# Patient Record
Sex: MALE | Race: BLACK OR AFRICAN AMERICAN | Employment: OTHER | URBAN - METROPOLITAN AREA
[De-identification: names, ages, dates, MRNs, and addresses within clinical notes are randomized per-mention and may not be internally consistent; named-entity substitution may affect disease eponyms.]

---

## 2017-01-17 ENCOUNTER — OFFICE VISIT (OUTPATIENT)
Dept: GASTROENTEROLOGY | Facility: CLINIC | Age: 72
End: 2017-01-17
Attending: PHYSICIAN ASSISTANT
Payer: COMMERCIAL

## 2017-01-17 VITALS
HEIGHT: 68 IN | OXYGEN SATURATION: 100 % | DIASTOLIC BLOOD PRESSURE: 82 MMHG | SYSTOLIC BLOOD PRESSURE: 148 MMHG | WEIGHT: 173 LBS | BODY MASS INDEX: 26.22 KG/M2 | TEMPERATURE: 97.7 F | HEART RATE: 74 BPM

## 2017-01-17 DIAGNOSIS — K74.60 CIRRHOSIS OF LIVER WITHOUT ASCITES, UNSPECIFIED HEPATIC CIRRHOSIS TYPE (H): ICD-10-CM

## 2017-01-17 DIAGNOSIS — R74.8 ALKALINE PHOSPHATASE ELEVATION: ICD-10-CM

## 2017-01-17 DIAGNOSIS — M35.3 PMR (POLYMYALGIA RHEUMATICA) (H): ICD-10-CM

## 2017-01-17 DIAGNOSIS — Z86.19 HISTORY OF HEPATITIS C: ICD-10-CM

## 2017-01-17 DIAGNOSIS — K74.60 CIRRHOSIS OF LIVER WITHOUT ASCITES, UNSPECIFIED HEPATIC CIRRHOSIS TYPE (H): Primary | ICD-10-CM

## 2017-01-17 DIAGNOSIS — N18.30 CKD (CHRONIC KIDNEY DISEASE) STAGE 3, GFR 30-59 ML/MIN (H): ICD-10-CM

## 2017-01-17 LAB
ALBUMIN SERPL-MCNC: 2.9 G/DL (ref 3.4–5)
ALP SERPL-CCNC: 240 U/L (ref 40–150)
ALT SERPL W P-5'-P-CCNC: 27 U/L (ref 0–70)
ANION GAP SERPL CALCULATED.3IONS-SCNC: 7 MMOL/L (ref 3–14)
AST SERPL W P-5'-P-CCNC: 40 U/L (ref 0–45)
BILIRUB DIRECT SERPL-MCNC: 0.4 MG/DL (ref 0–0.2)
BILIRUB SERPL-MCNC: 0.8 MG/DL (ref 0.2–1.3)
BUN SERPL-MCNC: 9 MG/DL (ref 7–30)
CALCIUM SERPL-MCNC: 8.2 MG/DL (ref 8.5–10.1)
CHLORIDE SERPL-SCNC: 106 MMOL/L (ref 94–109)
CO2 SERPL-SCNC: 28 MMOL/L (ref 20–32)
CREAT SERPL-MCNC: 1.15 MG/DL (ref 0.66–1.25)
ERYTHROCYTE [DISTWIDTH] IN BLOOD BY AUTOMATED COUNT: 15.6 % (ref 10–15)
GFR SERPL CREATININE-BSD FRML MDRD: 63 ML/MIN/1.7M2
GLUCOSE SERPL-MCNC: 96 MG/DL (ref 70–99)
HCT VFR BLD AUTO: 37.7 % (ref 40–53)
HGB BLD-MCNC: 12.5 G/DL (ref 13.3–17.7)
INR PPP: 0.98 (ref 0.86–1.14)
MCH RBC QN AUTO: 28.2 PG (ref 26.5–33)
MCHC RBC AUTO-ENTMCNC: 33.2 G/DL (ref 31.5–36.5)
MCV RBC AUTO: 85 FL (ref 78–100)
PLATELET # BLD AUTO: 117 10E9/L (ref 150–450)
POTASSIUM SERPL-SCNC: 4.1 MMOL/L (ref 3.4–5.3)
PROT SERPL-MCNC: 7.9 G/DL (ref 6.8–8.8)
RBC # BLD AUTO: 4.43 10E12/L (ref 4.4–5.9)
SODIUM SERPL-SCNC: 141 MMOL/L (ref 133–144)
WBC # BLD AUTO: 3.9 10E9/L (ref 4–11)

## 2017-01-17 PROCEDURE — 80076 HEPATIC FUNCTION PANEL: CPT | Performed by: PHYSICIAN ASSISTANT

## 2017-01-17 PROCEDURE — 85610 PROTHROMBIN TIME: CPT | Performed by: PHYSICIAN ASSISTANT

## 2017-01-17 PROCEDURE — 80048 BASIC METABOLIC PNL TOTAL CA: CPT | Performed by: PHYSICIAN ASSISTANT

## 2017-01-17 PROCEDURE — 36415 COLL VENOUS BLD VENIPUNCTURE: CPT | Performed by: PHYSICIAN ASSISTANT

## 2017-01-17 PROCEDURE — 99212 OFFICE O/P EST SF 10 MIN: CPT | Mod: ZF

## 2017-01-17 PROCEDURE — 85027 COMPLETE CBC AUTOMATED: CPT | Performed by: PHYSICIAN ASSISTANT

## 2017-01-17 ASSESSMENT — PAIN SCALES - GENERAL: PAINLEVEL: NO PAIN (0)

## 2017-01-17 NOTE — Clinical Note
1/17/2017      RE: Danielle Cook  [address]   [facility] [address]       Division of Gastroenterology, Hepatology and Nutrition Department of Medicine       Assessment Plan  Cirrhosis, without ascites (MR Ramirez 9/10/15)  History of Hepatitis , SVR 4/2016  CKD stage 3  Polymyalgia Rheumatica    Danielle Cook has  Cirrhosis, weill compensated.  His MELD score based on labs today is 8.  He did achieve a sustained virological response with Harvoni 4/2016.    His US from today shows no suspicious lesions or ascites.  He still reports pruritis all over without any relief from  Woody 300 mg bid and sertraline 50 mg qd.  He stopped both medications.  I will look into another option.     He will continue to need HCC surveillance   He will schedule an US, lab and appointment with me in 6 months.  He is up to date on immunizations.  I recommended he take a multivitamin.    Health Maintenance:  Imaging: US 1/17/17.  No suspicious lesions, no ascites.    EGD: 11/4/15:  No varices  Dexa scan:  1/25/16:  Low bone density:  Advised to take calcium and Vit D  Vaccines ( Hep A/B, pneumovax, influenza):  Up to Date  Multivitamin:  Advised to start taking    Thank you for allowing me to participate in the care of this patient.  If you have any questions regarding this visit and plan of care, please do not hesitate to page me at [phone].    Malinda Morse, MS, PA-C  Division of Gastroenterology, Hepatology and Nutrition    HPI  Danielle Cook has  Cirrhosis, weill compensated.  He is here for a follow-up appointment.  He did achieve a sustained virological response with Harvoni for 12 weeks April 2016. He reports these complaints:  chronic HAs and pruritis.  He did try WOODY and sertraline and the patient stopped both on his own as he felt they were not helping.  I had asked him to call Mary Smith RN, so we could try adjusting the dose, but the patient just stopped instead.  He is interested  in trying another medication.   These are his other medical issues:  Patient Active Problem List   Diagnosis     CKD (chronic kidney disease) stage 3, GFR 30-59 ml/min     Hypertension     Persistent headaches     History of hepatitis C     Cirrhosis of liver without ascites, unspecified hepatic cirrhosis type (H)     PMR (polymyalgia rheumatica) (H)   He is negative for hematemesis, hematochezia or melena.      ROS:  10 point review of systems performed.  All pertinent positives noted in the HPI,  otherwise Negative.    Past Medical History   Diagnosis Date     Hypertension      Hepatitis C virus infection      CKD (chronic kidney disease) stage 3, GFR 30-59 ml/min        Current Outpatient Prescriptions   Medication Sig Dispense Refill     sertraline (ZOLOFT) 50 MG tablet Take 1 tablet (50 mg) by mouth daily 30 tablet 3     acetaminophen (TYLENOL) 325 MG tablet Take 1 tablet (325 mg) by mouth every 6 hours as needed for mild pain 100 tablet 3     omeprazole (PRILOSEC) 40 MG capsule Take 1 capsule (40 mg) by mouth daily (with lunch) 60 capsule 2     cholecalciferol (PA VITAMIN D-3) 2000 UNITS CAPS Take 2,000 Units by mouth       calcium carb 1250 mg, 500 mg [place],/vitamin D 200 units (OSCAL WITH D) 500-200 MG-UNIT per tablet Take 1 tablet by mouth 2 times daily (with meals) 90 tablet 3     amLODIPine (NORVASC) 5 MG tablet Take 2 tablets (10 mg) by mouth daily 30 tablet 12     predniSONE (DELTASONE) 10 MG tablet Take 3 tablets (30 mg) by mouth daily 30 tablet 3     senna-docusate (SENOKOT-S;PERICOLACE) 8.6-50 MG per tablet Take 2 tablets by mouth daily as needed for constipation 20 tablet 1     gabapentin (NEURONTIN) 300 MG tablet Take 800 mg by mouth 3 times daily          No Known Allergies    Family History   Problem Relation Age of Onset     Hypertension Father        Social History     Social History     Marital Status:      Spouse Name: N/A     Number of Children: N/A     Years of Education: N/A  "    Social History Main Topics     Smoking status: Never Smoker      Smokeless tobacco: Never Used     Alcohol Use: No     Drug Use: No     Sexual Activity: Not on file     Other Topics Concern     Not on file     Social History Narrative       OBJECTIVE  Vitals: Blood pressure 148/82, pulse 74, temperature 97.7  F (36.5  C), temperature source Oral, height 1.727 m (5' 8\"), weight 78.472 kg (173 lb), SpO2 100 %. There is no weight on file to calculate BMI.  Gen: No acute distress, well nourished  Head: Normocephalic atraumatic  Eyes: Sclera anicteric  Respiratory: Clear to auscultation bilaterally, no overt wheezing or rales  CV: RRR without overt murmur  Abdomen:  Soft, nontender, nondistended, normal bowel sounds  Without appreciable hepatosplenomegaly or masses   Extrem: No edema  Skin: No jaundice  Neuro: Alert and oriented.     MSK: Grossly Intact  Psych: Normal speech, normal affect    Recent Laboratory Test Results  Lab Results   Component Value Date    ALBUMIN 2.9* 01/17/2017    BILITOTAL 0.8 01/17/2017    ALKPHOS 240* 01/17/2017    AST 40 01/17/2017    ALT 27 01/17/2017    WBC 3.9* 01/17/2017    ANEU 7.1 08/02/2016    HGB 12.5* 01/17/2017    * 01/17/2017    INR 0.98 01/17/2017    TRIG 78 08/02/2016    CR 1.15 01/17/2017    TSH 4.36 09/24/2013       Malinda Morse PA-C      "

## 2017-01-17 NOTE — PROGRESS NOTES
Division of Gastroenterology, Hepatology and Nutrition Department of Medicine       Assessment Plan  Cirrhosis, without ascites (MR Ramirez 9/10/15)  History of Hepatitis , SVR 4/2016  CKD stage 3  Polymyalgia Rheumatica    Danielle Cook has  Cirrhosis, weill compensated.  His MELD score based on labs today is 8.  He did achieve a sustained virological response with Harvoni 4/2016.    His US from today shows no suspicious lesions or ascites.  He still reports pruritis all over without any relief from  Woody 300 mg bid and sertraline 50 mg qd.  He stopped both medications.  I will look into another option.     He will continue to need HCC surveillance   He will schedule an US, lab and appointment with me in 6 months.  He is up to date on immunizations.  I recommended he take a multivitamin.    Health Maintenance:  Imaging: US 1/17/17.  No suspicious lesions, no ascites.    EGD: 11/4/15:  No varices  Dexa scan:  1/25/16:  Low bone density:  Advised to take calcium and Vit D  Vaccines ( Hep A/B, pneumovax, influenza):  Up to Date  Multivitamin:  Advised to start taking    Thank you for allowing me to participate in the care of this patient.  If you have any questions regarding this visit and plan of care, please do not hesitate to page me at [phone].    Malinda Morse, MS, PA-C  Division of Gastroenterology, Hepatology and Nutrition    HPI  Danielle Cook has  Cirrhosis, weill compensated.  He is here for a follow-up appointment.  He did achieve a sustained virological response with Harvoni for 12 weeks April 2016. He reports these complaints:  chronic HAs and pruritis.  He did try WOODY and sertraline and the patient stopped both on his own as he felt they were not helping.  I had asked him to call Mary Smith RN, so we could try adjusting the dose, but the patient just stopped instead.  He is interested in trying another medication.   These are his other medical issues:  Patient Active Problem List    Diagnosis     CKD (chronic kidney disease) stage 3, GFR 30-59 ml/min     Hypertension     Persistent headaches     History of hepatitis C     Cirrhosis of liver without ascites, unspecified hepatic cirrhosis type (H)     PMR (polymyalgia rheumatica) (H)   He is negative for hematemesis, hematochezia or melena.      ROS:  10 point review of systems performed.  All pertinent positives noted in the HPI,  otherwise Negative.    Past Medical History   Diagnosis Date     Hypertension      Hepatitis C virus infection      CKD (chronic kidney disease) stage 3, GFR 30-59 ml/min        Current Outpatient Prescriptions   Medication Sig Dispense Refill     sertraline (ZOLOFT) 50 MG tablet Take 1 tablet (50 mg) by mouth daily 30 tablet 3     acetaminophen (TYLENOL) 325 MG tablet Take 1 tablet (325 mg) by mouth every 6 hours as needed for mild pain 100 tablet 3     omeprazole (PRILOSEC) 40 MG capsule Take 1 capsule (40 mg) by mouth daily (with lunch) 60 capsule 2     cholecalciferol (PA VITAMIN D-3) 2000 UNITS CAPS Take 2,000 Units by mouth       calcium carb 1250 mg, 500 mg [place],/vitamin D 200 units (OSCAL WITH D) 500-200 MG-UNIT per tablet Take 1 tablet by mouth 2 times daily (with meals) 90 tablet 3     amLODIPine (NORVASC) 5 MG tablet Take 2 tablets (10 mg) by mouth daily 30 tablet 12     predniSONE (DELTASONE) 10 MG tablet Take 3 tablets (30 mg) by mouth daily 30 tablet 3     senna-docusate (SENOKOT-S;PERICOLACE) 8.6-50 MG per tablet Take 2 tablets by mouth daily as needed for constipation 20 tablet 1     gabapentin (NEURONTIN) 300 MG tablet Take 800 mg by mouth 3 times daily          No Known Allergies    Family History   Problem Relation Age of Onset     Hypertension Father        Social History     Social History     Marital Status:      Spouse Name: N/A     Number of Children: N/A     Years of Education: N/A     Social History Main Topics     Smoking status: Never Smoker      Smokeless tobacco: Never Used      "Alcohol Use: No     Drug Use: No     Sexual Activity: Not on file     Other Topics Concern     Not on file     Social History Narrative       OBJECTIVE  Vitals: Blood pressure 148/82, pulse 74, temperature 97.7  F (36.5  C), temperature source Oral, height 1.727 m (5' 8\"), weight 78.472 kg (173 lb), SpO2 100 %. There is no weight on file to calculate BMI.  Gen: No acute distress, well nourished  Head: Normocephalic atraumatic  Eyes: Sclera anicteric  Respiratory: Clear to auscultation bilaterally, no overt wheezing or rales  CV: RRR without overt murmur  Abdomen:  Soft, nontender, nondistended, normal bowel sounds  Without appreciable hepatosplenomegaly or masses   Extrem: No edema  Skin: No jaundice  Neuro: Alert and oriented.     MSK: Grossly Intact  Psych: Normal speech, normal affect    Recent Laboratory Test Results  Lab Results   Component Value Date    ALBUMIN 2.9* 01/17/2017    BILITOTAL 0.8 01/17/2017    ALKPHOS 240* 01/17/2017    AST 40 01/17/2017    ALT 27 01/17/2017    WBC 3.9* 01/17/2017    ANEU 7.1 08/02/2016    HGB 12.5* 01/17/2017    * 01/17/2017    INR 0.98 01/17/2017    TRIG 78 08/02/2016    CR 1.15 01/17/2017    TSH 4.36 09/24/2013             "

## 2017-01-17 NOTE — NURSING NOTE
Chief Complaint   Patient presents with     RECHECK     Cirrhosis of Liver without ascites   Pt roomed, vitals, meds, and allergies reviewed with pt. Pt ready for provider.  Enrrique Perez, CMA

## 2017-01-17 NOTE — MR AVS SNAPSHOT
After Visit Summary   1/17/2017    Danielle Cook    MRN: [id number]           Patient Information     Date Of Birth          1945        Visit Information        Provider Department      1/17/2017 9:45 AM Malinda Morse PA-C; [employer] [facility] Hepatology        Today's Diagnoses     Cirrhosis of liver without ascites, unspecified hepatic cirrhosis type (H)    -  1     Alkaline phosphatase elevation            Follow-ups after your visit        Follow-up notes from your care team     Return in about 6 months (around 7/17/2017).      Your next 10 appointments already scheduled     Jul 18, 2017  8:45 AM   Lab with  LAB   [facility] Lab ([facility])    [address] [place] [address] [phone]            Jul 18, 2017  9:15 AM   US ABDOMEN COMPLETE with US[address] [place] [address] Imaging Center US ([facility])    [address] [place] [address] [phone]           Please bring a list of your medicines (including vitamins, minerals and over-the-counter drugs). Also, tell your doctor about any allergies you may have. Wear comfortable clothes and leave your valuables at home.  Adults: No eating or drinking for 8 hours before the exam. You may take medicine with a small sip of water.  Children: - Children 6+ years: No food or drink for 6 hours before exam. - Children 1-5 years: No food or drink for 4 hours before exam. - Infants, breast-fed: may have breast milk up to 2 hours before exam. - Infants, formula: may have bottle until 4 hours before exam.  Please call the Imaging Department at your exam site with any questions.            Jul 18, 2017 11:00 AM   (Arrive by 10:45 AM)   Return General Liver with Malinda Morse PA-C   [facility] Hepatology ([facility])    [address] [address] [phone]  "           Dec 15, 2017  8:00 AM   Lab with  LAB    Health Lab ([facility])    [address]  1st Floor  [facility] [address] [phone]            Dec 15, 2017  8:40 AM   (Arrive by 8:10 AM)   Return Visit with Beto Alvarado MD   [facility] Nephrology ([facility])    [address]  [address] [facility] [address] [phone]              Future tests that were ordered for you today     Open Future Orders        Priority Expected Expires Ordered    INR Routine 7/17/2017 9/17/2017 1/17/2017    CBC with platelets Routine 7/17/2017 9/17/2017 1/17/2017    Hepatic panel Routine 7/17/2017 9/17/2017 1/17/2017    Basic metabolic panel Routine 7/17/2017 9/17/2017 1/17/2017    Alkaline phosphatase isoenzymes Routine 7/17/2017 9/17/2017 1/17/2017    US Abdomen Complete Routine 7/17/2017 9/17/2017 1/17/2017            Who to contact     If you have questions or need follow up information about today's clinic visit or your schedule please contact [facility] HEPATOLOGY directly at [phone].  Normal or non-critical lab and imaging results will be communicated to you by [employer], letter or phone within 4 business days after the clinic has received the results. If you do not hear from us within 7 days, please contact the clinic through [employer] or phone. If you have a critical or abnormal lab result, we will notify you by phone as soon as possible.  Submit refill requests through [employer] or call your pharmacy and they will forward the refill request to us. Please allow 3 business days for your refill to be completed.          Additional Information About Your Visit        [employer] Information     [employer] lets you send messages to your doctor, view your test results, renew your prescriptions, schedule appointments and more. To sign up, go to [email or web address] . Click on \"Log in\" on the left side of the screen, which will take you to the Welcome " "page. Then click on \"Sign up Now\" on the right side of the page.     You will be asked to enter the access code listed below, as well as some personal information. Please follow the directions to create your username and password.     Your access code is: [email or web address]  Expires: 2017  6:30 AM     Your access code will  in 90 days. If you need help or a new code, please call your [place] clinic or [phone].        Care EveryWhere ID     This is your Care EveryWhere ID. This could be used by other organizations to access your [place] medical records  [id number]        Your Vitals Were     Pulse Temperature Height BMI (Body Mass Index) Pulse Oximetry       74 97.7  F (36.5  C) (Oral) 1.727 m (5' 8\") 26.31 kg/m2 100%        Blood Pressure from Last 3 Encounters:   17 148/82   16 158/93   10/14/16 135/71    Weight from Last 3 Encounters:   17 78.472 kg (173 lb)   16 77.973 kg (171 lb 14.4 oz)   10/14/16 76.613 kg (168 lb 14.4 oz)               Primary Care Provider Office Phone # Fax #    Eddie Huff [phone]       [facility] [address] [place] [address]        Thank you!     Thank you for choosing [facility] HEPATOLOGY  for your care. Our goal is always to provide you with excellent care. Hearing back from our patients is one way we can continue to improve our services. Please take a few minutes to complete the written survey that you may receive in the mail after your visit with us. Thank you!             Your Updated Medication List - Protect others around you: Learn how to safely use, store and throw away your medicines at www.disposemymeds.org.          This list is accurate as of: 17 10:38 AM.  Always use your most recent med list.                   Brand Name Dispense Instructions for use    acetaminophen 325 MG tablet    TYLENOL    100 tablet    Take 1 tablet (325 mg) by mouth every 6 hours as needed for mild pain       " amLODIPine 5 MG tablet    NORVASC    30 tablet    Take 2 tablets (10 mg) by mouth daily       calcium carb 1250 mg (500 mg [place])/vitamin D 200 units 500-200 MG-UNIT per tablet    OSCAL with D    90 tablet    Take 1 tablet by mouth 2 times daily (with meals)       gabapentin 300 MG tablet    NEURONTIN     Take 600 mg by mouth 3 times daily       omeprazole 40 MG capsule    priLOSEC    60 capsule    Take 1 capsule (40 mg) by mouth daily (with lunch)

## 2017-07-06 DIAGNOSIS — K74.60 CIRRHOSIS OF LIVER WITHOUT ASCITES, UNSPECIFIED HEPATIC CIRRHOSIS TYPE (H): Primary | ICD-10-CM

## 2017-07-18 ENCOUNTER — OFFICE VISIT (OUTPATIENT)
Dept: GASTROENTEROLOGY | Facility: CLINIC | Age: 72
End: 2017-07-18
Attending: INTERNAL MEDICINE
Payer: COMMERCIAL

## 2017-07-18 VITALS
RESPIRATION RATE: 20 BRPM | OXYGEN SATURATION: 100 % | WEIGHT: 166.2 LBS | TEMPERATURE: 97.5 F | DIASTOLIC BLOOD PRESSURE: 75 MMHG | BODY MASS INDEX: 25.19 KG/M2 | HEART RATE: 67 BPM | HEIGHT: 68 IN | SYSTOLIC BLOOD PRESSURE: 136 MMHG

## 2017-07-18 DIAGNOSIS — K74.69 COMPENSATED HCV CIRRHOSIS (H): ICD-10-CM

## 2017-07-18 DIAGNOSIS — R74.8 ALKALINE PHOSPHATASE ELEVATION: ICD-10-CM

## 2017-07-18 DIAGNOSIS — B19.20 COMPENSATED HCV CIRRHOSIS (H): ICD-10-CM

## 2017-07-18 DIAGNOSIS — K74.60 CIRRHOSIS OF LIVER WITHOUT ASCITES, UNSPECIFIED HEPATIC CIRRHOSIS TYPE (H): ICD-10-CM

## 2017-07-18 DIAGNOSIS — D50.8 OTHER IRON DEFICIENCY ANEMIA: Primary | ICD-10-CM

## 2017-07-18 LAB
ALBUMIN SERPL-MCNC: 2.8 G/DL (ref 3.4–5)
ALP SERPL-CCNC: 270 U/L (ref 40–150)
ALT SERPL W P-5'-P-CCNC: 25 U/L (ref 0–70)
ANION GAP SERPL CALCULATED.3IONS-SCNC: 8 MMOL/L (ref 3–14)
AST SERPL W P-5'-P-CCNC: 33 U/L (ref 0–45)
BILIRUB DIRECT SERPL-MCNC: 0.5 MG/DL (ref 0–0.2)
BILIRUB SERPL-MCNC: 0.9 MG/DL (ref 0.2–1.3)
BUN SERPL-MCNC: 14 MG/DL (ref 7–30)
CALCIUM SERPL-MCNC: 8.1 MG/DL (ref 8.5–10.1)
CHLORIDE SERPL-SCNC: 108 MMOL/L (ref 94–109)
CO2 SERPL-SCNC: 24 MMOL/L (ref 20–32)
CREAT SERPL-MCNC: 1.24 MG/DL (ref 0.66–1.25)
ERYTHROCYTE [DISTWIDTH] IN BLOOD BY AUTOMATED COUNT: 15.7 % (ref 10–15)
GFR SERPL CREATININE-BSD FRML MDRD: 57 ML/MIN/1.7M2
GLUCOSE SERPL-MCNC: 94 MG/DL (ref 70–99)
HCT VFR BLD AUTO: 34 % (ref 40–53)
HGB BLD-MCNC: 11.1 G/DL (ref 13.3–17.7)
INR PPP: 1.1 (ref 0.86–1.14)
MCH RBC QN AUTO: 27.3 PG (ref 26.5–33)
MCHC RBC AUTO-ENTMCNC: 32.6 G/DL (ref 31.5–36.5)
MCV RBC AUTO: 84 FL (ref 78–100)
PLATELET # BLD AUTO: 142 10E9/L (ref 150–450)
POTASSIUM SERPL-SCNC: 3.9 MMOL/L (ref 3.4–5.3)
PROT SERPL-MCNC: 7.7 G/DL (ref 6.8–8.8)
RBC # BLD AUTO: 4.07 10E12/L (ref 4.4–5.9)
SODIUM SERPL-SCNC: 140 MMOL/L (ref 133–144)
WBC # BLD AUTO: 4.7 10E9/L (ref 4–11)

## 2017-07-18 PROCEDURE — 99212 OFFICE O/P EST SF 10 MIN: CPT

## 2017-07-18 RX ORDER — FERROUS SULFATE 325(65) MG
1 TABLET ORAL
Qty: 100 TABLET | Refills: 1 | Status: SHIPPED | OUTPATIENT
Start: 2017-07-18 | End: 2018-04-05

## 2017-07-18 ASSESSMENT — PAIN SCALES - GENERAL: PAINLEVEL: NO PAIN (0)

## 2017-07-18 NOTE — PROGRESS NOTES
Hepatology Clinic note  Danielle Cook   Date of Birth 1945  Date of Service 7/18/2017         Impression/plan:   Danielle Cook is a 72 year old Citizen of [place] man with h/o CKD 3, HTN,  cured HCV, complicated by cirrhosis, previously followed by Malinda Morse. Presenting for follow up.     His liver disease is well compensated with a MELD-Na of 9.   He doesn't have ascites, HE, or EV.   encouraged ongoing adherence with high protein intake and low sodium diet.   - will be due for repeat imaging in 6 months for HCC screening  - due for repeat EGD later this year, last one in 11/2017 without EV.     DERIAN:   Suggested repeat EGD and getting a colonoscopy. He's interested in iron supplementation, and improved nutrition before reassessing in few months. If still with anemia, and/or no response or inappropriate response then will recommend EGD/colonoscopy     Labs alone in 3 months, RTC in 6 months or sooner as needed.     Nataly Darnell MD  [facility] Hepatology clinic    -----------------------------------------------------       HPI:   Danielle Cook is a 72 year old male with cured HCV, complicated by cirrhosis, previously followed by Malinda Morse. He presents with his wife for follow up.     Please refer to prior clinic note for details of initial presentation.     Overall, he feels well and denies acute problems. He notes stable energy, appetite and weight.   Otherwise, denies chest pain, shortness of breath, abdominal pain, nausea, vomiting fevers, chills, bleeding, jaundice, confusion, falls, rash, pruritis, leg swelling or abdominal distention. Has stable bowel habits.         Past Medical History:     Past Medical History:   Diagnosis Date     CKD (chronic kidney disease) stage 3, GFR 30-59 ml/min      Hepatitis C virus infection      Hypertension             Past Surgical History:     Past Surgical History:   Procedure Laterality Date     ESOPHAGOSCOPY, GASTROSCOPY,  "DUODENOSCOPY (EGD), COMBINED N/A 11/4/2015    Procedure: COMBINED ESOPHAGOSCOPY, GASTROSCOPY, DUODENOSCOPY (EGD), BIOPSY SINGLE OR MULTIPLE;  Surgeon: Inocente Do MD;  Location:  GI            Medications:     Current Outpatient Prescriptions   Medication     acetaminophen (TYLENOL) 325 MG tablet     omeprazole (PRILOSEC) 40 MG capsule     calcium carb 1250 mg, 500 mg [place],/vitamin D 200 units (OSCAL WITH D) 500-200 MG-UNIT per tablet     amLODIPine (NORVASC) 5 MG tablet     gabapentin (NEURONTIN) 300 MG tablet     No current facility-administered medications for this visit.             Allergies:   No Known Allergies         Social History:     Social History     Social History     Marital status:      Spouse name: N/A     Number of children: N/A     Years of education: N/A     Occupational History     Not on file.     Social History Main Topics     Smoking status: Never Smoker     Smokeless tobacco: Never Used     Alcohol use No     Drug use: No     Sexual activity: Not on file     Other Topics Concern     Not on file     Social History Narrative            Family History:     Family History   Problem Relation Age of Onset     Hypertension Father               Review of Systems:   10 points ROS was obtained and highlighted in the HPI, otherwise negative.          Physical Exam:   VS:  /75  Pulse 67  Temp 97.5  F (36.4  C) (Oral)  Resp 20  Ht 1.727 m (5' 8\")  Wt 75.4 kg (166 lb 3.2 oz)  SpO2 100%  BMI 25.27 kg/m2      Gen: A&Ox3, NAD  HEENT: non-icteric, oropharynx clear  CV: RRR  Lung: CTAB  Abd: soft, NT, ND, spleen is enlarged, liver is not palpable.   Ext: no edema, intact pulses.   Skin: 2stigmata of chronic liver disease.   Neuro: no focal deficits, grossly intact, no asterixis   Psych: appropriate mood and affects         Data:   Reviewed in person and significant for:  Lab Results   Component Value Date    WBC 4.7 07/18/2017     Lab Results   Component Value Date    RBC 4.07 " 07/18/2017     Lab Results   Component Value Date    HGB 11.1 07/18/2017     Lab Results   Component Value Date    HCT 34.0 07/18/2017     No components found for: MCT  Lab Results   Component Value Date    MCV 84 07/18/2017     Lab Results   Component Value Date    MCH 27.3 07/18/2017     Lab Results   Component Value Date    MCHC 32.6 07/18/2017     Lab Results   Component Value Date    RDW 15.7 07/18/2017     Lab Results   Component Value Date     07/18/2017     Last Basic Metabolic Panel:  Lab Results   Component Value Date     07/18/2017      Lab Results   Component Value Date    POTASSIUM 3.9 07/18/2017     Lab Results   Component Value Date    CHLORIDE 108 07/18/2017     Lab Results   Component Value Date    ABDIRASHID 8.1 07/18/2017     Lab Results   Component Value Date    CO2 24 07/18/2017     Lab Results   Component Value Date    BUN 14 07/18/2017     Lab Results   Component Value Date    CR 1.24 07/18/2017     Lab Results   Component Value Date    GLC 94 07/18/2017     Liver Function Studies -   Recent Labs   Lab Test  07/18/17   0811   PROTTOTAL  7.7   ALBUMIN  2.8*   BILITOTAL  0.9   ALKPHOS  270*   AST  33   ALT  25

## 2017-07-18 NOTE — LETTER
7/18/2017       RE: Danielle Cook  [address]   [facility] [address]     Dear Colleague,    Thank you for referring your patient, Dainelle Cook, to the [facility] HEPATOLOGY at [facility]. Please see a copy of my visit note below.    Hepatology Clinic note  Danielle Cook   Date of Birth 1945  Date of Service 7/18/2017         Impression/plan:   Danielle Cook is a 72 year old [place]ian man with h/o CKD 3, HTN,  cured HCV, complicated by cirrhosis, previously followed by Malinda Morse. Presenting for follow up.     His liver disease is well compensated with a MELD-Na of 9.   He doesn't have ascites, HE, or EV.   encouraged ongoing adherence with high protein intake and low sodium diet.   - will be due for repeat imaging in 6 months for HCC screening  - due for repeat EGD later this year, last one in 11/2017 without EV.     DERIAN:   Suggested repeat EGD and getting a colonoscopy. He's interested in iron supplementation, and improved nutrition before reassessing in few months. If still with anemia, and/or no response or inappropriate response then will recommend EGD/colonoscopy     Labs alone in 3 months, RTC in 6 months or sooner as needed.     Nataly Darnell MD  [facility]    -----------------------------------------------------       HPI:   Danielle Cook is a 72 year old male with cured HCV, complicated by cirrhosis, previously followed by Malinda Morse. He presents with his wife for follow up.     Please refer to prior clinic note for details of initial presentation.     Overall, he feels well and denies acute problems. He notes stable energy, appetite and weight.   Otherwise, denies chest pain, shortness of breath, abdominal pain, nausea, vomiting fevers, chills, bleeding, jaundice, confusion, falls, rash, pruritis, leg swelling or abdominal distention. Has stable bowel habits.          "Past Medical History:     Past Medical History:   Diagnosis Date     CKD (chronic kidney disease) stage 3, GFR 30-59 ml/min      Hepatitis C virus infection      Hypertension             Past Surgical History:     Past Surgical History:   Procedure Laterality Date     ESOPHAGOSCOPY, GASTROSCOPY, DUODENOSCOPY (EGD), COMBINED N/A 11/4/2015    Procedure: COMBINED ESOPHAGOSCOPY, GASTROSCOPY, DUODENOSCOPY (EGD), BIOPSY SINGLE OR MULTIPLE;  Surgeon: Inocente Do MD;  Location:  GI            Medications:     Current Outpatient Prescriptions   Medication     acetaminophen (TYLENOL) 325 MG tablet     omeprazole (PRILOSEC) 40 MG capsule     calcium carb 1250 mg, 500 mg [place],/vitamin D 200 units (OSCAL WITH D) 500-200 MG-UNIT per tablet     amLODIPine (NORVASC) 5 MG tablet     gabapentin (NEURONTIN) 300 MG tablet     No current facility-administered medications for this visit.             Allergies:   No Known Allergies         Social History:     Social History     Social History     Marital status:      Spouse name: N/A     Number of children: N/A     Years of education: N/A     Occupational History     Not on file.     Social History Main Topics     Smoking status: Never Smoker     Smokeless tobacco: Never Used     Alcohol use No     Drug use: No     Sexual activity: Not on file     Other Topics Concern     Not on file     Social History Narrative            Family History:     Family History   Problem Relation Age of Onset     Hypertension Father               Review of Systems:   10 points ROS was obtained and highlighted in the HPI, otherwise negative.          Physical Exam:   VS:  /75  Pulse 67  Temp 97.5  F (36.4  C) (Oral)  Resp 20  Ht 1.727 m (5' 8\")  Wt 75.4 kg (166 lb 3.2 oz)  SpO2 100%  BMI 25.27 kg/m2      Gen: A&Ox3, NAD  HEENT: non-icteric, oropharynx clear  CV: RRR  Lung: CTAB  Abd: soft, NT, ND, spleen is enlarged, liver is not palpable.   Ext: no edema, intact pulses.   Skin: " 2stigmata of chronic liver disease.   Neuro: no focal deficits, grossly intact, no asterixis   Psych: appropriate mood and affects         Data:   Reviewed in person and significant for:  Lab Results   Component Value Date    WBC 4.7 07/18/2017     Lab Results   Component Value Date    RBC 4.07 07/18/2017     Lab Results   Component Value Date    HGB 11.1 07/18/2017     Lab Results   Component Value Date    HCT 34.0 07/18/2017     No components found for: MCT  Lab Results   Component Value Date    MCV 84 07/18/2017     Lab Results   Component Value Date    MCH 27.3 07/18/2017     Lab Results   Component Value Date    MCHC 32.6 07/18/2017     Lab Results   Component Value Date    RDW 15.7 07/18/2017     Lab Results   Component Value Date     07/18/2017     Last Basic Metabolic Panel:  Lab Results   Component Value Date     07/18/2017      Lab Results   Component Value Date    POTASSIUM 3.9 07/18/2017     Lab Results   Component Value Date    CHLORIDE 108 07/18/2017     Lab Results   Component Value Date    ABDIRASHID 8.1 07/18/2017     Lab Results   Component Value Date    CO2 24 07/18/2017     Lab Results   Component Value Date    BUN 14 07/18/2017     Lab Results   Component Value Date    CR 1.24 07/18/2017     Lab Results   Component Value Date    GLC 94 07/18/2017     Liver Function Studies -   Recent Labs   Lab Test  07/18/17   0811   PROTTOTAL  7.7   ALBUMIN  2.8*   BILITOTAL  0.9   ALKPHOS  270*   AST  33   ALT  25       Again, thank you for allowing me to participate in the care of your patient.      Sincerely,    Nataly Darnell MD

## 2017-07-18 NOTE — NURSING NOTE
"Chief Complaint   Patient presents with     Allied Health Visit     hep c       Initial /75  Pulse 67  Temp 97.5  F (36.4  C) (Oral)  Resp 20  Ht 1.727 m (5' 8\")  Wt 75.4 kg (166 lb 3.2 oz)  SpO2 100%  BMI 25.27 kg/m2 Estimated body mass index is 25.27 kg/(m^2) as calculated from the following:    Height as of this encounter: 1.727 m (5' 8\").    Weight as of this encounter: 75.4 kg (166 lb 3.2 oz).  Medication Reconciliation: complete    "

## 2017-07-18 NOTE — MR AVS SNAPSHOT
After Visit Summary   7/18/2017    Danielle Cook    MRN: [id number]           Patient Information     Date Of Birth          1945        Visit Information        Provider Department      7/18/2017 10:45 AM Nataly Darnell MD; [employer] [facility]at[facility]        Today's Diagnoses     Other iron deficiency anemia    -  1    Compensated HCV cirrhosis (H)           Follow-ups after your visit        Your next 10 appointments already scheduled     Oct 18, 2017 12:00 PM CDT   LAB with  LAB   [facility] ([facility])    [address] [place] [id number]   [phone]           Patient must bring picture ID.  Patient should be prepared to give a urine specimen  Please do not eat 10-12 hours before your appointment if you are coming in fasting for labs on lipids, cholesterol, or glucose (sugar).  Pregnant women should follow their Care Team instructions. Water with medications is okay. Do not drink coffee or other fluids.   If you have concerns about taking  your medications, please ask at office or if scheduling via [employer], send a message by clicking on Secure Messaging, Message Your Care Team.            Dec 15, 2017  8:00 AM CST   Lab with  LAB   [facility] ([facility])    [address] [place] [phone]   [phone]            Dec 15, 2017  8:40 AM CST   (Arrive by 8:10 AM)   Return Visit with Beto Alvarado MD   [facility] ([facility])    [address] [place] [id number]   [phone]            Jan 22, 2018  9:00 AM CST   Lab with  LAB   [facility] ([facility])    [address] [place] [id number]   [phone]            Jan 22, 2018  9:15 AM CST   US ABDOMEN/PELVIS DUPLEX COMPLETE with [id number]   [facility] ([facility]  [place])    [address] [id number] [phone]           Please bring a list of your medicines (including vitamins, minerals and over-the-counter drugs). Also, tell your doctor about any allergies you may have. Wear comfortable clothes and leave your valuables at home.  Adults: No eating or drinking for 8 hours before the exam. You may take medicine with a small sip of water.  Children: - Children 6+ years: No food or drink for 6 hours before exam. - Children 1-5 years: No food or drink for 4 hours before exam. - Infants, breast-fed: may have breast milk up to 2 hours before exam. - Infants, formula: may have bottle until 4 hours before exam.  Please call the Imaging Department at your exam site with any questions.            Jan 22, 2018 10:30 AM CST   (Arrive by 10:15 AM)   Return General Liver with Nataly Darnell MD   [facility] ([facility])    [address] [place] [id number] [phone]              Future tests that were ordered for you today     Open Future Orders        Priority Expected Expires Ordered    Basic metabolic panel Routine 1/8/2018 6/18/2018 7/18/2017    Hepatic panel Routine 1/8/2018 6/18/2018 7/18/2017    INR Routine 1/8/2018 6/18/2018 7/18/2017    US Abdomen/Pelvis Duplex Complete Routine 1/8/2018 6/18/2018 7/18/2017    CBC with platelets Routine 1/8/2018 6/18/2018 7/18/2017    CBC with platelets Routine 10/18/2017 6/18/2018 7/18/2017    IRON Routine 10/18/2017 6/18/2018 7/18/2017    IRON AND IRON BINDING CAPACITY Routine 10/18/2017 6/18/2018 7/18/2017    FERRITIN Routine 10/18/2017 6/18/2018 7/18/2017    Hepatic panel Routine 10/18/2017 6/18/2018 7/18/2017            Who to contact     If you have questions or need follow up information about today's clinic visit or your schedule please contact [place] directly at [phone].  Normal or non-critical lab and imaging results will  "be communicated to you by [employer], letter or phone within 4 business days after the clinic has received the results. If you do not hear from us within 7 days, please contact the clinic through [employer] or phone. If you have a critical or abnormal lab result, we will notify you by phone as soon as possible.  Submit refill requests through [employer] or call your pharmacy and they will forward the refill request to us. Please allow 3 business days for your refill to be completed.          Additional Information About Your Visit        [employer] Information     [employer] lets you send messages to your doctor, view your test results, renew your prescriptions, schedule appointments and more. To sign up, go to [email or web address] . Click on \"Log in\" on the left side of the screen, which will take you to the Welcome page. Then click on \"Sign up Now\" on the right side of the page.     You will be asked to enter the access code listed below, as well as some personal information. Please follow the directions to create your username and password.     Your access code is: [email or web address]  Expires: 10/2/2017  6:30 AM     Your access code will  in 90 days. If you need help or a new code, please call your [facility] clinic or [phone].        Care EveryWhere ID     This is your Care EveryWhere ID. This could be used by other organizations to access your [facility] medical records  [id number]        Your Vitals Were     Pulse Temperature Respirations Height Pulse Oximetry BMI (Body Mass Index)    67 97.5  F (36.4  C) (Oral) 20 1.727 m (5' 8\") 100% 25.27 kg/m2       Blood Pressure from Last 3 Encounters:   17 136/75   17 148/82   16 (!) 158/93    Weight from Last 3 Encounters:   17 75.4 kg (166 lb 3.2 oz)   17 78.5 kg (173 lb)   16 78 kg (171 lb 14.4 oz)                 Today's Medication Changes          These changes are accurate as of: 17 11:48 AM.  If you have any questions, ask your " nurse or doctor.               Start taking these medicines.        Dose/Directions    ferrous sulfate 325 (65 FE) MG tablet   Commonly known as:  IRON   Used for:  Other iron deficiency anemia   Started by:  Nataly Darnell MD        Dose:  1 tablet   Take 1 tablet (325 mg) by mouth daily (with breakfast)   Quantity:  100 tablet   Refills:  1            Where to get your medicines      These medications were sent to [facility] - [place], [place] - [address] [facility]  [address], [facility] [address]     Phone:  [phone]     ferrous sulfate 325 (65 FE) MG tablet                Primary Care Provider Office Phone # Fax #    Eddie Huff [phone]       [facility] [address] [place] [address]        Equal Access to Services     ISA SORIANO : Hadii janette boogie hadasho Soomaali, waaxda luqadaha, qaybta kaalmada adeegyada, waxcurt acosta. So [facility] [phone].    ATENCIÓN: Si habla español, tiene a whitehead disposición servicios gratuitos de asistencia lingüística. Llame al [phone].    We comply with applicable federal civil rights laws and [place] laws. We do not discriminate on the basis of race, color, national origin, age, disability sex, sexual orientation or gender identity.            Thank you!     Thank you for choosing [facility]  for your care. Our goal is always to provide you with excellent care. Hearing back from our patients is one way we can continue to improve our services. Please take a few minutes to complete the written survey that you may receive in the mail after your visit with us. Thank you!             Your Updated Medication List - Protect others around you: Learn how to safely use, store and throw away your medicines at www.disposemymeds.org.          This list is accurate as of: 7/18/17 11:48 AM.  Always use your most recent med list.                   Brand Name Dispense Instructions for use  Diagnosis    acetaminophen 325 MG tablet    TYLENOL    100 tablet    Take 1 tablet (325 mg) by mouth every 6 hours as needed for mild pain    Other chronic pain       amLODIPine 5 MG tablet    NORVASC    30 tablet    Take 2 tablets (10 mg) by mouth daily    Essential hypertension with goal blood pressure less than 140/90       calcium carb 1250 mg (500 mg [place])/vitamin D 200 units 500-200 MG-UNIT per tablet    OSCAL with D    90 tablet    Take 1 tablet by mouth 2 times daily (with meals)    PMR (polymyalgia rheumatica) (H)       ferrous sulfate 325 (65 FE) MG tablet    IRON    100 tablet    Take 1 tablet (325 mg) by mouth daily (with breakfast)    Other iron deficiency anemia       gabapentin 300 MG tablet    NEURONTIN     Take 600 mg by mouth 3 times daily    Chronic hepatitis C (H), Renal insufficiency       omeprazole 40 MG capsule    priLOSEC    60 capsule    Take 1 capsule (40 mg) by mouth daily (with lunch)    Esophagitis, reflux

## 2017-07-19 LAB
ALP BONE CFR SERPL: 53 %
ALP LIVER SERPL-CCNC: 201 U/L
ALP OTHER CFR SERPL: 0 %
ALP SERPL-CCNC: 254 U/L

## 2017-10-18 DIAGNOSIS — D50.8 OTHER IRON DEFICIENCY ANEMIA: ICD-10-CM

## 2017-10-18 LAB
ALBUMIN SERPL-MCNC: 2.9 G/DL (ref 3.4–5)
ALP SERPL-CCNC: 317 U/L (ref 40–150)
ALT SERPL W P-5'-P-CCNC: 28 U/L (ref 0–70)
AST SERPL W P-5'-P-CCNC: 34 U/L (ref 0–45)
BILIRUB DIRECT SERPL-MCNC: 0.6 MG/DL (ref 0–0.2)
BILIRUB SERPL-MCNC: 1.2 MG/DL (ref 0.2–1.3)
ERYTHROCYTE [DISTWIDTH] IN BLOOD BY AUTOMATED COUNT: 16.3 % (ref 10–15)
FERRITIN SERPL-MCNC: 13 NG/ML (ref 26–388)
HCT VFR BLD AUTO: 35.7 % (ref 40–53)
HGB BLD-MCNC: 11.4 G/DL (ref 13.3–17.7)
IRON SATN MFR SERPL: 11 % (ref 15–46)
IRON SERPL-MCNC: 42 UG/DL (ref 35–180)
MCH RBC QN AUTO: 27.2 PG (ref 26.5–33)
MCHC RBC AUTO-ENTMCNC: 31.9 G/DL (ref 31.5–36.5)
MCV RBC AUTO: 85 FL (ref 78–100)
PLATELET # BLD AUTO: 134 10E9/L (ref 150–450)
PROT SERPL-MCNC: 8 G/DL (ref 6.8–8.8)
RBC # BLD AUTO: 4.19 10E12/L (ref 4.4–5.9)
TIBC SERPL-MCNC: 374 UG/DL (ref 240–430)
WBC # BLD AUTO: 5.4 10E9/L (ref 4–11)

## 2017-11-10 DIAGNOSIS — D50.9 ANEMIA, IRON DEFICIENCY: Primary | ICD-10-CM

## 2017-11-13 ENCOUNTER — TELEPHONE (OUTPATIENT)
Dept: GASTROENTEROLOGY | Facility: CLINIC | Age: 72
End: 2017-11-13

## 2017-11-13 NOTE — TELEPHONE ENCOUNTER
Contacted patient via [employer]  to schedule colonoscopy and EGD as recommended by Dr. Darnell. Explained to patient that procedures were ordered to see why he was anemic.   Patient wants to speak with his provider first and then will call us if he wishes to schedule.    Call back number provided.  Anabell Tariq RN

## 2017-11-15 ENCOUNTER — TELEPHONE (OUTPATIENT)
Dept: GASTROENTEROLOGY | Facility: CLINIC | Age: 72
End: 2017-11-15

## 2017-11-15 NOTE — TELEPHONE ENCOUNTER
Orders per Dr. Darnell: Iron panel and Hemoglobin are still low and not significantly different from before.   I therefore, suggest he gets EGD and colonoscopy for iron deficiency anemia, as we discussed during our last clinic visit.  Contacted interpretor Rhina #[phone] and discussed with pt Dr. Darnell's recommendations. Pt prefers to wait to schedule until seen in January to discuss tests. KATJAI.

## 2017-12-12 DIAGNOSIS — N18.30 CKD (CHRONIC KIDNEY DISEASE) STAGE 3, GFR 30-59 ML/MIN (H): Primary | ICD-10-CM

## 2017-12-15 ENCOUNTER — OFFICE VISIT (OUTPATIENT)
Dept: NEPHROLOGY | Facility: CLINIC | Age: 72
End: 2017-12-15
Attending: INTERNAL MEDICINE
Payer: COMMERCIAL

## 2017-12-15 VITALS
HEART RATE: 82 BPM | WEIGHT: 166 LBS | BODY MASS INDEX: 25.16 KG/M2 | SYSTOLIC BLOOD PRESSURE: 130 MMHG | HEIGHT: 68 IN | TEMPERATURE: 98.2 F | OXYGEN SATURATION: 98 % | DIASTOLIC BLOOD PRESSURE: 76 MMHG

## 2017-12-15 DIAGNOSIS — N18.30 CKD (CHRONIC KIDNEY DISEASE) STAGE 3, GFR 30-59 ML/MIN (H): Primary | ICD-10-CM

## 2017-12-15 DIAGNOSIS — I10 HYPERTENSION, UNSPECIFIED TYPE: ICD-10-CM

## 2017-12-15 DIAGNOSIS — N18.30 CKD (CHRONIC KIDNEY DISEASE) STAGE 3, GFR 30-59 ML/MIN (H): ICD-10-CM

## 2017-12-15 LAB
ALBUMIN SERPL-MCNC: 2.8 G/DL (ref 3.4–5)
ANION GAP SERPL CALCULATED.3IONS-SCNC: 7 MMOL/L (ref 3–14)
BUN SERPL-MCNC: 12 MG/DL (ref 7–30)
CALCIUM SERPL-MCNC: 8.1 MG/DL (ref 8.5–10.1)
CHLORIDE SERPL-SCNC: 106 MMOL/L (ref 94–109)
CO2 SERPL-SCNC: 26 MMOL/L (ref 20–32)
CREAT SERPL-MCNC: 1.35 MG/DL (ref 0.66–1.25)
CREAT UR-MCNC: 239 MG/DL
ERYTHROCYTE [DISTWIDTH] IN BLOOD BY AUTOMATED COUNT: 15.3 % (ref 10–15)
GFR SERPL CREATININE-BSD FRML MDRD: 52 ML/MIN/1.7M2
GLUCOSE SERPL-MCNC: 99 MG/DL (ref 70–99)
HCT VFR BLD AUTO: 35.3 % (ref 40–53)
HGB BLD-MCNC: 11.2 G/DL (ref 13.3–17.7)
MCH RBC QN AUTO: 26.9 PG (ref 26.5–33)
MCHC RBC AUTO-ENTMCNC: 31.7 G/DL (ref 31.5–36.5)
MCV RBC AUTO: 85 FL (ref 78–100)
PHOSPHATE SERPL-MCNC: 2.9 MG/DL (ref 2.5–4.5)
PLATELET # BLD AUTO: 157 10E9/L (ref 150–450)
POTASSIUM SERPL-SCNC: 3.7 MMOL/L (ref 3.4–5.3)
PROT UR-MCNC: 0.24 G/L
PROT/CREAT 24H UR: 0.1 G/G CR (ref 0–0.2)
RBC # BLD AUTO: 4.17 10E12/L (ref 4.4–5.9)
SODIUM SERPL-SCNC: 139 MMOL/L (ref 133–144)
WBC # BLD AUTO: 5.2 10E9/L (ref 4–11)

## 2017-12-15 PROCEDURE — 84156 ASSAY OF PROTEIN URINE: CPT | Performed by: INTERNAL MEDICINE

## 2017-12-15 PROCEDURE — 99213 OFFICE O/P EST LOW 20 MIN: CPT | Mod: ZF

## 2017-12-15 PROCEDURE — 36415 COLL VENOUS BLD VENIPUNCTURE: CPT | Performed by: INTERNAL MEDICINE

## 2017-12-15 PROCEDURE — 80069 RENAL FUNCTION PANEL: CPT | Performed by: INTERNAL MEDICINE

## 2017-12-15 PROCEDURE — 85027 COMPLETE CBC AUTOMATED: CPT | Performed by: INTERNAL MEDICINE

## 2017-12-15 ASSESSMENT — PAIN SCALES - GENERAL: PAINLEVEL: NO PAIN (0)

## 2017-12-15 NOTE — LETTER
12/15/2017      RE: Danielle Coko  [address]   [facility] [address]       Nephrology Clinic    ASSESSMENT AND RECOMMENDATIONS:     1.CKD: creatinine baseline 1.3-1.4 mg/dl since 2013 with eGFR 60s. Etiology is likely renovascular disease due to longstanding HTN.    Renal function remains stable when evaluated over last 4-5 years. Does have moderate amount of variation, however.    2. HTN: BP goal is 140/90 OR LESS.Now at goal.    3.Electrolytes: hypokalemia resolved K 3.7.     Follow up in 12 months       REASON FOR VISIT: CKD follow up     HISTORY OF PRESENT ILLNESS:    Danielle Cook is a 71 year old male who presents for CKD follow up. He has been doing well since his last visit. Does complain of mild worsening of chronic headaches that he has had for at least 6 years. Pattern is suggestive of muscle tension HA and is relieved partially by pain medications. Occurs once or twice a week or less. Has been referred to neurologyOtherwise feels well. No edema.      PAST MEDICAL HISTORY:  Past Medical History:   Diagnosis Date     CKD (chronic kidney disease) stage 3, GFR 30-59 ml/min      Hepatitis C virus infection      Hypertension      PAST SURGICAL HISTORY:  Past Surgical History:   Procedure Laterality Date     ESOPHAGOSCOPY, GASTROSCOPY, DUODENOSCOPY (EGD), COMBINED N/A 11/4/2015    Procedure: COMBINED ESOPHAGOSCOPY, GASTROSCOPY, DUODENOSCOPY (EGD), BIOPSY SINGLE OR MULTIPLE;  Surgeon: Inocente Do MD;  Location:  GI     MEDICATIONS:  Prescription Medications as of 12/15/2017             ferrous sulfate (IRON) 325 (65 FE) MG tablet Take 1 tablet (325 mg) by mouth daily (with breakfast)    acetaminophen (TYLENOL) 325 MG tablet Take 1 tablet (325 mg) by mouth every 6 hours as needed for mild pain    omeprazole (PRILOSEC) 40 MG capsule Take 1 capsule (40 mg) by mouth daily (with lunch)    calcium carb 1250 mg, 500 mg [place],/vitamin D 200 units (OSCAL WITH D) 500-200 MG-UNIT per  "tablet Take 1 tablet by mouth 2 times daily (with meals)    amLODIPine (NORVASC) 5 MG tablet Take 2 tablets (10 mg) by mouth daily    gabapentin (NEURONTIN) 300 MG tablet Take 600 mg by mouth 3 times daily          ALLERGIES:    No Known Allergies  REVIEW OF SYSTEMS:  A comprehensive review of systems was performed and found to be negative except as described here or above.   SOCIAL HISTORY:   Social History     Social History     Marital status:      Spouse name: N/A     Number of children: N/A     Years of education: N/A     Occupational History     Not on file.     Social History Main Topics     Smoking status: Never Smoker     Smokeless tobacco: Never Used     Alcohol use No     Drug use: No     Sexual activity: Not on file     Other Topics Concern     Not on file     Social History Narrative     FAMILY MEDICAL HISTORY:   Family History   Problem Relation Age of Onset     Hypertension Father      PHYSICAL EXAM:     /76  Pulse 82  Temp 98.2  F (36.8  C) (Oral)  Ht 1.727 m (5' 8\")  Wt 75.3 kg (166 lb)  SpO2 98%  BMI 25.24 kg/m2  GENERAL APPEARANCE: alert and no distress, thin, healthy appearing  Head: no tenderness over scalp or paraspinal mm.  EYES: nonicteric  NECK: supple, no adenopathy  RESP: lungs clear to auscultation   CV: regular rhythm, normal rate, no rub  ABDOMEN: soft, nontender, normal bowel sounds  Extremities : no edema  MS:  no muscle tenderness  SKIN: no rash  NEURO: mentation and speech normal   PSYCH: affect normal/bright     LABS:     Recent Labs   Lab Test  12/15/17   0738  10/18/17   1209  07/18/17   0811  01/17/17   0821  12/16/16   0842   07/19/16   1522   10/06/15   1027   12/12/14   0826   NA  139   --   140  141  142   < >  137   < >  140   < >  138   POTASSIUM  3.7   --   3.9  4.1  4.5   < >  4.2   < >  3.8   < >  3.1*   CHLORIDE  106   --   108  106  109   < >  105   < >  109   < >  106   CO2  26   --   24  28  26   < >  27   < >  26   < >  29   ANIONGAP  7   --   8  " 7  7   < >  5   < >  6   < >  3   GLC  99   --   94  96  99   < >  99   < >  80   < >  97   BUN  12   --   14  9  13   < >  13   < >  9   < >  12   CR  1.35*   --   1.24  1.15  1.39*   < >  1.30*   < >  1.27*   < >  1.28*   GFRESTIMATED  52*   --   57*  63  50*   < >  55*   < >  56*   < >  56*   GFRESTBLACK  63   --   69  76  61   < >  66   < >  68   < >  67   ABDIRASHID  8.1*   --   8.1*  8.2*  8.5   < >  9.0   < >  8.2*   < >  8.5   PHOS  2.9   --    --    --   3.2   --    --    --   2.8   --   3.8   PROTTOTAL   --   8.0  7.7  7.9   --    --   8.6   < >  7.2   --    --    ALBUMIN  2.8*  2.9*  2.8*  2.9*  2.8*   --   3.1*   < >  2.6*   --   2.7*   BILITOTAL   --   1.2  0.9  0.8   --    --   1.2   < >  1.3   --    --    ALKPHOS   --   317*  270*  240*   --    --   228*   < >  174*   --    --    AST   --   34  33  40   --    --   36   < >  77*   --    --    ALT   --   28  25  27   --    --   27   < >  50   --    --     < > = values in this interval not displayed.     CBC  Recent Labs   Lab Test  10/18/17   1209  07/18/17   0811  01/17/17   0821  12/16/16   0842  08/02/16   1713   HGB  11.4*  11.1*  12.5*  12.4*  13.1*   WBC  5.4  4.7  3.9*   --   10.2   RBC  4.19*  4.07*  4.43   --   4.32*   HCT  35.7*  34.0*  37.7*   --   39.5*   MCV  85  84  85   --   91   MCH  27.2  27.3  28.2   --   30.3   MCHC  31.9  32.6  33.2   --   33.2   RDW  16.3*  15.7*  15.6*   --   13.9   PLT  134*  142*  117*   --   143*     INR  Recent Labs   Lab Test  07/18/17   0811  01/17/17   0821  07/19/16   1522  01/19/16   0958   INR  1.10  0.98  1.07  1.07     ABGNo lab results found.   URINE STUDIES  Recent Labs   Lab Test  08/15/14   1047  09/24/13   1439   COLOR  Yellow  Yellow   APPEARANCE  Clear  Clear   URINEGLC  Negative  Negative   URINEBILI  Negative  Small  This is an unconfirmed screening test result. A positive result may be false.*   URINEKETONE  Negative  Negative   SG  1.007  1.017   UBLD  Negative  Negative   URINEPH  7.0  6.5    PROTEIN  Negative  30*   NITRITE  Negative  Negative   LEUKEST  Negative  Negative   RBCU  10*  0   WBCU  8*  <1     Recent Labs   Lab Test  12/16/16   0852  10/06/15   1030  12/12/14   0835  08/15/14   1047  12/31/13   1319  09/24/13   1439   UTPG  0.10  0.12  0.16  0.16  0.06  0.03     PTH  Recent Labs   Lab Test  12/16/16   0842  08/15/14   1028  09/24/13   1303   PTHI  88*  81*  65     IRON STUDIES  Recent Labs   Lab Test  10/18/17   1209  12/16/16   0842  08/15/14   1028  09/24/13   1303   IRON  42  47  27*  38   FEB  374  414  437*  447*   IRONSAT  11*  11*  6*  9*   MARYAM  13*  10*  14*  19*       Beto Alvarado MD

## 2017-12-15 NOTE — PROGRESS NOTES
Nephrology Clinic    ASSESSMENT AND RECOMMENDATIONS:     1.CKD: creatinine baseline 1.3-1.4 mg/dl since 2013 with eGFR 60s. Etiology is likely renovascular disease due to longstanding HTN.    Renal function remains stable when evaluated over last 4-5 years. Does have moderate amount of variation, however.    2. HTN: BP goal is 140/90 OR LESS.Now at goal.    3.Electrolytes: hypokalemia resolved K 3.7.     Follow up in 12 months       REASON FOR VISIT: CKD follow up     HISTORY OF PRESENT ILLNESS:    Danielle Cook is a 71 year old male who presents for CKD follow up. He has been doing well since his last visit. Does complain of mild worsening of chronic headaches that he has had for at least 6 years. Pattern is suggestive of muscle tension HA and is relieved partially by pain medications. Occurs once or twice a week or less. Has been referred to neurologyOtherwise feels well. No edema.      PAST MEDICAL HISTORY:  Past Medical History:   Diagnosis Date     CKD (chronic kidney disease) stage 3, GFR 30-59 ml/min      Hepatitis C virus infection      Hypertension      PAST SURGICAL HISTORY:  Past Surgical History:   Procedure Laterality Date     ESOPHAGOSCOPY, GASTROSCOPY, DUODENOSCOPY (EGD), COMBINED N/A 11/4/2015    Procedure: COMBINED ESOPHAGOSCOPY, GASTROSCOPY, DUODENOSCOPY (EGD), BIOPSY SINGLE OR MULTIPLE;  Surgeon: Inocente Do MD;  Location:  GI     MEDICATIONS:  Prescription Medications as of 12/15/2017             ferrous sulfate (IRON) 325 (65 FE) MG tablet Take 1 tablet (325 mg) by mouth daily (with breakfast)    acetaminophen (TYLENOL) 325 MG tablet Take 1 tablet (325 mg) by mouth every 6 hours as needed for mild pain    omeprazole (PRILOSEC) 40 MG capsule Take 1 capsule (40 mg) by mouth daily (with lunch)    calcium carb 1250 mg, 500 mg [place],/vitamin D 200 units (OSCAL WITH D) 500-200 MG-UNIT per tablet Take 1 tablet by mouth 2 times daily (with meals)    amLODIPine (NORVASC) 5 MG tablet Take 2  "tablets (10 mg) by mouth daily    gabapentin (NEURONTIN) 300 MG tablet Take 600 mg by mouth 3 times daily          ALLERGIES:    No Known Allergies  REVIEW OF SYSTEMS:  A comprehensive review of systems was performed and found to be negative except as described here or above.   SOCIAL HISTORY:   Social History     Social History     Marital status:      Spouse name: N/A     Number of children: N/A     Years of education: N/A     Occupational History     Not on file.     Social History Main Topics     Smoking status: Never Smoker     Smokeless tobacco: Never Used     Alcohol use No     Drug use: No     Sexual activity: Not on file     Other Topics Concern     Not on file     Social History Narrative     FAMILY MEDICAL HISTORY:   Family History   Problem Relation Age of Onset     Hypertension Father      PHYSICAL EXAM:     /76  Pulse 82  Temp 98.2  F (36.8  C) (Oral)  Ht 1.727 m (5' 8\")  Wt 75.3 kg (166 lb)  SpO2 98%  BMI 25.24 kg/m2  GENERAL APPEARANCE: alert and no distress, thin, healthy appearing  Head: no tenderness over scalp or paraspinal mm.  EYES: nonicteric  NECK: supple, no adenopathy  RESP: lungs clear to auscultation   CV: regular rhythm, normal rate, no rub  ABDOMEN: soft, nontender, normal bowel sounds  Extremities : no edema  MS:  no muscle tenderness  SKIN: no rash  NEURO: mentation and speech normal   PSYCH: affect normal/bright     LABS:     Recent Labs   Lab Test  12/15/17   0738  10/18/17   1209  07/18/17   0811  01/17/17   0821  12/16/16   0842   07/19/16   1522   10/06/15   1027   12/12/14   0826   NA  139   --   140  141  142   < >  137   < >  140   < >  138   POTASSIUM  3.7   --   3.9  4.1  4.5   < >  4.2   < >  3.8   < >  3.1*   CHLORIDE  106   --   108  106  109   < >  105   < >  109   < >  106   CO2  26   --   24  28  26   < >  27   < >  26   < >  29   ANIONGAP  7   --   8  7  7   < >  5   < >  6   < >  3   GLC  99   --   94  96  99   < >  99   < >  80   < >  97   BUN  12 "   --   14  9  13   < >  13   < >  9   < >  12   CR  1.35*   --   1.24  1.15  1.39*   < >  1.30*   < >  1.27*   < >  1.28*   GFRESTIMATED  52*   --   57*  63  50*   < >  55*   < >  56*   < >  56*   GFRESTBLACK  63   --   69  76  61   < >  66   < >  68   < >  67   ABDIRASHID  8.1*   --   8.1*  8.2*  8.5   < >  9.0   < >  8.2*   < >  8.5   PHOS  2.9   --    --    --   3.2   --    --    --   2.8   --   3.8   PROTTOTAL   --   8.0  7.7  7.9   --    --   8.6   < >  7.2   --    --    ALBUMIN  2.8*  2.9*  2.8*  2.9*  2.8*   --   3.1*   < >  2.6*   --   2.7*   BILITOTAL   --   1.2  0.9  0.8   --    --   1.2   < >  1.3   --    --    ALKPHOS   --   317*  270*  240*   --    --   228*   < >  174*   --    --    AST   --   34  33  40   --    --   36   < >  77*   --    --    ALT   --   28  25  27   --    --   27   < >  50   --    --     < > = values in this interval not displayed.     CBC  Recent Labs   Lab Test  10/18/17   1209  07/18/17   0811  01/17/17   0821  12/16/16   0842  08/02/16   1713   HGB  11.4*  11.1*  12.5*  12.4*  13.1*   WBC  5.4  4.7  3.9*   --   10.2   RBC  4.19*  4.07*  4.43   --   4.32*   HCT  35.7*  34.0*  37.7*   --   39.5*   MCV  85  84  85   --   91   MCH  27.2  27.3  28.2   --   30.3   MCHC  31.9  32.6  33.2   --   33.2   RDW  16.3*  15.7*  15.6*   --   13.9   PLT  134*  142*  117*   --   143*     INR  Recent Labs   Lab Test  07/18/17   0811  01/17/17   0821  07/19/16   1522  01/19/16   0958   INR  1.10  0.98  1.07  1.07     ABGNo lab results found.   URINE STUDIES  Recent Labs   Lab Test  08/15/14   1047  09/24/13   1439   COLOR  Yellow  Yellow   APPEARANCE  Clear  Clear   URINEGLC  Negative  Negative   URINEBILI  Negative  Small  This is an unconfirmed screening test result. A positive result may be false.*   URINEKETONE  Negative  Negative   SG  1.007  1.017   UBLD  Negative  Negative   URINEPH  7.0  6.5   PROTEIN  Negative  30*   NITRITE  Negative  Negative   LEUKEST  Negative  Negative   RBCU  10*  0   WBCU   8*  <1     Recent Labs   Lab Test  12/16/16   0852  10/06/15   1030  12/12/14   0835  08/15/14   1047  12/31/13   1319  09/24/13   1439   UTPG  0.10  0.12  0.16  0.16  0.06  0.03     PTH  Recent Labs   Lab Test  12/16/16   0842  08/15/14   1028  09/24/13   1303   PTHI  88*  81*  65     IRON STUDIES  Recent Labs   Lab Test  10/18/17   1209  12/16/16   0842  08/15/14   1028  09/24/13   1303   IRON  42  47  27*  38   FEB  374  414  437*  447*   IRONSAT  11*  11*  6*  9*   MARYAM  13*  10*  14*  19*       Beto Alvarado MD

## 2017-12-15 NOTE — MR AVS SNAPSHOT
After Visit Summary   12/15/2017    Danielle Cook    MRN: [id number]           Patient Information     Date Of Birth          1945        Visit Information        Provider Department      12/15/2017 8:10 AM Beto Alvarado MD; [employer] [facility] Nephrology        Today's Diagnoses     CKD (chronic kidney disease) stage 3, GFR 30-59 ml/min    -  1    Hypertension, unspecified type           Follow-ups after your visit        Follow-up notes from your care team     Return in about 1 year (around 12/15/2018).      Your next 10 appointments already scheduled     Jan 04, 2018  3:30 PM CST   (Arrive by 3:15 PM)   New Patient Visit with Av De León MD   [facility] Neurology ([facility])    [address] [place] [address]  3rd [facility] [id number] [phone]            Jan 22, 2018  9:00 AM CST   Lab with  LAB   [facility] Lab ([facility])    [address] [place] [id number] [phone]            Jan 22, 2018  9:15 AM CST   US ABDOMEN/PELVIS DUPLEX COMPLETE with UCUS1   [facility] Imaging Center US ([facility])    [address] [place] [id number] [phone]           Please bring a list of your medicines (including vitamins, minerals and over-the-counter drugs). Also, tell your doctor about any allergies you may have. Wear comfortable clothes and leave your valuables at home.  Adults: No eating or drinking for 8 hours before the exam. You may take medicine with a small sip of water.  Children: - Children 6+ years: No food or drink for 6 hours before exam. - Children 1-5 years: No food or drink for 4 hours before exam. - Infants, breast-fed: may have breast milk up to 2 hours before exam. - Infants, formula: may have bottle until 4 hours before exam.  Please call the Imaging Department at your exam site with any questions.            Jan 22, 2018 10:20  "AM CST   (Arrive by 10:05 AM)   Return General Liver with Nataly Darnell MD   [facility] Hepatology ([facility])    [address] [facility]  3rd [facility] [address] [phone]              Who to contact     If you have questions or need follow up information about today's clinic visit or your schedule please contact [facility] NEPHROLOGY directly at [phone].  Normal or non-critical lab and imaging results will be communicated to you by [employer] Reg[employer], letter or phone within 4 business days after the clinic has received the results. If you do not hear from us within 7 days, please contact the clinic through Mobileumt or phone. If you have a critical or abnormal lab result, we will notify you by phone as soon as possible.  Submit refill requests through [employer] or call your pharmacy and they will forward the refill request to us. Please allow 3 business days for your refill to be completed.          Additional Information About Your Visit        [employer] Information     [employer] lets you send messages to your doctor, view your test results, renew your prescriptions, schedule appointments and more. To sign up, go to [email or web address] . Click on \"Log in\" on the left side of the screen, which will take you to the Welcome page. Then click on \"Sign up Now\" on the right side of the page.     You will be asked to enter the access code listed below, as well as some personal information. Please follow the directions to create your username and password.     Your access code is: [email or web address]  Expires: 2018  5:30 AM     Your access code will  in 90 days. If you need help or a new code, please call your [place] clinic or [phone].        Care EveryWhere ID     This is your Care EveryWhere ID. This could be used by other organizations to access your [place] medical records  [id number]        Your Vitals Were     Pulse Temperature Height Pulse Oximetry BMI (Body Mass Index)       " "82 98.2  F (36.8  C) (Oral) 1.727 m (5' 8\") 98% 25.24 kg/m2        Blood Pressure from Last 3 Encounters:   12/15/17 130/76   07/18/17 136/75   01/17/17 148/82    Weight from Last 3 Encounters:   12/15/17 75.3 kg (166 lb)   07/18/17 75.4 kg (166 lb 3.2 oz)   01/17/17 78.5 kg (173 lb)              Today, you had the following     No orders found for display       Primary Care Provider Office Phone # Fax #    Eddie Huff [phone]       [facility] [address] [place] [address]        Equal Access to Services     ISA SORIANO : Bobbi shuklao Sorohith, waaxda luqadaha, qaybta kaalmada adeegyada, jeannie markham . So [facility] [phone].    ATENCIÓN: Si habla español, tiene a whitehead disposición servicios gratuitos de asistencia lingüística. [facility] [phone].    We comply with applicable federal civil rights laws and [place] laws. We do not discriminate on the basis of race, color, national origin, age, disability, sex, sexual orientation, or gender identity.            Thank you!     Thank you for choosing [facility]  for your care. Our goal is always to provide you with excellent care. Hearing back from our patients is one way we can continue to improve our services. Please take a few minutes to complete the written survey that you may receive in the mail after your visit with us. Thank you!             Your Updated Medication List - Protect others around you: Learn how to safely use, store and throw away your medicines at www.disposemymeds.org.          This list is accurate as of: 12/15/17  8:58 AM.  Always use your most recent med list.                   Brand Name Dispense Instructions for use Diagnosis    acetaminophen 325 MG tablet    TYLENOL    100 tablet    Take 1 tablet (325 mg) by mouth every 6 hours as needed for mild pain    Other chronic pain       amLODIPine 5 MG tablet    NORVASC    30 tablet    Take 2 tablets (10 mg) " by mouth daily    Essential hypertension with goal blood pressure less than 140/90       Calcium carb-Vitamin D 500 mg [place]-200 units 500-200 MG-UNIT per tablet    OSCAL with D;Oyster Shell Calcium    90 tablet    Take 1 tablet by mouth 2 times daily (with meals)    PMR (polymyalgia rheumatica) (H)       ferrous sulfate 325 (65 FE) MG tablet    IRON    100 tablet    Take 1 tablet (325 mg) by mouth daily (with breakfast)    Other iron deficiency anemia       gabapentin 300 MG tablet    NEURONTIN     Take 600 mg by mouth 3 times daily    Chronic hepatitis C (H), Renal insufficiency       omeprazole 40 MG capsule    priLOSEC    60 capsule    Take 1 capsule (40 mg) by mouth daily (with lunch)    Esophagitis, reflux       SYNTHROID PO

## 2017-12-15 NOTE — NURSING NOTE
"Chief Complaint   Patient presents with     RECHECK     Ckd follow up       Initial /76  Pulse 82  Temp 98.2  F (36.8  C) (Oral)  Ht 1.727 m (5' 8\")  Wt 75.3 kg (166 lb)  SpO2 98%  BMI 25.24 kg/m2 Estimated body mass index is 25.24 kg/(m^2) as calculated from the following:    Height as of this encounter: 1.727 m (5' 8\").    Weight as of this encounter: 75.3 kg (166 lb).  Medication Reconciliation: complete   ENA PENN CMA      "

## 2017-12-21 NOTE — TELEPHONE ENCOUNTER
APPT INFO    Date /Time: 1/4/18, 3:30pm   Reason for Appt: Headaches    [facility]   Are there internal records? Yes/No?  IF YES, list clinic names: no   Are there outside records? Yes/No? Yes    Patient Contact (Y/N) & Call Details: No, referred    Action: Faxed cover sheet     OUTSIDE RECORDS CHECKLIST     CLINIC NAME COMMENTS REC (x) IMG (x)   [facility]

## 2018-01-04 ENCOUNTER — OFFICE VISIT (OUTPATIENT)
Dept: NEUROLOGY | Facility: CLINIC | Age: 73
End: 2018-01-04
Payer: COMMERCIAL

## 2018-01-04 ENCOUNTER — PRE VISIT (OUTPATIENT)
Dept: NEUROLOGY | Facility: CLINIC | Age: 73
End: 2018-01-04

## 2018-01-04 VITALS — SYSTOLIC BLOOD PRESSURE: 124 MMHG | DIASTOLIC BLOOD PRESSURE: 69 MMHG | HEART RATE: 76 BPM | HEIGHT: 68 IN

## 2018-01-04 DIAGNOSIS — G44.59 OTHER COMPLICATED HEADACHE SYNDROME: Primary | ICD-10-CM

## 2018-01-04 ASSESSMENT — PAIN SCALES - GENERAL: PAINLEVEL: EXTREME PAIN (8)

## 2018-01-04 NOTE — MR AVS SNAPSHOT
After Visit Summary   1/4/2018    Danielle Cook    MRN: [id number]           Patient Information     Date Of Birth          1945        Visit Information        Provider Department      1/4/2018 3:15 PM Av De León MD; [employer] [facility] Neurology        Today's Diagnoses     Other complicated headache syndrome    -  1       Follow-ups after your visit        Follow-up notes from your care team     Return in about 3 months (around 4/4/2018).      Your next 10 appointments already scheduled     Jan 13, 2018  1:45 PM CST   (Arrive by 1:30 PM)   MR BRAIN W/O CONTRAST & MR ANGIOGRAM with UC[address] [facility] [place] [facility] ([facility])    [address] [address] [phone]           Take your medicines as usual, unless your doctor tells you not to. Bring a list of your current medicines to your exam (including vitamins, minerals and over-the-counter drugs). Also bring the results of similar scans you may have had.  Please remove any body piercings and hair extensions before you arrive.  Follow your doctor s orders. If you do not, we may have to postpone your exam.  You will not have contrast for this exam. You do not need to do anything special to prepare.  The MRI machine uses a strong magnet. Please wear clothes without metal (snaps, zippers). A sweatsuit works well, or we may give you a hospital gown.   **IMPORTANT** THE INSTRUCTIONS BELOW ARE ONLY FOR THOSE PATIENTS WHO HAVE BEEN TOLD THEY WILL RECEIVE SEDATION OR GENERAL ANESTHESIA DURING THEIR MRI PROCEDURE:  IF YOU WILL RECEIVE SEDATION (take medicine to help you relax during your exam):   You must get the medicine from your doctor before you arrive. Bring the medicine to the exam. Do not take it at home.   Arrive one hour early. Bring someone who can take you home after the test. Your medicine will make you sleepy. After the exam, you may not drive, take a  bus or take a taxi by yourself.   No eating 8 hours before your exam. You may have clear liquids up until 4 hours before your exam. (Clear liquids include water, clear tea, black coffee and fruit juice without pulp.)  IF YOU WILL RECEIVE ANESTHESIA (be asleep for your exam):   Arrive 1 1/2 hours early. Bring someone who can take you home after the test. You may not drive, take a bus or take a taxi by yourself.   No eating 8 hours before your exam. You may have clear liquids up until 4 hours before your exam. (Clear liquids include water, clear tea, black coffee and fruit juice without pulp.)   You will spend four to five hours in the recovery room.  Please call the Imaging Department at your exam site with any questions.            Jan 22, 2018  9:00 AM CST   Lab with  LAB   [facility] Lab ([facility])    [address] [place] [id number] [phone]            Jan 22, 2018  9:15 AM CST   US ABDOMEN/PELVIS DUPLEX COMPLETE with UCUS1   [facility] Imaging Center US ([facility])    [address] [place] [id number] [phone]           Please bring a list of your medicines (including vitamins, minerals and over-the-counter drugs). Also, tell your doctor about any allergies you may have. Wear comfortable clothes and leave your valuables at home.  Adults: No eating or drinking for 8 hours before the exam. You may take medicine with a small sip of water.  Children: - Children 6+ years: No food or drink for 6 hours before exam. - Children 1-5 years: No food or drink for 4 hours before exam. - Infants, breast-fed: may have breast milk up to 2 hours before exam. - Infants, formula: may have bottle until 4 hours before exam.  Please call the Imaging Department at your exam site with any questions.            Jan 22, 2018 10:20 AM CST   (Arrive by 10:05 AM)   Return General Liver with Nataly Darnell MD   [facility] Hepatology  ([facility])    [address] [facility]  [address]  [facility] [address] [phone]            Apr 05, 2018  1:30 PM CDT   (Arrive by 1:15 PM)   Return Visit with Av De León MD   [facility] Neurology ([facility])    [address] [place] [address]  3rd Floor  [facility] [id number] [phone]            Dec 21, 2018  8:45 AM CST   Lab with UC LAB   [facility] Lab ([facility])    [address] [place] [address]  1st Floor  [facility] [phone]            Dec 21, 2018  9:40 AM CST   (Arrive by 9:10 AM)   Return Visit with Beto Alvarado MD   [facility] Nephrology ([facility])    [address] [place] [address]  [address]  [facility] [id number] [phone]              Future tests that were ordered for you today     Open Future Orders        Priority Expected Expires Ordered    MRA and MRI brain wo contrast Routine  1/4/2019 1/4/2018            Who to contact     Please call your clinic at [phone] to:    Ask questions about your health    Make or cancel appointments    Discuss your medicines    Learn about your test results    Speak to your doctor   If you have compliments or concerns about an experience at your clinic, or if you wish to file a complaint, please contact [facility] Patient Relations at [phone] or email us at [email or web address]         Additional Information About Your Visit        [employer] Information     B[employer] is an electronic gateway that provides easy, online access to your medical records. With BTR, you can request a clinic appointment, read your test results, renew a prescription or communicate with your care team.     To sign up for B[employer] visit the website at [email or web address]   You will be asked to enter the access code listed below, as well as some personal information. Please follow the directions to create your username  "and password.     Your access code is: [email or web address]  Expires: 4/3/2018  6:30 AM     Your access code will  in 90 days. If you need help or a new code, please contact your [facility] or call [phone] for assistance.        Care EveryWhere ID     This is your Care EveryWhere ID. This could be used by other organizations to access your [place] medical records  [id number]        Your Vitals Were     Pulse Height                76 1.727 m (5' 8\")           Blood Pressure from Last 3 Encounters:   18 124/69   12/15/17 130/76   17 136/75    Weight from Last 3 Encounters:   12/15/17 75.3 kg (166 lb)   17 75.4 kg (166 lb 3.2 oz)   17 78.5 kg (173 lb)               Primary Care Provider Office Phone # Fax #    Eddie Huff [phone]       [facility] [address] [place] [address]        Equal Access to Services     [facility]: Hadii aad ku hadasho Soomaali, waaxda luqadaha, qaybta kaalmada adeegyada, jeannie markham . So [facility] [phone].    ATENCIÓN: Si habla español, tiene a whitehead disposición servicios gratuitos de asistencia lingüística. Roque al [phone].    We comply with applicable federal civil rights laws and [place] laws. We do not discriminate on the basis of race, color, national origin, age, disability, sex, sexual orientation, or gender identity.            Thank you!     Thank you for choosing [facility] NEUROLOGY  for your care. Our goal is always to provide you with excellent care. Hearing back from our patients is one way we can continue to improve our services. Please take a few minutes to complete the written survey that you may receive in the mail after your visit with us. Thank you!             Your Updated Medication List - Protect others around you: Learn how to safely use, store and throw away your medicines at www.disposemymeds.org.          This list is accurate " as of: 1/4/18  4:11 PM.  Always use your most recent med list.                   Brand Name Dispense Instructions for use Diagnosis    acetaminophen 325 MG tablet    TYLENOL    100 tablet    Take 1 tablet (325 mg) by mouth every 6 hours as needed for mild pain    Other chronic pain       amLODIPine 5 MG tablet    NORVASC    30 tablet    Take 2 tablets (10 mg) by mouth daily    Essential hypertension with goal blood pressure less than 140/90       Calcium carb-Vitamin D 500 mg [place]-200 units 500-200 MG-UNIT per tablet    OSCAL with D;Oyster Shell Calcium    90 tablet    Take 1 tablet by mouth 2 times daily (with meals)    PMR (polymyalgia rheumatica) (H)       ferrous sulfate 325 (65 FE) MG tablet    IRON    100 tablet    Take 1 tablet (325 mg) by mouth daily (with breakfast)    Other iron deficiency anemia       gabapentin 300 MG tablet    NEURONTIN     Take 600 mg by mouth 3 times daily    Chronic hepatitis C (H), Renal insufficiency       omeprazole 40 MG capsule    priLOSEC    60 capsule    Take 1 capsule (40 mg) by mouth daily (with lunch)    Esophagitis, reflux       SYNTHROID PO

## 2018-01-04 NOTE — LETTER
2018       RE: Danielle Cook  [address]   [facility] [address]     Dear Colleague,    Thank you for referring your patient, Danielle Cook, to the [facility] NEUROLOGY at [facility]. Please see a copy of my visit note below.    2018         RE: Danielle Cook   MRN: [id number]   : 1945      Dear Dr. Huff:       This Mexican gentleman is 72 years old, retired, and reports that for the last 5-6 years after he came to [place], he has had these headaches, which are constant.  They tend to occur more on the right side.  They are a pressure type, and he feels sometimes he has air in the head.  He may have some numbness in the scalp as well as he reports some swelling.  He has not been dizzy, fallen down, had seizures or any focal symptoms.  The headaches are pretty ongoing.  He is on no medication to manage his headaches except Tylenol.        PAST MEDICAL HISTORY:  He carries diagnoses of polymyalgia rheumatica, cirrhosis of the liver without ascites, history of hepatitis C and chronic kidney disease.        PAST SURGICAL HISTORY:   Esophagoscopy.      SOCIAL HISTORY:  He has had no significant problems with stress in the family.  He is retired.        REVIEW OF SYSTEMS:  He does report some tingling in his legs.      CURRENT MEDICATIONS:  Levothyroxine, iron, Tylenol, omeprazole, amlodipine.  He is on gabapentin 600 three times a day, which does help his headaches.        PHYSICAL EXAMINATION:  He is a pleasant man.  He has no abnormalities of mental status that I can determine.  His blood pressure is 124/69.  His weight is not given.  Height is 5 feet 8 inches.  He has a normal funduscopic examination.  Normal eye movements.      His coordination in his arms and legs is reasonable.  He does have some difficulty tandem walking.  His finger-to-nose testing is good.  There is no tremor.  His face is symmetrical.  Reflexes are  brisk, perhaps a little brisker on the left than on the right, but that is questionable.  Plantars are flexor, and the sensory exam is normal.      IMPRESSION:  This man has chronic daily headaches for about 5 years since he moved from [facility] to [place].      We would be concerned about a lesion, such as an aneurysm or a tumor, so we ordered an MRI of the brain with an MRA, also.  We will see him for followup.  No medication for the time being.      Sincerely,      Av De León MD         D: 2018 16:02   T: 2018 08:15   MT: [email or web address]      Name:     ALYSSA SAMUEL   MRN:      [id number]        Account:      [id number]   :      1945           Service Date: 2018      Document: [id number]       Again, thank you for allowing me to participate in the care of your patient.      Sincerely,    Av De León MD    CC:  Eddie Huff MD   [facility] [address] [place] [address]

## 2018-01-05 NOTE — PROGRESS NOTES
2018             Eddie Huff MD   [facility] [address] [place] [address]      RE: Danielle Cook   MRN: [id number]   : 1945      Dear Dr. Huff:       This [place] gentleman is 72 years old, retired, and reports that for the last 5-6 years after he came to [place], he has had these headaches, which are constant.  They tend to occur more on the right side.  They are a pressure type, and he feels sometimes he has air in the head.  He may have some numbness in the scalp as well as he reports some swelling.  He has not been dizzy, fallen down, had seizures or any focal symptoms.  The headaches are pretty ongoing.  He is on no medication to manage his headaches except Tylenol.        PAST MEDICAL HISTORY:  He carries diagnoses of polymyalgia rheumatica, cirrhosis of the liver without ascites, history of hepatitis C and chronic kidney disease.        PAST SURGICAL HISTORY:   Esophagoscopy.      SOCIAL HISTORY:  He has had no significant problems with stress in the family.  He is retired.        REVIEW OF SYSTEMS:  He does report some tingling in his legs.      CURRENT MEDICATIONS:  Levothyroxine, iron, Tylenol, omeprazole, amlodipine.  He is on gabapentin 600 three times a day, which does help his headaches.        PHYSICAL EXAMINATION:  He is a pleasant man.  He has no abnormalities of mental status that I can determine.  His blood pressure is 124/69.  His weight is not given.  Height is 5 feet 8 inches.  He has a normal funduscopic examination.  Normal eye movements.      His coordination in his arms and legs is reasonable.  He does have some difficulty tandem walking.  His finger-to-nose testing is good.  There is no tremor.  His face is symmetrical.  Reflexes are brisk, perhaps a little brisker on the left than on the right, but that is questionable.  Plantars are flexor, and the sensory exam is normal.      IMPRESSION:  This man has chronic daily headaches for  about 5 years since he moved from [facility] to [facility].      We would be concerned about a lesion, such as an aneurysm or a tumor, so we ordered an MRI of the brain with an MRA, also.  We will see him for followup.  No medication for the time being.      Sincerely,      MD DONYA Peterson MD             D: 2018 16:02   T: 2018 08:15   MT: scotty      Name:     ALYSSA SAMUEL   MRN:      [id number]        Account:      [id number]   :      1945           Service Date: 2018      Document: [id number]

## 2018-01-12 DIAGNOSIS — K74.60 CIRRHOSIS OF LIVER WITHOUT ASCITES (H): Primary | ICD-10-CM

## 2018-01-13 ENCOUNTER — RADIANT APPOINTMENT (OUTPATIENT)
Dept: MRI IMAGING | Facility: CLINIC | Age: 73
End: 2018-01-13
Attending: PSYCHIATRY & NEUROLOGY
Payer: COMMERCIAL

## 2018-01-13 DIAGNOSIS — G44.59 OTHER COMPLICATED HEADACHE SYNDROME: ICD-10-CM

## 2018-01-13 NOTE — DISCHARGE INSTRUCTIONS
MRI Contrast Discharge Instructions    The IV contrast you received today will pass out of your body in your  urine. This will happen in the next 24 hours. You will not feel this process.  Your urine will not change color.    Drink at least 4 extra glasses of water or juice today (unless your doctor  has restricted your fluids). This reduces the stress on your kidneys.  You may take your regular medicines.    If you are on dialysis: It is best to have dialysis today.    If you have a reaction: Most reactions happen right away. If you have  any new symptoms after leaving the hospital (such as hives or swelling),  call your hospital at the correct number below. Or call your family doctor.  If you have breathing distress or wheezing, call 911.    Special instructions: ***    I have read and understand the above information.    Signature:______________________________________ Date:___________    Staff:__________________________________________ Date:___________     Time:__________    [facility] Departments:    ___[place]: [phone]  ___[facility]: [phone]  ___[place]: [phone] ___[facility]: [phone]  ___[facility]: [phone]  ___[facility]: [phone]  ___[phone]  ___[facility]: [phone]  ___[phone]

## 2018-01-17 ENCOUNTER — TELEPHONE (OUTPATIENT)
Dept: GASTROENTEROLOGY | Facility: CLINIC | Age: 73
End: 2018-01-17

## 2018-01-17 DIAGNOSIS — Z12.11 ENCOUNTER FOR SCREENING COLONOSCOPY: Primary | ICD-10-CM

## 2018-01-17 NOTE — TELEPHONE ENCOUNTER
Patient scheduled for colonoscopy and upper endoscopy    Indication for procedure. Screening colonoscopy, variceal screening    Referring Provider. Dr. Darnell    ? Yes-Vietnamese    Arrival time verified? 2:10    Facility location verified? [address] [facility], room 1-301    Instructions given regarding prep and procedure    Prep Type golytely    Are you taking any anticoagulants or blood thinners? no    Instructions given? Yes, via Greenlandic    Electronic implanted devices? no    Pre procedure teaching completed? Yes    Transportation from procedure? yes    H&P / Pre op physical completed? na

## 2018-01-19 ENCOUNTER — SURGERY (OUTPATIENT)
Age: 73
End: 2018-01-19

## 2018-01-19 ENCOUNTER — HOSPITAL ENCOUNTER (OUTPATIENT)
Facility: CLINIC | Age: 73
Discharge: HOME | End: 2018-01-19
Attending: INTERNAL MEDICINE | Admitting: INTERNAL MEDICINE
Payer: COMMERCIAL

## 2018-01-19 VITALS
DIASTOLIC BLOOD PRESSURE: 109 MMHG | OXYGEN SATURATION: 87 % | SYSTOLIC BLOOD PRESSURE: 158 MMHG | RESPIRATION RATE: 22 BRPM

## 2018-01-19 DIAGNOSIS — R13.10 ODYNOPHAGIA: Primary | ICD-10-CM

## 2018-01-19 LAB
COLONOSCOPY: NORMAL
UPPER GI ENDOSCOPY: NORMAL

## 2018-01-19 PROCEDURE — 88305 TISSUE EXAM BY PATHOLOGIST: CPT | Performed by: INTERNAL MEDICINE

## 2018-01-19 PROCEDURE — 45378 DIAGNOSTIC COLONOSCOPY: CPT | Performed by: INTERNAL MEDICINE

## 2018-01-19 PROCEDURE — 43251 EGD REMOVE LESION SNARE: CPT | Performed by: INTERNAL MEDICINE

## 2018-01-19 PROCEDURE — 99153 MOD SED SAME PHYS/QHP EA: CPT | Performed by: INTERNAL MEDICINE

## 2018-01-19 PROCEDURE — 43255 EGD CONTROL BLEEDING ANY: CPT | Performed by: INTERNAL MEDICINE

## 2018-01-19 PROCEDURE — G0500 MOD SEDAT ENDO SERVICE >5YRS: HCPCS | Performed by: INTERNAL MEDICINE

## 2018-01-19 PROCEDURE — 25000128 H RX IP 250 OP 636: Performed by: INTERNAL MEDICINE

## 2018-01-19 PROCEDURE — 43244 EGD VARICES LIGATION: CPT | Performed by: INTERNAL MEDICINE

## 2018-01-19 PROCEDURE — 25000125 ZZHC RX 250: Performed by: INTERNAL MEDICINE

## 2018-01-19 RX ORDER — FENTANYL CITRATE 50 UG/ML
INJECTION, SOLUTION INTRAMUSCULAR; INTRAVENOUS PRN
Status: DISCONTINUED | OUTPATIENT
Start: 2018-01-19 | End: 2018-01-19 | Stop reason: HOSPADM

## 2018-01-19 RX ORDER — OXYCODONE HYDROCHLORIDE 5 MG/1
5 TABLET ORAL EVERY 4 HOURS PRN
Qty: 18 TABLET | Refills: 0 | Status: SHIPPED | OUTPATIENT
Start: 2018-01-19 | End: 2018-01-22

## 2018-01-19 RX ADMIN — MIDAZOLAM 1 MG: 1 INJECTION INTRAMUSCULAR; INTRAVENOUS at 15:35

## 2018-01-19 RX ADMIN — BENZOCAINE 1 SPRAY: 220 SPRAY, METERED PERIODONTAL at 15:20

## 2018-01-19 RX ADMIN — LIDOCAINE HYDROCHLORIDE 15 ML: 20 SOLUTION ORAL; TOPICAL at 16:59

## 2018-01-19 RX ADMIN — FENTANYL CITRATE 50 MCG: 50 INJECTION, SOLUTION INTRAMUSCULAR; INTRAVENOUS at 15:23

## 2018-01-19 RX ADMIN — MIDAZOLAM 2 MG: 1 INJECTION INTRAMUSCULAR; INTRAVENOUS at 15:19

## 2018-01-19 RX ADMIN — FENTANYL CITRATE 50 MCG: 50 INJECTION, SOLUTION INTRAMUSCULAR; INTRAVENOUS at 15:35

## 2018-01-19 RX ADMIN — MIDAZOLAM 1 MG: 1 INJECTION INTRAMUSCULAR; INTRAVENOUS at 15:23

## 2018-01-19 RX ADMIN — FENTANYL CITRATE 50 MCG: 50 INJECTION, SOLUTION INTRAMUSCULAR; INTRAVENOUS at 16:43

## 2018-01-19 RX ADMIN — MIDAZOLAM 1 MG: 1 INJECTION INTRAMUSCULAR; INTRAVENOUS at 15:46

## 2018-01-19 RX ADMIN — FENTANYL CITRATE 50 MCG: 50 INJECTION, SOLUTION INTRAMUSCULAR; INTRAVENOUS at 15:19

## 2018-01-19 RX ADMIN — FENTANYL CITRATE 50 MCG: 50 INJECTION, SOLUTION INTRAMUSCULAR; INTRAVENOUS at 15:46

## 2018-01-19 RX ADMIN — FENTANYL CITRATE 50 MCG: 50 INJECTION, SOLUTION INTRAMUSCULAR; INTRAVENOUS at 16:11

## 2018-01-19 NOTE — OR NURSING
Post-procedure, pt complained of severe throat pain and abdominal pain.  MD notified, viscous lidocaine and additional fentanyl given.  Pt denied any relief and vomited approx. 50cc of clear liquid with flecks of dark blood.  MD notified.  After some walking, pt stated his pain seemed to be decreasing.  Pt okay for d/c by MD.

## 2018-01-19 NOTE — DISCHARGE INSTRUCTIONS
Discharge Instructions after  Upper Endoscopy (EGD)    Activity and Diet  You were given medicine for pain. You may be dizzy or sleepy.  For 24 hours:    Do not drive or use heavy equipment.    Do not make important decisions.    Do not drink any alcohol.      Discomfort  You may have a sore throat for 2 to 3 days. It may help to:    Avoid hot liquids for 24 hours.    Use sore throat lozenges.    Gargle as needed with salt water up to 4 times a day. Mix 1 cup of warm water  with 1 teaspoon of salt. Do not swallow.  _x__ Your esophagus was banded during the exam:    Drink only cool liquids for the rest of the day. Eat a soft diet for the next few days.    You may have a sore chest for 2 to 3 days.    You may take Tylenol (acetaminophen) for pain unless your doctor has told you not to.    Do not take aspirin or ibuprofen (Advil, Motrin) or other NSAIDS  (anti-inflammatory drugs) for _3__ days.    Follow-up  __x_ We took small tissue samples for study. If you do not have a follow-up visit scheduled,  call your provider s office in 2 weeks for the results.      When to call us:  Problems are rare. Call right away if you have:    Unusual throat pain or trouble swallowing    Unusual pain in belly or chest that is not relieved by belching or passing air    Black stools (tar-like looking bowel movement)    Temperature above 100.6  F. (37.5  C).    If you vomit blood or have severe pain, go to an emergency room.    If you have questions, call:  Monday to Friday, 7 a.m. to 4:30 p.m.: Endoscopy: [phone] (We may have to call you back)    After hours: Hospital: [phone] (Ask for the GI fellow on call)    Discharge Instructions after Colonoscopy    Today you had a __x__ Colonoscopy    Activity and Diet  You were given medicine for pain. You may be dizzy or sleepy.  For 24 hours:    Do not drive or use heavy equipment.    Do not make important decisions.    Do not drink any alcohol.    Discomfort    Air was placed in your  colon during the exam in order to see it. Walking helps to pass the air.    You may take Tylenol (acetaminophen) for pain unless your doctor has told you not to.  Do not take aspirin or ibuprofen (Advil, Motrin, or other anti-inflammatory  drugs) for __3___ days.    When to call:    Call right away if you have:    Unusual pain in belly or chest pain not relieved with passing air.    More than 1 to 2 Tablespoons of bleeding from your rectum.    Fever above 100.6  F (37.5  C).    If you have severe pain, bleeding, or shortness of breath, go to an emergency room.    If you have questions, call:  Monday to Friday, 7 a.m. to 4:30 p.m.  Endoscopy: [phone] (We may have to call you back)    After hours  Hospital: [phone] (Ask for the GI fellow on call)

## 2018-01-19 NOTE — IP AVS SNAPSHOT
MRN:[id number]                      After Visit Summary   1/19/2018    Danielle Cook    MRN: [id number]           Thank you!     Thank you for choosing [facility] for your care. Our goal is always to provide you with excellent care. Hearing back from our patients is one way we can continue to improve our services. Please take a few minutes to complete the written survey that you may receive in the mail after you visit with us. Thank you!        Patient Information     Date Of Birth          1945        About your hospital stay     You were admitted on:  January 19, 2018 You last received care in the:  [facility], Endoscopy    You were discharged on:  January 19, 2018       Who to Call     For medical emergencies, please call 911.  For non-urgent questions about your medical care, please call your primary care provider or clinic, [phone]  For questions related to your surgery, please call your surgery clinic        Attending Provider     Provider Specialty    Xavier Sutton MD Gastroenterology       Primary Care Provider Office Phone # Fax #    Eddie Huff [phone]      Your next 10 appointments already scheduled     Jan 22, 2018  9:00 AM CST   Lab with  LAB   [facility] ([facility])    [address] [place] [address] [phone]            Jan 22, 2018  9:15 AM CST   US ABDOMEN/PELVIS DUPLEX COMPLETE with [address] [facility] ([facility])    [address] [place] [address] [phone]           Please bring a list of your medicines (including vitamins, minerals and over-the-counter drugs). Also, tell your doctor about any allergies you may have. Wear comfortable clothes and leave your valuables at home.  Adults: No eating or drinking for 8 hours before the exam. You may take medicine with a small sip of water.  Children: -  Children 6+ years: No food or drink for 6 hours before exam. - Children 1-5 years: No food or drink for 4 hours before exam. - Infants, breast-fed: may have breast milk up to 2 hours before exam. - Infants, formula: may have bottle until 4 hours before exam.  Please call the Imaging Department at your exam site with any questions.            Jan 22, 2018 10:05 AM CST   Return General Liver with Nataly Darnell MD   [facility] ([facility])    [address] [place] [address]  [address]  [facility] [address]   [phone]            Apr 05, 2018  1:30 PM CDT   (Arrive by 1:15 PM)   Return Visit with Av De León MD   [facility] Neurology ([facility])    [address] [place] [address]  3rd Floor  [facility] [address]   [phone]            Dec 21, 2018  8:45 AM CST   Lab with  LAB   [facility] ([facility])    [address] [place] [address]  [address] [facility] [address]   [phone]            Dec 21, 2018  9:40 AM CST   (Arrive by 9:10 AM)   Return Visit with Beto Alvarado MD   [facility] Neph[facility] ([facility])    [address] [place] [address]  [address] [place] [address]   [phone]              Further instructions from your care team       Discharge Instructions after  Upper Endoscopy (EGD)    Activity and Diet  You were given medicine for pain. You may be dizzy or sleepy.  For 24 hours:    Do not drive or use heavy equipment.    Do not make important decisions.    Do not drink any alcohol.      Discomfort  You may have a sore throat for 2 to 3 days. It may help to:    Avoid hot liquids for 24 hours.    Use sore throat lozenges.    Gargle as needed with salt water up to 4 times a day. Mix 1 cup of warm water  with 1 teaspoon of salt. Do not swallow.  _x__ Your esophagus was banded during the exam:    Drink only cool liquids for the rest of the day. Eat a soft diet for the next few days.    You may have a sore  chest for 2 to 3 days.    You may take Tylenol (acetaminophen) for pain unless your doctor has told you not to.    Do not take aspirin or ibuprofen (Advil, Motrin) or other NSAIDS  (anti-inflammatory drugs) for _3__ days.    Follow-up  __x_ We took small tissue samples for study. If you do not have a follow-up visit scheduled,  call your provider s office in 2 weeks for the results.      When to call us:  Problems are rare. Call right away if you have:    Unusual throat pain or trouble swallowing    Unusual pain in belly or chest that is not relieved by belching or passing air    Black stools (tar-like looking bowel movement)    Temperature above 100.6  F. (37.5  C).    If you vomit blood or have severe pain, go to an emergency room.    If you have questions, call:  Monday to Friday, 7 a.m. to 4:30 p.m.: Endoscopy: [phone] (We may have to call you back)    After hours: Hospital: [phone] (Ask for the GI fellow on call)    Discharge Instructions after Colonoscopy    Today you had a __x__ Colonoscopy    Activity and Diet  You were given medicine for pain. You may be dizzy or sleepy.  For 24 hours:    Do not drive or use heavy equipment.    Do not make important decisions.    Do not drink any alcohol.    Discomfort    Air was placed in your colon during the exam in order to see it. Walking helps to pass the air.    You may take Tylenol (acetaminophen) for pain unless your doctor has told you not to.  Do not take aspirin or ibuprofen (Advil, Motrin, or other anti-inflammatory  drugs) for __3___ days.    When to call:    Call right away if you have:    Unusual pain in belly or chest pain not relieved with passing air.    More than 1 to 2 Tablespoons of bleeding from your rectum.    Fever above 100.6  F (37.5  C).    If you have severe pain, bleeding, or shortness of breath, go to an emergency room.    If you have questions, call:  Monday to Friday, 7 a.m. to 4:30 p.m.  Endoscopy: [phone] (We may have to  "call you back)    After hours  Hospital: [phone] (Ask for the GI fellow on call)    Pending Results     No orders found from 2018 to 2018.            Admission Information     Date & Time Provider Department Dept. Phone    2018 Xavier Sutton MD [facility] [place], Endoscopy [phone]      Your Vitals Were     Blood Pressure Respirations Pulse Oximetry             151/85 24 100%         MyChart Information     [employer] lets you send messages to your doctor, view your test results, renew your prescriptions, schedule appointments and more. To sign up, go to [email or web address] . Click on \"Log in\" on the left side of the screen, which will take you to the Welcome page. Then click on \"Sign up Now\" on the right side of the page.     You will be asked to enter the access code listed below, as well as some personal information. Please follow the directions to create your username and password.     Your access code is: [email or web address]  Expires: 4/3/2018  6:30 AM     Your access code will  in 90 days. If you need help or a new code, please call your [facility] clinic or [phone].        Care EveryWhere ID     This is your Care EveryWhere ID. This could be used by other organizations to access your [facility] medical records  [id number]        Equal Access to Services     ISA SORIANO : Hadii janette donnelly Sorohith, waaxda luqadaha, qaybta kaalmada jeannie caro. So [facility] [phone].    ATENCIÓN: Si habla español, tiene a whitehead disposición servicios gratuitos de asistencia lingüística. Llame al [phone].    We comply with applicable federal civil rights laws and [place] laws. We do not discriminate on the basis of race, color, national origin, age, disability, sex, sexual orientation, or gender identity.               Review of your medicines      UNREVIEWED medicines. Ask your doctor about these medicines        Dose / Directions    acetaminophen 325 MG " tablet   Commonly known as:  TYLENOL   Used for:  Other chronic pain        Dose:  325 mg   Take 1 tablet (325 mg) by mouth every 6 hours as needed for mild pain   Quantity:  100 tablet   Refills:  3       amLODIPine 5 MG tablet   Commonly known as:  NORVASC   Used for:  Essential hypertension with goal blood pressure less than 140/90        Dose:  10 mg   Take 2 tablets (10 mg) by mouth daily   Quantity:  30 tablet   Refills:  12       Calcium carb-Vitamin D 500 mg [place]-200 units 500-200 MG-UNIT per tablet   Commonly known as:  OSCAL with D;Oyster Shell Calcium   Used for:  PMR (polymyalgia rheumatica) (H)        Dose:  1 tablet   Take 1 tablet by mouth 2 times daily (with meals)   Quantity:  90 tablet   Refills:  3       ferrous sulfate 325 (65 FE) MG tablet   Commonly known as:  IRON   Used for:  Other iron deficiency anemia        Dose:  1 tablet   Take 1 tablet (325 mg) by mouth daily (with breakfast)   Quantity:  100 tablet   Refills:  1       gabapentin 300 MG tablet   Commonly known as:  NEURONTIN   Used for:  Chronic hepatitis C (H), Renal insufficiency        Dose:  600 mg   Take 600 mg by mouth 3 times daily   Refills:  0       omeprazole 40 MG capsule   Commonly known as:  priLOSEC   Used for:  Esophagitis, reflux        Dose:  40 mg   Take 1 capsule (40 mg) by mouth daily (with lunch)   Quantity:  60 capsule   Refills:  2       SYNTHROID PO        Refills:  0                Protect others around you: Learn how to safely use, store and throw away your medicines at www.disposemymeds.org.             Medication List: This is a list of all your medications and when to take them. Check marks below indicate your daily home schedule. Keep this list as a reference.      Medications           Morning Afternoon Evening Bedtime As Needed    acetaminophen 325 MG tablet   Commonly known as:  TYLENOL   Take 1 tablet (325 mg) by mouth every 6 hours as needed for mild pain                                amLODIPine 5 MG  tablet   Commonly known as:  NORVASC   Take 2 tablets (10 mg) by mouth daily                                Calcium carb-Vitamin D 500 mg [place]-200 units 500-200 MG-UNIT per tablet   Commonly known as:  OSCAL with D;Oyster Shell Calcium   Take 1 tablet by mouth 2 times daily (with meals)                                ferrous sulfate 325 (65 FE) MG tablet   Commonly known as:  IRON   Take 1 tablet (325 mg) by mouth daily (with breakfast)                                gabapentin 300 MG tablet   Commonly known as:  NEURONTIN   Take 600 mg by mouth 3 times daily                                omeprazole 40 MG capsule   Commonly known as:  priLOSEC   Take 1 capsule (40 mg) by mouth daily (with lunch)                                SYNTHROID PO

## 2018-01-19 NOTE — OR NURSING
Pt tolerated EGD on 2L NC. Weaned to rm air after. Gastric polyp removed and site clipped. Banding done in esophagus. No interventions in colonoscopy. VSS throughout. Sample sent to lab.

## 2018-01-19 NOTE — IP AVS SNAPSHOT
[facility], [place], Endoscopy    [address] [facility] [address]    Phone:  [phone]                                       After Visit Summary   1/19/2018    Danielle Cook    MRN: [id number]           After Visit Summary Signature Page     I have received my discharge instructions, and my questions have been answered. I have discussed any challenges I see with this plan with the nurse or doctor.    ..........................................................................................................................................  Patient/Patient Representative Signature      ..........................................................................................................................................  Patient Representative Print Name and Relationship to Patient    ..................................................               ................................................  Date                                            Time    ..........................................................................................................................................  Reviewed by Signature/Title    ...................................................              ..............................................  Date                                                            Time

## 2018-01-22 ENCOUNTER — RADIANT APPOINTMENT (OUTPATIENT)
Dept: ULTRASOUND IMAGING | Facility: CLINIC | Age: 73
End: 2018-01-22
Attending: INTERNAL MEDICINE
Payer: COMMERCIAL

## 2018-01-22 ENCOUNTER — TELEPHONE (OUTPATIENT)
Dept: GASTROENTEROLOGY | Facility: CLINIC | Age: 73
End: 2018-01-22

## 2018-01-22 ENCOUNTER — RADIANT APPOINTMENT (OUTPATIENT)
Dept: CT IMAGING | Facility: CLINIC | Age: 73
End: 2018-01-22
Attending: INTERNAL MEDICINE
Payer: COMMERCIAL

## 2018-01-22 ENCOUNTER — OFFICE VISIT (OUTPATIENT)
Dept: GASTROENTEROLOGY | Facility: CLINIC | Age: 73
End: 2018-01-22
Attending: INTERNAL MEDICINE
Payer: COMMERCIAL

## 2018-01-22 VITALS
SYSTOLIC BLOOD PRESSURE: 142 MMHG | HEART RATE: 81 BPM | BODY MASS INDEX: 25.58 KG/M2 | DIASTOLIC BLOOD PRESSURE: 77 MMHG | HEIGHT: 68 IN | WEIGHT: 168.8 LBS | TEMPERATURE: 97.6 F

## 2018-01-22 DIAGNOSIS — I85.00 ESOPHAGEAL VARICES WITHOUT BLEEDING, UNSPECIFIED ESOPHAGEAL VARICES TYPE (H): Primary | ICD-10-CM

## 2018-01-22 DIAGNOSIS — R10.84 ABDOMINAL PAIN, GENERALIZED: Primary | ICD-10-CM

## 2018-01-22 DIAGNOSIS — K74.69 COMPENSATED HCV CIRRHOSIS (H): ICD-10-CM

## 2018-01-22 DIAGNOSIS — K59.03 DRUG-INDUCED CONSTIPATION: ICD-10-CM

## 2018-01-22 DIAGNOSIS — B19.20 COMPENSATED HCV CIRRHOSIS (H): ICD-10-CM

## 2018-01-22 DIAGNOSIS — R13.10 ODYNOPHAGIA: ICD-10-CM

## 2018-01-22 DIAGNOSIS — R10.84 ABDOMINAL PAIN, GENERALIZED: ICD-10-CM

## 2018-01-22 LAB
ALBUMIN SERPL-MCNC: 3 G/DL (ref 3.4–5)
ALP SERPL-CCNC: 353 U/L (ref 40–150)
ALT SERPL W P-5'-P-CCNC: 41 U/L (ref 0–70)
ANION GAP SERPL CALCULATED.3IONS-SCNC: 5 MMOL/L (ref 3–14)
AST SERPL W P-5'-P-CCNC: 78 U/L (ref 0–45)
BILIRUB DIRECT SERPL-MCNC: 0.6 MG/DL (ref 0–0.2)
BILIRUB SERPL-MCNC: 1 MG/DL (ref 0.2–1.3)
BUN SERPL-MCNC: 12 MG/DL (ref 7–30)
CALCIUM SERPL-MCNC: 8.2 MG/DL (ref 8.5–10.1)
CHLORIDE SERPL-SCNC: 105 MMOL/L (ref 94–109)
CO2 SERPL-SCNC: 27 MMOL/L (ref 20–32)
COPATH REPORT: NORMAL
CREAT SERPL-MCNC: 1.25 MG/DL (ref 0.66–1.25)
ERYTHROCYTE [DISTWIDTH] IN BLOOD BY AUTOMATED COUNT: 15.3 % (ref 10–15)
GFR SERPL CREATININE-BSD FRML MDRD: 57 ML/MIN/1.7M2
GLUCOSE SERPL-MCNC: 106 MG/DL (ref 70–99)
HCT VFR BLD AUTO: 36.3 % (ref 40–53)
HGB BLD-MCNC: 11.7 G/DL (ref 13.3–17.7)
INR PPP: 1.13 (ref 0.86–1.14)
MCH RBC QN AUTO: 27 PG (ref 26.5–33)
MCHC RBC AUTO-ENTMCNC: 32.2 G/DL (ref 31.5–36.5)
MCV RBC AUTO: 84 FL (ref 78–100)
PLATELET # BLD AUTO: 143 10E9/L (ref 150–450)
POTASSIUM SERPL-SCNC: 3.5 MMOL/L (ref 3.4–5.3)
PROT SERPL-MCNC: 8.3 G/DL (ref 6.8–8.8)
RBC # BLD AUTO: 4.34 10E12/L (ref 4.4–5.9)
SODIUM SERPL-SCNC: 137 MMOL/L (ref 133–144)
WBC # BLD AUTO: 6.5 10E9/L (ref 4–11)

## 2018-01-22 PROCEDURE — 36415 COLL VENOUS BLD VENIPUNCTURE: CPT | Performed by: INTERNAL MEDICINE

## 2018-01-22 PROCEDURE — 80048 BASIC METABOLIC PNL TOTAL CA: CPT | Performed by: INTERNAL MEDICINE

## 2018-01-22 PROCEDURE — G0463 HOSPITAL OUTPT CLINIC VISIT: HCPCS | Mod: ZF

## 2018-01-22 PROCEDURE — 85610 PROTHROMBIN TIME: CPT | Performed by: INTERNAL MEDICINE

## 2018-01-22 PROCEDURE — 85027 COMPLETE CBC AUTOMATED: CPT | Performed by: INTERNAL MEDICINE

## 2018-01-22 PROCEDURE — 80076 HEPATIC FUNCTION PANEL: CPT | Performed by: INTERNAL MEDICINE

## 2018-01-22 RX ORDER — POLYETHYLENE GLYCOL 3350 17 G/17G
1 POWDER, FOR SOLUTION ORAL 3 TIMES DAILY PRN
Qty: 850 G | Refills: 3 | Status: SHIPPED | OUTPATIENT
Start: 2018-01-22 | End: 2018-04-05

## 2018-01-22 RX ORDER — OXYCODONE HYDROCHLORIDE 5 MG/1
5 TABLET ORAL EVERY 4 HOURS PRN
Qty: 5 TABLET | Refills: 0 | Status: SHIPPED | OUTPATIENT
Start: 2018-01-22 | End: 2018-04-05

## 2018-01-22 ASSESSMENT — PAIN SCALES - GENERAL: PAINLEVEL: MILD PAIN (3)

## 2018-01-22 NOTE — PROGRESS NOTES
Hepatology Clinic note  Danielle Cook   Date of Birth 1945  Date of Service 1/22/2018         Impression/plan:   Danielle Cook is a 72 year old male with history of CKD 3, hypertension, cured hepatitis C complicated by cirrhosis with esophageal varices status post banding, who presents with his wife for followup.      His MELD sodium is 10, without signs of decompensation. However, appears rather uncomfortable today.      Most of his symptoms appear related to the recent endoscopic procedure that he had 3 days ago. A concern for abdominal etiology such as possible ileus, less likely obstruction vs perforation, as he does have minimal bowel sounds.  In the absence of bowel movements since the procedure, I suspect a narcotic-induced constipation.   - Checking a CT scan of the chest, abdomen and pelvis to exclude concerning findings such as perforation, obstruction or an ileus.  - If no concerning findings are noted, then we will work on regulating his bowel habits by adding MiraLax and minimizing narcotics.   - Otherwise, we will continue regular cirrhosis care including HCC screening with imaging every 6 months, esophageal varices surveillance, and he is due for repeat EGD in 4-6 weeks.  He is up-to-date on vaccines.  Otherwise, no change in his management.      We will plan on seeing him in clinic in 3 months.     Nataly Darnell MD  [facility] Hepatology clinic    -----------------------------------------------------       HPI:   Danielle Cook is a 72 year old male with cured hepatitis C complicated by cirrhosis who presents for followup.      Since last visit, he underwent an upper endoscopy and colonoscopy 3 days ago. Found to have large enough esophageal varices status post banding x4.    This appears to have resulted in a significant amount of discomfort which responded some to the pain medication that was prescribed; however, he continues to have significant symptoms  including dysphagia.  He denies nausea.  In addition, he has not had a bowel movement for the last 3-4 days which is adding to his symptoms of abdominal discomfort described as generalized and not related to oral intake and not associated with emesis.  He is tolerating liquid diet only.      Otherwise, denies symptoms such as fevers, chills, jaundice, bleeding, confusion, falls.   He notes full adherence with medications.          Past Medical History:     Past Medical History:   Diagnosis Date     CKD (chronic kidney disease) stage 3, GFR 30-59 ml/min      Hepatitis C virus infection      Hypertension             Past Surgical History:     Past Surgical History:   Procedure Laterality Date     COLONOSCOPY N/A 1/19/2018    Procedure: COLONOSCOPY;  COMBINED ESOPHAGOSCOPY, GASTROSCOPY, DUODENOSCOPY (EGD) REMOVE TUMOR/POYP/LESION BY SNARE, CONTROL BLEED,BANDING/LIGATION OF VARICES Colonoscopy;  Surgeon: Xavier Sutton MD;  Location: [facility]     ESOPHAGOSCOPY, GASTROSCOPY, DUODENOSCOPY (EGD), COMBINED N/A 11/4/2015    Procedure: COMBINED ESOPHAGOSCOPY, GASTROSCOPY, DUODENOSCOPY (EGD), BIOPSY SINGLE OR MULTIPLE;  Surgeon: Inocente Do MD;  [facility]            Medications:     Current Outpatient Prescriptions   Medication     Levothyroxine Sodium (SYNTHROID PO)     omeprazole (PRILOSEC) 40 MG capsule     amLODIPine (NORVASC) 5 MG tablet     gabapentin (NEURONTIN) 300 MG tablet     ferrous sulfate (IRON) 325 (65 FE) MG tablet     acetaminophen (TYLENOL) 325 MG tablet     No current facility-administered medications for this visit.             Allergies:   No Known Allergies         Social History:     Social History     Social History     Marital status:      Spouse name: N/A     Number of children: N/A     Years of education: N/A     Occupational History     Not on file.     Social History Main Topics     Smoking status: Never Smoker     Smokeless tobacco: Never Used     Alcohol use No     Drug use: No      "Sexual activity: Not on file     Other Topics Concern     Not on file     Social History Narrative            Family History:     Family History   Problem Relation Age of Onset     Hypertension Father               Review of Systems:   10 points ROS was obtained and highlighted in the HPI, otherwise negative.          Physical Exam:   VS:  /77  Pulse 81  Temp 97.6  F (36.4  C) (Oral)  Ht 1.727 m (5' 8\")  Wt 76.6 kg (168 lb 12.8 oz)  BMI 25.67 kg/m2      Gen: A&Ox3, NAD  HEENT: non-icteric, oropharynx clear  CV: RRR  Lung: CTAB  Abd: soft, NT, ND, spleen is enlarged, liver is not palpable.   Ext: no edema, intact pulses.   Skin: stigmata of chronic liver disease.   Neuro: no focal deficits, grossly intact, no asterixis   Psych: appropriate mood and affects         Data:   Reviewed in person and significant for:  Lab Results   Component Value Date    WBC 6.5 01/22/2018     Lab Results   Component Value Date    RBC 4.34 01/22/2018     Lab Results   Component Value Date    HGB 11.7 01/22/2018     Lab Results   Component Value Date    HCT 36.3 01/22/2018     No components found for: MCT  Lab Results   Component Value Date    MCV 84 01/22/2018     Lab Results   Component Value Date    MCH 27.0 01/22/2018     Lab Results   Component Value Date    MCHC 32.2 01/22/2018     Lab Results   Component Value Date    RDW 15.3 01/22/2018     Lab Results   Component Value Date     01/22/2018     Last Basic Metabolic Panel:  Lab Results   Component Value Date     01/22/2018      Lab Results   Component Value Date    POTASSIUM 3.5 01/22/2018     Lab Results   Component Value Date    CHLORIDE 105 01/22/2018     Lab Results   Component Value Date    ABDIRASHID 8.2 01/22/2018     Lab Results   Component Value Date    CO2 27 01/22/2018     Lab Results   Component Value Date    BUN 12 01/22/2018     Lab Results   Component Value Date    CR 1.25 01/22/2018     Lab Results   Component Value Date     01/22/2018 "     Liver Function Studies -   Recent Labs   Lab Test  01/22/18   0853   PROTTOTAL  8.3   ALBUMIN  3.0*   BILITOTAL  1.0   ALKPHOS  353*   AST  78*   ALT  41

## 2018-01-22 NOTE — NURSING NOTE
"Chief Complaint   Patient presents with     RECHECK     follow up with cirrhosis, tzimmer cma       Initial /77  Pulse 81  Temp 97.6  F (36.4  C) (Oral)  Ht 1.727 m (5' 8\")  Wt 76.6 kg (168 lb 12.8 oz)  BMI 25.67 kg/m2 Estimated body mass index is 25.67 kg/(m^2) as calculated from the following:    Height as of this encounter: 1.727 m (5' 8\").    Weight as of this encounter: 76.6 kg (168 lb 12.8 oz).  Medication Reconciliation: complete    "

## 2018-01-22 NOTE — MR AVS SNAPSHOT
After Visit Summary   1/22/2018    Danielle Cook    MRN: [id number]           Patient Information     Date Of Birth          1945        Visit Information        Provider Department      1/22/2018 10:05 AM Nataly Darnell MD; [facility] [employer] [facility] Hepatology        Today's Diagnoses     Abdominal pain, generalized    -  1    Odynophagia           Follow-ups after your visit        Your next 10 appointments already scheduled     Jan 22, 2018  2:00 PM CST   (Arrive by 1:45 PM)   CT CHEST ABDOMEN PELVIS W/O & W CONTRAST with UCCT2   [facility] CT ([facility])    [address]  1st Floor  [facility] [address] [phone]           Please bring any scans or X-rays taken at other hospitals, if similar tests were done. Also bring a list of your medicines, including vitamins, minerals and over-the-counter drugs. It is safest to leave personal items at home.  Be sure to tell your doctor:   If you have any allergies.   If there s any chance you are pregnant.   If you are breastfeeding.   If you have any special needs.  You may have contrast for this exam. To prepare:   Do not eat or drink for 2 hours before your exam. If you need to take medicine, you may take it with small sips of water. (We may ask you to take liquid medicine as well.)   The day before your exam, drink extra fluids at least six 8-ounce glasses (unless your doctor tells you to restrict your fluids).  Patients over 70 or patients with diabetes or kidney problems:   If you haven t had a blood test (creatinine test) within the last 30 days, go to your clinic or Diagnostic Imaging Department for this test.  If you have diabetes:   If your kidney function is normal, continue taking your metformin (Avandamet, Glucophage, Glucovance, Metaglip) on the day of your exam.   If your kidney function is abnormal, wait 48 hours before restarting this medicine.  You will have oral contrast  for this exam:   You will drink the contrast at home. Get this from your clinic or Diagnostic Imaging Department. Please follow the directions given.  Please wear loose clothing, such as a sweat suit or jogging clothes. Avoid snaps, zippers and other metal. We may ask you to undress and put on a hospital gown.  If you have any questions, please call the Imaging Department where you will have your exam.            Apr 05, 2018  1:30 PM CDT   (Arrive by 1:15 PM)   Return Visit with Av De León MD   [facility] Neurology ([facility])    [address] [place] [address]  3rd Floor  [facility] [address] [phone]            Apr 17, 2018 10:00 AM CDT   Lab with  LAB   [facility] Lab ([facility])    [address] [place] [address]  1st [facility] [id number] [phone]            Apr 17, 2018 11:00 AM CDT   (Arrive by 10:45 AM)   Return General Liver with Nataly Darnell MD   [facility] Hepatology ([facility])    [address] [place] [address]  [address]  [facility] [address] [phone]            Dec 21, 2018  8:45 AM CST   Lab with  LAB   [facility] Lab ([facility])    [address] [place] [address]  [address]st [facility] [address] [phone]            Dec 21, 2018  9:40 AM CST   (Arrive by 9:10 AM)   Return Visit with Beto Alvarado MD   [facility] Nephrology ([facility])    [address] [place] [address]  [address]  [facility] [id number] [phone]              Future tests that were ordered for you today     Open Future Orders        Priority Expected Expires Ordered    CT Chest Abdomen Pelvis w/o & w Contrast STAT  1/22/2019 1/22/2018    UPPER GI ENDOSCOPY Routine  3/8/2018 1/22/2018            Who to contact     If you have questions or need follow up information about today's clinic visit or your schedule please contact [facility] HEPATOLOG[facility] directly at [phone].  Normal or non-critical lab and  "imaging results will be communicated to you by MyChart, letter or phone within 4 business days after the clinic has received the results. If you do not hear from us within 7 days, please contact the clinic through [employer] or phone. If you have a critical or abnormal lab result, we will notify you by phone as soon as possible.  Submit refill requests through [employer] or call your pharmacy and they will forward the refill request to us. Please allow 3 business days for your refill to be completed.          Additional Information About Your Visit        [employer] Information     [employer] lets you send messages to your doctor, view your test results, renew your prescriptions, schedule appointments and more. To sign up, go to [email or web address] . Click on \"Log in\" on the left side of the screen, which will take you to the Welcome page. Then click on \"Sign up Now\" on the right side of the page.     You will be asked to enter the access code listed below, as well as some personal information. Please follow the directions to create your username and password.     Your access code is: [email or web address]  Expires: 4/3/2018  6:30 AM     Your access code will  in 90 days. If you need help or a new code, please call your [facility] clinic or [phone].        Care EveryWhere ID     This is your Care EveryWhere ID. This could be used by other organizations to access your [facility] medical records  [id number]        Your Vitals Were     Pulse Temperature Height BMI (Body Mass Index)          81 97.6  F (36.4  C) (Oral) 1.727 m (5' 8\") 25.67 kg/m2         Blood Pressure from Last 3 Encounters:   18 142/77   18 (!) 158/109   18 124/69    Weight from Last 3 Encounters:   18 76.6 kg (168 lb 12.8 oz)   12/15/17 75.3 kg (166 lb)   17 75.4 kg (166 lb 3.2 oz)                 Today's Medication Changes          These changes are accurate as of: 18 11:33 AM.  If you have any questions, ask your nurse or " doctor.               Stop taking these medicines if you haven't already. Please contact your care team if you have questions.     Calcium carb-Vitamin D 500 mg [place]-200 units 500-200 MG-UNIT per tablet   Commonly known as:  OSCAL with D;Oyster Shell Calcium   Stopped by:  Nataly Darnell MD           lidocaine (viscous) 2 % solution   Commonly known as:  XYLOCAINE   Stopped by:  Nataly Darnell MD                Where to get your medicines      Some of these will need a paper prescription and others can be bought over the counter.  Ask your nurse if you have questions.     Bring a paper prescription for each of these medications     oxyCODONE IR 5 MG tablet                Primary Care Provider Office Phone # Fax #    Eddie Huff [phone]       [facility] [address] [place] [address]        Equal Access to Services     [facility]: Hadii aad ku hadasho Sorohith, waaxda luqadaha, qaybta kaalmada vania, jeannie markham . So [facility] [phone].    ATENCIÓN: Si habla español, tiene a whitehead disposición servicios gratuitos de asistencia lingüística. Roque al [phone].    We comply with applicable federal civil rights laws and [place] laws. We do not discriminate on the basis of race, color, national origin, age, disability, sex, sexual orientation, or gender identity.            Thank you!     Thank you for choosing [facility]  for your care. Our goal is always to provide you with excellent care. Hearing back from our patients is one way we can continue to improve our services. Please take a few minutes to complete the written survey that you may receive in the mail after your visit with us. Thank you!             Your Updated Medication List - Protect others around you: Learn how to safely use, store and throw away your medicines at www.disposemymeds.org.          This list is accurate as of: 1/22/18 11:33 AM.  Always use your most  recent med list.                   Brand Name Dispense Instructions for use Diagnosis    acetaminophen 325 MG tablet    TYLENOL    100 tablet    Take 1 tablet (325 mg) by mouth every 6 hours as needed for mild pain    Other chronic pain       amLODIPine 5 MG tablet    NORVASC    30 tablet    Take 2 tablets (10 mg) by mouth daily    Essential hypertension with goal blood pressure less than 140/90       ferrous sulfate 325 (65 FE) MG tablet    IRON    100 tablet    Take 1 tablet (325 mg) by mouth daily (with breakfast)    Other iron deficiency anemia       gabapentin 300 MG tablet    NEURONTIN     Take 600 mg by mouth 3 times daily    Chronic hepatitis C (H), Renal insufficiency       omeprazole 40 MG capsule    priLOSEC    60 capsule    Take 1 capsule (40 mg) by mouth daily (with lunch)    Esophagitis, reflux       oxyCODONE IR 5 MG tablet    ROXICODONE    5 tablet    Take 1 tablet (5 mg) by mouth every 4 hours as needed for pain Pain not responding to lidocaine    Odynophagia       SYNTHROID PO

## 2018-01-22 NOTE — LETTER
1/22/2018      RE: Danielle Cook  [address]   [facility] [address]       Hepatology Clinic note  Danielle Cook   Date of Birth 1945  Date of Service 1/22/2018         Impression/plan:   Danielle Cook is a 72 year old male with history of CKD 3, hypertension, cured hepatitis C complicated by cirrhosis with esophageal varices status post banding, who presents with his wife for followup.      His MELD sodium is 10, without signs of decompensation. However, appears rather uncomfortable today.      Most of his symptoms appear related to the recent endoscopic procedure that he had 3 days ago. A concern for abdominal etiology such as possible ileus, less likely obstruction vs perforation, as he does have minimal bowel sounds.  In the absence of bowel movements since the procedure, I suspect a narcotic-induced constipation.   - Checking a CT scan of the chest, abdomen and pelvis to exclude concerning findings such as perforation, obstruction or an ileus.  - If no concerning findings are noted, then we will work on regulating his bowel habits by adding MiraLax and minimizing narcotics.   - Otherwise, we will continue regular cirrhosis care including HCC screening with imaging every 6 months, esophageal varices surveillance, and he is due for repeat EGD in 4-6 weeks.  He is up-to-date on vaccines.  Otherwise, no change in his management.      We will plan on seeing him in clinic in 3 months.     Nataly Darnell MD  [facility] Hepatology clinic    -----------------------------------------------------       HPI:   Danielle Cook is a 72 year old male with cured hepatitis C complicated by cirrhosis who presents for followup.      Since last visit, he underwent an upper endoscopy and colonoscopy 3 days ago. Found to have large enough esophageal varices status post banding x4.    This appears to have resulted in a significant amount of discomfort which  responded some to the pain medication that was prescribed; however, he continues to have significant symptoms including dysphagia.  He denies nausea.  In addition, he has not had a bowel movement for the last 3-4 days which is adding to his symptoms of abdominal discomfort described as generalized and not related to oral intake and not associated with emesis.  He is tolerating liquid diet only.      Otherwise, denies symptoms such as fevers, chills, jaundice, bleeding, confusion, falls.   He notes full adherence with medications.          Past Medical History:     Past Medical History:   Diagnosis Date     CKD (chronic kidney disease) stage 3, GFR 30-59 ml/min      Hepatitis C virus infection      Hypertension             Past Surgical History:     Past Surgical History:   Procedure Laterality Date     COLONOSCOPY N/A 1/19/2018    Procedure: COLONOSCOPY;  COMBINED ESOPHAGOSCOPY, GASTROSCOPY, DUODENOSCOPY (EGD) REMOVE TUMOR/POYP/LESION BY SNARE, CONTROL BLEED,BANDING/LIGATION OF VARICES Colonoscopy;  Surgeon: Xavier Sutton MD;  Location: [facility]     ESOPHAGOSCOPY, GASTROSCOPY, DUODENOSCOPY (EGD), COMBINED N/A 11/4/2015    Procedure: COMBINED ESOPHAGOSCOPY, GASTROSCOPY, DUODENOSCOPY (EGD), BIOPSY SINGLE OR MULTIPLE;  Surgeon: Inocente Do MD;  Location: [facility]            Medications:     Current Outpatient Prescriptions   Medication     Levothyroxine Sodium (SYNTHROID PO)     omeprazole (PRILOSEC) 40 MG capsule     amLODIPine (NORVASC) 5 MG tablet     gabapentin (NEURONTIN) 300 MG tablet     ferrous sulfate (IRON) 325 (65 FE) MG tablet     acetaminophen (TYLENOL) 325 MG tablet     No current facility-administered medications for this visit.             Allergies:   No Known Allergies         Social History:     Social History     Social History     Marital status:      Spouse name: N/A     Number of children: N/A     Years of education: N/A     Occupational History     Not on file.     Social History Main  "Topics     Smoking status: Never Smoker     Smokeless tobacco: Never Used     Alcohol use No     Drug use: No     Sexual activity: Not on file     Other Topics Concern     Not on file     Social History Narrative            Family History:     Family History   Problem Relation Age of Onset     Hypertension Father               Review of Systems:   10 points ROS was obtained and highlighted in the HPI, otherwise negative.          Physical Exam:   VS:  /77  Pulse 81  Temp 97.6  F (36.4  C) (Oral)  Ht 1.727 m (5' 8\")  Wt 76.6 kg (168 lb 12.8 oz)  BMI 25.67 kg/m2      Gen: A&Ox3, NAD  HEENT: non-icteric, oropharynx clear  CV: RRR  Lung: CTAB  Abd: soft, NT, ND, spleen is enlarged, liver is not palpable.   Ext: no edema, intact pulses.   Skin: stigmata of chronic liver disease.   Neuro: no focal deficits, grossly intact, no asterixis   Psych: appropriate mood and affects         Data:   Reviewed in person and significant for:  Lab Results   Component Value Date    WBC 6.5 01/22/2018     Lab Results   Component Value Date    RBC 4.34 01/22/2018     Lab Results   Component Value Date    HGB 11.7 01/22/2018     Lab Results   Component Value Date    HCT 36.3 01/22/2018     No components found for: MCT  Lab Results   Component Value Date    MCV 84 01/22/2018     Lab Results   Component Value Date    MCH 27.0 01/22/2018     Lab Results   Component Value Date    MCHC 32.2 01/22/2018     Lab Results   Component Value Date    RDW 15.3 01/22/2018     Lab Results   Component Value Date     01/22/2018     Last Basic Metabolic Panel:  Lab Results   Component Value Date     01/22/2018      Lab Results   Component Value Date    POTASSIUM 3.5 01/22/2018     Lab Results   Component Value Date    CHLORIDE 105 01/22/2018     Lab Results   Component Value Date    ABDIRASHID 8.2 01/22/2018     Lab Results   Component Value Date    CO2 27 01/22/2018     Lab Results   Component Value Date    BUN 12 01/22/2018     Lab Results "   Component Value Date    CR 1.25 01/22/2018     Lab Results   Component Value Date     01/22/2018     Liver Function Studies -   Recent Labs   Lab Test  01/22/18   0853   PROTTOTAL  8.3   ALBUMIN  3.0*   BILITOTAL  1.0   ALKPHOS  353*   AST  78*   ALT  41       Nataly Darnell MD

## 2018-01-22 NOTE — TELEPHONE ENCOUNTER
Per procedure recommendation: Repeat upper endoscopy in 1 month for endoscopic band                        ligation. Of note, patient had immediate discomfort when                        deploying bands requiring more sedation and                        postprocedure.    Please place order for scheduling and if the patient needs MAC.    Anabell Tariq RN

## 2018-01-23 ENCOUNTER — HOSPITAL ENCOUNTER (OUTPATIENT)
Facility: CLINIC | Age: 73
End: 2018-01-23
Attending: INTERNAL MEDICINE | Admitting: INTERNAL MEDICINE

## 2018-01-25 ENCOUNTER — CARE COORDINATION (OUTPATIENT)
Dept: NEUROLOGY | Facility: CLINIC | Age: 73
End: 2018-01-25

## 2018-01-25 DIAGNOSIS — B19.20 COMPENSATED HCV CIRRHOSIS (H): Primary | ICD-10-CM

## 2018-01-25 DIAGNOSIS — K74.69 COMPENSATED HCV CIRRHOSIS (H): Primary | ICD-10-CM

## 2018-01-25 NOTE — PROGRESS NOTES
call to start Aspirin baby  until i see him.  Received: Yesterday       Av De León MD McAllister, Angela, RN     1/25/18: called patient via [employer]  and left voicemail message asking him to start taking 81 mg aspirin daily until he sees Dr De León in April. Left our contact info incase he has any questions.

## 2018-01-26 ENCOUNTER — OFFICE VISIT (OUTPATIENT)
Dept: NEUROLOGY | Facility: CLINIC | Age: 73
End: 2018-01-26
Payer: COMMERCIAL

## 2018-01-26 VITALS
DIASTOLIC BLOOD PRESSURE: 64 MMHG | WEIGHT: 164.4 LBS | BODY MASS INDEX: 24.92 KG/M2 | SYSTOLIC BLOOD PRESSURE: 140 MMHG | HEART RATE: 76 BPM | HEIGHT: 68 IN

## 2018-01-26 DIAGNOSIS — I73.9 SMALL VESSEL DISEASE (H): Primary | ICD-10-CM

## 2018-01-26 DIAGNOSIS — G44.209 TENSION HEADACHE: ICD-10-CM

## 2018-01-26 DIAGNOSIS — I73.9 SMALL VESSEL DISEASE (H): ICD-10-CM

## 2018-01-26 LAB — ERYTHROCYTE [SEDIMENTATION RATE] IN BLOOD BY WESTERGREN METHOD: 99 MM/H (ref 0–20)

## 2018-01-26 RX ORDER — NORTRIPTYLINE HCL 25 MG
25 CAPSULE ORAL AT BEDTIME
Qty: 90 CAPSULE | Refills: 3 | Status: SHIPPED | OUTPATIENT
Start: 2018-01-26 | End: 2018-04-05

## 2018-01-26 NOTE — LETTER
2018       RE: Danielle Cook  [address]   [facility] [address]     Dear Colleague,    Thank you for referring your patient, Danielle Cook, to the [facility] NEUROLOGY at [facility]. Please see a copy of my visit note below.    Service Date: 2018        RE: Danielle Cook   MRN: [id number]   : 1945      Dear Dr. Huff:       Mr. Cook was seen back in the clinic for further evaluation of his headaches.  The headaches have a 5 year duration.  He denies any stress or other problems of this nature.  His wife, I believe, is with him today.  They think he has migraines.  I went over the MRI again.  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  He denies any symptoms whatsoever that could relate to this vascular problem.  No dizziness, diplopia, numbness or tingling.      I will discuss these findings with our Vascular Stroke Service, but I believe there is nothing to do.  He does have small-vessel disease and has multiple T2 hyperintensities.  He is hypertensive.  His blood pressure control is marginal.  We will start him on aspirin one a day and also put him on nortriptyline 25 mg per day and see him for followup.  I told him if he gets any symptoms as I described to contact me.        We will check a sedimentation rate for arteritis of temporal or cranial vessels..        AV DE LEÓN MD             D: 2018   T: 2018   MT: scotty      Name:     DANIELLE COOK   MRN:      [id number]        Account:      [id number]   :      1945           Service Date: 2018      Document: [id number]        Again, thank you for allowing me to participate in the care of your patient.      Sincerely,    Av De León MD      CC:  Eddie Huff MD   [facility]    [address] [place] [address]

## 2018-01-26 NOTE — MR AVS SNAPSHOT
After Visit Summary   1/26/2018    Danielle Cook    MRN: [id number]           Patient Information     Date Of Birth          1945        Visit Information        Provider Department      1/26/2018 2:45 PM Av De León MD; [employer] [facility] Neurology        Today's Diagnoses     Small vessel disease    -  1    Tension headache          Care Instructions    Blood test today  Aspirin every day  Nortryptilline 25 mg bedtime  Return to clinic 3 months.          Follow-ups after your visit        Follow-up notes from your care team     Return in about 3 months (around 4/26/2018).      Your next 10 appointments already scheduled     Feb 22, 2018   Procedure with Danielle Mendenhall MD   [facility] [place], Endoscopy ([facility])    [address] [address]   [phone]           The [facility] is located on the corner of [address] and [address] on the [address]. It is easily accessible from virtually any point in the [facility] [place] [facility] area, via [address] and [address].            Apr 05, 2018  1:30 PM CDT   (Arrive by 1:15 PM)   Return Visit with Av De León MD   [facility] Neurology ([facility])    [address]  3rd Floor  [facility] [address]   [phone]            Apr 17, 2018 10:00 AM CDT   Lab with  LAB   [facility] Lab ([facility])    [address] [place] [address]  [address] [facility] [address]   [phone]            Apr 17, 2018 11:00 AM CDT   (Arrive by 10:45 AM)   Return General Liver with Nataly Darnell MD   [facility] Hepatology ([facility])    [address] [place] [address]  [address]  [facility] [address]   [phone]            Dec 21, 2018  8:45 AM CST   Lab with  LAB   [facility] Lab ([facility])    [address] [place] [address]  [address]   [phone]            Dec 21, 2018  9:40 AM CST   (Arrive by 9:10 AM)   Return Visit with Beto Alvarado MD   [facility] Nephrology ([facility])    [address]  [address]  [facility] [address] [phone]              Future tests that were ordered for you today     Open Standing Orders        Priority Remaining Interval Expires Ordered    US Abdomen/Pelvis Duplex Complete Routine 2/2 Every 6 Months 2019          Open Future Orders        Priority Expected Expires Ordered    Erythrocyte sedimentation rate auto Routine  2019            Who to contact     Please call your clinic at [phone] to:    Ask questions about your health    Make or cancel appointments    Discuss your medicines    Learn about your test results    Speak to your doctor   If you have compliments or concerns about an experience at your clinic, or if you wish to file a complaint, please contact [facility] Patient Relations at [phone] or email us at [email or web address]         Additional Information About Your Visit        [employer] Information     [employer] is an electronic gateway that provides easy, online access to your medical records. With [employer], you can request a clinic appointment, read your test results, renew a prescription or communicate with your care team.     To sign up for [employer] visit the website at [email or web address]   You will be asked to enter the access code listed below, as well as some personal information. Please follow the directions to create your username and password.     Your access code is: [email or web address]  Expires: 4/3/2018  6:30 AM     Your access code will  in 90 days. If you need help or a new code, please contact your [facility] or call [phone] for assistance.        Care EveryWhere ID     This is your Care EveryWhere ID. This could be used  "by other organizations to access your [facility] medical records  [id number]        Your Vitals Were     Pulse Height BMI (Body Mass Index)             76 1.727 m (5' 8\") 25 kg/m2          Blood Pressure from Last 3 Encounters:   01/26/18 140/64   01/22/18 142/77   01/19/18 (!) 158/109    Weight from Last 3 Encounters:   01/26/18 74.6 kg (164 lb 6.4 oz)   01/22/18 76.6 kg (168 lb 12.8 oz)   12/15/17 75.3 kg (166 lb)                 Today's Medication Changes          These changes are accurate as of 1/26/18  3:28 PM.  If you have any questions, ask your nurse or doctor.               Start taking these medicines.        Dose/Directions    aspirin 81 MG tablet   Used for:  Small vessel disease   Started by:  Av De León MD        Dose:  81 mg   Take 1 tablet (81 mg) by mouth daily   Quantity:  30 tablet   Refills:  11       nortriptyline 25 MG capsule   Commonly known as:  PAMELOR   Used for:  Tension headache   Started by:  Av De León MD        Dose:  25 mg   Take 1 capsule (25 mg) by mouth At Bedtime   Quantity:  90 capsule   Refills:  3            Where to get your medicines      These medications were sent to [facility] - [place], [place] - [address]  [address] [id number]     Phone:  [phone]     aspirin 81 MG tablet    nortriptyline 25 MG capsule                Primary Care Provider Office Phone # Fax #    Eddie Huff [phone]       [facility] [address] [place] [address]        Equal Access to Services     HOWARD SORIANO : Hadii janette boogie hadasho Soomaali, waaxda luqadaha, qaybta kaalmada adeegyada, jeannie acosta. So [facility] [phone].    ATENCIÓN: Si habla español, tiene a whitehead disposición servicios gratuitos de asistencia lingüística. Llame al [phone].    We comply with applicable federal civil rights laws and [place] laws. We do not discriminate on the basis of race, color, national " origin, age, disability, sex, sexual orientation, or gender identity.            Thank you!     Thank you for choosing [facility] NEUROLOGY  for your care. Our goal is always to provide you with excellent care. Hearing back from our patients is one way we can continue to improve our services. Please take a few minutes to complete the written survey that you may receive in the mail after your visit with us. Thank you!             Your Updated Medication List - Protect others around you: Learn how to safely use, store and throw away your medicines at www.disposemymeds.org.          This list is accurate as of 1/26/18  3:28 PM.  Always use your most recent med list.                   Brand Name Dispense Instructions for use Diagnosis    acetaminophen 325 MG tablet    TYLENOL    100 tablet    Take 1 tablet (325 mg) by mouth every 6 hours as needed for mild pain    Other chronic pain       amLODIPine 5 MG tablet    NORVASC    30 tablet    Take 2 tablets (10 mg) by mouth daily    Essential hypertension with goal blood pressure less than 140/90       aspirin 81 MG tablet     30 tablet    Take 1 tablet (81 mg) by mouth daily    Small vessel disease       ferrous sulfate 325 (65 FE) MG tablet    IRON    100 tablet    Take 1 tablet (325 mg) by mouth daily (with breakfast)    Other iron deficiency anemia       gabapentin 300 MG tablet    NEURONTIN     Take 600 mg by mouth 3 times daily    Chronic hepatitis C (H), Renal insufficiency       nortriptyline 25 MG capsule    PAMELOR    90 capsule    Take 1 capsule (25 mg) by mouth At Bedtime    Tension headache       omeprazole 40 MG capsule    priLOSEC    60 capsule    Take 1 capsule (40 mg) by mouth daily (with lunch)    Esophagitis, reflux       oxyCODONE IR 5 MG tablet    ROXICODONE    5 tablet    Take 1 tablet (5 mg) by mouth every 4 hours as needed for pain Pain not responding to lidocaine    Odynophagia       polyethylene glycol powder    MIRALAX    850 g    Take 17 g (1  capful) by mouth 3 times daily as needed for constipation (take 1-2 times daily until having regular bowel movements)    Drug-induced constipation       SYNTHROID PO

## 2018-01-29 ENCOUNTER — TELEPHONE (OUTPATIENT)
Dept: GASTROENTEROLOGY | Facility: CLINIC | Age: 73
End: 2018-01-29

## 2018-01-29 NOTE — PROGRESS NOTES
Service Date: 2018             Eddie Huff MD   [facility] [address] [place] [address]      RE: Danielle Samuel   MRN: [id number]   : 1945      Dear Dr. Huff:       Mr. Samuel was seen back in the clinic for further evaluation of his headaches.  The headaches have a 5 year duration.  He denies any stress or other problems of this nature.  His wife, I believe, is with him today.  They think he has migraines.  I went over the MRI again.  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  He denies any symptoms whatsoever that could relate to this vascular problem.  No dizziness, diplopia, numbness or tingling.      I will discuss these findings with our Vascular Stroke Service, but I believe there is nothing to do.  He does have small-vessel disease and has multiple T2 hyperintensities.  He is hypertensive.  His blood pressure control is marginal.  We will start him on aspirin one a day and also put him on nortriptyline 25 mg per day and see him for followup.  I told him if he gets any symptoms as I described to contact me.        We will check a sedimentation rate for arteritis of temporal or cranial vessels..      Sincerely,      MD DONYA Peterson MD             D: 2018   T: 2018   MT: scotty      Name:     DANIELLE SAMUEL   MRN:      [id number]        Account:      [id number]   :      1945           Service Date: 2018      Document: [id number]

## 2018-01-29 NOTE — TELEPHONE ENCOUNTER
Through  Leo # [id number], Instructed pt the need to follow up with PCP and the results of the abdomen CT and the need to see an ortho specialists. Informed pt I will fax recommendations to his PCP Dr. Mary Flores. Pt understood.

## 2018-02-01 DIAGNOSIS — Z86.73 HISTORY OF CVA (CEREBROVASCULAR ACCIDENT): Primary | ICD-10-CM

## 2018-02-19 ENCOUNTER — TELEPHONE (OUTPATIENT)
Dept: GASTROENTEROLOGY | Facility: CLINIC | Age: 73
End: 2018-02-19

## 2018-02-19 NOTE — TELEPHONE ENCOUNTER
Patient scheduled for EGD    Indication for procedure.  Follow up    Referring Provider. Dr. Darnell    ? Yes, Guatemalan    Arrival time verified? Yes 10:00 am    Facility location verified? [address] , [address]    Instructions given regarding prep and procedure    Prep Type NPO for 6 hours prior to procedure    Are you taking any anticoagulants or blood thinners? no    Instructions given? Yes, verbally via Guatemalan     Electronic implanted devices? no    Pre procedure teaching completed? Yes    Transportation from procedure? Yes,     H&P / Pre op physical completed? Na    Anabell Tariq RN

## 2018-03-19 ENCOUNTER — RADIANT APPOINTMENT (OUTPATIENT)
Dept: GENERAL RADIOLOGY | Facility: CLINIC | Age: 73
End: 2018-03-19
Payer: COMMERCIAL

## 2018-03-19 DIAGNOSIS — N18.9 CHRONIC KIDNEY DISEASE, UNSPECIFIED CKD STAGE: ICD-10-CM

## 2018-03-19 DIAGNOSIS — R07.1 CHEST PAIN ON BREATHING: ICD-10-CM

## 2018-03-19 DIAGNOSIS — K76.9 LIVER DISEASE: ICD-10-CM

## 2018-03-19 DIAGNOSIS — B18.2 CHRONIC VIRAL HEPATITIS C (H): ICD-10-CM

## 2018-03-22 ENCOUNTER — TELEPHONE (OUTPATIENT)
Dept: GASTROENTEROLOGY | Facility: CLINIC | Age: 73
End: 2018-03-22

## 2018-03-22 NOTE — TELEPHONE ENCOUNTER
Phone call completed with Spanish Phone .     Patient scheduled for EGD    Indication for procedure. Screening    Referring Provider. Dr. Mendenhall    ? Yes, Spanish     Arrival time verified? Instructed patient to arrive at 1000 am.     Facility location verified? Yes, [address] , [address]    Instructions given regarding prep and procedure    Prep Type NPO 6-8 hours prior to procedure.     Are you taking any anticoagulants or blood thinners? Denies     Instructions given? Yes, RN answered all questions and patient verbalized understanding using [facility] .     Electronic implanted devices? Denies     Pre procedure teaching completed? Yes    Transportation from procedure? Yes. And will stay with patient.     H&P / Pre op physical completed? N/A    Estrada Wallace RN

## 2018-03-29 ENCOUNTER — SURGERY (OUTPATIENT)
Age: 73
End: 2018-03-29

## 2018-03-29 ENCOUNTER — HOSPITAL ENCOUNTER (OUTPATIENT)
Facility: CLINIC | Age: 73
Discharge: HOME | End: 2018-03-29
Attending: INTERNAL MEDICINE | Admitting: INTERNAL MEDICINE
Payer: COMMERCIAL

## 2018-03-29 ENCOUNTER — OFFICE VISIT (OUTPATIENT)
Dept: INTERPRETER SERVICES | Facility: CLINIC | Age: 73
End: 2018-03-29

## 2018-03-29 VITALS
DIASTOLIC BLOOD PRESSURE: 86 MMHG | SYSTOLIC BLOOD PRESSURE: 138 MMHG | OXYGEN SATURATION: 95 % | RESPIRATION RATE: 14 BRPM

## 2018-03-29 LAB — UPPER GI ENDOSCOPY: NORMAL

## 2018-03-29 PROCEDURE — 25000125 ZZHC RX 250: Performed by: INTERNAL MEDICINE

## 2018-03-29 PROCEDURE — 25000128 H RX IP 250 OP 636: Performed by: INTERNAL MEDICINE

## 2018-03-29 PROCEDURE — 43235 EGD DIAGNOSTIC BRUSH WASH: CPT | Performed by: INTERNAL MEDICINE

## 2018-03-29 PROCEDURE — 40000104 ZZH STATISTIC MODERATE SEDATION < 10 MIN: Performed by: INTERNAL MEDICINE

## 2018-03-29 PROCEDURE — T1013 SIGN LANG/ORAL INTERPRETER: HCPCS | Mod: U3

## 2018-03-29 RX ORDER — FENTANYL CITRATE 50 UG/ML
INJECTION, SOLUTION INTRAMUSCULAR; INTRAVENOUS PRN
Status: DISCONTINUED | OUTPATIENT
Start: 2018-03-29 | End: 2018-03-29 | Stop reason: HOSPADM

## 2018-03-29 RX ADMIN — MIDAZOLAM 1 MG: 1 INJECTION INTRAMUSCULAR; INTRAVENOUS at 11:32

## 2018-03-29 RX ADMIN — FENTANYL CITRATE 50 MCG: 50 INJECTION, SOLUTION INTRAMUSCULAR; INTRAVENOUS at 11:32

## 2018-03-29 RX ADMIN — BENZOCAINE 1 SPRAY: 220 SPRAY, METERED PERIODONTAL at 11:32

## 2018-03-29 NOTE — IP AVS SNAPSHOT
MRN:[id number]                      After Visit Summary   3/29/2018    Danielle Cook    MRN: [id number]           Thank you!     Thank you for choosing [facility] for your care. Our goal is always to provide you with excellent care. Hearing back from our patients is one way we can continue to improve our services. Please take a few minutes to complete the written survey that you may receive in the mail after you visit with us. Thank you!        Patient Information     Date Of Birth          1945        About your hospital stay     You were admitted on:  March 29, 2018 You last received care in the:  [facility], Endoscopy    You were discharged on:  March 29, 2018       Who to Call     For medical emergencies, please call 911.  For non-urgent questions about your medical care, please call your primary care provider or clinic, [phone]  For questions related to your surgery, please call your surgery clinic        Attending Provider     Provider Specialty    Danielle Mendenhall MD Gastroenterology       Primary Care Provider Office Phone # Fax #    Andrey Zan Flores [phone]      Your next 10 appointments already scheduled     Apr 05, 2018  1:30 PM CDT   (Arrive by 1:15 PM)   Return Visit with Av De León MD   [facility] Neurology ([facility] [place])    [address]  3rd Floor  [facility] [id number] [phone]            Apr 17, 2018 10:00 AM CDT   Lab with  LAB   [facility] Lab ([facility])    [address]  1st Floor  [facility] [address] [phone]            Apr 17, 2018 11:00 AM CDT   (Arrive by 10:45 AM)   Return General Liver with Nataly Darnell MD   [facility] Hepatology ([facility])    [address]  [address]  [facility] [address] [phone]            Dec 21, 2018  8:45 AM CST   Lab with  LAB   [facility] ([facility]    [address]  1st Floor  [facility] [id number] [phone]            Dec 21, 2018  9:40 AM CST   (Arrive by 9:10 AM)   Return Visit with Beto Alvarado MD   [facility] Nephrology ([facility] [place])    [address]  [address]  [facility] [id number] [phone]              Pending Results     No orders found from 3/27/2018 to 3/30/2018.            Admission Information     Date & Time Provider Department Dept. Phone    3/29/2018 Danielle Mendenhall MD [facility] [place], Endoscopy [phone]      Your Vitals Were     Blood Pressure Respirations Pulse Oximetry             133/80 16 98%         MyChart Information     [employer] lets you send messages to your doctor, view your test results, renew your prescriptions, schedule appointments and more. To sign up, go to [email or web address] . Click on \"Log in\" on the left side of the screen, which will take you to the Welcome page. Then click on \"Sign up Now\" on the right side of the page.     You will be asked to enter the access code listed below, as well as some personal information. Please follow the directions to create your username and password.     Your access code is: [email or web address]  Expires: 4/3/2018  7:30 AM     Your access code will  in 90 days. If you need help or a new code, please call your [place] clinic or [phone].        Care EveryWhere ID     This is your Care EveryWhere ID. This could be used by other organizations to access your [facility] medical records  [id number]        Equal Access to Services     ISA SORIANO AH: Hadyary Huerta, delia jama, jeannie an. So [facility] [phone].    ATENCIÓN: Si habla español, tiene a whitehead disposición servicios gratuitos de asistencia lingüística. Llame al [phone].    We comply with applicable federal civil rights laws and [place] laws. We do not " discriminate on the basis of race, color, national origin, age, disability, sex, sexual orientation, or gender identity.               Review of your medicines      UNREVIEWED medicines. Ask your doctor about these medicines        Dose / Directions    acetaminophen 325 MG tablet   Commonly known as:  TYLENOL   Used for:  Other chronic pain        Dose:  325 mg   Take 1 tablet (325 mg) by mouth every 6 hours as needed for mild pain   Quantity:  100 tablet   Refills:  3       amLODIPine 5 MG tablet   Commonly known as:  NORVASC   Used for:  Essential hypertension with goal blood pressure less than 140/90        Dose:  10 mg   Take 2 tablets (10 mg) by mouth daily   Quantity:  30 tablet   Refills:  12       aspirin 81 MG tablet   Used for:  Small vessel disease        Dose:  81 mg   Take 1 tablet (81 mg) by mouth daily   Quantity:  30 tablet   Refills:  11       ferrous sulfate 325 (65 FE) MG tablet   Commonly known as:  IRON   Used for:  Other iron deficiency anemia        Dose:  1 tablet   Take 1 tablet (325 mg) by mouth daily (with breakfast)   Quantity:  100 tablet   Refills:  1       gabapentin 300 MG tablet   Commonly known as:  NEURONTIN   Used for:  Chronic hepatitis C (H), Renal insufficiency        Dose:  600 mg   Take 600 mg by mouth 3 times daily   Refills:  0       nortriptyline 25 MG capsule   Commonly known as:  PAMELOR   Used for:  Tension headache        Dose:  25 mg   Take 1 capsule (25 mg) by mouth At Bedtime   Quantity:  90 capsule   Refills:  3       omeprazole 40 MG capsule   Commonly known as:  priLOSEC   Used for:  Esophagitis, reflux        Dose:  40 mg   Take 1 capsule (40 mg) by mouth daily (with lunch)   Quantity:  60 capsule   Refills:  2       oxyCODONE IR 5 MG tablet   Commonly known as:  ROXICODONE   Used for:  Odynophagia        Dose:  5 mg   Take 1 tablet (5 mg) by mouth every 4 hours as needed for pain Pain not responding to lidocaine   Quantity:  5 tablet   Refills:  0        polyethylene glycol powder   Commonly known as:  MIRALAX   Used for:  Drug-induced constipation        Dose:  1 capful   Take 17 g (1 capful) by mouth 3 times daily as needed for constipation (take 1-2 times daily until having regular bowel movements)   Quantity:  850 g   Refills:  3       SYNTHROID PO        Refills:  0                Protect others around you: Learn how to safely use, store and throw away your medicines at www.disposemymeds.org.             Medication List: This is a list of all your medications and when to take them. Check marks below indicate your daily home schedule. Keep this list as a reference.      Medications           Morning Afternoon Evening Bedtime As Needed    acetaminophen 325 MG tablet   Commonly known as:  TYLENOL   Take 1 tablet (325 mg) by mouth every 6 hours as needed for mild pain                                amLODIPine 5 MG tablet   Commonly known as:  NORVASC   Take 2 tablets (10 mg) by mouth daily                                aspirin 81 MG tablet   Take 1 tablet (81 mg) by mouth daily                                ferrous sulfate 325 (65 FE) MG tablet   Commonly known as:  IRON   Take 1 tablet (325 mg) by mouth daily (with breakfast)                                gabapentin 300 MG tablet   Commonly known as:  NEURONTIN   Take 600 mg by mouth 3 times daily                                nortriptyline 25 MG capsule   Commonly known as:  PAMELOR   Take 1 capsule (25 mg) by mouth At Bedtime                                omeprazole 40 MG capsule   Commonly known as:  priLOSEC   Take 1 capsule (40 mg) by mouth daily (with lunch)                                oxyCODONE IR 5 MG tablet   Commonly known as:  ROXICODONE   Take 1 tablet (5 mg) by mouth every 4 hours as needed for pain Pain not responding to lidocaine                                polyethylene glycol powder   Commonly known as:  MIRALAX   Take 17 g (1 capful) by mouth 3 times daily as needed for constipation  (take 1-2 times daily until having regular bowel movements)                                SYNTHROID PO

## 2018-03-29 NOTE — IP AVS SNAPSHOT
[facility] [place], Endoscopy    [address] [address]    Phone:  [phone]                                       After Visit Summary   3/29/2018    Danielle Cook    MRN: [id number]           After Visit Summary Signature Page     I have received my discharge instructions, and my questions have been answered. I have discussed any challenges I see with this plan with the nurse or doctor.    ..........................................................................................................................................  Patient/Patient Representative Signature      ..........................................................................................................................................  Patient Representative Print Name and Relationship to Patient    ..................................................               ................................................  Date                                            Time    ..........................................................................................................................................  Reviewed by Signature/Title    ...................................................              ..............................................  Date                                                            Time

## 2018-03-30 DIAGNOSIS — K74.60 CIRRHOSIS OF LIVER WITHOUT ASCITES, UNSPECIFIED HEPATIC CIRRHOSIS TYPE (H): Primary | ICD-10-CM

## 2018-04-02 ENCOUNTER — TELEPHONE (OUTPATIENT)
Dept: GASTROENTEROLOGY | Facility: CLINIC | Age: 73
End: 2018-04-02

## 2018-04-02 NOTE — TELEPHONE ENCOUNTER
Per procedure note, - Repeat upper endoscopy in 6 months for surveillance. Please place order for pt to be scheduled.     Thank you,    Lizbeth Love RN

## 2018-04-05 ENCOUNTER — OFFICE VISIT (OUTPATIENT)
Dept: NEUROLOGY | Facility: CLINIC | Age: 73
End: 2018-04-05
Payer: COMMERCIAL

## 2018-04-05 VITALS
SYSTOLIC BLOOD PRESSURE: 124 MMHG | WEIGHT: 166.4 LBS | DIASTOLIC BLOOD PRESSURE: 69 MMHG | HEIGHT: 68 IN | BODY MASS INDEX: 25.22 KG/M2 | HEART RATE: 99 BPM

## 2018-04-05 DIAGNOSIS — G44.51 HEMICRANIA CONTINUA: Primary | ICD-10-CM

## 2018-04-05 DIAGNOSIS — Z86.73 HISTORY OF CVA (CEREBROVASCULAR ACCIDENT): ICD-10-CM

## 2018-04-05 DIAGNOSIS — G44.51 HEMICRANIA CONTINUA: ICD-10-CM

## 2018-04-05 DIAGNOSIS — K74.60 CIRRHOSIS OF LIVER WITHOUT ASCITES, UNSPECIFIED HEPATIC CIRRHOSIS TYPE (H): ICD-10-CM

## 2018-04-05 LAB
ALBUMIN SERPL-MCNC: 3 G/DL (ref 3.4–5)
ALP SERPL-CCNC: 320 U/L (ref 40–150)
ALT SERPL W P-5'-P-CCNC: 29 U/L (ref 0–70)
ANION GAP SERPL CALCULATED.3IONS-SCNC: 6 MMOL/L (ref 3–14)
AST SERPL W P-5'-P-CCNC: 40 U/L (ref 0–45)
BILIRUB DIRECT SERPL-MCNC: 0.5 MG/DL (ref 0–0.2)
BILIRUB SERPL-MCNC: 1 MG/DL (ref 0.2–1.3)
BUN SERPL-MCNC: 13 MG/DL (ref 7–30)
CALCIUM SERPL-MCNC: 8.5 MG/DL (ref 8.5–10.1)
CHLORIDE SERPL-SCNC: 105 MMOL/L (ref 94–109)
CO2 SERPL-SCNC: 26 MMOL/L (ref 20–32)
CREAT SERPL-MCNC: 1.22 MG/DL (ref 0.66–1.25)
CRP SERPL-MCNC: 3.2 MG/L (ref 0–8)
ERYTHROCYTE [DISTWIDTH] IN BLOOD BY AUTOMATED COUNT: 16.3 % (ref 10–15)
ERYTHROCYTE [SEDIMENTATION RATE] IN BLOOD BY WESTERGREN METHOD: 82 MM/H (ref 0–20)
GFR SERPL CREATININE-BSD FRML MDRD: 58 ML/MIN/1.7M2
GLUCOSE SERPL-MCNC: 119 MG/DL (ref 70–99)
HCT VFR BLD AUTO: 38.3 % (ref 40–53)
HGB BLD-MCNC: 12.2 G/DL (ref 13.3–17.7)
INR PPP: 1.06 (ref 0.86–1.14)
MCH RBC QN AUTO: 26.8 PG (ref 26.5–33)
MCHC RBC AUTO-ENTMCNC: 31.9 G/DL (ref 31.5–36.5)
MCV RBC AUTO: 84 FL (ref 78–100)
PLATELET # BLD AUTO: 144 10E9/L (ref 150–450)
POTASSIUM SERPL-SCNC: 3.1 MMOL/L (ref 3.4–5.3)
PROT SERPL-MCNC: 8.4 G/DL (ref 6.8–8.8)
RBC # BLD AUTO: 4.56 10E12/L (ref 4.4–5.9)
SODIUM SERPL-SCNC: 136 MMOL/L (ref 133–144)
WBC # BLD AUTO: 5.1 10E9/L (ref 4–11)

## 2018-04-05 ASSESSMENT — PAIN SCALES - GENERAL: PAINLEVEL: EXTREME PAIN (8)

## 2018-04-05 NOTE — MR AVS SNAPSHOT
After Visit Summary   4/5/2018    Danielle Cook    MRN: [id number]           Patient Information     Date Of Birth          1945        Visit Information        Provider Department      4/5/2018 1:15 PM Av De León MD; [employer] [facility] Neurology        Today's Diagnoses     Hemicrania continua    -  1       Follow-ups after your visit        Follow-up notes from your care team     Return in about 3 months (around 7/5/2018).      Your next 10 appointments already scheduled     Apr 17, 2018 11:00 AM CDT   (Arrive by 10:45 AM)   Return General Liver with Nataly Darnell MD   [facility] Hepatology ([facility])    [address]  [address]  [facility] [address] [phone]            Dec 21, 2018  8:45 AM CST   Lab with UC LAB   [facility] Lab ([facility])    [address] [place] [address]  1st Floor  [facility] [id number] [phone]            Dec 21, 2018  9:40 AM CST   (Arrive by 9:10 AM)   Return Visit with Beto Alvarado MD   [facility] Nephrology ([facility])    [address]  [address]  [facility] [id number] [phone]              Who to contact     Please call your clinic at [phone] to:    Ask questions about your health    Make or cancel appointments    Discuss your medicines    Learn about your test results    Speak to your doctor            Additional Information About Your Visit        MyChart Information     [employer] is an electronic gateway that provides easy, online access to your medical records. With [employer], you can request a clinic appointment, read your test results, renew a prescription or communicate with your care team.     To sign up for [employer] visit the website at [email or web address]   You will be asked to enter the access code listed below, as well as some personal information. Please follow the directions to create your username and password.    "  Your access code is: [email or web address]  Expires: 7/3/2018  6:31 AM     Your access code will  in 90 days. If you need help or a new code, please contact your [facility] or call [phone] for assistance.        Care EveryWhere ID     This is your Care EveryWhere ID. This could be used by other organizations to access your [facility] medical records  [id number]        Your Vitals Were     Pulse Height BMI (Body Mass Index)             99 1.727 m (5' 8\") 25.3 kg/m2          Blood Pressure from Last 3 Encounters:   18 124/69   18 138/86   18 140/64    Weight from Last 3 Encounters:   18 75.5 kg (166 lb 6.4 oz)   18 74.6 kg (164 lb 6.4 oz)   18 76.6 kg (168 lb 12.8 oz)               Primary Care Provider Office Phone # Fax #    Andrey Zan Flores [phone]       [facility] CARE  [facility] [address]        Equal Access to Services     [facility] AH: Hadii aad ku hadasho Soomaali, waaxda luqadaha, qaybta kaalmada adeegyada, waxay idiin hayaan reid daley larosetta ah. So [facility] [phone].    ATENCIÓN: Si habla español, tiene a whitehead disposición servicios gratuitos de asistencia lingüística. Llame al [phone].    We comply with applicable federal civil rights laws and [place] laws. We do not discriminate on the basis of race, color, national origin, age, disability, sex, sexual orientation, or gender identity.            Thank you!     Thank you for choosing [facility] NEUROLOGY  for your care. Our goal is always to provide you with excellent care. Hearing back from our patients is one way we can continue to improve our services. Please take a few minutes to complete the written survey that you may receive in the mail after your visit with us. Thank you!             Your Updated Medication List - Protect others around you: Learn how to safely use, store and throw away your medicines at " www.disposemymeds.org.          This list is accurate as of 4/5/18 11:59 PM.  Always use your most recent med list.                   Brand Name Dispense Instructions for use Diagnosis    acetaminophen 325 MG tablet    TYLENOL    100 tablet    Take 1 tablet (325 mg) by mouth every 6 hours as needed for mild pain    Other chronic pain       amLODIPine 5 MG tablet    NORVASC    30 tablet    Take 2 tablets (10 mg) by mouth daily    Essential hypertension with goal blood pressure less than 140/90       aspirin 81 MG tablet     30 tablet    Take 1 tablet (81 mg) by mouth daily    Small vessel disease       gabapentin 300 MG tablet    NEURONTIN     Take 600 mg by mouth 3 times daily    Chronic hepatitis C (H), Renal insufficiency       omeprazole 40 MG capsule    priLOSEC    60 capsule    Take 1 capsule (40 mg) by mouth daily (with lunch)    Esophagitis, reflux       SYNTHROID PO

## 2018-04-05 NOTE — LETTER
2018       RE: Danielle Samuel  [address]   [facility] [address]     Dear Colleague,    Thank you for referring your patient, Danielle Samuel, to the [facility] NEUROLOGY at [facility]. Please see a copy of my visit note below.    Service Date: 2018       RE: Danielle Samuel   MRN: [id number]   : 1945      Dear Dr. Flores:        Mr. Danielle Samuel continues to have very refractory headaches.  He has them on the back of his head.  On my examination, he is tender superficially in the scalp.  He has this very high sedimentation rate of 99, which has been ascribed to polymyalgia rheumatica and he does have tenderness and pains of his shoulders.  It is possible that the temporal arteritis is part of the polymyalgic syndrome as it does occur.  He did have a trial of steroids in the past, last year, without any big relief.  There are multiple issues medically with him with his chronic liver disease, cirrhosis, ascites and esophageal varices.  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      PHYSICAL EXAMINATION:     NEUROLOGIC:   He is fairly bitter about the headaches.  His eye movements are normal.  His coordination is good.  There is no asterixis.  Neck is supple.  He complains of some pain in the back of the head.        In summary, he probably has some elements of arteritis in the scalp and we would like to rechallenge with steroids once we get the okay from you and also as far as the other medical issues.  A biopsy of his temporal arteries remains an option.  That frequently is negative or skips lesions.  We will await for your reporting.         D: 2018   T: 2018   MT: AKA      Name:     DANIELLE SAMUEL   MRN:      [id number]        Account:      [id number]   :      1945            Service Date: 04/05/2018      Document: [id number]       Again, thank you for allowing me to participate in the care of your patient.      Sincerely,    Av De León MD    CC:  Andrey Flores MD   [address] [address]

## 2018-04-06 NOTE — PROGRESS NOTES
Service Date: 2018      Andrey Flores MD   [facility] [place] [address]      RE: Danielle Samuel   MRN: [id number]   : 1945      Dear Dr. Flores:        Mr. Danielle Samuel continues to have very refractory headaches.  He has them on the back of his head.  On my examination, he is tender superficially in the scalp.  He has this very high sedimentation rate of 99, which has been ascribed to polymyalgia rheumatica and he does have tenderness and pains of his shoulders.  It is possible that the temporal arteritis is part of the polymyalgic syndrome as it does occur.  He did have a trial of steroids in the past, last year, without any big relief.  There are multiple issues medically with him with his chronic liver disease, cirrhosis, ascites and esophageal varices.  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      PHYSICAL EXAMINATION:     NEUROLOGIC:   He is fairly bitter about the headaches.  His eye movements are normal.  His coordination is good.  There is no asterixis.  Neck is supple.  He complains of some pain in the back of the head.        In summary, he probably has some elements of arteritis in the scalp and we would like to rechallenge with steroids once we get the okay from you and also as far as the other medical issues.  A biopsy of his temporal arteries remains an option.  That frequently is negative or skips lesions.  We will await for your reporting.      Sincerely,       MD DONYA Peterson MD             D: 2018   T: 2018   MT: AKA      Name:     DANIELLE SAMUEL   MRN:      [id number]        Account:      [id number]   :      1945           Service Date: 2018      Document: [id number]

## 2018-04-11 ENCOUNTER — MEDICAL CORRESPONDENCE (OUTPATIENT)
Dept: HEALTH INFORMATION MANAGEMENT | Facility: CLINIC | Age: 73
End: 2018-04-11

## 2018-04-11 ENCOUNTER — TRANSFERRED RECORDS (OUTPATIENT)
Dept: HEALTH INFORMATION MANAGEMENT | Facility: CLINIC | Age: 73
End: 2018-04-11

## 2018-04-11 LAB — CRYOGLOB SER QL: ABNORMAL %

## 2018-04-12 ENCOUNTER — TRANSFERRED RECORDS (OUTPATIENT)
Dept: HEALTH INFORMATION MANAGEMENT | Facility: CLINIC | Age: 73
End: 2018-04-12

## 2018-04-17 ENCOUNTER — OFFICE VISIT (OUTPATIENT)
Dept: GASTROENTEROLOGY | Facility: CLINIC | Age: 73
End: 2018-04-17
Attending: INTERNAL MEDICINE
Payer: COMMERCIAL

## 2018-04-17 VITALS
HEIGHT: 68 IN | HEART RATE: 96 BPM | DIASTOLIC BLOOD PRESSURE: 69 MMHG | WEIGHT: 158.6 LBS | BODY MASS INDEX: 24.04 KG/M2 | TEMPERATURE: 97.4 F | SYSTOLIC BLOOD PRESSURE: 118 MMHG

## 2018-04-17 DIAGNOSIS — K74.60 CIRRHOSIS OF LIVER WITHOUT ASCITES, UNSPECIFIED HEPATIC CIRRHOSIS TYPE (H): ICD-10-CM

## 2018-04-17 DIAGNOSIS — K74.60 CIRRHOSIS OF LIVER WITHOUT ASCITES, UNSPECIFIED HEPATIC CIRRHOSIS TYPE (H): Primary | ICD-10-CM

## 2018-04-17 DIAGNOSIS — K74.60 HEPATIC CIRRHOSIS, UNSPECIFIED HEPATIC CIRRHOSIS TYPE (H): Primary | ICD-10-CM

## 2018-04-17 LAB
ALBUMIN SERPL-MCNC: 3 G/DL (ref 3.4–5)
ALP SERPL-CCNC: 286 U/L (ref 40–150)
ALT SERPL W P-5'-P-CCNC: 34 U/L (ref 0–70)
ANION GAP SERPL CALCULATED.3IONS-SCNC: 7 MMOL/L (ref 3–14)
AST SERPL W P-5'-P-CCNC: 42 U/L (ref 0–45)
BILIRUB DIRECT SERPL-MCNC: 0.4 MG/DL (ref 0–0.2)
BILIRUB SERPL-MCNC: 1 MG/DL (ref 0.2–1.3)
BUN SERPL-MCNC: 14 MG/DL (ref 7–30)
CALCIUM SERPL-MCNC: 8.5 MG/DL (ref 8.5–10.1)
CHLORIDE SERPL-SCNC: 109 MMOL/L (ref 94–109)
CO2 SERPL-SCNC: 26 MMOL/L (ref 20–32)
CREAT SERPL-MCNC: 1.19 MG/DL (ref 0.66–1.25)
ERYTHROCYTE [DISTWIDTH] IN BLOOD BY AUTOMATED COUNT: 15.9 % (ref 10–15)
GFR SERPL CREATININE-BSD FRML MDRD: 60 ML/MIN/1.7M2
GLUCOSE SERPL-MCNC: 99 MG/DL (ref 70–99)
HCT VFR BLD AUTO: 35.4 % (ref 40–53)
HGB BLD-MCNC: 11.4 G/DL (ref 13.3–17.7)
INR PPP: 1.08 (ref 0.86–1.14)
MCH RBC QN AUTO: 27 PG (ref 26.5–33)
MCHC RBC AUTO-ENTMCNC: 32.2 G/DL (ref 31.5–36.5)
MCV RBC AUTO: 84 FL (ref 78–100)
PLATELET # BLD AUTO: 157 10E9/L (ref 150–450)
POTASSIUM SERPL-SCNC: 3.5 MMOL/L (ref 3.4–5.3)
PROT SERPL-MCNC: 8 G/DL (ref 6.8–8.8)
RBC # BLD AUTO: 4.22 10E12/L (ref 4.4–5.9)
SODIUM SERPL-SCNC: 141 MMOL/L (ref 133–144)
WBC # BLD AUTO: 4.7 10E9/L (ref 4–11)

## 2018-04-17 PROCEDURE — 80076 HEPATIC FUNCTION PANEL: CPT | Performed by: INTERNAL MEDICINE

## 2018-04-17 PROCEDURE — 80048 BASIC METABOLIC PNL TOTAL CA: CPT | Performed by: INTERNAL MEDICINE

## 2018-04-17 PROCEDURE — 85610 PROTHROMBIN TIME: CPT | Performed by: INTERNAL MEDICINE

## 2018-04-17 PROCEDURE — 36415 COLL VENOUS BLD VENIPUNCTURE: CPT | Performed by: INTERNAL MEDICINE

## 2018-04-17 PROCEDURE — G0463 HOSPITAL OUTPT CLINIC VISIT: HCPCS | Mod: ZF

## 2018-04-17 PROCEDURE — 85027 COMPLETE CBC AUTOMATED: CPT | Performed by: INTERNAL MEDICINE

## 2018-04-17 ASSESSMENT — PAIN SCALES - GENERAL: PAINLEVEL: NO PAIN (0)

## 2018-04-17 NOTE — MR AVS SNAPSHOT
After Visit Summary   4/17/2018    Danielle Cook    MRN: [id number]           Patient Information     Date Of Birth          1945        Visit Information        Provider Department      4/17/2018 10:45 AM Nataly Darnell MD; [employer] [facility] Hepatology        Today's Diagnoses     Hepatic cirrhosis, unspecified hepatic cirrhosis type (H)    -  1       Follow-ups after your visit        Your next 10 appointments already scheduled     Apr 17, 2018 12:15 PM CDT   Lab with  LAB   [facility] Lab ([facility])    [address] [place] [address]   [phone]            May 02, 2018  4:45 PM CDT   (Arrive by 4:30 PM)   MR ABDOMEN & PELVIS W/O & W CONTRAST with [address] [facility] [place] [facility] ([facility])    [address] [place] [id number] [phone]           Take your medicines as usual, unless your doctor tells you not to. Bring a list of your current medicines to your exam (including vitamins, minerals and over-the-counter drugs). Also bring the results of similar scans you may have had.    You may or may not receive IV contrast for this exam pending the discretion of the Radiologist.   Do not eat or drink for 6 hours prior to exam.  The MRI machine uses a strong magnet. Please wear clothes without metal (snaps, zippers). A sweatsuit works well, or we may give you a hospital gown.  Please remove any body piercings and hair extensions before you arrive. You will also remove watches, jewelry, hairpins, wallets, dentures, partial dental plates and hearing aids. You may wear contact lenses, and you may be able to wear your rings. We have a safe place to keep your personal items, but it is safer to leave them at home.  **IMPORTANT** THE INSTRUCTIONS BELOW ARE ONLY FOR THOSE PATIENTS WHO HAVE BEEN PRESCRIBED SEDATION OR GENERAL ANESTHESIA DURING THEIR MRI PROCEDURE:  IF YOUR  DOCTOR PRESCRIBED ORAL SEDATION (take medicine to help you relax during your exam):   You must get the medicine from your doctor (oral medication) before you arrive. Bring the medicine to the exam. Do not take it at home. You ll be told when to take it upon arriving for your exam.   Arrive one hour early. Bring someone who can take you home after the test. Your medicine will make you sleepy. After the exam, you may not drive, take a bus or take a taxi by yourself.  IF YOUR DOCTOR PRESCRIBED IV SEDATION:   Arrive one hour early. Bring someone who can take you home after the test. Your medicine will make you sleepy. After the exam, you may not drive, take a bus or take a taxi by yourself.   No eating 6 hours before your exam. You may have clear liquids up until 4 hours before your exam. (Clear liquids include water, clear tea, black coffee and fruit juice without pulp.)  IF YOUR DOCTOR PRESCRIBED ANESTHESIA (be asleep for your exam):   Arrive 1 1/2 hours early. Bring someone who can take you home after the test. You may not drive, take a bus or take a taxi by yourself.   No eating 8 hours before your exam. You may have clear liquids up until 4 hours before your exam. (Clear liquids include water, clear tea, black coffee and fruit juice without pulp.)   You will spend four to five hours in the recovery room.  If you have any questions, please contact your Imaging Department exam site.            Aug 13, 2018  2:00 PM CDT   Lab with [facility] Lab ([facility])    [address]  1st Floor  [facility] [place] [id number] [phone]            Aug 13, 2018  3:00 PM CDT   (Arrive by 2:45 PM)   Return General Liver with Nataly Darnell MD   [facility] Hepatology ([facility])    [address]  [address]  [facility] [place] [id number] [phone]            Dec 21, 2018  8:45 AM CST   Lab with  LAB   [facility] Lab ([facility])    [address]  1st Floor  [facility] [id number] [phone]            Dec 21, 2018  9:40 AM CST   (Arrive by 9:10 AM)   Return Visit with Beto Alvarado MD   [facility] Nephrology ([facility])    [address]  [address]  [facility] [address] [phone]              Future tests that were ordered for you today     Open Future Orders        Priority Expected Expires Ordered    Cryoglobulin identification Routine  10/17/2018 4/17/2018    Parathyroid Hormone Intact Routine  10/17/2018 4/17/2018    Erythrocyte sedimentation rate auto Routine  10/17/2018 4/17/2018    CRP inflammation Routine  10/17/2018 4/17/2018    MR Abdomen & Pelvis w/o & w Contrast Routine  10/17/2018 4/17/2018    CK total Routine  10/17/2018 4/17/2018    Lactate Dehydrogenase Routine  10/17/2018 4/17/2018    Blood Morphology Pathologist Review Routine  10/17/2018 4/17/2018    CBC with platelets differential Routine  10/17/2018 4/17/2018    Reticulocyte Count Routine  10/17/2018 4/17/2018            Who to contact     If you have questions or need follow up information about today's clinic visit or your schedule please contact [facility] HEPATOLOGY directly at [phone].  Normal or non-critical lab and imaging results will be communicated to you by [employer], letter or phone within 4 business days after the clinic has received the results. If you do not hear from us within 7 days, please contact the clinic through [employer] or phone. If you have a critical or abnormal lab result, we will notify you by phone as soon as possible.  Submit refill requests through [employer] or call your pharmacy and they will forward the refill request to us. Please allow 3 business days for your refill to be completed.          Additional Information About Your Visit        [employer] Information     [employer] lets you send messages to your doctor, view your test results, renew your prescriptions, schedule appointments and more. To  "sign up, go to [email or web address] . Click on \"Log in\" on the left side of the screen, which will take you to the Welcome page. Then click on \"Sign up Now\" on the right side of the page.     You will be asked to enter the access code listed below, as well as some personal information. Please follow the directions to create your username and password.     Your access code is: [email or web address]  Expires: 7/3/2018  6:31 AM     Your access code will  in 90 days. If you need help or a new code, please call your [place] clinic or [phone].        Care EveryWhere ID     This is your Care EveryWhere ID. This could be used by other organizations to access your [place] medical records  [id number]        Your Vitals Were     Pulse Temperature Height BMI (Body Mass Index)          96 97.4  F (36.3  C) (Oral) 1.727 m (5' 8\") 24.12 kg/m2         Blood Pressure from Last 3 Encounters:   18 118/69   18 124/69   18 138/86    Weight from Last 3 Encounters:   18 71.9 kg (158 lb 9.6 oz)   18 75.5 kg (166 lb 6.4 oz)   18 74.6 kg (164 lb 6.4 oz)               Primary Care Provider Office Phone # Fax #    Andrey Zan Flores [phone]       [facility] CARE  [facility] [address]        Equal Access to Services     [facility] AH: Hadii aad ku hadasho Soomaali, waaxda luqadaha, qaybta kaalmada adeegperri, jeannie markham . So [facility] [phone].    ATENCIÓN: Si habla español, tiene a whitehead disposición servicios gratuitos de asistencia lingüística. Llame al [phone].    We comply with applicable federal civil rights laws and [place] laws. We do not discriminate on the basis of race, color, national origin, age, disability, sex, sexual orientation, or gender identity.            Thank you!     Thank you for choosing [facility] HEPATOLOGY  for your care. Our goal is always to provide you with excellent care. Hearing back from " our patients is one way we can continue to improve our services. Please take a few minutes to complete the written survey that you may receive in the mail after your visit with us. Thank you!             Your Updated Medication List - Protect others around you: Learn how to safely use, store and throw away your medicines at www.disposemymeds.org.          This list is accurate as of 4/17/18 12:10 PM.  Always use your most recent med list.                   Brand Name Dispense Instructions for use Diagnosis    acetaminophen 325 MG tablet    TYLENOL    100 tablet    Take 1 tablet (325 mg) by mouth every 6 hours as needed for mild pain    Other chronic pain       amLODIPine 5 MG tablet    NORVASC    30 tablet    Take 2 tablets (10 mg) by mouth daily    Essential hypertension with goal blood pressure less than 140/90       aspirin 81 MG tablet     30 tablet    Take 1 tablet (81 mg) by mouth daily    Small vessel disease       gabapentin 300 MG tablet    NEURONTIN     Take 600 mg by mouth 3 times daily    Chronic hepatitis C (H), Renal insufficiency       omeprazole 40 MG capsule    priLOSEC    60 capsule    Take 1 capsule (40 mg) by mouth daily (with lunch)    Esophagitis, reflux       SYNTHROID PO

## 2018-04-17 NOTE — PROGRESS NOTES
Hepatology Clinic note  Danielle Cook   Date of Birth 1945  Date of Service 4/17/2018         Impression/plan:   Danielle Cook is a 73 year old male with history of CKD 3, hypertension, recently cured hepatitis C complicated by cirrhosis with esophageal varices status post banding.  He presents with his wife for follow-up.    1.  Hepatitis C cirrhosis  Clinically he is relatively compensated, meld sodium is 9.  The cure of the virus has allowed his liver to recover somewhat.  In terms of complications: He has no clinically significant ascites or hepatic encephalopathy.  His esophageal varices were assessed to be small per last upper endoscopy in March 2018.  Due for repeat in approximately a year.  We will continue to screen for HCC.    2.  Nonspecific symptoms of headaches body aches and low energy.  Review of his records reveal this to be a long-standing and intermittent issue.  Has had multiple visits in the past for the headaches as well as the diffuse body aches.  More recently he is seen neurology, Dr. De León, with a clinical concern for polyrheumatica based on constellation of symptoms and elevated ESR.  There was also consideration for temporal arteritis.  It appears he was considered for steroid trial however because of avascular necrosis of the hip this is pending or so evaluation.  To complete the above workup I would suggest checking few labs including cryo-, electrolytes including phosphorus and magnesium, PTH, testosterone.     3.  Pelvic symptoms:  This appeared to be long-standing as well.  Suggestive of BPH.  Will check PSA, MRI of the abdomen and pelvis as we can take a closer look at the liver for potential lesions..    RTC in 3-4 months or sooner as needed    Nataly Darnell MD  [facility] Hepatology clinic    -----------------------------------------------------       HPI:   Danielle Cook is a 73 year old male with history of recently cured hepatitis C  complicated by cirrhosis who presents for follow-up.    Since last visit, he reports to be generally stable.  However he notes ongoing issues with low energy, persistent headaches, racing heart, low stamina, generalized weakness, and approximately 9 pounds weight loss in the last 3 months due to decreased appetite.    Mr. Cook and his wife noted that his mood is not great due mostly to poor sleep/insomnia.  Some of these problems appear to be chronic with periods of exacerbation.    He has been following with neurology for his headaches, and at some point there was a concern for temporal arteritis however not enough clinical evidence for it.  He also endorses lower pelvic pain and difficulty urinating.    Otherwise, denies shortness of breath, nausea, vomiting fevers, chills, bleeding, jaundice, confusion, falls, rash, pruritis, leg swelling or abdominal distention. Has stable bowel habits.           Past Medical History:     Past Medical History:   Diagnosis Date     CKD (chronic kidney disease) stage 3, GFR 30-59 ml/min      Hepatitis C virus infection      Hypertension             Past Surgical History:     Past Surgical History:   Procedure Laterality Date     COLONOSCOPY N/A 1/19/2018    Procedure: COLONOSCOPY;  COMBINED ESOPHAGOSCOPY, GASTROSCOPY, DUODENOSCOPY (EGD) REMOVE TUMOR/POYP/LESION BY SNARE, CONTROL BLEED,BANDING/LIGATION OF VARICES Colonoscopy;  Surgeon: Xavier Sutton MD;  Location:  GI     ESOPHAGOSCOPY, GASTROSCOPY, DUODENOSCOPY (EGD), COMBINED N/A 11/4/2015    Procedure: COMBINED ESOPHAGOSCOPY, GASTROSCOPY, DUODENOSCOPY (EGD), BIOPSY SINGLE OR MULTIPLE;  Surgeon: Inocente Do MD;  Location:  GI     ESOPHAGOSCOPY, GASTROSCOPY, DUODENOSCOPY (EGD), COMBINED N/A 3/29/2018    Procedure: COMBINED ESOPHAGOSCOPY, GASTROSCOPY, DUODENOSCOPY (EGD);  egd;  Surgeon: Danielle Mendenhall MD;  Location:  GI            Medications:     Current Outpatient Prescriptions   Medication     aspirin  "81 MG tablet     Levothyroxine Sodium (SYNTHROID PO)     omeprazole (PRILOSEC) 40 MG capsule     amLODIPine (NORVASC) 5 MG tablet     gabapentin (NEURONTIN) 300 MG tablet     acetaminophen (TYLENOL) 325 MG tablet     No current facility-administered medications for this visit.             Allergies:   No Known Allergies         Social History:     Social History     Social History     Marital status:      Spouse name: N/A     Number of children: N/A     Years of education: N/A     Occupational History     Not on file.     Social History Main Topics     Smoking status: Never Smoker     Smokeless tobacco: Never Used     Alcohol use No     Drug use: No     Sexual activity: Not on file     Other Topics Concern     Not on file     Social History Narrative            Family History:     Family History   Problem Relation Age of Onset     Hypertension Father               Review of Systems:   10 points ROS was obtained and highlighted in the HPI, otherwise negative.          Physical Exam:   VS:  /69  Pulse 96  Temp 97.4  F (36.3  C) (Oral)  Ht 1.727 m (5' 8\")  Wt 71.9 kg (158 lb 9.6 oz)  BMI 24.12 kg/m2      Gen: A&Ox3, NAD  HEENT: non-icteric, oropharynx clear  CV: RRR  Lung: CTAB  Abd: soft, NT, ND  spleen is enlarged, liver is not palpable.   Ext: no edema, intact pulses.   Skin: No stigmata of chronic liver disease.   Neuro: no focal deficits, grossly intact, no asterixis   Psych: appropriate mood and affects         Data:   Reviewed in person and significant for:  Lab Results   Component Value Date    WBC 4.7 04/17/2018     Lab Results   Component Value Date    RBC 4.22 04/17/2018     Lab Results   Component Value Date    HGB 11.4 04/17/2018     Lab Results   Component Value Date    HCT 35.4 04/17/2018     No components found for: MCT  Lab Results   Component Value Date    MCV 84 04/17/2018     Lab Results   Component Value Date    MCH 27.0 04/17/2018     Lab Results   Component Value Date    MCHC " 32.2 04/17/2018     Lab Results   Component Value Date    RDW 15.9 04/17/2018     Lab Results   Component Value Date     04/17/2018     Last Basic Metabolic Panel:  Lab Results   Component Value Date     04/17/2018      Lab Results   Component Value Date    POTASSIUM 3.5 04/17/2018     Lab Results   Component Value Date    CHLORIDE 109 04/17/2018     Lab Results   Component Value Date    ABDIRASHID 8.5 04/17/2018     Lab Results   Component Value Date    CO2 26 04/17/2018     Lab Results   Component Value Date    BUN 14 04/17/2018     Lab Results   Component Value Date    CR 1.19 04/17/2018     Lab Results   Component Value Date    GLC 99 04/17/2018     Liver Function Studies -   Recent Labs   Lab Test  04/17/18   1028   PROTTOTAL  8.0   ALBUMIN  3.0*   BILITOTAL  1.0   ALKPHOS  286*   AST  42   ALT  34

## 2018-04-17 NOTE — NURSING NOTE
"Chief Complaint   Patient presents with     RECHECK     follow up with cirrhosis, tzimmer cma       Initial /69  Pulse 96  Temp 97.4  F (36.3  C) (Oral)  Ht 1.727 m (5' 8\")  Wt 71.9 kg (158 lb 9.6 oz)  BMI 24.12 kg/m2 Estimated body mass index is 24.12 kg/(m^2) as calculated from the following:    Height as of this encounter: 1.727 m (5' 8\").    Weight as of this encounter: 71.9 kg (158 lb 9.6 oz).  Medication Reconciliation: complete    "

## 2018-04-17 NOTE — LETTER
4/17/2018      RE: Danielle Cook  [address]   [place] [id number]       Hepatology Clinic note  Danielle Cook   Date of Birth 1945  Date of Service 4/17/2018         Impression/plan:   Danielle Cook is a 73 year old male with history of CKD 3, hypertension, recently cured hepatitis C complicated by cirrhosis with esophageal varices status post banding.  He presents with his wife for follow-up.    1.  Hepatitis C cirrhosis  Clinically he is relatively compensated, meld sodium is 9.  The cure of the virus has allowed his liver to recover somewhat.  In terms of complications: He has no clinically significant ascites or hepatic encephalopathy.  His esophageal varices were assessed to be small per last upper endoscopy in March 2018.  Due for repeat in approximately a year.  We will continue to screen for HCC.    2.  Nonspecific symptoms of headaches body aches and low energy.  Review of his records reveal this to be a long-standing and intermittent issue.  Has had multiple visits in the past for the headaches as well as the diffuse body aches.  More recently he is seen neurology, Dr. De León, with a clinical concern for polyrheumatica based on constellation of symptoms and elevated ESR.  There was also consideration for temporal arteritis.  It appears he was considered for steroid trial however because of avascular necrosis of the hip this is pending or so evaluation.  To complete the above workup I would suggest checking few labs including cryo-, electrolytes including phosphorus and magnesium, PTH, testosterone.     3.  Pelvic symptoms:  This appeared to be long-standing as well.  Suggestive of BPH.  Will check PSA, MRI of the abdomen and pelvis as we can take a closer look at the liver for potential lesions..    RTC in 3-4 months or sooner as needed    Nataly Darnell MD  [facility]plant Hepatology  clinic    -----------------------------------------------------       HPI:   Danielle Cook is a 73 year old male with history of recently cured hepatitis C complicated by cirrhosis who presents for follow-up.    Since last visit, he reports to be generally stable.  However he notes ongoing issues with low energy, persistent headaches, racing heart, low stamina, generalized weakness, and approximately 9 pounds weight loss in the last 3 months due to decreased appetite.    Mr. Cook and his wife noted that his mood is not great due mostly to poor sleep/insomnia.  Some of these problems appear to be chronic with periods of exacerbation.    He has been following with neurology for his headaches, and at some point there was a concern for temporal arteritis however not enough clinical evidence for it.  He also endorses lower pelvic pain and difficulty urinating.    Otherwise, denies shortness of breath, nausea, vomiting fevers, chills, bleeding, jaundice, confusion, falls, rash, pruritis, leg swelling or abdominal distention. Has stable bowel habits.           Past Medical History:     Past Medical History:   Diagnosis Date     CKD (chronic kidney disease) stage 3, GFR 30-59 ml/min      Hepatitis C virus infection      Hypertension             Past Surgical History:     Past Surgical History:   Procedure Laterality Date     COLONOSCOPY N/A 1/19/2018    Procedure: COLONOSCOPY;  COMBINED ESOPHAGOSCOPY, GASTROSCOPY, DUODENOSCOPY (EGD) REMOVE TUMOR/POYP/LESION BY SNARE, CONTROL BLEED,BANDING/LIGATION OF VARICES Colonoscopy;  Surgeon: Xavier Sutton MD;  Location:  GI     ESOPHAGOSCOPY, GASTROSCOPY, DUODENOSCOPY (EGD), COMBINED N/A 11/4/2015    Procedure: COMBINED ESOPHAGOSCOPY, GASTROSCOPY, DUODENOSCOPY (EGD), BIOPSY SINGLE OR MULTIPLE;  Surgeon: Inocente Do MD;  Location:  GI     ESOPHAGOSCOPY, GASTROSCOPY, DUODENOSCOPY (EGD), COMBINED N/A 3/29/2018    Procedure: COMBINED ESOPHAGOSCOPY, GASTROSCOPY, DUODENOSCOPY  "(EGD);  egd;  Surgeon: Danielle Mendenhall MD;  Location:  GI            Medications:     Current Outpatient Prescriptions   Medication     aspirin 81 MG tablet     Levothyroxine Sodium (SYNTHROID PO)     omeprazole (PRILOSEC) 40 MG capsule     amLODIPine (NORVASC) 5 MG tablet     gabapentin (NEURONTIN) 300 MG tablet     acetaminophen (TYLENOL) 325 MG tablet     No current facility-administered medications for this visit.             Allergies:   No Known Allergies         Social History:     Social History     Social History     Marital status:      Spouse name: N/A     Number of children: N/A     Years of education: N/A     Occupational History     Not on file.     Social History Main Topics     Smoking status: Never Smoker     Smokeless tobacco: Never Used     Alcohol use No     Drug use: No     Sexual activity: Not on file     Other Topics Concern     Not on file     Social History Narrative            Family History:     Family History   Problem Relation Age of Onset     Hypertension Father               Review of Systems:   10 points ROS was obtained and highlighted in the HPI, otherwise negative.          Physical Exam:   VS:  /69  Pulse 96  Temp 97.4  F (36.3  C) (Oral)  Ht 1.727 m (5' 8\")  Wt 71.9 kg (158 lb 9.6 oz)  BMI 24.12 kg/m2      Gen: A&Ox3, NAD  HEENT: non-icteric, oropharynx clear  CV: RRR  Lung: CTAB  Abd: soft, NT, ND  spleen is enlarged, liver is not palpable.   Ext: no edema, intact pulses.   Skin: No stigmata of chronic liver disease.   Neuro: no focal deficits, grossly intact, no asterixis   Psych: appropriate mood and affects         Data:   Reviewed in person and significant for:  Lab Results   Component Value Date    WBC 4.7 04/17/2018     Lab Results   Component Value Date    RBC 4.22 04/17/2018     Lab Results   Component Value Date    HGB 11.4 04/17/2018     Lab Results   Component Value Date    HCT 35.4 04/17/2018     No components found for: MCT  Lab " Results   Component Value Date    MCV 84 04/17/2018     Lab Results   Component Value Date    MCH 27.0 04/17/2018     Lab Results   Component Value Date    MCHC 32.2 04/17/2018     Lab Results   Component Value Date    RDW 15.9 04/17/2018     Lab Results   Component Value Date     04/17/2018     Last Basic Metabolic Panel:  Lab Results   Component Value Date     04/17/2018      Lab Results   Component Value Date    POTASSIUM 3.5 04/17/2018     Lab Results   Component Value Date    CHLORIDE 109 04/17/2018     Lab Results   Component Value Date    ABDIRASHID 8.5 04/17/2018     Lab Results   Component Value Date    CO2 26 04/17/2018     Lab Results   Component Value Date    BUN 14 04/17/2018     Lab Results   Component Value Date    CR 1.19 04/17/2018     Lab Results   Component Value Date    GLC 99 04/17/2018     Liver Function Studies -   Recent Labs   Lab Test  04/17/18   1028   PROTTOTAL  8.0   ALBUMIN  3.0*   BILITOTAL  1.0   ALKPHOS  286*   AST  42   ALT  34       Nataly Darnell MD

## 2018-04-23 ENCOUNTER — TELEPHONE (OUTPATIENT)
Dept: GASTROENTEROLOGY | Facility: CLINIC | Age: 73
End: 2018-04-23

## 2018-04-23 NOTE — TELEPHONE ENCOUNTER
Connected with patient utilizing a [employer]  went over recommendations per Dr. Darnell as stated below. Patient scheduled for lab appointment on 4/30 prior to ortho appointment.  All questions answered and patient aware Dr. Darnell or his nurse will reach out to him with plan once labs and MRI results have been reviewed.    DORETHA Jones      ---- Message from Nataly Darnell MD sent at 4/23/2018  4:51 AM CDT -----  Would you please update Mr. Cook (needs [employer] ) that I received and reviewed his outside records and they are not showing an obvious problem.   - His regular doctor is doing a good job working up his symptoms, Dr. Andrey Flores.   - I've ordered additional labs that he should try to get done sooner than his August appt. Probably on 4/30 when he's back to see ortho. (they should draw that's ordered under my name).   - finally, will await results of both labs and MRI on 5/2 to see if we can get an idea about what's going on.     Thanks.

## 2018-04-25 NOTE — TELEPHONE ENCOUNTER
FUTURE VISIT INFORMATION      FUTURE VISIT INFORMATION:    Date: 4/30/18    Time: 1330    Location: ORTHO  REFERRAL INFORMATION:    Referring provider:  DR ROWLAND    Referring providers clinic:  [facility]    Reason for visit/diagnosis  NECROSIS    RECORDS REQUESTED FROM:       Clinic name Comments Records Status Imaging Status   [facility] REQUESTED LVM 2:46pm. Requested records 3 times                                      RECORDS STATUS      RECORDS RECEIVED FROM: [facility]   DATE RECEIVED: 4/30/18   NOTES STATUS DETAILS   OFFICE NOTE from referring provider Received 4/11/18   OFFICE NOTE from other specialist N/A    DISCHARGE SUMMARY from hospital N/A    DISCHARGE REPORT from the ER N/A    OPERATIVE REPORT N/A    MEDICATION LIST Internal    IMPLANT RECORD/STICKER N/A    LABS     CBC/DIFF Internal    CULTURES N/A    IMAGING  (NEED IMAGES & REPORTS) N/A    INJECTIONS DONE IN RADIOLOGY N/A    MRI N/A    CT SCAN N/A    XRAYS (IMAGES & REPORTS) N/A    TUMOR     PATHOLOGY  Slides & report N/A

## 2018-04-30 ENCOUNTER — PRE VISIT (OUTPATIENT)
Dept: ORTHOPEDICS | Facility: CLINIC | Age: 73
End: 2018-04-30

## 2018-04-30 ENCOUNTER — RADIANT APPOINTMENT (OUTPATIENT)
Dept: GENERAL RADIOLOGY | Facility: CLINIC | Age: 73
End: 2018-04-30
Attending: ORTHOPAEDIC SURGERY
Payer: COMMERCIAL

## 2018-04-30 ENCOUNTER — OFFICE VISIT (OUTPATIENT)
Dept: ORTHOPEDICS | Facility: CLINIC | Age: 73
End: 2018-04-30
Payer: COMMERCIAL

## 2018-04-30 VITALS — BODY MASS INDEX: 23.12 KG/M2 | HEIGHT: 70 IN | WEIGHT: 161.5 LBS

## 2018-04-30 DIAGNOSIS — M87.9 OSTEONECROSIS (H): ICD-10-CM

## 2018-04-30 DIAGNOSIS — M87.9 OSTEONECROSIS (H): Primary | ICD-10-CM

## 2018-04-30 DIAGNOSIS — K74.60 HEPATIC CIRRHOSIS, UNSPECIFIED HEPATIC CIRRHOSIS TYPE (H): ICD-10-CM

## 2018-04-30 LAB
ALBUMIN UR-MCNC: NEGATIVE MG/DL
APPEARANCE UR: CLEAR
BASOPHILS # BLD AUTO: 0 10E9/L (ref 0–0.2)
BASOPHILS NFR BLD AUTO: 1 %
BILIRUB UR QL STRIP: NEGATIVE
CA-I SERPL ISE-MCNC: 4.5 MG/DL (ref 4.4–5.2)
CK SERPL-CCNC: 41 U/L (ref 30–300)
COLOR UR AUTO: YELLOW
CREAT UR-MCNC: 252 MG/DL
CRP SERPL-MCNC: 3.2 MG/L (ref 0–8)
DEPRECATED CALCIDIOL+CALCIFEROL SERPL-MC: 16 UG/L (ref 20–75)
DIFFERENTIAL METHOD BLD: ABNORMAL
EOSINOPHIL # BLD AUTO: 0.2 10E9/L (ref 0–0.7)
EOSINOPHIL NFR BLD AUTO: 4 %
ERYTHROCYTE [DISTWIDTH] IN BLOOD BY AUTOMATED COUNT: 15.7 % (ref 10–15)
ERYTHROCYTE [SEDIMENTATION RATE] IN BLOOD BY WESTERGREN METHOD: 115 MM/H (ref 0–20)
FERRITIN SERPL-MCNC: 8 NG/ML (ref 26–388)
GLUCOSE UR STRIP-MCNC: NEGATIVE MG/DL
HCT VFR BLD AUTO: 34.6 % (ref 40–53)
HGB BLD-MCNC: 11.1 G/DL (ref 13.3–17.7)
HGB UR QL STRIP: NEGATIVE
HYALINE CASTS #/AREA URNS LPF: 8 /LPF (ref 0–2)
IMM GRANULOCYTES # BLD: 0 10E9/L (ref 0–0.4)
IMM GRANULOCYTES NFR BLD: 0.5 %
IRON SATN MFR SERPL: 5 % (ref 15–46)
IRON SERPL-MCNC: 20 UG/DL (ref 35–180)
KETONES UR STRIP-MCNC: NEGATIVE MG/DL
LDH SERPL L TO P-CCNC: 185 U/L (ref 85–227)
LEUKOCYTE ESTERASE UR QL STRIP: NEGATIVE
LYMPHOCYTES # BLD AUTO: 0.7 10E9/L (ref 0.8–5.3)
LYMPHOCYTES NFR BLD AUTO: 16.6 %
MAGNESIUM SERPL-MCNC: 2.4 MG/DL (ref 1.6–2.3)
MCH RBC QN AUTO: 26.7 PG (ref 26.5–33)
MCHC RBC AUTO-ENTMCNC: 32.1 G/DL (ref 31.5–36.5)
MCV RBC AUTO: 83 FL (ref 78–100)
MONOCYTES # BLD AUTO: 0.5 10E9/L (ref 0–1.3)
MONOCYTES NFR BLD AUTO: 11.9 %
MUCOUS THREADS #/AREA URNS LPF: PRESENT /LPF
NEUTROPHILS # BLD AUTO: 2.8 10E9/L (ref 1.6–8.3)
NEUTROPHILS NFR BLD AUTO: 66 %
NITRATE UR QL: NEGATIVE
NRBC # BLD AUTO: 0 10*3/UL
NRBC BLD AUTO-RTO: 0 /100
PH UR STRIP: 6 PH (ref 5–7)
PHOSPHATE SERPL-MCNC: 2.3 MG/DL (ref 2.5–4.5)
PLATELET # BLD AUTO: 147 10E9/L (ref 150–450)
PTH-INTACT SERPL-MCNC: 100 PG/ML (ref 18–80)
RBC # BLD AUTO: 4.15 10E12/L (ref 4.4–5.9)
RBC #/AREA URNS AUTO: <1 /HPF (ref 0–2)
RETICS # AUTO: 60.2 10E9/L (ref 25–95)
RETICS/RBC NFR AUTO: 1.5 % (ref 0.5–2)
SOURCE: ABNORMAL
SP GR UR STRIP: 1.02 (ref 1–1.03)
TIBC SERPL-MCNC: 411 UG/DL (ref 240–430)
UROBILINOGEN UR STRIP-MCNC: 4 MG/DL (ref 0–2)
WBC # BLD AUTO: 4.2 10E9/L (ref 4–11)
WBC #/AREA URNS AUTO: 1 /HPF (ref 0–5)

## 2018-04-30 NOTE — PROGRESS NOTES
[facility]  Orthopaedic Surgery, Joint Replacement Consultation  by Sae Godwin M.D.    Danielle Cook MRN# [id number]   Age: 73 year old YOB: 1945     Requesting physician: Andrey Roy            Assessment and Plan:   Assessment:  74 yo m with incidental finding of AVN of bilateral hips     Plan:  Given the patient lack of current hip symptoms we will proceed with observation.  We informed him that there is a risk that this left hip does collapse over time, and if that should happen we would recommend LOGAN.  With regards to the risk of progression/collapse with further steroid treatment for his rhematological issues, it is possible but not certain that steroids could lead to progression, however if they are required for his other medical issues then this would need to be weighed against the potential for worsening his AVN.           History of Present Illness:   73 year old male presenting for evaluation of bilateral hip avn found incidentally.  He has been having chronic headaches, and being evaluated by neurology for possible temporal arteritis versus other rheumatological issue.  In the course of treatment for his underlying issue, corticosteroid treatment was considered, however given this recent finding of avn he was sent for evaluation of risk prior to initiation of treatment.  With regards to his hips, he reports minimal pain.  He is here for evaluation of this incidental finding.       Current symptoms:  Problem: B hip avn, L > R  Onset and duration: incidental finding, since january  Awakens from sleep due to sx's:  No  Precipitating Injury:  No    Other joints or sites painful:  No    Exposures: patient denies exposures to steroids or EtOH      Background history:  DX:  1. B hip avn, precollapse    TREATMENTS:  None             Physical Exam:     EXAMINATION pertinent findings:   VITAL SIGNS: There were no vitals taken for this visit.  There is no  height or weight on file to calculate BMI.  RESP: non labored breathing   ABD: benign   SKIN: grossly normal   LYMPHATIC: grossly normal   NEURO: grossly normal   VASCULAR: satisfactory perfusion of all extremities   MUSCULOSKELETAL:   Painless rom about bilateral hips  Reciprocal, antalgic gait without limp                  Data:   All laboratory data reviewed  All imaging studies reviewed by me    XR B hip - L>R hip avn, without evidence of collapse      Signed:   Aravind Altman MD  Adult Reconstructive Surgery Fellow  Dept Orthopaedic Surgery, [facility]       Attending MD (Dr. Sae Godwin) Attestation :  This patient was seen and evaluated by me including a history, exam, and interpretation of all imaging and/or lab data.  Either a training physician (resident/fellow), who also saw the patient, or scribe has documented the clinic visit in the attached note.    MD Shelby Hancock Family Professor  Oncology and Adult Reconstructive Surgery  Dept Orthopaedic Surgery, [facility]  [phone] office, [phone] pager  [email or web address]          DATA for DOCUMENTATION:         Past Medical History:     Patient Active Problem List   Diagnosis     CKD (chronic kidney disease) stage 3, GFR 30-59 ml/min     Hypertension     Persistent headaches     History of hepatitis C     Cirrhosis of liver without ascites, unspecified hepatic cirrhosis type (H)     PMR (polymyalgia rheumatica) (H)     Past Medical History:   Diagnosis Date     CKD (chronic kidney disease) stage 3, GFR 30-59 ml/min      Hepatitis C virus infection      Hypertension        Also see scanned health assessment forms.       Past Surgical History:     Past Surgical History:   Procedure Laterality Date     COLONOSCOPY N/A 1/19/2018    Procedure: COLONOSCOPY;  COMBINED ESOPHAGOSCOPY, GASTROSCOPY, DUODENOSCOPY (EGD) REMOVE TUMOR/POYP/LESION BY SNARE, CONTROL BLEED,BANDING/LIGATION OF VARICES Colonoscopy;  Surgeon: Xavier Sutton  MD;  Location:  GI     ESOPHAGOSCOPY, GASTROSCOPY, DUODENOSCOPY (EGD), COMBINED N/A 11/4/2015    Procedure: COMBINED ESOPHAGOSCOPY, GASTROSCOPY, DUODENOSCOPY (EGD), BIOPSY SINGLE OR MULTIPLE;  Surgeon: Inocente Do MD;  Location:  GI     ESOPHAGOSCOPY, GASTROSCOPY, DUODENOSCOPY (EGD), COMBINED N/A 3/29/2018    Procedure: COMBINED ESOPHAGOSCOPY, GASTROSCOPY, DUODENOSCOPY (EGD);  egd;  Surgeon: Danielle Mendenhall MD;  Location:  GI            Social History:     Social History     Social History     Marital status:      Spouse name: N/A     Number of children: N/A     Years of education: N/A     Occupational History     Not on file.     Social History Main Topics     Smoking status: Never Smoker     Smokeless tobacco: Never Used     Alcohol use No     Drug use: No     Sexual activity: Not on file     Other Topics Concern     Not on file     Social History Narrative            Family History:       Family History   Problem Relation Age of Onset     Hypertension Father             Medications:     Current Outpatient Prescriptions   Medication Sig     acetaminophen (TYLENOL) 325 MG tablet Take 1 tablet (325 mg) by mouth every 6 hours as needed for mild pain     amLODIPine (NORVASC) 5 MG tablet Take 2 tablets (10 mg) by mouth daily     aspirin 81 MG tablet Take 1 tablet (81 mg) by mouth daily     gabapentin (NEURONTIN) 300 MG tablet Take 600 mg by mouth 3 times daily      Levothyroxine Sodium (SYNTHROID PO)      omeprazole (PRILOSEC) 40 MG capsule Take 1 capsule (40 mg) by mouth daily (with lunch)     No current facility-administered medications for this visit.               Review of Systems:   A comprehensive 10 point review of systems (constitutional, ENT, cardiac, peripheral vascular, lymphatic, respiratory, GI, , Musculoskeletal, skin, Neurological) was performed and found to be negative except as described in this note.     See intake form completed by patient      Answers for HPI/ROS  submitted by the patient on 4/30/2018   General Symptoms: No  Skin Symptoms: No  HENT Symptoms: No  EYE SYMPTOMS: No  HEART SYMPTOMS: No  LUNG SYMPTOMS: No  INTESTINAL SYMPTOMS: No  URINARY SYMPTOMS: No  REPRODUCTIVE SYMPTOMS: No  SKELETAL SYMPTOMS: No  BLOOD SYMPTOMS: No  NERVOUS SYSTEM SYMPTOMS: No  MENTAL HEALTH SYMPTOMS: No

## 2018-04-30 NOTE — MR AVS SNAPSHOT
After Visit Summary   4/30/2018    Danielle Cook    MRN: [id number]           Patient Information     Date Of Birth          1945        Visit Information        Provider Department      4/30/2018 1:15 PM Sae Godwin MD; [employer] LANGUAGE SERVICES [facility]        Today's Diagnoses     Osteonecrosis (H)    -  1       Follow-ups after your visit        Your next 10 appointments already scheduled     May 02, 2018  4:30 PM CDT   MR ABDOMEN & PELVIS W/O & W CONTRAST with UC1   [facility] ([email or web address])    [address] [address] [phone]           Take your medicines as usual, unless your doctor tells you not to. Bring a list of your current medicines to your exam (including vitamins, minerals and over-the-counter drugs). Also bring the results of similar scans you may have had.    You may or may not receive IV contrast for this exam pending the discretion of the Radiologist.   Do not eat or drink for 6 hours prior to exam.  The MRI machine uses a strong magnet. Please wear clothes without metal (snaps, zippers). A sweatsuit works well, or we may give you a hospital gown.  Please remove any body piercings and hair extensions before you arrive. You will also remove watches, jewelry, hairpins, wallets, dentures, partial dental plates and hearing aids. You may wear contact lenses, and you may be able to wear your rings. We have a safe place to keep your personal items, but it is safer to leave them at home.  **IMPORTANT** THE INSTRUCTIONS BELOW ARE ONLY FOR THOSE PATIENTS WHO HAVE BEEN PRESCRIBED SEDATION OR GENERAL ANESTHESIA DURING THEIR MRI PROCEDURE:  IF YOUR DOCTOR PRESCRIBED ORAL SEDATION (take medicine to help you relax during your exam):   You must get the medicine from your doctor (oral medication) before you arrive. Bring the medicine to the exam. Do not take it at home. You ll be told  when to take it upon arriving for your exam.   Arrive one hour early. Bring someone who can take you home after the test. Your medicine will make you sleepy. After the exam, you may not drive, take a bus or take a taxi by yourself.  IF YOUR DOCTOR PRESCRIBED IV SEDATION:   Arrive one hour early. Bring someone who can take you home after the test. Your medicine will make you sleepy. After the exam, you may not drive, take a bus or take a taxi by yourself.   No eating 6 hours before your exam. You may have clear liquids up until 4 hours before your exam. (Clear liquids include water, clear tea, black coffee and fruit juice without pulp.)  IF YOUR DOCTOR PRESCRIBED ANESTHESIA (be asleep for your exam):   Arrive 1 1/2 hours early. Bring someone who can take you home after the test. You may not drive, take a bus or take a taxi by yourself.   No eating 8 hours before your exam. You may have clear liquids up until 4 hours before your exam. (Clear liquids include water, clear tea, black coffee and fruit juice without pulp.)   You will spend four to five hours in the recovery room.  If you have any questions, please contact your Imaging Department exam site.            Aug 13, 2018  2:00 PM CDT   Lab with [facility] ([facility])    [address] [place] [address]   [phone]            Aug 13, 2018  3:00 PM CDT   (Arrive by 2:45 PM)   Return General Liver with Nataly Darnell MD   [facility] Hepatology ([facility])    [address] [place] [address]  [address]  [facility] [address]   [phone]            Dec 21, 2018  8:45 AM CST   Lab with  LAB   [facility] ([facility])    [address] [place] [address]  1st Floor  [facility] [address]   [phone]            Dec 21, 2018  9:40 AM CST   (Arrive by 9:10 AM)   Return Visit with Beto Alvarado MD   [facility] Nephrology ([facility])    [address] [place] [address]  "Street   [address]  [facility] [address] [phone]              Future tests that were ordered for you today     Open Future Orders        Priority Expected Expires Ordered    XR Pelvis and Hip Bilateral 2 Views Routine 2018            Who to contact     Please call your clinic at [phone] to:    Ask questions about your health    Make or cancel appointments    Discuss your medicines    Learn about your test results    Speak to your doctor            Additional Information About Your Visit        [employer] Information     [employer] is an electronic gateway that provides easy, online access to your medical records. With [employer], you can request a clinic appointment, read your test results, renew a prescription or communicate with your care team.     To sign up for [employer] visit the website at [email or web address]   You will be asked to enter the access code listed below, as well as some personal information. Please follow the directions to create your username and password.     Your access code is: [email or web address]  Expires: 7/3/2018  6:31 AM     Your access code will  in 90 days. If you need help or a new code, please contact your [facility] or call [phone] for assistance.        Care EveryWhere ID     This is your Care EveryWhere ID. This could be used by other organizations to access your [facility] medical records  [id number]        Your Vitals Were     Height BMI (Body Mass Index)                1.778 m (5' 10\") 23.17 kg/m2           Blood Pressure from Last 3 Encounters:   18 118/69   18 124/69   18 138/86    Weight from Last 3 Encounters:   18 73.3 kg (161 lb 8 oz)   18 71.9 kg (158 lb 9.6 oz)   18 75.5 kg (166 lb 6.4 oz)               Primary Care Provider Office Phone # Fax #    Andrey Flores [phone]       [facility] CARE  [facility] " [id number]        Equal Access to Services     [facility] : Hadii aad ku hadsandeepedrito Conchisali, waivannada brandanhayha, qadeondino parraavjeannie duran. So [facility] [phone].    ATENCIÓN: Si habla español, tiene a whitehead disposición servicios gratuitos de asistencia lingüística. Jasoname al [phone].    We comply with applicable federal civil rights laws and [place] laws. We do not discriminate on the basis of race, color, national origin, age, disability, sex, sexual orientation, or gender identity.            Thank you!     Thank you for choosing [facility]  for your care. Our goal is always to provide you with excellent care. Hearing back from our patients is one way we can continue to improve our services. Please take a few minutes to complete the written survey that you may receive in the mail after your visit with us. Thank you!             Your Updated Medication List - Protect others around you: Learn how to safely use, store and throw away your medicines at www.disposemymeds.org.          This list is accurate as of 4/30/18 11:59 PM.  Always use your most recent med list.                   Brand Name Dispense Instructions for use Diagnosis    acetaminophen 325 MG tablet    TYLENOL    100 tablet    Take 1 tablet (325 mg) by mouth every 6 hours as needed for mild pain    Other chronic pain       amLODIPine 5 MG tablet    NORVASC    30 tablet    Take 2 tablets (10 mg) by mouth daily    Essential hypertension with goal blood pressure less than 140/90       aspirin 81 MG tablet     30 tablet    Take 1 tablet (81 mg) by mouth daily    Small vessel disease       gabapentin 300 MG tablet    NEURONTIN     Take 600 mg by mouth 3 times daily    Chronic hepatitis C (H), Renal insufficiency       omeprazole 40 MG capsule    priLOSEC    60 capsule    Take 1 capsule (40 mg) by mouth daily (with lunch)    Esophagitis, reflux       SYNTHROID PO

## 2018-04-30 NOTE — LETTER
4/30/2018       RE: Danielle Cook  [address]   [facility] [address]     Dear Colleague,    Thank you for referring your patient, Danielle Cook, to the [facility] at [facility]. Please see a copy of my visit note below.      [facility]  Orthopaedic Surgery, Joint Replacement Consultation  by Sae Godwin M.D.    Danielle Cook MRN# [id number]   Age: 73 year old YOB: 1945     Requesting physician: Andrey Roy            Assessment and Plan:   Assessment:  72 yo m with incidental finding of AVN of bilateral hips     Plan:  Given the patient lack of current hip symptoms we will proceed with observation.  We informed him that there is a risk that this left hip does collapse over time, and if that should happen we would recommend LOGAN.  With regards to the risk of progression/collapse with further steroid treatment for his rhematological issues, it is possible but not certain that steroids could lead to progression, however if they are required for his other medical issues then this would need to be weighed against the potential for worsening his AVN.           History of Present Illness:   73 year old male presenting for evaluation of bilateral hip avn found incidentally.  He has been having chronic headaches, and being evaluated by neurology for possible temporal arteritis versus other rheumatological issue.  In the course of treatment for his underlying issue, corticosteroid treatment was considered, however given this recent finding of avn he was sent for evaluation of risk prior to initiation of treatment.  With regards to his hips, he reports minimal pain.  He is here for evaluation of this incidental finding.       Current symptoms:  Problem: B hip avn, L > R  Onset and duration: incidental finding, since january  Awakens from sleep due to sx's:  No  Precipitating Injury:  No     Other joints or sites painful:  No    Exposures: patient denies exposures to steroids or EtOH      Background history:  DX:  1. B hip avn, precollapse    TREATMENTS:  None             Physical Exam:     EXAMINATION pertinent findings:   VITAL SIGNS: There were no vitals taken for this visit.  There is no height or weight on file to calculate BMI.  RESP: non labored breathing   ABD: benign   SKIN: grossly normal   LYMPHATIC: grossly normal   NEURO: grossly normal   VASCULAR: satisfactory perfusion of all extremities   MUSCULOSKELETAL:   Painless rom about bilateral hips  Reciprocal, antalgic gait without limp                  Data:   All laboratory data reviewed  All imaging studies reviewed by me    XR B hip - L>R hip avn, without evidence of collapse      Signed:   Aravind Altman MD  Adult Reconstructive Surgery Fellow  Dept Orthopaedic Surgery, [facility]       Attending MD (Dr. Sae Godwin) Attestation :  This patient was seen and evaluated by me including a history, exam, and interpretation of all imaging and/or lab data.  Either a training physician (resident/fellow), who also saw the patient, or scribe has documented the clinic visit in the attached note.    MD Shelby Hancock Family Professor  Oncology and Adult Reconstructive Surgery  Dept Orthopaedic Surgery, [facility]  [phone] office, [phone] pager  [email or web address]          DATA for DOCUMENTATION:         Past Medical History:     Patient Active Problem List   Diagnosis     CKD (chronic kidney disease) stage 3, GFR 30-59 ml/min     Hypertension     Persistent headaches     History of hepatitis C     Cirrhosis of liver without ascites, unspecified hepatic cirrhosis type (H)     PMR (polymyalgia rheumatica) (H)     Past Medical History:   Diagnosis Date     CKD (chronic kidney disease) stage 3, GFR 30-59 ml/min      Hepatitis C virus infection      Hypertension        Also see scanned health assessment forms.        Past Surgical History:     Past Surgical History:   Procedure Laterality Date     COLONOSCOPY N/A 1/19/2018    Procedure: COLONOSCOPY;  COMBINED ESOPHAGOSCOPY, GASTROSCOPY, DUODENOSCOPY (EGD) REMOVE TUMOR/POYP/LESION BY SNARE, CONTROL BLEED,BANDING/LIGATION OF VARICES Colonoscopy;  Surgeon: Xavier Sutton MD;  Location:  GI     ESOPHAGOSCOPY, GASTROSCOPY, DUODENOSCOPY (EGD), COMBINED N/A 11/4/2015    Procedure: COMBINED ESOPHAGOSCOPY, GASTROSCOPY, DUODENOSCOPY (EGD), BIOPSY SINGLE OR MULTIPLE;  Surgeon: Inocente Do MD;  Location: [facility]     ESOPHAGOSCOPY, GASTROSCOPY, DUODENOSCOPY (EGD), COMBINED N/A 3/29/2018    Procedure: COMBINED ESOPHAGOSCOPY, GASTROSCOPY, DUODENOSCOPY (EGD);  egd;  Surgeon: Danielle Mendenhall MD;  Location:  GI            Social History:     Social History     Social History     Marital status:      Spouse name: N/A     Number of children: N/A     Years of education: N/A     Occupational History     Not on file.     Social History Main Topics     Smoking status: Never Smoker     Smokeless tobacco: Never Used     Alcohol use No     Drug use: No     Sexual activity: Not on file     Other Topics Concern     Not on file     Social History Narrative            Family History:       Family History   Problem Relation Age of Onset     Hypertension Father             Medications:     Current Outpatient Prescriptions   Medication Sig     acetaminophen (TYLENOL) 325 MG tablet Take 1 tablet (325 mg) by mouth every 6 hours as needed for mild pain     amLODIPine (NORVASC) 5 MG tablet Take 2 tablets (10 mg) by mouth daily     aspirin 81 MG tablet Take 1 tablet (81 mg) by mouth daily     gabapentin (NEURONTIN) 300 MG tablet Take 600 mg by mouth 3 times daily      Levothyroxine Sodium (SYNTHROID PO)      omeprazole (PRILOSEC) 40 MG capsule Take 1 capsule (40 mg) by mouth daily (with lunch)     No current facility-administered medications for this visit.               Review of Systems:    A comprehensive 10 point review of systems (constitutional, ENT, cardiac, peripheral vascular, lymphatic, respiratory, GI, , Musculoskeletal, skin, Neurological) was performed and found to be negative except as described in this note.     See intake form completed by patient

## 2018-04-30 NOTE — NURSING NOTE
"Chief Complaint   Patient presents with     Consult     Pt. states that he is unsure as to why he is here today. He states that he had some imaging done of Bilat. Hips, and was then told to come here.  Referring:  KASHIF MAGDALENA FACUNDO       73 year old  1945    Ht 1.778 m (5' 10\")  Wt 73.3 kg (161 lb 8 oz)  BMI 23.17 kg/m2            Pain Assessment  Patient Currently in Pain: Denies          [facility] - [address] [facility] - [address] [facility] - [place], [place] - [address] [place] [address]    No Known Allergies  Current Outpatient Prescriptions   Medication     acetaminophen (TYLENOL) 325 MG tablet     amLODIPine (NORVASC) 5 MG tablet     aspirin 81 MG tablet     gabapentin (NEURONTIN) 300 MG tablet     Levothyroxine Sodium (SYNTHROID PO)     omeprazole (PRILOSEC) 40 MG capsule     No current facility-administered medications for this visit.          Questionnaires:    Promis 10 Assessment    PROMIS 10 4/30/2018   In general, would you say your health is: Fair   In general, would you say your quality of life is: Fair   In general, how would you rate your physical health? Fair   In general, how would you rate your mental health, including your mood and your ability to think? Fair   In general, how would you rate your satisfaction with your social activities and relationships? Fair   Some recent data might be hidden                "

## 2018-05-01 LAB
ALBUMIN SERPL ELPH-MCNC: 3.5 G/DL (ref 3.7–5.1)
ALPHA1 GLOB SERPL ELPH-MCNC: 0.4 G/DL (ref 0.2–0.4)
ALPHA2 GLOB SERPL ELPH-MCNC: 1 G/DL (ref 0.5–0.9)
B-GLOBULIN SERPL ELPH-MCNC: 0.9 G/DL (ref 0.6–1)
COPATH REPORT: NORMAL
GAMMA GLOB SERPL ELPH-MCNC: 1.9 G/DL (ref 0.7–1.6)
M PROTEIN SERPL ELPH-MCNC: 0 G/DL
PROT PATTERN SERPL ELPH-IMP: ABNORMAL

## 2018-05-02 ENCOUNTER — RADIANT APPOINTMENT (OUTPATIENT)
Dept: MRI IMAGING | Facility: CLINIC | Age: 73
End: 2018-05-02
Attending: INTERNAL MEDICINE
Payer: COMMERCIAL

## 2018-05-02 DIAGNOSIS — R39.14 BENIGN PROSTATIC HYPERPLASIA WITH INCOMPLETE BLADDER EMPTYING: Primary | ICD-10-CM

## 2018-05-02 DIAGNOSIS — N40.1 BENIGN PROSTATIC HYPERPLASIA WITH INCOMPLETE BLADDER EMPTYING: Primary | ICD-10-CM

## 2018-05-02 DIAGNOSIS — K74.60 HEPATIC CIRRHOSIS, UNSPECIFIED HEPATIC CIRRHOSIS TYPE (H): ICD-10-CM

## 2018-05-02 LAB
IGA SERPL-MCNC: 227 MG/DL (ref 70–380)
IGG SERPL-MCNC: 1820 MG/DL (ref 695–1620)
IGM SERPL-MCNC: 292 MG/DL (ref 60–265)
PROT PATTERN UR ELPH-IMP: NORMAL
PSA FREE MFR SERPL: 33 %
PSA FREE SERPL-MCNC: 0.3 NG/ML
PSA SERPL-MCNC: 0.9 NG/ML (ref 0–4)
SHBG SERPL-SCNC: 175 NMOL/L (ref 11–80)
TESTOST FREE SERPL-MCNC: 4.88 NG/DL (ref 4.7–24.4)
TESTOST SERPL-MCNC: 853 NG/DL (ref 240–950)

## 2018-05-02 RX ORDER — GADOBUTROL 604.72 MG/ML
7.5 INJECTION INTRAVENOUS ONCE
Status: COMPLETED | OUTPATIENT
Start: 2018-05-02 | End: 2018-05-02

## 2018-05-02 RX ADMIN — GADOBUTROL 7.5 ML: 604.72 INJECTION INTRAVENOUS at 16:34

## 2018-05-07 LAB — CRYOGLOB SER QL: ABNORMAL %

## 2018-05-08 LAB
ALBUMIN SERPL ELPH-MCNC: NEGATIVE G/DL
CRYOGLOB IGA & IGG & IGM SER-IMP: ABNORMAL
CRYOGLOB TYP SER IFE: ABNORMAL
CRYOGLOB TYP SER IFE: NEGATIVE
CRYOGLOB TYP SER IFE: POSITIVE

## 2018-06-08 ENCOUNTER — TELEPHONE (OUTPATIENT)
Dept: GASTROENTEROLOGY | Facility: CLINIC | Age: 73
End: 2018-06-08

## 2018-06-08 NOTE — TELEPHONE ENCOUNTER
Faxed the below communication to Dr. Flores and received confirmation today that it was given to Dr. Flores.

## 2018-06-08 NOTE — TELEPHONE ENCOUNTER
----- Message from Nataly Darnell MD sent at 5/21/2018  1:58 PM CDT -----  I believe it's Dr. Mary Flores.     ----- Message -----     From: Verena Jiménez LPN     Sent: 5/21/2018   1:30 PM       To: Nataly Darnell MD    I sent a communication thru [facility] who I thought was pt's PCP but no response. The listed physician is not pt's PCP. I am back to square one.   ----- Message -----     From: Nataly Darnell MD     Sent: 5/21/2018       To: Verena Jiménez LPN    Was wondering if you heard any updates regarding the previous message below?    Thanks.   ----- Message -----     From: Nataly Darnell MD     Sent: 5/9/2018   4:37 AM       To: Verena Jiménez LPN    Good morning Verena,   Please update Mr. Cook's PCP/team with the following (below), and if they would be arrange for the referral/treatment (as they see fit), otherwise, I am happy to place those orders.     Thank you.     In an attempt to investigate Mr. Cook's symptoms and if related to his liver condition we ran few tests showing:  - MRI abdomen: Stable, no new or unexpected findings  - iron panel: has DERIAN, I suggest 2 doses of IV iron, instead of PO to avoid GI symptoms/constipation: Injectafer 750 mg IV x 2, at least 1 week apart.   - Vit D def / low phos / elevated PTH: considering endocrine referral to assist in evaluation.  - BPH symptoms: deferring to your team  - high ESR / trace cryo / headaches / hips: considering rheumatology's assistance.    Thank you

## 2018-06-13 ENCOUNTER — MEDICAL CORRESPONDENCE (OUTPATIENT)
Dept: HEALTH INFORMATION MANAGEMENT | Facility: CLINIC | Age: 73
End: 2018-06-13

## 2018-06-14 ENCOUNTER — TELEPHONE (OUTPATIENT)
Dept: RHEUMATOLOGY | Facility: CLINIC | Age: 73
End: 2018-06-14

## 2018-06-14 NOTE — TELEPHONE ENCOUNTER
Health Call Center    Phone Message    May a detailed message be left on voicemail: yes    Reason for Call: Other: Per Isadora from [facility], Dr. Mary Flores referring patient into Rheum for consult with his exisiting condition. Would like Rheumatologist to provide recommendation with liver condition and how to proceed. Referral currently being faxed over. Additional notes in MyChart as well. Please follow up    Action Taken: Message routed to:  [facility] ([facility]

## 2018-06-22 NOTE — TELEPHONE ENCOUNTER
Call was placed to patient regarding the referral we received for myalgias from Andrey Flores at [facility], however, there was no answer. Message was left asking patient to call the clinic back to complete an intake form and discuss if there are outside records needed. Awaiting a call back from the patient.  Yuli Martin CMA  6/22/2018 3:27 PM

## 2018-06-26 ENCOUNTER — TELEPHONE (OUTPATIENT)
Dept: GASTROENTEROLOGY | Facility: CLINIC | Age: 73
End: 2018-06-26

## 2018-06-26 NOTE — TELEPHONE ENCOUNTER
Second and final attempt to contact patient regarding the referral we received. Message left asking patient to return my call to complete an intake form.  Yuli Martin CMA  6/26/2018 3:17 PM

## 2018-06-26 NOTE — TELEPHONE ENCOUNTER
Received a call from Es, Dr. Flores's nurse and wanted to clarify Dr. Darnell's recommendations. Es and Dr. Flores both questioned the injectevar order. Informed Es that it is generally given in an outpt infusion center. If Dr. Flores doesn't feel comfortable ordering, then let writer know and will discuss with Dr. Darnell. Es understood.

## 2018-07-03 ENCOUNTER — TELEPHONE (OUTPATIENT)
Dept: GASTROENTEROLOGY | Facility: CLINIC | Age: 73
End: 2018-07-03

## 2018-07-03 DIAGNOSIS — D64.9 ANEMIA: ICD-10-CM

## 2018-07-03 NOTE — TELEPHONE ENCOUNTER
Writer called pt by  # [phone] to inform him of the need to receive injectafer.  Pt refuses to complete until he talks to Dr. Darnell. Pt has an appt in August.

## 2018-07-03 NOTE — TELEPHONE ENCOUNTER
----- Message from Latosha Kwon sent at 7/3/2018 11:52 AM CDT -----  Ok cool. Pt is scheduled 7/10 at 4pm and 7/17 at 10am    Latosha  ----- Message -----     From: Verena Jiménez LPN     Sent: 7/3/2018  11:45 AM       To: Verena Jiménez LPN, Latosha Kwon    Lets try next week please.   ----- Message -----     From: Latosha Kwon     Sent: 7/3/2018  11:17 AM       To: Verena Jiménez LPN    We could do Friday at 7am... Or next week starting Tuesday    Latosha  ----- Message -----     From: Verena Jiménez LPN     Sent: 7/3/2018  10:49 AM       To: Verena Jiménez LPN, [facility] Scheduling [facility]    When would you have the next available appt for injectafer?

## 2018-07-26 ENCOUNTER — PRE VISIT (OUTPATIENT)
Dept: UROLOGY | Facility: CLINIC | Age: 73
End: 2018-07-26

## 2018-07-26 NOTE — TELEPHONE ENCOUNTER
MEDICAL RECORDS REQUEST   [facility]  Urology Clinic  [address] [place] [address]  PHONE: [phone]  Fax: [phone]        FUTURE VISIT INFORMATION                                                   Danielle Cook, : 1945 scheduled for future visit at [facility]    APPOINTMENT INFORMATION:    Date: 2018    Provider:  Olivia Romero    Reason for Visit/Diagnosis: BPH    REFERRAL INFORMATION:    Referring provider:  Andrey Flores    Specialty: MD    Referring providers clinic:  [facility]    Clinic contact number:  [phone];    RECORDS REQUESTED FOR VISIT                                                     NOTES  STATUS/DETAILS   OFFICE NOTE from referring provider  yes   OFFICE NOTE from other specialist  no   DISCHARGE SUMMARY from hospital  no   DISCHARGE REPORT from the ER  no   OPERATIVE REPORT  no   MEDICATION LIST  yes       PRE-VISIT CHECKLIST      Record collection complete Yes   Appointment appropriately scheduled           (right time/right provider) Yes   MyChart activation Yes   Questionnaire complete If no, please explain in process     Completed by: Lesvia Ordaz

## 2018-08-10 DIAGNOSIS — K74.60 CIRRHOSIS OF LIVER WITHOUT ASCITES, UNSPECIFIED HEPATIC CIRRHOSIS TYPE (H): Primary | ICD-10-CM

## 2018-08-13 ENCOUNTER — OFFICE VISIT (OUTPATIENT)
Dept: GASTROENTEROLOGY | Facility: CLINIC | Age: 73
End: 2018-08-13
Attending: INTERNAL MEDICINE
Payer: COMMERCIAL

## 2018-08-13 VITALS
SYSTOLIC BLOOD PRESSURE: 128 MMHG | DIASTOLIC BLOOD PRESSURE: 65 MMHG | TEMPERATURE: 98.1 F | HEIGHT: 68 IN | BODY MASS INDEX: 24.43 KG/M2 | HEART RATE: 98 BPM | WEIGHT: 161.2 LBS

## 2018-08-13 DIAGNOSIS — K72.90 DECOMPENSATION OF CIRRHOSIS OF LIVER (H): Primary | ICD-10-CM

## 2018-08-13 DIAGNOSIS — K74.60 CIRRHOSIS OF LIVER WITHOUT ASCITES, UNSPECIFIED HEPATIC CIRRHOSIS TYPE (H): ICD-10-CM

## 2018-08-13 DIAGNOSIS — K74.60 DECOMPENSATION OF CIRRHOSIS OF LIVER (H): Primary | ICD-10-CM

## 2018-08-13 LAB
ALBUMIN SERPL-MCNC: 2.8 G/DL (ref 3.4–5)
ALP SERPL-CCNC: 231 U/L (ref 40–150)
ALT SERPL W P-5'-P-CCNC: 18 U/L (ref 0–70)
ANION GAP SERPL CALCULATED.3IONS-SCNC: 8 MMOL/L (ref 3–14)
AST SERPL W P-5'-P-CCNC: 24 U/L (ref 0–45)
BILIRUB DIRECT SERPL-MCNC: 0.4 MG/DL (ref 0–0.2)
BILIRUB SERPL-MCNC: 0.8 MG/DL (ref 0.2–1.3)
BUN SERPL-MCNC: 12 MG/DL (ref 7–30)
CALCIUM SERPL-MCNC: 7.9 MG/DL (ref 8.5–10.1)
CHLORIDE SERPL-SCNC: 108 MMOL/L (ref 94–109)
CO2 SERPL-SCNC: 24 MMOL/L (ref 20–32)
CREAT SERPL-MCNC: 1.27 MG/DL (ref 0.66–1.25)
ERYTHROCYTE [DISTWIDTH] IN BLOOD BY AUTOMATED COUNT: 17.5 % (ref 10–15)
GFR SERPL CREATININE-BSD FRML MDRD: 56 ML/MIN/1.7M2
GLUCOSE SERPL-MCNC: 113 MG/DL (ref 70–99)
HCT VFR BLD AUTO: 26.3 % (ref 40–53)
HGB BLD-MCNC: 7.8 G/DL (ref 13.3–17.7)
INR PPP: 1.14 (ref 0.86–1.14)
MCH RBC QN AUTO: 22.7 PG (ref 26.5–33)
MCHC RBC AUTO-ENTMCNC: 29.7 G/DL (ref 31.5–36.5)
MCV RBC AUTO: 77 FL (ref 78–100)
PLATELET # BLD AUTO: 161 10E9/L (ref 150–450)
POTASSIUM SERPL-SCNC: 3.6 MMOL/L (ref 3.4–5.3)
PROT SERPL-MCNC: 7.4 G/DL (ref 6.8–8.8)
RBC # BLD AUTO: 3.44 10E12/L (ref 4.4–5.9)
SODIUM SERPL-SCNC: 139 MMOL/L (ref 133–144)
WBC # BLD AUTO: 5.1 10E9/L (ref 4–11)

## 2018-08-13 PROCEDURE — 80048 BASIC METABOLIC PNL TOTAL CA: CPT | Performed by: INTERNAL MEDICINE

## 2018-08-13 PROCEDURE — G0463 HOSPITAL OUTPT CLINIC VISIT: HCPCS | Mod: ZF

## 2018-08-13 PROCEDURE — 80076 HEPATIC FUNCTION PANEL: CPT | Performed by: INTERNAL MEDICINE

## 2018-08-13 PROCEDURE — 36415 COLL VENOUS BLD VENIPUNCTURE: CPT | Performed by: INTERNAL MEDICINE

## 2018-08-13 PROCEDURE — 85610 PROTHROMBIN TIME: CPT | Performed by: INTERNAL MEDICINE

## 2018-08-13 PROCEDURE — 85027 COMPLETE CBC AUTOMATED: CPT | Performed by: INTERNAL MEDICINE

## 2018-08-13 RX ORDER — FERROUS FUMARATE 324(106)MG
1 TABLET ORAL DAILY
COMMUNITY
End: 2020-04-27

## 2018-08-13 ASSESSMENT — PAIN SCALES - GENERAL: PAINLEVEL: NO PAIN (0)

## 2018-08-13 NOTE — LETTER
8/13/2018      RE: Danielle Cook  [address]  [facility] [address]       Hepatology Clinic note  Danielle Cook   Date of Birth 1945  Date of Service 8/13/2018         Impression/plan:   Danielle Cook is a 73 year old male with history of CKD 3, hypertension, recently cured hepatitis C complicated by cirrhosis with esophageal varices status post banding, and recently DERIAN without clear source of bleeding.   He presents with his wife for follow-up.    DERIAN:   - likely related to upper GI bleeding, previously banded EV in 1/2018, and removed an inflammatory gastric polyp.   - Currently worsening. Recent EGD 3/2018 with small EV, colonoscopy in 1/2018 normal.   - suggesting: starting with EGD 1st, if unrevealing then would arrange for colonoscopy + capsule endoscopy.   - encouraged him to get IV iron as prescribed before. Checking Hg in ~ 2 weeks.     Cirrhosis:  Decompensated, but stable MELD-Na 10  No clinical obvious acsites, HE.   Continue HCC screening, last MRI in 5/2018 without lesions, next US in 11/2018    RTc 2-3 months or sooner as needed.     Nataly Darnell MD  [facility] Hep[facility]    -----------------------------------------------------       HPI:   Danielle Cook is a 73 year old male with history of cured hepatitis C complicated by cirrhosis who presents for follow-up.    Please refer to prior clinic note for details of initial presentation.   Since last clinic visit, he reports to be generally stable without new problems.  He continues to have ongoing daily headaches.  Was offered IV iron infusion for his anemia however his declined citing that his hemoglobin was relatively stable at the time at 11.  Denies any evidence of hematemesis, hematochezia, melena, or hematuria.  No bruising.    Denies chest pain, shortness of breath, change in bowel habits, fevers chills jaundice rash pruritus falls confusion or bleeding.          Past Medical History:     Past Medical History:   Diagnosis Date     CKD (chronic kidney disease) stage 3, GFR 30-59 ml/min      Hepatitis C virus infection      Hypertension             Past Surgical History:     Past Surgical History:   Procedure Laterality Date     COLONOSCOPY N/A 1/19/2018    Procedure: COLONOSCOPY;  COMBINED ESOPHAGOSCOPY, GASTROSCOPY, DUODENOSCOPY (EGD) REMOVE TUMOR/POYP/LESION BY SNARE, CONTROL BLEED,BANDING/LIGATION OF VARICES Colonoscopy;  Surgeon: Xavier Sutton MD;  Location:  GI     ESOPHAGOSCOPY, GASTROSCOPY, DUODENOSCOPY (EGD), COMBINED N/A 11/4/2015    Procedure: COMBINED ESOPHAGOSCOPY, GASTROSCOPY, DUODENOSCOPY (EGD), BIOPSY SINGLE OR MULTIPLE;  Surgeon: Inocente Do MD;  Location:  GI     ESOPHAGOSCOPY, GASTROSCOPY, DUODENOSCOPY (EGD), COMBINED N/A 3/29/2018    Procedure: COMBINED ESOPHAGOSCOPY, GASTROSCOPY, DUODENOSCOPY (EGD);  egd;  Surgeon: Danielle Mendenhall MD;  Location:  GI            Medications:     Current Outpatient Prescriptions   Medication     amLODIPine (NORVASC) 5 MG tablet     aspirin 81 MG tablet     ferrous fumarate (FERRETTS) 324 (106 Fe) MG TABS tablet     gabapentin (NEURONTIN) 300 MG tablet     omeprazole (PRILOSEC) 40 MG capsule     acetaminophen (TYLENOL) 325 MG tablet     No current facility-administered medications for this visit.             Allergies:   No Known Allergies         Social History:     Social History     Social History     Marital status:      Spouse name: N/A     Number of children: N/A     Years of education: N/A     Occupational History     Not on file.     Social History Main Topics     Smoking status: Never Smoker     Smokeless tobacco: Never Used     Alcohol use No     Drug use: No     Sexual activity: Not on file     Other Topics Concern     Not on file     Social History Narrative            Family History:     Family History   Problem Relation Age of Onset     Hypertension Father               Review of  "Systems:   10 points ROS was obtained and highlighted in the HPI, otherwise negative.          Physical Exam:   VS:  /65  Pulse 98  Temp 98.1  F (36.7  C) (Oral)  Ht 1.727 m (5' 8\")  Wt 73.1 kg (161 lb 3.2 oz)  BMI 24.51 kg/m2      Gen: A&Ox3, NAD  HEENT: non-icteric, oropharynx clear  CV: RRR.  Lung: CTAB  Abd: soft, NT, ND, spleen is enlarged, liver is not palpable.   Ext: no edema, intact pulses.   Skin: stigmata of chronic liver disease.   Neuro: no focal deficits, grossly intact, no asterixis   Psych: appropriate mood and affects         Data:   Reviewed in person and significant for:  Lab Results   Component Value Date    WBC 5.1 08/13/2018     Lab Results   Component Value Date    RBC 3.44 08/13/2018     Lab Results   Component Value Date    HGB 7.8 08/13/2018     Lab Results   Component Value Date    HCT 26.3 08/13/2018     No components found for: MCT  Lab Results   Component Value Date    MCV 77 08/13/2018     Lab Results   Component Value Date    MCH 22.7 08/13/2018     Lab Results   Component Value Date    MCHC 29.7 08/13/2018     Lab Results   Component Value Date    RDW 17.5 08/13/2018     Lab Results   Component Value Date     08/13/2018     Last Comprehensive Metabolic Panel:  Sodium   Date Value Ref Range Status   08/13/2018 139 133 - 144 mmol/L Final     Potassium   Date Value Ref Range Status   08/13/2018 3.6 3.4 - 5.3 mmol/L Final     Chloride   Date Value Ref Range Status   08/13/2018 108 94 - 109 mmol/L Final     Carbon Dioxide   Date Value Ref Range Status   08/13/2018 24 20 - 32 mmol/L Final     Anion Gap   Date Value Ref Range Status   08/13/2018 8 3 - 14 mmol/L Final     Glucose   Date Value Ref Range Status   08/13/2018 113 (H) 70 - 99 mg/dL Final     Urea Nitrogen   Date Value Ref Range Status   08/13/2018 12 7 - 30 mg/dL Final     Creatinine   Date Value Ref Range Status   08/13/2018 1.27 (H) 0.66 - 1.25 mg/dL Final     GFR Estimate   Date Value Ref Range Status "   08/13/2018 56 (L) >60 mL/min/1.7m2 Final     Comment:     Non  GFR Calc     Calcium   Date Value Ref Range Status   08/13/2018 7.9 (L) 8.5 - 10.1 mg/dL Final     Liver Function Studies -   Recent Labs   Lab Test  08/13/18   1410   PROTTOTAL  7.4   ALBUMIN  2.8*   BILITOTAL  0.8   ALKPHOS  231*   AST  24   ALT  18       Nataly Darnell MD

## 2018-08-13 NOTE — NURSING NOTE
"/65  Pulse 98  Temp 98.1  F (36.7  C) (Oral)  Ht 1.727 m (5' 8\")  Wt 73.1 kg (161 lb 3.2 oz)  BMI 24.51 kg/m2  Chief Complaint   Patient presents with     RECHECK     follow up with cirrhosis, tzimmer cma       "

## 2018-08-13 NOTE — MR AVS SNAPSHOT
After Visit Summary   8/13/2018    Danielle Cook    MRN: [id number]           Patient Information     Date Of Birth          1945        Visit Information        Provider Department      8/13/2018 2:45 PM Nataly Darnell MD; [employer] [facility] Hepatology         Follow-ups after your visit        Your next 10 appointments already scheduled     Aug 21, 2018  3:00 PM CDT   (Arrive by 2:45 PM)   NEW BENIGN PROSTATIC HYPERTROPHY with DEX Queen   [facility] ([facility])    [address] [place] [address]  4th Floor  [facility] [id number]   [phone]            Oct 23, 2018 10:30 AM CDT   Lab with  LAB   [facility] ([facility])    [address] [place] [address]  1st Floor  [facility] [id number] [phone]            Oct 23, 2018 11:30 AM CDT   (Arrive by 11:15 AM)   Return General Liver with Nataly Darnell MD   [facility]at[facility] ([facility])    [address] [place] [address]  [address]  [facility] [id number] [phone]            Dec 21, 2018  8:45 AM CST   Lab with  LAB   [facility] ([facility])    [address] [place] [address]  1st Floor  [facility] [id number] [phone]            Dec 21, 2018  9:40 AM CST   (Arrive by 9:10 AM)   Return Visit with Beto Alvarado MD   [facility] Nephrology ([facility])    [address] [place] [address]  [address]  [facility] [id number] [phone]              Who to contact     If you have questions or need follow up information about today's clinic visit or your schedule please contact [facility] HEPATOLOGY directly at [phone].  Normal or non-critical lab and imaging results will be communicated to you by MyChart, letter or phone within 4 business days after the clinic has received the results. If you do not hear from us within 7 days, please contact the clinic through  "[employer] or phone. If you have a critical or abnormal lab result, we will notify you by phone as soon as possible.  Submit refill requests through [employer] or call your pharmacy and they will forward the refill request to us. Please allow 3 business days for your refill to be completed.          Additional Information About Your Visit        [employer] Information     [email or web address] lets you send messages to your doctor, view your test results, renew your prescriptions, schedule appointments and more. To sign up, go to [email or web address] . Click on \"Log in\" on the left side of the screen, which will take you to the Welcome page. Then click on \"Sign up Now\" on the right side of the page.     You will be asked to enter the access code listed below, as well as some personal information. Please follow the directions to create your username and password.     Your access code is: [email or web address]  Expires: 10/28/2018  6:31 AM     Your access code will  in 90 days. If you need help or a new code, please call your [place] clinic or [phone].        Care EveryWhere ID     This is your Care EveryWhere ID. This could be used by other organizations to access your [place] medical records  [id number]        Your Vitals Were     Pulse Temperature Height BMI (Body Mass Index)          98 98.1  F (36.7  C) (Oral) 1.727 m (5' 8\") 24.51 kg/m2         Blood Pressure from Last 3 Encounters:   18 128/65   18 118/69   18 124/69    Weight from Last 3 Encounters:   18 73.1 kg (161 lb 3.2 oz)   18 73.3 kg (161 lb 8 oz)   18 71.9 kg (158 lb 9.6 oz)              Today, you had the following     No orders found for display         Today's Medication Changes          These changes are accurate as of 18  3:34 PM.  If you have any questions, ask your nurse or doctor.               Stop taking these medicines if you haven't already. Please contact your care team if you have questions.     SYNTHROID PO "   Stopped by:  Nataly Darnell MD                    Primary Care Provider Office Phone # Fax #    Andrey Zan Flores [phone]       [facility] CARE [address] [facility] [address]        Equal Access to Services     ISA BROCKBROWN : Hadii aad ku hadsandeeo Soomaali, waaxda luqadaha, qaybta kaalmada adeegyada, waxcurt idiin haynagin adepalmira daley lamarilynhusam dave. So [facility] [phone].    ATENCIÓN: Si habla español, tiene a whitehead disposición servicios gratuitos de asistencia lingüística. Llame al [phone].    We comply with applicable federal civil rights laws and [place] laws. We do not discriminate on the basis of race, color, national origin, age, disability, sex, sexual orientation, or gender identity.            Thank you!     Thank you for choosing [facility] HEPATOLOGY  for your care. Our goal is always to provide you with excellent care. Hearing back from our patients is one way we can continue to improve our services. Please take a few minutes to complete the written survey that you may receive in the mail after your visit with us. Thank you!             Your Updated Medication List - Protect others around you: Learn how to safely use, store and throw away your medicines at www.disposemymeds.org.          This list is accurate as of 8/13/18  3:34 PM.  Always use your most recent med list.                   Brand Name Dispense Instructions for use Diagnosis    acetaminophen 325 MG tablet    TYLENOL    100 tablet    Take 1 tablet (325 mg) by mouth every 6 hours as needed for mild pain    Other chronic pain       amLODIPine 5 MG tablet    NORVASC    30 tablet    Take 2 tablets (10 mg) by mouth daily    Essential hypertension with goal blood pressure less than 140/90       aspirin 81 MG tablet     30 tablet    Take 1 tablet (81 mg) by mouth daily    Small vessel disease       ferrous fumarate 324 (106 Fe) MG Tabs tablet    FERRETTS     Take by mouth daily        gabapentin 300 MG tablet     NEURONTIN     Take 600 mg by mouth 3 times daily    Chronic hepatitis C (H), Renal insufficiency       omeprazole 40 MG capsule    priLOSEC    60 capsule    Take 1 capsule (40 mg) by mouth daily (with lunch)    Esophagitis, reflux

## 2018-08-13 NOTE — PROGRESS NOTES
Hepatology Clinic note  Danielle Cook   Date of Birth 1945  Date of Service 8/13/2018         Impression/plan:   Danielle Cook is a 73 year old male with history of CKD 3, hypertension, recently cured hepatitis C complicated by cirrhosis with esophageal varices status post banding, and recently DERIAN without clear source of bleeding.   He presents with his wife for follow-up.    DERIAN:   - likely related to upper GI bleeding, previously banded EV in 1/2018, and removed an inflammatory gastric polyp.   - Currently worsening. Recent EGD 3/2018 with small EV, colonoscopy in 1/2018 normal.   - suggesting: starting with EGD 1st, if unrevealing then would arrange for colonoscopy + capsule endoscopy.   - encouraged him to get IV iron as prescribed before. Checking Hg in ~ 2 weeks.     Cirrhosis:  Decompensated, but stable MELD-Na 10  No clinical obvious acsites, HE.   Continue HCC screening, last MRI in 5/2018 without lesions, next US in 11/2018    RTc 2-3 months or sooner as needed.     Nataly Darnell MD  [facility] Hepatology clinic    -----------------------------------------------------       HPI:   Danielle Cook is a 73 year old male with history of cured hepatitis C complicated by cirrhosis who presents for follow-up.    Please refer to prior clinic note for details of initial presentation.   Since last clinic visit, he reports to be generally stable without new problems.  He continues to have ongoing daily headaches.  Was offered IV iron infusion for his anemia however his declined citing that his hemoglobin was relatively stable at the time at 11.  Denies any evidence of hematemesis, hematochezia, melena, or hematuria.  No bruising.    Denies chest pain, shortness of breath, change in bowel habits, fevers chills jaundice rash pruritus falls confusion or bleeding.         Past Medical History:     Past Medical History:   Diagnosis Date     CKD (chronic kidney disease) stage  3, GFR 30-59 ml/min      Hepatitis C virus infection      Hypertension             Past Surgical History:     Past Surgical History:   Procedure Laterality Date     COLONOSCOPY N/A 1/19/2018    Procedure: COLONOSCOPY;  COMBINED ESOPHAGOSCOPY, GASTROSCOPY, DUODENOSCOPY (EGD) REMOVE TUMOR/POYP/LESION BY SNARE, CONTROL BLEED,BANDING/LIGATION OF VARICES Colonoscopy;  Surgeon: Xavier Sutton MD;  Location: [facility]     ESOPHAGOSCOPY, GASTROSCOPY, DUODENOSCOPY (EGD), COMBINED N/A 11/4/2015    Procedure: COMBINED ESOPHAGOSCOPY, GASTROSCOPY, DUODENOSCOPY (EGD), BIOPSY SINGLE OR MULTIPLE;  Surgeon: Inocente Do MD;  Location: [facility]     ESOPHAGOSCOPY, GASTROSCOPY, DUODENOSCOPY (EGD), COMBINED N/A 3/29/2018    Procedure: COMBINED ESOPHAGOSCOPY, GASTROSCOPY, DUODENOSCOPY (EGD);  egd;  Surgeon: Danielle Mendenhall MD;  Location:  GI            Medications:     Current Outpatient Prescriptions   Medication     amLODIPine (NORVASC) 5 MG tablet     aspirin 81 MG tablet     ferrous fumarate (FERRETTS) 324 (106 Fe) MG TABS tablet     gabapentin (NEURONTIN) 300 MG tablet     omeprazole (PRILOSEC) 40 MG capsule     acetaminophen (TYLENOL) 325 MG tablet     No current facility-administered medications for this visit.             Allergies:   No Known Allergies         Social History:     Social History     Social History     Marital status:      Spouse name: N/A     Number of children: N/A     Years of education: N/A     Occupational History     Not on file.     Social History Main Topics     Smoking status: Never Smoker     Smokeless tobacco: Never Used     Alcohol use No     Drug use: No     Sexual activity: Not on file     Other Topics Concern     Not on file     Social History Narrative            Family History:     Family History   Problem Relation Age of Onset     Hypertension Father               Review of Systems:   10 points ROS was obtained and highlighted in the HPI, otherwise negative.          Physical  "Exam:   VS:  /65  Pulse 98  Temp 98.1  F (36.7  C) (Oral)  Ht 1.727 m (5' 8\")  Wt 73.1 kg (161 lb 3.2 oz)  BMI 24.51 kg/m2      Gen: A&Ox3, NAD  HEENT: non-icteric, oropharynx clear  CV: RRR.  Lung: CTAB  Abd: soft, NT, ND, spleen is enlarged, liver is not palpable.   Ext: no edema, intact pulses.   Skin: stigmata of chronic liver disease.   Neuro: no focal deficits, grossly intact, no asterixis   Psych: appropriate mood and affects         Data:   Reviewed in person and significant for:  Lab Results   Component Value Date    WBC 5.1 08/13/2018     Lab Results   Component Value Date    RBC 3.44 08/13/2018     Lab Results   Component Value Date    HGB 7.8 08/13/2018     Lab Results   Component Value Date    HCT 26.3 08/13/2018     No components found for: MCT  Lab Results   Component Value Date    MCV 77 08/13/2018     Lab Results   Component Value Date    MCH 22.7 08/13/2018     Lab Results   Component Value Date    MCHC 29.7 08/13/2018     Lab Results   Component Value Date    RDW 17.5 08/13/2018     Lab Results   Component Value Date     08/13/2018     Last Comprehensive Metabolic Panel:  Sodium   Date Value Ref Range Status   08/13/2018 139 133 - 144 mmol/L Final     Potassium   Date Value Ref Range Status   08/13/2018 3.6 3.4 - 5.3 mmol/L Final     Chloride   Date Value Ref Range Status   08/13/2018 108 94 - 109 mmol/L Final     Carbon Dioxide   Date Value Ref Range Status   08/13/2018 24 20 - 32 mmol/L Final     Anion Gap   Date Value Ref Range Status   08/13/2018 8 3 - 14 mmol/L Final     Glucose   Date Value Ref Range Status   08/13/2018 113 (H) 70 - 99 mg/dL Final     Urea Nitrogen   Date Value Ref Range Status   08/13/2018 12 7 - 30 mg/dL Final     Creatinine   Date Value Ref Range Status   08/13/2018 1.27 (H) 0.66 - 1.25 mg/dL Final     GFR Estimate   Date Value Ref Range Status   08/13/2018 56 (L) >60 mL/min/1.7m2 Final     Comment:     Non  GFR Calc     Calcium   Date " Value Ref Range Status   08/13/2018 7.9 (L) 8.5 - 10.1 mg/dL Final     Liver Function Studies -   Recent Labs   Lab Test  08/13/18   1410   PROTTOTAL  7.4   ALBUMIN  2.8*   BILITOTAL  0.8   ALKPHOS  231*   AST  24   ALT  18

## 2018-08-14 ENCOUNTER — TELEPHONE (OUTPATIENT)
Dept: GASTROENTEROLOGY | Facility: CLINIC | Age: 73
End: 2018-08-14

## 2018-08-15 ENCOUNTER — PRE VISIT (OUTPATIENT)
Dept: UROLOGY | Facility: CLINIC | Age: 73
End: 2018-08-15

## 2018-08-15 NOTE — TELEPHONE ENCOUNTER
Writer spoke to pt. Instructed pt the need to have two IV iron infusions and repeat blood work. Writer scheduled pt for the first injectafer on 8/23 @ 4pm, 2nd 8/30 @ 10am. Also pt has a lab appt on 8/30 @ 12noon. Informed pt that the endoscopy department will get in touch with him to schedule an EGD. Pt was able to repeat instructions and was cooperative with appt scheduling.

## 2018-08-15 NOTE — TELEPHONE ENCOUNTER
----- Message from Nataly Darnell MD sent at 8/14/2018  4:27 AM CDT -----  Please update pt that:  - I've changed the plan to: 1- EGD 1st (ordered), if unrevealing THEN we can do capsule and colonoscopy.   - he needs to get Injectafer like we planned before (see your note from 7/3).   - if it is going to be longer than 2 weeks before he gets the infusion then I recommend we give him a unit of pRBCs soon (within a week).     - finally, should have his CBC checked in ~ 2 weeks    Thanks.

## 2018-08-21 ENCOUNTER — OFFICE VISIT (OUTPATIENT)
Dept: UROLOGY | Facility: CLINIC | Age: 73
End: 2018-08-21
Payer: COMMERCIAL

## 2018-08-21 VITALS
WEIGHT: 160.8 LBS | BODY MASS INDEX: 24.37 KG/M2 | HEIGHT: 68 IN | SYSTOLIC BLOOD PRESSURE: 127 MMHG | HEART RATE: 89 BPM | DIASTOLIC BLOOD PRESSURE: 67 MMHG

## 2018-08-21 DIAGNOSIS — R35.1 NOCTURIA: Primary | ICD-10-CM

## 2018-08-21 LAB
ALBUMIN UR-MCNC: NEGATIVE MG/DL
APPEARANCE UR: CLEAR
BILIRUB UR QL STRIP: NEGATIVE
COLOR UR AUTO: ABNORMAL
GLUCOSE UR STRIP-MCNC: NEGATIVE MG/DL
HGB UR QL STRIP: NEGATIVE
HYALINE CASTS #/AREA URNS LPF: 3 /LPF (ref 0–2)
KETONES UR STRIP-MCNC: NEGATIVE MG/DL
LEUKOCYTE ESTERASE UR QL STRIP: NEGATIVE
MUCOUS THREADS #/AREA URNS LPF: PRESENT /LPF
NITRATE UR QL: NEGATIVE
PH UR STRIP: 6 PH (ref 5–7)
RBC #/AREA URNS AUTO: <1 /HPF (ref 0–2)
SOURCE: ABNORMAL
SP GR UR STRIP: 1.02 (ref 1–1.03)
UROBILINOGEN UR STRIP-MCNC: 4 MG/DL (ref 0–2)
WBC #/AREA URNS AUTO: <1 /HPF (ref 0–5)

## 2018-08-21 RX ORDER — FERROUS SULFATE 324(65)MG
TABLET, DELAYED RELEASE (ENTERIC COATED) ORAL
Status: ON HOLD | COMMUNITY
Start: 2018-08-18 | End: 2018-10-12

## 2018-08-21 RX ORDER — NORTRIPTYLINE HCL 25 MG
CAPSULE ORAL
COMMUNITY
Start: 2018-03-04

## 2018-08-21 RX ORDER — OXYCODONE HYDROCHLORIDE 5 MG/1
TABLET ORAL
COMMUNITY
Start: 2018-01-22 | End: 2019-01-28

## 2018-08-21 RX ORDER — ASPIRIN 81 MG
TABLET, DELAYED RELEASE (ENTERIC COATED) ORAL
Status: ON HOLD | COMMUNITY
Start: 2018-08-06 | End: 2021-02-18

## 2018-08-21 RX ORDER — POLYETHYLENE GLYCOL 3350 17 G/17G
POWDER, FOR SOLUTION ORAL
COMMUNITY
Start: 2018-06-13 | End: 2019-01-28

## 2018-08-21 RX ORDER — LEVOTHYROXINE SODIUM 50 UG/1
TABLET ORAL
COMMUNITY
Start: 2018-08-15

## 2018-08-21 RX ORDER — GABAPENTIN 600 MG/1
TABLET ORAL
Status: ON HOLD | COMMUNITY
Start: 2018-08-10 | End: 2018-10-12

## 2018-08-21 RX ORDER — TAMSULOSIN HYDROCHLORIDE 0.4 MG/1
0.4 CAPSULE ORAL DAILY
Qty: 60 CAPSULE | Refills: 5 | Status: SHIPPED | OUTPATIENT
Start: 2018-08-21 | End: 2020-04-21

## 2018-08-21 RX ORDER — AMLODIPINE BESYLATE 10 MG/1
TABLET ORAL
COMMUNITY
Start: 2018-08-18

## 2018-08-21 ASSESSMENT — ENCOUNTER SYMPTOMS
WEIGHT LOSS: 1
RECTAL PAIN: 1
EXERCISE INTOLERANCE: 0
BRUISES/BLEEDS EASILY: 0
SORE THROAT: 0
HEADACHES: 1
FATIGUE: 1
SPEECH CHANGE: 1
POLYPHAGIA: 0
LEG PAIN: 1
SLEEP DISTURBANCES DUE TO BREATHING: 0
POOR WOUND HEALING: 0
HYPERTENSION: 1
MUSCLE CRAMPS: 1
DISTURBANCES IN COORDINATION: 0
HALLUCINATIONS: 0
HEMATURIA: 0
WEAKNESS: 1
WEIGHT GAIN: 0
BOWEL INCONTINENCE: 0
BACK PAIN: 0
INCREASED ENERGY: 1
MEMORY LOSS: 1
ABDOMINAL PAIN: 1
HOARSE VOICE: 0
SMELL DISTURBANCE: 0
FEVER: 0
DYSURIA: 0
BLOATING: 1
VOMITING: 0
ARTHRALGIAS: 0
NECK PAIN: 0
LOSS OF CONSCIOUSNESS: 0
SWOLLEN GLANDS: 0
LIGHT-HEADEDNESS: 0
HEARTBURN: 0
NAUSEA: 0
TASTE DISTURBANCE: 0
NIGHT SWEATS: 0
BLOOD IN STOOL: 0
CONSTIPATION: 1
PALPITATIONS: 1
NAIL CHANGES: 0
JAUNDICE: 0
MYALGIAS: 1
PARALYSIS: 0
DIARRHEA: 0
SINUS PAIN: 0
SYNCOPE: 0
HYPOTENSION: 0
DIZZINESS: 1
ALTERED TEMPERATURE REGULATION: 0
SEIZURES: 0
DECREASED APPETITE: 1
SINUS CONGESTION: 1
STIFFNESS: 0
FLANK PAIN: 0
TINGLING: 0
SKIN CHANGES: 0
DIFFICULTY URINATING: 1
JOINT SWELLING: 0
TREMORS: 0
ORTHOPNEA: 0
NECK MASS: 0
POLYDIPSIA: 0
NUMBNESS: 0
MUSCLE WEAKNESS: 1
TROUBLE SWALLOWING: 0

## 2018-08-21 ASSESSMENT — PAIN SCALES - GENERAL: PAINLEVEL: MODERATE PAIN (4)

## 2018-08-21 NOTE — PROGRESS NOTES
"It was my pleasure to meet Mr. Danielle Cook, a 73 year old year old male seen in consultation today at the request of [facility] for chief complaint: Consult (bph)    HPI: Mr. Danielle Cook has PMH significant for HTN, GERD, CKD, polymyalgia rheumatica, hep C (cured) with cirrhosis.  He was referred for BPH.   Chief complaints are:    Low abdominal pain. Has had for months, but in recent weeks it is worsening.    - Aggravating factors: if he hasn't had a bowel movement for 2 days   - Alleviating factors: having a bowel movement, but then the pain returns right away again.   - Endoscopy, colonoscopy performed at [facility]  - Tried \"a powder mixed with water.\"  Also tried prune juice - which he uses with good efficacy when his constipation gets worse.     Voiding symptoms:  Has hesitancy and bloating after urinating.  Feels he doesn't empty fully. But never needs to strain.   Nocturia x 4-5 times, depending on what time he goes to bed (every hour).  During the daytime, no frequency - sometimes may not urinate all day long.   Does have urgency.  Rare urge incontinence.  No pads.   No dysuria, bladder or flank pain  No hematuria, UTIs, No stones  No urology visits, procedures.    AUA SS 28 \"unhappy\"    Fluid intake:  - at least 1L water per day.  Also drinks tea 1-2 cups per day (+ caffeine)     Presents with a professional     Past Medical History:   Diagnosis Date     CKD (chronic kidney disease) stage 3, GFR 30-59 ml/min      Hepatitis C virus infection      Hypertension        Past Surgical History:   Procedure Laterality Date     COLONOSCOPY N/A 1/19/2018    Procedure: COLONOSCOPY;  COMBINED ESOPHAGOSCOPY, GASTROSCOPY, DUODENOSCOPY (EGD) REMOVE TUMOR/POYP/LESION BY SNARE, CONTROL BLEED,BANDING/LIGATION OF VARICES Colonoscopy;  Surgeon: Xavier Sutton MD;  Location:  GI     ESOPHAGOSCOPY, GASTROSCOPY, DUODENOSCOPY (EGD), COMBINED N/A 11/4/2015    Procedure: COMBINED ESOPHAGOSCOPY, GASTROSCOPY, " DUODENOSCOPY (EGD), BIOPSY SINGLE OR MULTIPLE;  Surgeon: Inocente Do MD;  Location:  GI     ESOPHAGOSCOPY, GASTROSCOPY, DUODENOSCOPY (EGD), COMBINED N/A 3/29/2018    Procedure: COMBINED ESOPHAGOSCOPY, GASTROSCOPY, DUODENOSCOPY (EGD);  egd;  Surgeon: Danielle Mendenhall MD;  Location:  GI       FAMILY HISTORY: Denies family history of urologic cancer.     SOCIAL HISTORY: Lives with family. Retired - .  No chemical exposures.    reports that he has never smoked. He has never used smokeless tobacco.    Current Outpatient Prescriptions   Medication Sig Dispense Refill     acetaminophen (TYLENOL) 325 MG tablet Take 1 tablet (325 mg) by mouth every 6 hours as needed for mild pain 100 tablet 3     amLODIPine (NORVASC) 5 MG tablet Take 2 tablets (10 mg) by mouth daily 30 tablet 12     aspirin 81 MG tablet Take 1 tablet (81 mg) by mouth daily 30 tablet 11     ferrous fumarate (FERRETTS) 324 (106 Fe) MG TABS tablet Take by mouth daily       gabapentin (NEURONTIN) 300 MG tablet Take 600 mg by mouth 3 times daily        omeprazole (PRILOSEC) 40 MG capsule Take 1 capsule (40 mg) by mouth daily (with lunch) 60 capsule 2       ALLERGIES: Review of patient's allergies indicates no known allergies.      REVIEW OF SYSTEMS:  Answers for HPI/ROS submitted by the patient on 8/21/2018   General Symptoms: Yes  Skin Symptoms: Yes  HENT Symptoms: Yes  EYE SYMPTOMS: No  HEART SYMPTOMS: Yes  LUNG SYMPTOMS: No  INTESTINAL SYMPTOMS: Yes  URINARY SYMPTOMS: Yes  REPRODUCTIVE SYMPTOMS: Yes  SKELETAL SYMPTOMS: Yes  BLOOD SYMPTOMS: Yes  NERVOUS SYSTEM SYMPTOMS: Yes  MENTAL HEALTH SYMPTOMS: No  Fever: No  Loss of appetite: Yes  Weight loss: Yes  Weight gain: No  Fatigue: Yes  Night sweats: No  Increased stress: No  Excessive hunger: No  Excessive thirst: No  Feeling hot or cold when others believe the temperature is normal: No  Loss of height: No  Post-operative complications: No  Surgical site pain: No  Hallucinations:  No  Change in or Loss of Energy: Yes  Hyperactivity: No  Confusion: No  Changes in hair: Yes  Changes in moles/birth marks: No  Itching: Yes  Rashes: No  Changes in nails: No  Acne: No  Change in facial hair: No  Warts: No  Non-healing sores: No  Scarring: No  Flaking of skin: No  Color changes of hands/feet in cold : No  Sun sensitivity: No  Skin thickening: No  Ear pain: No  Ear discharge: No  Hearing loss: No  Tinnitus: No  Nosebleeds: No  Congestion: Yes  Sinus pain: No  Trouble swallowing: No   Voice hoarseness: No  Mouth sores: No  Sore throat: No  Tooth pain: No  Gum tenderness: No  Bleeding gums: No  Change in taste: No  Change in sense of smell: No  Dry mouth: No  Hearing aid used: No  Neck lump: No  Chest pain or pressure: Yes  Fast or irregular heartbeat: Yes  Pain in legs with walking: Yes  Trouble breathing while lying down: No  Fingers or toes appear blue: No  High blood pressure: Yes  Low blood pressure: No  Fainting: No  Murmurs: Yes  Pacemaker: No  Varicose veins: No  Edema or swelling: No  Wake up at night with shortness of breath: No  Light-headedness: No  Exercise intolerance: No  Heart burn or indigestion: No  Nausea: No  Vomiting: No  Abdominal pain: Yes  Bloating: Yes  Constipation: Yes  Diarrhea: No  Blood in stool: No  Black stools: No  Rectal or Anal pain: Yes  Fecal incontinence: No  Yellowing of skin or eyes: No  Vomit with blood: No  Change in stools: No  Trouble holding urine or incontinence: Yes  Pain or burning: No  Trouble starting or stopping: Yes  Increased frequency of urination: Yes  Blood in urine: No  Decreased frequency of urination: No  Frequent nighttime urination: Yes  Flank pain: No  Difficulty emptying bladder: Yes  Back pain: No  Muscle aches: Yes  Neck pain: No  Swollen joints: No  Joint pain: No  Bone pain: Yes  Muscle cramps: Yes  Muscle weakness: Yes  Joint stiffness: No  Bone fracture: No  Anemia: Yes  Swollen glands: No  Easy bleeding or bruising: No  Trouble with  "coordination: No  Dizziness or trouble with balance: Yes  Fainting or black-out spells: No  Memory loss: Yes  Headache: Yes  Seizures: No  Speech problems: Yes  Tingling: No  Tremor: No  Weakness: Yes  Difficulty walking: Yes  Paralysis: No  Numbness: No  Scrotal pain or swelling: No  Erectile dysfunction: Yes  Penile discharge: No  Genital ulcers: No  Reduced libido: Yes    GENERAL PHYSICAL EXAM:   Vitals: /67  Pulse 89  Ht 1.727 m (5' 8\")  Wt 72.9 kg (160 lb 12.8 oz)  BMI 24.45 kg/m2  Body mass index is 24.45 kg/(m^2).    GENERAL: Well groomed, well developed, well nourished male in NAD.  HEAD: Normocephalic. No masses, lesions, tenderness or abnormalities  NECK: Neck supple. No adenopathy. Thyroid symmetric, normal size  GI: Soft, NT, ND, no palpable masses.    No CVAT bilaterally.  MS: Full ROM in extremities, gait normal, normal muscle tone  SKIN: Warm to touch, dry.  No visible rashes or lesions on examined areas.  HEMATOLOGIC/LYMPHATIC/IMMUNOLOGIC:  No LE edema.  NEURO: Alert and oriented x 3.  PSYCH: Normal mood and affect, pleasant and agreeable during interview and exam.     :  MAIN:     Normal rectal tone, small soft amount of stool in the rectal vault.      Medium large sized prostate, without tenderness, nodules or asymmetry.    PVR: Residual urine by ultrasound was 38 ml.      RADIOLOGY: The following tests were reviewed:   MRI ABDOMEN     CLINICAL HISTORY:  Liver cirrhosis. Evaluate for liver lesions     TECHNIQUE: Images were acquired with and without intravenous contrast  through the abdomen. The following MR images were acquired: TrueFISP,  multiplanar T2 weighted, axial T1 in/out of phase, diffusion-weighted.  Multiplanar T1-weighted images with fat saturation were before  contrast administration and at multiple time points following the  administration of intravenous contrast. Contrast dose: 7.5mL Gadavist     FINDINGS:     Comparison study: Ultrasound 1/22/2018; MRI 1/25/2016. "      Liver: Cirrhotic configuration of the liver parenchyma with nodular  contours, irregular surface, hypertrophy of the left lobe, lacy T2  signal and delayed reticular pattern of contrast enhancement due to  fibrosis. Innumerable subcentimeter foci of increased precontrast T1  signal with no abnormal enhancement likely due to regenerative  nodules. No suspicious liver lesions meeting diagnostic criteria for  HCC. No significant hepatic steatosis or iron deposition. No abnormal  restricted diffusion in the liver parenchyma. Patent hepatic  vasculature. Recanalized paraumbilical vein.     Gallbladder: Unremarkable. No biliary tree dilation.     Spleen: Not enlarged measuring 8.5 cm in length. Scattered siderotic  Gamna-Shana bodies.     Kidneys: Subcentimeter cortical benign-appearing renal cysts. No  suspicious renal lesions. No hydronephrosis.     Adrenal glands: Unremarkable.     Pancreas: Partially atrophic with preserved increased intrinsic  precontrast T1 signal. No focal lesions. Main pancreatic duct is not  dilated.     Bowel: No dilated loops of bowel. Surgical clip along the lesser  curvature of the stomach better characterized on CT exam 1/22/2018.  Moderate sliding hiatal hernia. Mild wall thickening of the duodenum,  likely due to portal enteropathy.     Lymph nodes: Prominent cardiophrenic, retroperitoneal, mesenteric and  robb hepatis lymph nodes, likely reactive.     Blood vessels: No abdominal aortic aneurysm. The proper hepatic artery  origins from the abdominal aorta. Recanalized paraumbilical vein.  Patent abdominal vasculature.     Lung bases: Mild bilateral lower lobes dependent atelectasis.     Bones and soft tissues: Bilateral gynecomastia. No suspicious bone  lesions.     Mesentery and abdominal wall: Mesenteric edema.     Ascites: Small volume of ascites.         IMPRESSION:    1. Cirrhosis and evidence of portal hypertension including  portosystemic collaterals, small volume of ascites  and findings of  portal enteropathy.  2. No suspicious liver lesions meeting diagnostic criteria for HCC.   3. Moderate sliding hiatal hernia.  4. Based on this exam only, the patient is within Tillar criteria.    LABS: The last test results for Ms. Danielle Cook were reviewed.   PSA - No results found for: PSA  BMP -   Recent Labs   Lab Test  08/13/18   1410  04/30/18   1322  04/17/18   1028  04/05/18   1438   12/15/17   0738   12/16/16   0842   NA  139   --   141  136   < >  139   < >  142   POTASSIUM  3.6   --   3.5  3.1*   < >  3.7   < >  4.5   CHLORIDE  108   --   109  105   < >  106   < >  109   CO2  24   --   26  26   < >  26   < >  26   BUN  12   --   14  13   < >  12   < >  13   CR  1.27*   --   1.19  1.22   < >  1.35*   < >  1.39*   GLC  113*   --   99  119*   < >  99   < >  99   ABDIRASHID  7.9*   --   8.5  8.5   < >  8.1*   < >  8.5   MAG   --   2.4*   --    --    --    --    --    --    PHOS   --   2.3*   --    --    --   2.9   --   3.2    < > = values in this interval not displayed.       CBC -   Recent Labs   Lab Test  08/13/18   1410  04/30/18   1322  04/17/18   1028   WBC  5.1  4.2  4.7   HGB  7.8*  11.1*  11.4*   PLT  161  147*  157       ASSESSMENT:   1) constipation  2) nocturia  3) BPH with obstructive luts    PLAN:   - Urinalysis sent (urobilinogen 4, otherwise negative)   - PVR = 38mL  - daily prune juice  - Start Flomax (tamsulosin) - taken before bed - discuss use and side effects  - Limit fluids for 3 hours before bed  - Return 2 months    Allyson Romero PA-C  Department of Urologic Surgery

## 2018-08-21 NOTE — LETTER
Danielle Cook   [address]   [place] [address]        Dear Mr. Cook:    I am writing you concerning your recent test results:    Results for orders placed or performed in visit on 08/21/18   UA with Microscopic reflex to Culture   Result Value Ref Range    Color Urine Shikha     Appearance Urine Clear     Glucose Urine Negative NEG^Negative mg/dL    Bilirubin Urine Negative NEG^Negative    Ketones Urine Negative NEG^Negative mg/dL    Specific Gravity Urine 1.018 1.003 - 1.035    Blood Urine Negative NEG^Negative    pH Urine 6.0 5.0 - 7.0 pH    Protein Albumin Urine Negative NEG^Negative mg/dL    Urobilinogen mg/dL 4.0 (H) 0.0 - 2.0 mg/dL    Nitrite Urine Negative NEG^Negative    Leukocyte Esterase Urine Negative NEG^Negative    Source Midstream Urine     WBC Urine <1 0 - 5 /HPF    RBC Urine <1 0 - 2 /HPF    Mucous Urine Present (A) NEG^Negative /LPF    Hyaline Casts 3 (H) 0 - 2 /LPF         This urinalysis is within normal limits.  Specifically, there is no blood and there are no concerns for a urinary tract infection.     Please let me know if you have any questions.      Sincerely,      Olivia Romero PA-C  Physician Assistant Urology        [facility] UROLOGY AND [facility] FOR PROSTATE [facility] UROLOGIC CANCERS  [address]  [address]  [facility] [address]  Phone: [phone]  Fax: [phone]

## 2018-08-21 NOTE — LETTER
"8/21/2018       RE: Danielle Cook  [address]  [place] [address]     Dear Colleague,    Thank you for referring your patient, Danielle Cook, to the [facility] UROLOGY AND INST FOR PROSTATE AND UROLOGIC CANCERS at [facility]. Please see a copy of my visit note below.    It was my pleasure to meet Mr. Danielle Cook, a 73 year old year old male seen in consultation today at the request of [facility] for chief complaint: Consult (bph)    HPI: Mr. Danielle Cook has PMH significant for HTN, GERD, CKD, polymyalgia rheumatica, hep C (cured) with cirrhosis.  He was referred for BPH.   Chief complaints are:    Low abdominal pain. Has had for months, but in recent weeks it is worsening.    - Aggravating factors: if he hasn't had a bowel movement for 2 days   - Alleviating factors: having a bowel movement, but then the pain returns right away again.   - Endoscopy, colonoscopy performed at [facility]  - Tried \"a powder mixed with water.\"  Also tried prune juice - which he uses with good efficacy when his constipation gets worse.     Voiding symptoms:  Has hesitancy and bloating after urinating.  Feels he doesn't empty fully. But never needs to strain.   Nocturia x 4-5 times, depending on what time he goes to bed (every hour).  During the daytime, no frequency - sometimes may not urinate all day long.   Does have urgency.  Rare urge incontinence.  No pads.   No dysuria, bladder or flank pain  No hematuria, UTIs, No stones  No urology visits, procedures.    AUA SS 28 \"unhappy\"    Fluid intake:  - at least 1L water per day.  Also drinks tea 1-2 cups per day (+ caffeine)     Presents with a professional     Past Medical History:   Diagnosis Date     CKD (chronic kidney disease) stage 3, GFR 30-59 ml/min      Hepatitis C virus infection      Hypertension        Past Surgical History:   Procedure Laterality Date     COLONOSCOPY N/A 1/19/2018    " "Procedure: COLONOSCOPY;  COMBINED ESOPHAGOSCOPY, GASTROSCOPY, DUODENOSCOPY (EGD) REMOVE TUMOR/POYP/LESION BY SNARE, CONTROL BLEED,BANDING/LIGATION OF VARICES Colonoscopy;  Surgeon: Xavier Sutton MD;  Location:  GI     ESOPHAGOSCOPY, GASTROSCOPY, DUODENOSCOPY (EGD), COMBINED N/A 11/4/2015    Procedure: COMBINED ESOPHAGOSCOPY, GASTROSCOPY, DUODENOSCOPY (EGD), BIOPSY SINGLE OR MULTIPLE;  Surgeon: Inocente Do MD;  Location:  GI     ESOPHAGOSCOPY, GASTROSCOPY, DUODENOSCOPY (EGD), COMBINED N/A 3/29/2018    Procedure: COMBINED ESOPHAGOSCOPY, GASTROSCOPY, DUODENOSCOPY (EGD);  egd;  Surgeon: Danielle Mendenhall MD;  Location: [facility]       FAMILY HISTORY: Denies family history of urologic cancer.     SOCIAL HISTORY: Lives with family. Retired - .  No chemical exposures.    reports that he has never smoked. He has never used smokeless tobacco.    Current Outpatient Prescriptions   Medication Sig Dispense Refill     acetaminophen (TYLENOL) 325 MG tablet Take 1 tablet (325 mg) by mouth every 6 hours as needed for mild pain 100 tablet 3     amLODIPine (NORVASC) 5 MG tablet Take 2 tablets (10 mg) by mouth daily 30 tablet 12     aspirin 81 MG tablet Take 1 tablet (81 mg) by mouth daily 30 tablet 11     ferrous fumarate (FERRETTS) 324 (106 Fe) MG TABS tablet Take by mouth daily       gabapentin (NEURONTIN) 300 MG tablet Take 600 mg by mouth 3 times daily        omeprazole (PRILOSEC) 40 MG capsule Take 1 capsule (40 mg) by mouth daily (with lunch) 60 capsule 2       ALLERGIES: Review of patient's allergies indicates no known allergies.      REVIEW OF SYSTEMS:      GENERAL PHYSICAL EXAM:   Vitals: /67  Pulse 89  Ht 1.727 m (5' 8\")  Wt 72.9 kg (160 lb 12.8 oz)  BMI 24.45 kg/m2  Body mass index is 24.45 kg/(m^2).    GENERAL: Well groomed, well developed, well nourished male in NAD.  HEAD: Normocephalic. No masses, lesions, tenderness or abnormalities  NECK: Neck supple. No adenopathy. Thyroid " symmetric, normal size  GI: Soft, NT, ND, no palpable masses.    No CVAT bilaterally.  MS: Full ROM in extremities, gait normal, normal muscle tone  SKIN: Warm to touch, dry.  No visible rashes or lesions on examined areas.  HEMATOLOGIC/LYMPHATIC/IMMUNOLOGIC:  No LE edema.  NEURO: Alert and oriented x 3.  PSYCH: Normal mood and affect, pleasant and agreeable during interview and exam.     :  MAIN:     Normal rectal tone, small soft amount of stool in the rectal vault.      Medium large sized prostate, without tenderness, nodules or asymmetry.    PVR: Residual urine by ultrasound was 38 ml.      RADIOLOGY: The following tests were reviewed:   MRI ABDOMEN     CLINICAL HISTORY:  Liver cirrhosis. Evaluate for liver lesions     TECHNIQUE: Images were acquired with and without intravenous contrast  through the abdomen. The following MR images were acquired: TrueFISP,  multiplanar T2 weighted, axial T1 in/out of phase, diffusion-weighted.  Multiplanar T1-weighted images with fat saturation were before  contrast administration and at multiple time points following the  administration of intravenous contrast. Contrast dose: 7.5mL Gadavist     FINDINGS:     Comparison study: Ultrasound 1/22/2018; MRI 1/25/2016.      Liver: Cirrhotic configuration of the liver parenchyma with nodular  contours, irregular surface, hypertrophy of the left lobe, lacy T2  signal and delayed reticular pattern of contrast enhancement due to  fibrosis. Innumerable subcentimeter foci of increased precontrast T1  signal with no abnormal enhancement likely due to regenerative  nodules. No suspicious liver lesions meeting diagnostic criteria for  HCC. No significant hepatic steatosis or iron deposition. No abnormal  restricted diffusion in the liver parenchyma. Patent hepatic  vasculature. Recanalized paraumbilical vein.     Gallbladder: Unremarkable. No biliary tree dilation.     Spleen: Not enlarged measuring 8.5 cm in length. Scattered  siderotic  Gamna-Shana bodies.     Kidneys: Subcentimeter cortical benign-appearing renal cysts. No  suspicious renal lesions. No hydronephrosis.     Adrenal glands: Unremarkable.     Pancreas: Partially atrophic with preserved increased intrinsic  precontrast T1 signal. No focal lesions. Main pancreatic duct is not  dilated.     Bowel: No dilated loops of bowel. Surgical clip along the lesser  curvature of the stomach better characterized on CT exam 1/22/2018.  Moderate sliding hiatal hernia. Mild wall thickening of the duodenum,  likely due to portal enteropathy.     Lymph nodes: Prominent cardiophrenic, retroperitoneal, mesenteric and  robb hepatis lymph nodes, likely reactive.     Blood vessels: No abdominal aortic aneurysm. The proper hepatic artery  origins from the abdominal aorta. Recanalized paraumbilical vein.  Patent abdominal vasculature.     Lung bases: Mild bilateral lower lobes dependent atelectasis.     Bones and soft tissues: Bilateral gynecomastia. No suspicious bone  lesions.     Mesentery and abdominal wall: Mesenteric edema.     Ascites: Small volume of ascites.         IMPRESSION:    1. Cirrhosis and evidence of portal hypertension including  portosystemic collaterals, small volume of ascites and findings of  portal enteropathy.  2. No suspicious liver lesions meeting diagnostic criteria for HCC.   3. Moderate sliding hiatal hernia.  4. Based on this exam only, the patient is within Lauro criteria.    LABS: The last test results for Ms. Danielle Cook were reviewed.   PSA - No results found for: PSA  BMP -   Recent Labs   Lab Test  08/13/18   1410  04/30/18   1322  04/17/18   1028  04/05/18   1438   12/15/17   0738   12/16/16   0842   NA  139   --   141  136   < >  139   < >  142   POTASSIUM  3.6   --   3.5  3.1*   < >  3.7   < >  4.5   CHLORIDE  108   --   109  105   < >  106   < >  109   CO2  24   --   26  26   < >  26   < >  26   BUN  12   --   14  13   < >  12   < >  13   CR  1.27*    --   1.19  1.22   < >  1.35*   < >  1.39*   GLC  113*   --   99  119*   < >  99   < >  99   ABDIRASHID  7.9*   --   8.5  8.5   < >  8.1*   < >  8.5   MAG   --   2.4*   --    --    --    --    --    --    PHOS   --   2.3*   --    --    --   2.9   --   3.2    < > = values in this interval not displayed.       CBC -   Recent Labs   Lab Test  08/13/18   1410  04/30/18   1322  04/17/18   1028   WBC  5.1  4.2  4.7   HGB  7.8*  11.1*  11.4*   PLT  161  147*  157       ASSESSMENT:   1) constipation  2) nocturia  3) BPH with obstructive luts    PLAN:   - Urinalysis sent (urobilinogen 4, otherwise negative)   - PVR = 38mL  - daily prune juice  - Start Flomax (tamsulosin) - taken before bed - discuss use and side effects  - Limit fluids for 3 hours before bed  - Return 2 months      Again, thank you for allowing me to participate in the care of your patient.      Sincerely,    DEX Mckee

## 2018-08-21 NOTE — MR AVS SNAPSHOT
After Visit Summary   8/21/2018    Danielle Cook    MRN: [id number]           Patient Information     Date Of Birth          1945        Visit Information        Provider Department      8/21/2018 2:45 PM Olivia Romero PA; [employer] [facility] for Prostate and Urologic Cancers        Today's Diagnoses     Nocturia    -  1      Care Instructions    - Urinalysis  - PVR = 38mL  - daily prune juice  - Start Flomax (tamsulosin) - taken before bed  - Limit fluids for 3 hours before bed  - Return 2 months    [facility]          Follow-ups after your visit        Your next 10 appointments already scheduled     Aug 23, 2018  4:00 PM CDT   Infusion 120 with UC SPEC INFUSION, UC 46 ATC   [facility] Specialty and Procedure ([facility] [place])    [address] [place] [address]  [address]  [facility] [address]   [phone]            Aug 30, 2018 10:00 AM CDT   Infusion 120 with UC SPEC INFUSION, UC 50 ATC   [facility] Special[facility] Procedure ([facility] [place])    [address] [place] [address]  [address]  [facility] [address]   [phone]            Aug 30, 2018 12:00 PM CDT   Lab with UC LAB   [facility] Lab ([facility])    [address] [place] [address]  1st Floor  [facility] [address]   [phone]            Sep 10, 2018   Procedure with Xavier Sutton MD   [facility] Surgery [facility] Procedure Center ([facility] [place])    [address] [place] [address]  5th Floor  [facility] [address] [phone]           Located in the Clinics and Surgery Center at [address] [place] [address].   parking is very convenient and highly recommended.  is a $6 flat rate fee.  Both  and self parkers should enter the main arrival plaza from [facility]; parking attendants will direct you based on your parking preference.            Oct 23,   10:30 AM CDT   Lab with UC LAB    [facility] ([facility])    [address]  1st Floor  [facility] [address] [phone]            Oct 23, 2018 11:30 AM CDT   (Arrive by 11:15 AM)   Return General Liver with Nataly Darnell MD   [facility] Hepatology ([facility])    [address]  [address]  [facility] [id number] [phone]            Oct 23, 2018  2:00 PM CDT   (Arrive by 1:45 PM)   Return Visit with DEX Queen   [facility] Urology and [facility] for Prostate and Urologic Cancers ([facility])    [address]  4th Floor  [facility] [id number] [phone]            Dec 21, 2018  8:45 AM CST   Lab with  LAB   [facility] Lab ([facility])    [address]  1st Floor  [facility] [id number] [phone]              Who to contact     Please call your clinic at [phone] to:    Ask questions about your health    Make or cancel appointments    Discuss your medicines    Learn about your test results    Speak to your doctor            Additional Information About Your Visit        [employer] Information     [employer] is an electronic gateway that provides easy, online access to your medical records. With [employer], you can request a clinic appointment, read your test results, renew a prescription or communicate with your care team.     To sign up for [employer] visit the website at [email or web address]   You will be asked to enter the access code listed below, as well as some personal information. Please follow the directions to create your username and password.     Your access code is: [email or web address]  Expires: 10/28/2018  6:31 AM     Your access code will  in 90 days. If you need help or a new code, please contact your [facility] or call [phone] for assistance.        Care EveryWhere ID     This is your Care EveryWhere ID.  "This could be used by other organizations to access your [facility] medical records  [id number]        Your Vitals Were     Pulse Height BMI (Body Mass Index)             89 1.727 m (5' 8\") 24.45 kg/m2          Blood Pressure from Last 3 Encounters:   08/21/18 127/67   08/13/18 128/65   04/17/18 118/69    Weight from Last 3 Encounters:   08/21/18 72.9 kg (160 lb 12.8 oz)   08/13/18 73.1 kg (161 lb 3.2 oz)   04/30/18 73.3 kg (161 lb 8 oz)              We Performed the Following     POST-VOID RESIDUAL BLADDER SCAN     UA with Microscopic reflex to Culture          Today's Medication Changes          These changes are accurate as of 8/21/18  4:13 PM.  If you have any questions, ask your nurse or doctor.               Start taking these medicines.        Dose/Directions    tamsulosin 0.4 MG capsule   Commonly known as:  FLOMAX   Used for:  Nocturia   Started by:  Olivia Romero PA        Dose:  0.4 mg   Take 1 capsule (0.4 mg) by mouth daily   Quantity:  60 capsule   Refills:  5            Where to get your medicines      These medications were sent to [facility] - [place], [place] - [address] [place] [address]  [address] [facility] [place] [address]     Phone:  [phone]     tamsulosin 0.4 MG capsule               Information about OPIOIDS     PRESCRIPTION OPIOIDS: WHAT YOU NEED TO KNOW   We gave you an opioid (narcotic) pain medicine. It is important to manage your pain, but opioids are not always the best choice. You should first try all the other options your care team gave you. Take this medicine for as short a time (and as few doses) as possible.    Some activities can increase your pain, such as bandage changes or therapy sessions. It may help to take your pain medicine 30 to 60 minutes before these activities. Reduce your stress by getting enough sleep, working on hobbies you enjoy and practicing relaxation or meditation. Talk to your care team about ways to manage your pain beyond prescription " opioids.    These medicines have risks:    DO NOT drive when on new or higher doses of pain medicine. These medicines can affect your alertness and reaction times, and you could be arrested for driving under the influence (DUI). If you need to use opioids long-term, talk to your care team about driving.    DO NOT operate heavy machinery    DO NOT do any other dangerous activities while taking these medicines.    DO NOT drink any alcohol while taking these medicines.     If the opioid prescribed includes acetaminophen, DO NOT take with any other medicines that contain acetaminophen. Read all labels carefully. Look for the word  acetaminophen  or  Tylenol.  Ask your pharmacist if you have questions or are unsure.    You can get addicted to pain medicines, especially if you have a history of addiction (chemical, alcohol or substance dependence). Talk to your care team about ways to reduce this risk.    All opioids tend to cause constipation. Drink plenty of water and eat foods that have a lot of fiber, such as fruits, vegetables, prune juice, apple juice and high-fiber cereal. Take a laxative (Miralax, milk of magnesia, Colace, Senna) if you don t move your bowels at least every other day. Other side effects include upset stomach, sleepiness, dizziness, throwing up, tolerance (needing more of the medicine to have the same effect), physical dependence and slowed breathing.    Store your pills in a secure place, locked if possible. We will not replace any lost or stolen medicine. If you don t finish your medicine, please throw away (dispose) as directed by your pharmacist. The [place] Pollution Control Agency has more information about safe disposal: [email or web address]         Primary Care Provider Office Phone # Fax #    Andrey Flores -[phone]       [facility] CARE [address] [address]        Equal Access to  Services     [facility]: Hadii aad ku hadsandeepedrito Merlynrohith, waivannada luqadaha, qaybta kaalmajerad caro, jeannie markham . So [facility] [phone].    ATENCIÓN: Si jessicala aimee, tiene a whitehead disposición servicios gratuitos de asistencia lingüística. Llame al [phone].    We comply with applicable federal civil rights laws and [place] laws. We do not discriminate on the basis of race, color, national origin, age, disability, sex, sexual orientation, or gender identity.            Thank you!     Thank you for choosing [facility] UROLOGY AND [facility] FOR PROSTATE AND UROLOGIC CANCERS  for your care. Our goal is always to provide you with excellent care. Hearing back from our patients is one way we can continue to improve our services. Please take a few minutes to complete the written survey that you may receive in the mail after your visit with us. Thank you!             Your Updated Medication List - Protect others around you: Learn how to safely use, store and throw away your medicines at www.disposemymeds.org.          This list is accurate as of 8/21/18  4:13 PM.  Always use your most recent med list.                   Brand Name Dispense Instructions for use Diagnosis    acetaminophen 325 MG tablet    TYLENOL    100 tablet    Take 1 tablet (325 mg) by mouth every 6 hours as needed for mild pain    Other chronic pain       * amLODIPine 5 MG tablet    NORVASC    30 tablet    Take 2 tablets (10 mg) by mouth daily    Essential hypertension with goal blood pressure less than 140/90       * amLODIPine 10 MG tablet    NORVASC          * aspirin 81 MG tablet     30 tablet    Take 1 tablet (81 mg) by mouth daily    Small vessel disease       * ASPIRIN LOW DOSE 81 MG EC tablet   Generic drug:  aspirin           ferrous fumarate 324 (106 Fe) MG Tabs tablet    FERRETTS     Take by mouth daily        Ferrous Sulfate 324 (65 Fe) MG Tbec           * gabapentin 300 MG tablet    NEURONTIN     Take 600 mg by  mouth 3 times daily    Chronic hepatitis C (H), Renal insufficiency       * gabapentin 600 MG tablet    NEURONTIN          levothyroxine 50 MCG tablet    SYNTHROID/LEVOTHROID          lidocaine (viscous) 2 % solution    XYLOCAINE          nortriptyline 25 MG capsule    PAMELOR          omeprazole 40 MG capsule    priLOSEC    60 capsule    Take 1 capsule (40 mg) by mouth daily (with lunch)    Esophagitis, reflux       oxyCODONE IR 5 MG tablet    ROXICODONE          polyethylene glycol powder    MIRALAX/GLYCOLAX          tamsulosin 0.4 MG capsule    FLOMAX    60 capsule    Take 1 capsule (0.4 mg) by mouth daily    Nocturia       * Notice:  This list has 6 medication(s) that are the same as other medications prescribed for you. Read the directions carefully, and ask your doctor or other care provider to review them with you.

## 2018-08-21 NOTE — PATIENT INSTRUCTIONS
- Urinalysis  - PVR = 38mL  - daily prune juice  - Start Flomax (tamsulosin) - taken before bed  - Limit fluids for 3 hours before bed  - Return 2 months    [facility]

## 2018-08-23 ENCOUNTER — INFUSION THERAPY VISIT (OUTPATIENT)
Dept: INFUSION THERAPY | Facility: CLINIC | Age: 73
End: 2018-08-23
Attending: INTERNAL MEDICINE
Payer: COMMERCIAL

## 2018-08-23 VITALS — RESPIRATION RATE: 18 BRPM | HEART RATE: 74 BPM | SYSTOLIC BLOOD PRESSURE: 119 MMHG | DIASTOLIC BLOOD PRESSURE: 56 MMHG

## 2018-08-23 DIAGNOSIS — D64.9 ANEMIA: Primary | ICD-10-CM

## 2018-08-23 PROCEDURE — 25000128 H RX IP 250 OP 636: Mod: ZF | Performed by: INTERNAL MEDICINE

## 2018-08-23 PROCEDURE — 96365 THER/PROPH/DIAG IV INF INIT: CPT

## 2018-08-23 RX ADMIN — FERRIC CARBOXYMALTOSE INJECTION 750 MG: 50 INJECTION, SOLUTION INTRAVENOUS at 16:27

## 2018-08-23 NOTE — PATIENT INSTRUCTIONS
Dear Danielle Cook    Thank you for choosing [facility] ([facility]) for your infusion.  The following information is a summary of our appointment as well as important reminders.          Additional information: you received your injectafer infusion through your IV today.       We look forward in seeing you on your next appointment here at [facility].  Please don t hesitate to call us at [phone] to reschedule any of your appointments or to speak with one of the [facility] registered nurses.  It was a pleasure taking care of you today.    Sincerely,    [facility]  Specialty Infusion & Procedure Center  [address] [place]  [address]  Phone:  [phone]  Ferric carboxymaltose injection  Brand Name: Injectafer  What is this medicine?  FERRIC CARBOXYMALTOSE (ferr-ik car-box-ee-mol-toes) is an iron complex. Iron is used to make healthy red blood cells, which carry oxygen and nutrients throughout the body. This medicine is used to treat anemia in people with chronic kidney disease or people who cannot take iron by mouth.  How should I use this medicine?  This medicine is for infusion into a vein. It is given by a health care professional in a hospital or clinic setting.  Talk to your pediatrician regarding the use of this medicine in children. Special care may be needed.  What side effects may I notice from receiving this medicine?  Side effects that you should report to your doctor or health care professional as soon as possible:    allergic reactions like skin rash, itching or hives, swelling of the face, lips, or tongue    breathing problems    changes in blood pressure    feeling faint or lightheaded, falls    flushing, sweating, or hot feelings  Side effects that usually do not require medical attention (report to your doctor or health care professional if they continue or are bothersome):    changes in  taste    constipation    dizziness    headache    nausea    pain, redness, or irritation at site where injected    vomiting  What may interact with this medicine?  Do not take this medicine with any of the following medications:    deferoxamine    dimercaprol    other iron products  This medicine may also interact with the following medications:    chloramphenicol    deferasirox  What if I miss a dose?  It is important not to miss your dose. Call your doctor or health care professional if you are unable to keep an appointment.  Where should I keep my medicine?  This drug is given in a hospital or clinic and will not be stored at home.  What should I tell my health care provider before I take this medicine?  They need to know if you have any of these conditions:    anemia not caused by low iron levels    high levels of iron in the blood    liver disease    an unusual or allergic reaction to iron, other medicines, foods, dyes, or preservatives    pregnant or trying to get pregnant    breast-feeding  What should I watch for while using this medicine?  Visit your doctor or health care professional regularly. Tell your doctor if your symptoms do not start to get better or if they get worse. You may need blood work done while you are taking this medicine.  You may need to follow a special diet. Talk to your doctor. Foods that contain iron include: whole grains/cereals, dried fruits, beans, or peas, leafy green vegetables, and organ meats (liver, kidney).  NOTE:This sheet is a summary. It may not cover all possible information. If you have questions about this medicine, talk to your doctor, pharmacist, or health care provider. Copyright  2018 [employer]

## 2018-08-23 NOTE — MR AVS SNAPSHOT
After Visit Summary   8/23/2018    Danielle Cook    MRN: [id number]           Patient Information     Date Of Birth          1945        Visit Information        Provider Department      8/23/2018 4:00 PM [employer];  47 ATC;  SPEC INFUSION [facility] Specialty and Procedure        Today's Diagnoses     Anemia    -  1      Care Instructions    Dear Danielle Cook    Thank you for choosing [facility] ([facility]) for your infusion.  The following information is a summary of our appointment as well as important reminders.          Additional information: you received your injectafer infusion through your IV today.       We look forward in seeing you on your next appointment here at [facility].  Please don t hesitate to call us at [phone] to reschedule any of your appointments or to speak with one of the [facility] registered nurses.  It was a pleasure taking care of you today.    Sincerely,    [facility]  [facility]  [address] [place] [address]  [address]  Phone:  [phone]  Ferric carboxymaltose injection  Brand Name: Injectafer  What is this medicine?  FERRIC CARBOXYMALTOSE (ferr-ik car-box-ee-mol-toes) is an iron complex. Iron is used to make healthy red blood cells, which carry oxygen and nutrients throughout the body. This medicine is used to treat anemia in people with chronic kidney disease or people who cannot take iron by mouth.  How should I use this medicine?  This medicine is for infusion into a vein. It is given by a health care professional in a hospital or clinic setting.  Talk to your pediatrician regarding the use of this medicine in children. Special care may be needed.  What side effects may I notice from receiving this medicine?  Side effects that you should report to your doctor or health care professional as soon  as possible:    allergic reactions like skin rash, itching or hives, swelling of the face, lips, or tongue    breathing problems    changes in blood pressure    feeling faint or lightheaded, falls    flushing, sweating, or hot feelings  Side effects that usually do not require medical attention (report to your doctor or health care professional if they continue or are bothersome):    changes in taste    constipation    dizziness    headache    nausea    pain, redness, or irritation at site where injected    vomiting  What may interact with this medicine?  Do not take this medicine with any of the following medications:    deferoxamine    dimercaprol    other iron products  This medicine may also interact with the following medications:    chloramphenicol    deferasirox  What if I miss a dose?  It is important not to miss your dose. Call your doctor or health care professional if you are unable to keep an appointment.  Where should I keep my medicine?  This drug is given in a hospital or clinic and will not be stored at home.  What should I tell my health care provider before I take this medicine?  They need to know if you have any of these conditions:    anemia not caused by low iron levels    high levels of iron in the blood    liver disease    an unusual or allergic reaction to iron, other medicines, foods, dyes, or preservatives    pregnant or trying to get pregnant    breast-feeding  What should I watch for while using this medicine?  Visit your doctor or health care professional regularly. Tell your doctor if your symptoms do not start to get better or if they get worse. You may need blood work done while you are taking this medicine.  You may need to follow a special diet. Talk to your doctor. Foods that contain iron include: whole grains/cereals, dried fruits, beans, or peas, leafy green vegetables, and organ meats (liver, kidney).  NOTE:This sheet is a summary. It may not cover all possible information. If  you have questions about this medicine, talk to your doctor, pharmacist, or health care provider. Copyright  2018 [employer]                Follow-ups after your visit        Your next 10 appointments already scheduled     Aug 30, 2018 10:00 AM CDT   Infusion 120 with UC SPEC INFUSION, UC 50 ATC   [facility] Specialty [facility] Procedure ([facility])    [address] [place] [address]  [address]  [facility] [address] [phone]            Aug 30, 2018 12:00 PM CDT   Lab with UC LAB   [facility] Lab ([facility])    [address] [place] [address]  1st Floor  [facility] [id number] [phone]            Sep 10, 2018   Procedure with Xavier Sutton MD   [facility] Surgery and Procedure Center ([facility])    [address] [place] [address]  5th Floor  [facility] [address] [phone]           Located in the Clinics and Surgery Center at [address] [place] [address].   parking is very convenient and highly recommended.  is a $6 flat rate fee.  Both  and self parkers should enter the main arrival plaza from [facility]; parking attendants will direct you based on your parking preference.            Oct 23, 2018 10:30 AM CDT   Lab with UC LAB   [facility] Lab ([facility])    [address] [place] [address]  1st Floor  [facility] [address] [phone]            Oct 23, 2018 11:30 AM CDT   (Arrive by 11:15 AM)   Return General Liver with Nataly Darnell MD   [facility] Hepatology ([facility])    [address] [place] [address]  [address]  [facility] [address] [phone]            Oct 23, 2018  2:00 PM CDT   (Arrive by 1:45 PM)   Return Visit with DEX Queen   [facility] Urology and Inst for Prostate and Urologic Cancers ([facility])    [address] [place] [address]  4th Floor  [facility] [address] [phone]            Dec 21, 2018  8:45 AM CST  "  Lab with  LAB   [facility] Lab ([facility])    [address]  1st Floor  [facility] [address] [phone]            Dec 21, 2018  9:40 AM CST   (Arrive by 9:10 AM)   Return Visit with Beto Alvarado MD   [facility] Nephrology ([facility])    [address]  [address]  [facility] [address] [phone]              Who to contact     If you have questions or need follow up information about today's clinic visit or your schedule please contact [facility] SPECIALTY AND PROCEDURE directly at [phone].  Normal or non-critical lab and imaging results will be communicated to you by MyChart, letter or phone within 4 business days after the clinic has received the results. If you do not hear from us within 7 days, please contact the clinic through [employer] or phone. If you have a critical or abnormal lab result, we will notify you by phone as soon as possible.  Submit refill requests through [employer] or call your pharmacy and they will forward the refill request to us. Please allow 3 business days for your refill to be completed.          Additional Information About Your Visit        MyChart Information     [employer] lets you send messages to your doctor, view your test results, renew your prescriptions, schedule appointments and more. To sign up, go to [email or web address] . Click on \"Log in\" on the left side of the screen, which will take you to the Welcome page. Then click on \"Sign up Now\" on the right side of the page.     You will be asked to enter the access code listed below, as well as some personal information. Please follow the directions to create your username and password.     Your access code is: [email or web address]  Expires: 10/28/2018  6:31 AM     Your access code will  in 90 days. If you need help or a new code, please call your [place] clinic or [phone].        Care EveryWhere ID     This is your " Care EveryWhere ID. This could be used by other organizations to access your [place] medical records  [id number]         Blood Pressure from Last 3 Encounters:   08/21/18 127/67   08/13/18 128/65   04/17/18 118/69    Weight from Last 3 Encounters:   08/21/18 72.9 kg (160 lb 12.8 oz)   08/13/18 73.1 kg (161 lb 3.2 oz)   04/30/18 73.3 kg (161 lb 8 oz)              Today, you had the following     No orders found for display       Primary Care Provider Office Phone # Fax #    Andrey Zan Flores [phone]       [facility] CARE [address] [facility] [address]        Equal Access to Services     ISA SORIANO : Hadii aad ku hadasho Sorobertali, waaxda luqadaha, qaybta kaalmada adeegyada, jeannie acosta. So [facility] [phone].    ATENCIÓN: Si habla español, tiene a whitehead disposición servicios gratuitos de asistencia lingüística. [facility] [phone].    We comply with applicable federal civil rights laws and [place] laws. We do not discriminate on the basis of race, color, national origin, age, disability, sex, sexual orientation, or gender identity.            Thank you!     Thank you for choosing [facility] SPECIALTY AND PROCEDURE  for your care. Our goal is always to provide you with excellent care. Hearing back from our patients is one way we can continue to improve our services. Please take a few minutes to complete the written survey that you may receive in the mail after your visit with us. Thank you!             Your Updated Medication List - Protect others around you: Learn how to safely use, store and throw away your medicines at www.disposemymeds.org.          This list is accurate as of 8/23/18  4:00 PM.  Always use your most recent med list.                   Brand Name Dispense Instructions for use Diagnosis    acetaminophen 325 MG tablet    TYLENOL    100 tablet    Take 1 tablet (325 mg) by mouth every 6 hours as needed  for mild pain    Other chronic pain       * amLODIPine 5 MG tablet    NORVASC    30 tablet    Take 2 tablets (10 mg) by mouth daily    Essential hypertension with goal blood pressure less than 140/90       * amLODIPine 10 MG tablet    NORVASC          * aspirin 81 MG tablet     30 tablet    Take 1 tablet (81 mg) by mouth daily    Small vessel disease       * ASPIRIN LOW DOSE 81 MG EC tablet   Generic drug:  aspirin           ferrous fumarate 324 (106 Fe) MG Tabs tablet    FERRETTS     Take by mouth daily        Ferrous Sulfate 324 (65 Fe) MG Tbec           * gabapentin 300 MG tablet    NEURONTIN     Take 600 mg by mouth 3 times daily    Chronic hepatitis C (H), Renal insufficiency       * gabapentin 600 MG tablet    NEURONTIN          levothyroxine 50 MCG tablet    SYNTHROID/LEVOTHROID          lidocaine (viscous) 2 % solution    XYLOCAINE          nortriptyline 25 MG capsule    PAMELOR          omeprazole 40 MG capsule    priLOSEC    60 capsule    Take 1 capsule (40 mg) by mouth daily (with lunch)    Esophagitis, reflux       oxyCODONE IR 5 MG tablet    ROXICODONE          polyethylene glycol powder    MIRALAX/GLYCOLAX          tamsulosin 0.4 MG capsule    FLOMAX    60 capsule    Take 1 capsule (0.4 mg) by mouth daily    Nocturia       * Notice:  This list has 6 medication(s) that are the same as other medications prescribed for you. Read the directions carefully, and ask your doctor or other care provider to review them with you.

## 2018-08-23 NOTE — PROGRESS NOTES
Nursing Note  Danielle Cook [facility] for:   Chief Complaint   Patient presents with     Infusion     injectafer'     During today's [facility] appointment, orders from Dr. Darnell were completed.  Frequency: weekly x 2. Today is dose #1.     Note: patient reports 8/10 HA. Patient states he takes tylenol with some relief. Patient reports he has been worked up for his HA by his PCP with no significant findings.      Progress note:  Patient identification verified by name and date of birth.  Assessment completed.  Vitals recorded in Doc Flowsheets.  Patient was provided with education regarding infusion and possible side effects.  Patient verbalized understanding.      needed: Yes  Premedications: were not ordered.  Infusion Rates: infusion given over approximately 15 minutes.  Approximate Infusion length:15 minutes.   Labs: were not ordered for this appointment.  Vascular access: peripheral IV placed today.  Treatment Conditions: non-applicable.  Patient tolerated infusion: well.  Patient monitored for 30 minutes post infusion.     Administrations This Visit     ferric carboxymaltose (INJECTAFER) 750 mg in sodium chloride 0.9 % 100 mL intermittent infusion     Admin Date Action Dose Route Administered By             08/23/2018 New Bag 750 mg Intravenous Sveta Dubon, RN                          Discharge Plan:   Follow up plan of care with: ongoing infusions at [facility].  Discharge instructions were reviewed with patient.  Patient/representative verbalized understanding of discharge instructions and all questions answered.  Patient discharged from [facility] in stable condition.    Sveta Dubon RN        There were no vitals taken for this visit.

## 2018-08-30 ENCOUNTER — INFUSION THERAPY VISIT (OUTPATIENT)
Dept: INFUSION THERAPY | Facility: CLINIC | Age: 73
End: 2018-08-30
Attending: INTERNAL MEDICINE
Payer: COMMERCIAL

## 2018-08-30 VITALS — DIASTOLIC BLOOD PRESSURE: 55 MMHG | TEMPERATURE: 96.4 F | SYSTOLIC BLOOD PRESSURE: 114 MMHG | HEART RATE: 82 BPM

## 2018-08-30 DIAGNOSIS — D64.9 ANEMIA: Primary | ICD-10-CM

## 2018-08-30 DIAGNOSIS — K74.60 CIRRHOSIS OF LIVER WITHOUT ASCITES, UNSPECIFIED HEPATIC CIRRHOSIS TYPE (H): Primary | ICD-10-CM

## 2018-08-30 DIAGNOSIS — K74.60 CIRRHOSIS OF LIVER WITHOUT ASCITES, UNSPECIFIED HEPATIC CIRRHOSIS TYPE (H): ICD-10-CM

## 2018-08-30 LAB
ERYTHROCYTE [DISTWIDTH] IN BLOOD BY AUTOMATED COUNT: 24.8 % (ref 10–15)
HCT VFR BLD AUTO: 33 % (ref 40–53)
HGB BLD-MCNC: 9.6 G/DL (ref 13.3–17.7)
MCH RBC QN AUTO: 23.7 PG (ref 26.5–33)
MCHC RBC AUTO-ENTMCNC: 29.1 G/DL (ref 31.5–36.5)
MCV RBC AUTO: 82 FL (ref 78–100)
PLATELET # BLD AUTO: 150 10E9/L (ref 150–450)
RBC # BLD AUTO: 4.05 10E12/L (ref 4.4–5.9)
WBC # BLD AUTO: 5.2 10E9/L (ref 4–11)

## 2018-08-30 PROCEDURE — 25000128 H RX IP 250 OP 636: Mod: ZF | Performed by: INTERNAL MEDICINE

## 2018-08-30 PROCEDURE — 96365 THER/PROPH/DIAG IV INF INIT: CPT

## 2018-08-30 PROCEDURE — 36415 COLL VENOUS BLD VENIPUNCTURE: CPT | Performed by: INTERNAL MEDICINE

## 2018-08-30 PROCEDURE — 85027 COMPLETE CBC AUTOMATED: CPT | Performed by: INTERNAL MEDICINE

## 2018-08-30 RX ADMIN — FERRIC CARBOXYMALTOSE INJECTION 750 MG: 50 INJECTION, SOLUTION INTRAVENOUS at 10:01

## 2018-08-30 NOTE — MR AVS SNAPSHOT
After Visit Summary   8/30/2018    Danielle Cook    MRN: [id number]           Patient Information     Date Of Birth          1945        Visit Information        Provider Department      8/30/2018 9:45 AM [employer];  50 ATC;  SPEC IN[facility]USION [facility]        Today's Diagnoses     Anemia    -  1      Care Instructions      Ferric carboxymaltose Solution for injection  What is this medicine?  FERRIC CARBOXYMALTOSE (ferr-ik car-box-ee-mol-toes) is an iron complex. Iron is used to make healthy red blood cells, which carry oxygen and nutrients throughout the body. This medicine is used to treat anemia in people with chronic kidney disease or people who cannot take iron by mouth.  This medicine may be used for other purposes; ask your health care provider or pharmacist if you have questions.  What should I tell my health care provider before I take this medicine?  They need to know if you have any of these conditions:    anemia not caused by low iron levels    high levels of iron in the blood    liver disease    an unusual or allergic reaction to iron, other medicines, foods, dyes, or preservatives    pregnant or trying to get pregnant    breast-feeding  How should I use this medicine?  This medicine is for infusion into a vein. It is given by a health care professional in a hospital or clinic setting.  Talk to your pediatrician regarding the use of this medicine in children. Special care may be needed.  Overdosage: If you think you've taken too much of this medicine contact a poison control center or emergency room at once.  NOTE: This medicine is only for you. Do not share this medicine with others.  What if I miss a dose?  It is important not to miss your dose. Call your doctor or health care professional if you are unable to keep an appointment.  What may interact with this medicine?  Do not take this medicine with any of the  following medications:  deferoxamine  dimercaprol  other iron products  This medicine may also interact with the following medications:  chloramphenicol  deferasirox  This list may not describe all possible interactions. Give your health care provider a list of all the medicines, herbs, non-prescription drugs, or dietary supplements you use. Also tell them if you smoke, drink alcohol, or use illegal drugs. Some items may interact with your medicine.  What should I watch for while using this medicine?  Visit your doctor or health care professional regularly. Tell your doctor if your symptoms do not start to get better or if they get worse. You may need blood work done while you are taking this medicine.  You may need to follow a special diet. Talk to your doctor. Foods that contain iron include: whole grains/cereals, dried fruits, beans, or peas, leafy green vegetables, and organ meats (liver, kidney).  What side effects may I notice from receiving this medicine?  Side effects that you should report to your doctor or health care professional as soon as possible:  allergic reactions like skin rash, itching or hives, swelling of the face, lips, or tonguebreathing problems  changes in blood pressure  feeling faint or lightheaded, falls  flushing, sweating, or hot feelings  Side effects that usually do not require medical attention (Report these to your doctor or health care professional if they continue or are bothersome.):  changes in taste  constipation  dizziness  headache  nausea  pain, redness, or irritation at site where injected  vomiting  This list may not describe all possible side effects. Call your doctor for medical advice about side effects. You may report side effects to FDA at 9-908-BJY-6694.  Where should I keep my medicine?  This drug is given in a hospital or clinic and will not be stored at home.  NOTE: This sheet is a summary. It may not cover all possible information. If you have questions about  this medicine, talk to your doctor, pharmacist, or health care provider.  NOTE:This sheet is a summary. It may not cover all possible information. If you have questions about this medicine, talk to your doctor, pharmacist, or health care provider. Copyright  2016 [employer] Standard                Follow-ups after your visit        Your next 10 appointments already scheduled     Aug 30, 2018 12:00 PM CDT   Lab with [facility] [employer] [facility]    Health Lab ([facility])    [address] [place] [address]  1st Floor  [facility] [address]   [phone]            Sep 10, 2018   Procedure with Xavier Sutton MD   [facility] ([facility])    [address] [place] [address]  5th Floor  [facility] [id number] [phone]           Located in the Clinics and Surgery Center at [address] [place] [address].   parking is very convenient and highly recommended.  is a $6 flat rate fee.  Both  and self parkers should enter the main arrival plaza from [facility]; parking attendants will direct you based on your parking preference.            Oct 23, 2018 10:30 AM CDT   Lab with [facility] [employer] [facility]    [facility] ([facility])    [address] [place] [address]  1st Floor  [facility] [address]   [phone]            Oct 23, 2018 11:30 AM CDT   (Arrive by 11:15 AM)   Return General Liver with Nataly Darnell MD   [facility] ([facility])    [address] [place] [address]  [address]  [facility] [address]   [phone]            Oct 23, 2018  2:00 PM CDT   (Arrive by 1:45 PM)   Return Visit with DEX Queen   [facility] ([facility])    [address] [place] [address]  4th Floor  [facility] [address] [phone]            Dec 21, 2018  8:45 AM CST   Lab with [employer] [facility]    Health Lab ([facility])    [address] [place] [address]  1st  "Floor  [facility] [address] [phone]            Dec 21, 2018  9:40 AM CST   (Arrive by 9:10 AM)   Return Visit with Beto Alvarado MD   [facility] ([facility])    [address] [facility] [address]  [address]  [facility] [id number] [phone]              Future tests that were ordered for you today     Open Future Orders        Priority Expected Expires Ordered    CBC with platelets Routine 2018            Who to contact     If you have questions or need follow up information about today's clinic visit or your schedule please contact [facility] directly at [phone].  Normal or non-critical lab and imaging results will be communicated to you by [employer], letter or phone within 4 business days after the clinic has received the results. If you do not hear from us within 7 days, please contact the clinic through [employer] or phone. If you have a critical or abnormal lab result, we will notify you by phone as soon as possible.  Submit refill requests through [employer] or call your pharmacy and they will forward the refill request to us. Please allow 3 business days for your refill to be completed.          Additional Information About Your Visit        [employer] Information     [employer] lets you send messages to your doctor, view your test results, renew your prescriptions, schedule appointments and more. To sign up, go to [email or web address] . Click on \"Log in\" on the left side of the screen, which will take you to the Welcome page. Then click on \"Sign up Now\" on the right side of the page.     You will be asked to enter the access code listed below, as well as some personal information. Please follow the directions to create your username and password.     Your access code is: [email or web address]  Expires: 10/28/2018  6:31 AM     Your access code will  in 90 days. If you need help or a new code, please call " your [facility] clinic or [phone].        Care EveryWhere ID     This is your Care EveryWhere ID. This could be used by other organizations to access your [facility] medical records  [id number]        Your Vitals Were     Pulse Temperature                82 96.4  F (35.8  C) (Oral)           Blood Pressure from Last 3 Encounters:   08/30/18 114/55   08/23/18 119/56   08/21/18 127/67    Weight from Last 3 Encounters:   08/21/18 72.9 kg (160 lb 12.8 oz)   08/13/18 73.1 kg (161 lb 3.2 oz)   04/30/18 73.3 kg (161 lb 8 oz)              Today, you had the following     No orders found for display       Primary Care Provider Office Phone # Fax #    Andrey Zan Flores [phone]       [facility] [address] [facility] [address]        Equal Access to Services     ISA SORIANO : Hadii aad ku hadasho Soomaali, waaxda luqadaha, qaybta kaalmada adeegyada, waxay ishin haynagin reid markham . So [facility] [phone].    ATENCIÓN: Si habla español, tiene a whitehead disposición servicios gratuitos de asistencia lingüística. Llame al [phone].    We comply with applicable federal civil rights laws and [place] laws. We do not discriminate on the basis of race, color, national origin, age, disability, sex, sexual orientation, or gender identity.            Thank you!     Thank you for choosing [facility] PROCED[facility]  for your care. Our goal is always to provide you with excellent care. Hearing back from our patients is one way we can continue to improve our services. Please take a few minutes to complete the written survey that you may receive in the mail after your visit with us. Thank you!             Your Updated Medication List - Protect others around you: Learn how to safely use, store and throw away your medicines at www.disposemymeds.org.          This list is accurate as of 8/30/18 10:58 AM.  Always use your most recent med list.                    Brand Name Dispense Instructions for use Diagnosis    acetaminophen 325 MG tablet    TYLENOL    100 tablet    Take 1 tablet (325 mg) by mouth every 6 hours as needed for mild pain    Other chronic pain       * amLODIPine 5 MG tablet    NORVASC    30 tablet    Take 2 tablets (10 mg) by mouth daily    Essential hypertension with goal blood pressure less than 140/90       * amLODIPine 10 MG tablet    NORVASC          * aspirin 81 MG tablet     30 tablet    Take 1 tablet (81 mg) by mouth daily    Small vessel disease       * ASPIRIN LOW DOSE 81 MG EC tablet   Generic drug:  aspirin           ferrous fumarate 324 (106 Fe) MG Tabs tablet    FERRETTS     Take by mouth daily        Ferrous Sulfate 324 (65 Fe) MG Tbec           * gabapentin 300 MG tablet    NEURONTIN     Take 600 mg by mouth 3 times daily    Chronic hepatitis C (H), Renal insufficiency       * gabapentin 600 MG tablet    NEURONTIN          levothyroxine 50 MCG tablet    SYNTHROID/LEVOTHROID          lidocaine (viscous) 2 % solution    XYLOCAINE          nortriptyline 25 MG capsule    PAMELOR          omeprazole 40 MG capsule    priLOSEC    60 capsule    Take 1 capsule (40 mg) by mouth daily (with lunch)    Esophagitis, reflux       oxyCODONE IR 5 MG tablet    ROXICODONE          polyethylene glycol powder    MIRALAX/GLYCOLAX          tamsulosin 0.4 MG capsule    FLOMAX    60 capsule    Take 1 capsule (0.4 mg) by mouth daily    Nocturia       * Notice:  This list has 6 medication(s) that are the same as other medications prescribed for you. Read the directions carefully, and ask your doctor or other care provider to review them with you.

## 2018-08-30 NOTE — PATIENT INSTRUCTIONS
Ferric carboxymaltose Solution for injection  What is this medicine?  FERRIC CARBOXYMALTOSE (ferr-ik car-box-ee-mol-toes) is an iron complex. Iron is used to make healthy red blood cells, which carry oxygen and nutrients throughout the body. This medicine is used to treat anemia in people with chronic kidney disease or people who cannot take iron by mouth.  This medicine may be used for other purposes; ask your health care provider or pharmacist if you have questions.  What should I tell my health care provider before I take this medicine?  They need to know if you have any of these conditions:    anemia not caused by low iron levels    high levels of iron in the blood    liver disease    an unusual or allergic reaction to iron, other medicines, foods, dyes, or preservatives    pregnant or trying to get pregnant    breast-feeding  How should I use this medicine?  This medicine is for infusion into a vein. It is given by a health care professional in a hospital or clinic setting.  Talk to your pediatrician regarding the use of this medicine in children. Special care may be needed.  Overdosage: If you think you've taken too much of this medicine contact a poison control center or emergency room at once.  NOTE: This medicine is only for you. Do not share this medicine with others.  What if I miss a dose?  It is important not to miss your dose. Call your doctor or health care professional if you are unable to keep an appointment.  What may interact with this medicine?  Do not take this medicine with any of the following medications:  deferoxamine  dimercaprol  other iron products  This medicine may also interact with the following medications:  chloramphenicol  deferasirox  This list may not describe all possible interactions. Give your health care provider a list of all the medicines, herbs, non-prescription drugs, or dietary supplements you use. Also tell them if you smoke, drink alcohol, or use illegal drugs. Some  items may interact with your medicine.  What should I watch for while using this medicine?  Visit your doctor or health care professional regularly. Tell your doctor if your symptoms do not start to get better or if they get worse. You may need blood work done while you are taking this medicine.  You may need to follow a special diet. Talk to your doctor. Foods that contain iron include: whole grains/cereals, dried fruits, beans, or peas, leafy green vegetables, and organ meats (liver, kidney).  What side effects may I notice from receiving this medicine?  Side effects that you should report to your doctor or health care professional as soon as possible:  allergic reactions like skin rash, itching or hives, swelling of the face, lips, or tonguebreathing problems  changes in blood pressure  feeling faint or lightheaded, falls  flushing, sweating, or hot feelings  Side effects that usually do not require medical attention (Report these to your doctor or health care professional if they continue or are bothersome.):  changes in taste  constipation  dizziness  headache  nausea  pain, redness, or irritation at site where injected  vomiting  This list may not describe all possible side effects. Call your doctor for medical advice about side effects. You may report side effects to FDA at 7-529-FDA-9995.  Where should I keep my medicine?  This drug is given in a hospital or clinic and will not be stored at home.  NOTE: This sheet is a summary. It may not cover all possible information. If you have questions about this medicine, talk to your doctor, pharmacist, or health care provider.  NOTE:This sheet is a summary. It may not cover all possible information. If you have questions about this medicine, talk to your doctor, pharmacist, or health care provider. Copyright  2016 Gold Standard

## 2018-08-30 NOTE — PROGRESS NOTES
Nursing Note  Danielle Cook [facility]:   Chief Complaint   Patient presents with     Infusion     injectafer     During [facility]'s [facility] appointment, orders from Dr. Darnell were completed.  Frequency: today is dose 2 of 2 total.    Progress note:  Patient identification verified by name and date of birth.  Assessment completed.  Vitals recorded in Doc Flowsheets.  Patient was provided with education regarding infusion and possible side effects.  Patient verbalized understanding.      needed: Yes  Premedications: were not ordered.  Infusion Rates: 500 ml/hr.  Approximate Infusion length:15-20 minutes.   Labs: were not ordered for this appointment.  Vascular access: peripheral IV placed today.  Treatment Conditions: patient monitored for 30   minutes after medication was infused.  Patient tolerated infusion: well.    Drug Waste Record? No     Discharge Plan:   Follow up plan of care with: primary medical doctor.  Discharge instructions were reviewed with patient.  Patient/representative verbalized understanding of discharge instructions and all questions answered.  [facility] in stable condition.    Judy Rios RN    Administrations This Visit     ferric carboxymaltose (INJECTAFER) 750 mg in sodium chloride 0.9 % 100 mL intermittent infusion     Admin Date Action Dose Rate Route Administered By          08/30/2018 New Bag 750 mg 500 mL/hr Intravenous Judy Rios RN                         /56  Pulse 81  Temp 96.4  F (35.8  C) (Oral)

## 2018-09-04 ENCOUNTER — TELEPHONE (OUTPATIENT)
Dept: GASTROENTEROLOGY | Facility: CLINIC | Age: 73
End: 2018-09-04

## 2018-09-04 NOTE — TELEPHONE ENCOUNTER
Patient scheduled for EGD     Indication for procedure. Decompensation of cirrhosis of liver    Referring Provider. Eddie Huff MD    ? Yes, Indian     Arrival time verified? 2:20 pm     Facility location verified? [address] [facility],     Instructions given regarding prep and procedure. Transportation policy reviewed and verbalized understanding.     Prep Type NPO     Are you taking any anticoagulants or blood thinners? Aspirin     Instructions given? Yes     Electronic implanted devices? Denies     Pre procedure teaching completed? Yes    Transportation from procedure? Yes     H&P / Pre op physical completed? N/A    Estrada Wallace RN

## 2018-09-10 ENCOUNTER — HOSPITAL ENCOUNTER (OUTPATIENT)
Facility: AMBULATORY SURGERY CENTER | Age: 73
End: 2018-09-10
Attending: INTERNAL MEDICINE
Payer: COMMERCIAL

## 2018-09-10 ENCOUNTER — SURGERY (OUTPATIENT)
Age: 73
End: 2018-09-10

## 2018-09-10 VITALS
HEIGHT: 68 IN | WEIGHT: 161 LBS | TEMPERATURE: 97.2 F | OXYGEN SATURATION: 94 % | DIASTOLIC BLOOD PRESSURE: 70 MMHG | RESPIRATION RATE: 16 BRPM | SYSTOLIC BLOOD PRESSURE: 119 MMHG | BODY MASS INDEX: 24.4 KG/M2

## 2018-09-10 RX ORDER — ONDANSETRON 4 MG/1
4 TABLET, ORALLY DISINTEGRATING ORAL EVERY 6 HOURS PRN
Status: DISCONTINUED | OUTPATIENT
Start: 2018-09-10 | End: 2018-09-11 | Stop reason: HOSPADM

## 2018-09-10 RX ORDER — ONDANSETRON 2 MG/ML
4 INJECTION INTRAMUSCULAR; INTRAVENOUS EVERY 6 HOURS PRN
Status: DISCONTINUED | OUTPATIENT
Start: 2018-09-10 | End: 2018-09-11 | Stop reason: HOSPADM

## 2018-09-10 RX ORDER — NALOXONE HYDROCHLORIDE 0.4 MG/ML
.1-.4 INJECTION, SOLUTION INTRAMUSCULAR; INTRAVENOUS; SUBCUTANEOUS
Status: DISCONTINUED | OUTPATIENT
Start: 2018-09-10 | End: 2018-09-11 | Stop reason: HOSPADM

## 2018-09-10 RX ORDER — FENTANYL CITRATE 50 UG/ML
INJECTION, SOLUTION INTRAMUSCULAR; INTRAVENOUS PRN
Status: DISCONTINUED | OUTPATIENT
Start: 2018-09-10 | End: 2018-09-10 | Stop reason: HOSPADM

## 2018-09-10 RX ORDER — ONDANSETRON 2 MG/ML
4 INJECTION INTRAMUSCULAR; INTRAVENOUS
Status: DISCONTINUED | OUTPATIENT
Start: 2018-09-10 | End: 2018-09-10 | Stop reason: HOSPADM

## 2018-09-10 RX ORDER — FLUMAZENIL 0.1 MG/ML
0.2 INJECTION, SOLUTION INTRAVENOUS
Status: DISCONTINUED | OUTPATIENT
Start: 2018-09-10 | End: 2018-09-11 | Stop reason: HOSPADM

## 2018-09-10 RX ORDER — LIDOCAINE 40 MG/G
CREAM TOPICAL
Status: DISCONTINUED | OUTPATIENT
Start: 2018-09-10 | End: 2018-09-10 | Stop reason: HOSPADM

## 2018-09-10 RX ADMIN — FENTANYL CITRATE 50 MCG: 50 INJECTION, SOLUTION INTRAMUSCULAR; INTRAVENOUS at 15:36

## 2018-09-10 RX ADMIN — FENTANYL CITRATE 50 MCG: 50 INJECTION, SOLUTION INTRAMUSCULAR; INTRAVENOUS at 15:45

## 2018-09-10 RX ADMIN — FENTANYL CITRATE 50 MCG: 50 INJECTION, SOLUTION INTRAMUSCULAR; INTRAVENOUS at 15:42

## 2018-10-02 ENCOUNTER — OFFICE VISIT (OUTPATIENT)
Dept: NEUROLOGY | Facility: CLINIC | Age: 73
End: 2018-10-02
Payer: COMMERCIAL

## 2018-10-02 ENCOUNTER — RADIANT APPOINTMENT (OUTPATIENT)
Dept: CT IMAGING | Facility: CLINIC | Age: 73
End: 2018-10-02
Attending: PSYCHIATRY & NEUROLOGY
Payer: COMMERCIAL

## 2018-10-02 VITALS
SYSTOLIC BLOOD PRESSURE: 134 MMHG | DIASTOLIC BLOOD PRESSURE: 69 MMHG | OXYGEN SATURATION: 98 % | BODY MASS INDEX: 24.4 KG/M2 | HEIGHT: 68 IN | RESPIRATION RATE: 17 BRPM | WEIGHT: 161 LBS | HEART RATE: 70 BPM | TEMPERATURE: 97.5 F

## 2018-10-02 DIAGNOSIS — R51.9 CHRONIC INTRACTABLE HEADACHE, UNSPECIFIED HEADACHE TYPE: ICD-10-CM

## 2018-10-02 DIAGNOSIS — R51.9 CHRONIC INTRACTABLE HEADACHE, UNSPECIFIED HEADACHE TYPE: Primary | ICD-10-CM

## 2018-10-02 DIAGNOSIS — M31.6 TEMPORAL ARTERITIS SYNDROME (H): ICD-10-CM

## 2018-10-02 DIAGNOSIS — G89.29 CHRONIC INTRACTABLE HEADACHE, UNSPECIFIED HEADACHE TYPE: Primary | ICD-10-CM

## 2018-10-02 DIAGNOSIS — G89.29 CHRONIC INTRACTABLE HEADACHE, UNSPECIFIED HEADACHE TYPE: ICD-10-CM

## 2018-10-02 PROBLEM — K21.9 GERD (GASTROESOPHAGEAL REFLUX DISEASE): Status: ACTIVE | Noted: 2018-10-02

## 2018-10-02 PROBLEM — N28.9 RENAL INSUFFICIENCY SYNDROME: Status: ACTIVE | Noted: 2018-10-02

## 2018-10-02 RX ORDER — PREDNISONE 20 MG/1
TABLET ORAL
Qty: 60 TABLET | Refills: 3 | Status: ON HOLD | OUTPATIENT
Start: 2018-10-02 | End: 2018-10-12

## 2018-10-02 ASSESSMENT — PAIN SCALES - GENERAL: PAINLEVEL: EXTREME PAIN (8)

## 2018-10-02 NOTE — MR AVS SNAPSHOT
After Visit Summary   10/2/2018    Danielle Cook    MRN: [id number]           Patient Information     Date Of Birth          1945        Visit Information        Provider Department      10/2/2018 8:15 AM Av De León MD; LANGUAGE [facility] Neurology        Today's Diagnoses     Chronic intractable headache, unspecified headache type    -  1       Follow-ups after your visit        Follow-up notes from your care team     Return in about 4 weeks (around 10/30/2018).      Your next 10 appointments already scheduled     Oct 02, 2018  8:00 PM CDT   CT HEAD W/O CONTRAST with UCCT2   [facility] CT ([facility])    [address]  [facility] [address] [phone]           How do I prepare for my exam? (Food and drink instructions) No Food and Drink Restrictions.  How do I prepare for my exam? (Other instructions) You do not need to do anything special to prepare for this exam. For a sinus scan: Use your nose spray (nasal decongestant spray) as directed.  What should I wear: Please wear loose clothing, such as a sweat suit or jogging clothes. Avoid snaps, zippers and other metal. We may ask you to undress and put on a hospital gown.  How long does the exam take: Most scans take less than 20 minutes.  What should I bring: Please bring any scans or X-rays taken at other hospitals, if similar tests were done. Also bring a list of your medicines, including vitamins, minerals and over-the-counter drugs. It is safest to leave personal items at home.  Do I need a : No  is needed.  What do I need to tell my doctor? Be sure to tell your doctor: * If you have any allergies. * If there s any chance you are pregnant. * If you are breastfeeding.  What should I do after the exam: No restrictions, You may resume normal activities.  What is this test: A CT (computed tomography) scan is a series of pictures that allows us to look inside  your body. The scanner creates images of the body in cross sections, much like slices of bread. This helps us see any problems more clearly.  Who should I call with questions: If you have any questions, please call the Imaging Department where you will have your exam. Directions, parking instructions, and other information is available on our website, [email or web address].            Oct 23, 2018 10:30 AM CDT   Lab with  LAB   [facility] ([facility])    [address] [place] [address]  1st Floor  [facility] [address]   [phone]            Oct 23, 2018 11:30 AM CDT   (Arrive by 11:15 AM)   Return General Liver with Nataly Darnell MD   [facility]at[facility] ([facility])    [address] [place] [address]  [address]  [facility] [id number] [phone]            Oct 23, 2018  2:00 PM CDT   (Arrive by 1:45 PM)   Return Visit with DEX Queen   [facility] for Prostate and Urologic Cancers ([facility])    [address] [place] [address]  4th Floor  [facility] [address] [phone]            Dec 21, 2018  8:45 AM CST   Lab with  LAB   [facility] ([facility])    [address] [place] [address]  1st Floor  [facility] [address]   [phone]            Dec 21, 2018  9:40 AM CST   (Arrive by 9:10 AM)   Return Visit with Beto Alvarado MD   [facility] Neph[facility] ([facility])    [address] [place] [address]  [address]  [facility] [id number] [phone]              Future tests that were ordered for you today     Open Future Orders        Priority Expected Expires Ordered    CT Head w/o contrast Routine  10/2/2019 10/2/2018            Who to contact     Please call your clinic at [phone] to:    Ask questions about your health    Make or cancel appointments    Discuss your medicines    Learn about your test results    Speak to your doctor            Additional Information About Your  "Visit        [employer] Information     [employer] is an electronic gateway that provides easy, online access to your medical records. With [employer]it, you can request a clinic appointment, read your test results, renew a prescription or communicate with your care team.     To sign up for [employer] visit the website at [email or web address]   You will be asked to enter the access code listed below, as well as some personal information. Please follow the directions to create your username and password.     Your access code is: [email or web address]  Expires: 10/28/2018  6:31 AM     Your access code will  in 90 days. If you need help or a new code, please contact your [facility] or call [phone] for assistance.        Care EveryWhere ID     This is your Care EveryWhere ID. This could be used by other organizations to access your [place] medical records  [id number]        Your Vitals Were     Pulse Temperature Respirations Height Pulse Oximetry BMI (Body Mass Index)    70 97.5  F (36.4  C) (Oral) 17 1.727 m (5' 8\") 98% 24.48 kg/m2       Blood Pressure from Last 3 Encounters:   10/02/18 134/69   09/10/18 119/70   18 114/55    Weight from Last 3 Encounters:   10/02/18 73 kg (161 lb)   09/10/18 73 kg (161 lb)   18 72.9 kg (160 lb 12.8 oz)               Primary Care Provider Office Phone # Fax #    Andrey Zan Flores [phone]       [facility]  [facility] [address]        Equal Access to Services     ISA SORIANO AH: Hadii janette donnelly Sorohith, waaxda luqadaha, qaybta kaaljeannie soto. So [facility] [phone].    ATENCIÓN: Si habla español, tiene a whitehead disposición servicios gratuitos de asistencia lingüística. Llame al [phone].    We comply with applicable federal civil rights laws and [place] laws. We do not discriminate on the basis of race, color, national origin, age, " disability, sex, sexual orientation, or gender identity.            Thank you!     Thank you for choosing [facility] NEUROLOGY  for your care. Our goal is always to provide you with excellent care. Hearing back from our patients is one way we can continue to improve our services. Please take a few minutes to complete the written survey that you may receive in the mail after your visit with us. Thank you!             Your Updated Medication List - Protect others around you: Learn how to safely use, store and throw away your medicines at www.disposemymeds.org.          This list is accurate as of 10/2/18  9:16 AM.  Always use your most recent med list.                   Brand Name Dispense Instructions for use Diagnosis    acetaminophen 325 MG tablet    TYLENOL    100 tablet    Take 1 tablet (325 mg) by mouth every 6 hours as needed for mild pain    Other chronic pain       * amLODIPine 5 MG tablet    NORVASC    30 tablet    Take 2 tablets (10 mg) by mouth daily    Essential hypertension with goal blood pressure less than 140/90       * amLODIPine 10 MG tablet    NORVASC          * aspirin 81 MG tablet     30 tablet    Take 1 tablet (81 mg) by mouth daily    Small vessel disease       * ASPIRIN LOW DOSE 81 MG EC tablet   Generic drug:  aspirin           ferrous fumarate 324 (106 Fe) MG Tabs tablet    FERRETTS     Take by mouth daily        Ferrous Sulfate 324 (65 Fe) MG Tbec           * gabapentin 300 MG tablet    NEURONTIN     Take 600 mg by mouth 3 times daily    Chronic hepatitis C (H), Renal insufficiency       * gabapentin 600 MG tablet    NEURONTIN          levothyroxine 50 MCG tablet    SYNTHROID/LEVOTHROID          lidocaine (viscous) 2 % solution    XYLOCAINE          nortriptyline 25 MG capsule    PAMELOR          omeprazole 40 MG capsule    priLOSEC    60 capsule    Take 1 capsule (40 mg) by mouth daily (with lunch)    Esophagitis, reflux       oxyCODONE IR 5 MG tablet    ROXICODONE          polyethylene  glycol powder    MIRALAX/GLYCOLAX          tamsulosin 0.4 MG capsule    FLOMAX    60 capsule    Take 1 capsule (0.4 mg) by mouth daily    Nocturia       * Notice:  This list has 6 medication(s) that are the same as other medications prescribed for you. Read the directions carefully, and ask your doctor or other care provider to review them with you.

## 2018-10-02 NOTE — PROGRESS NOTES
Service Date: 10/02/2018      Andrey Flores MD   [facility] [place] [address]      RE: Danielle Cook   MRN: [id number]   : 1945      Dear Dr. Flores:        Mr. Cook was seen in the clinic today.  He is quite distressed with his headaches which have continued.  Since last Wednesday he says they got terribly worse.  His wife is with him and is quite concerned.  He says the pain is as it has been before, on the crown his head, goes down his neck.  He says the skin there is a little bit sensitive, says like nails sticking in his head.  He says his legs are weak.  He has not had fever, nausea, vomiting or any other symptoms.  He does have multiple medical problems and this includes cirrhosis of the liver, has had anemia and did receive infusion therapy.  We have worked him up for these headaches before with a negative MRI/MRA.  He has had a very high sedimentation rate which was followed through with a CPK that was normal at 41.  His last sedimentation rate was in April when I last saw him and it was 115.  We asked these medical person if this very high sed rate could be due to his cirrhosis and multiple medical issues, anemia, and he thought it would, but I need to recheck his note.  He has had trace cryoglobulin with a cryo IgM elevated.      PHYSICAL EXAMINATION:     GENERAL:   He looks very distressed today.     NEUROLOGIC:   On his exam shows mental status exam is unchanged.  Cranial nerves are okay.  I could take a very short glimpse at the optic nerve and it looks okay in the right eye.  His eye movements are full.  His face is symmetrical.  Actually, his neck is pretty supple.  There are no meningeal signs.  There is no particular tenderness over the scalp.  His arm strengths are pretty good.  There is no asterixis.  His reflexes are obtainable.  He does have a few beats of clonus in the lower extremities.  Negative Babinski.  Negative exam for neuropathy.      His  gait is slow.  Romberg is negative.  Strength in his legs and arms is very good.      In summary, persistent with an exacerbation since Friday of his headaches on the top of the head.  He has a very high sed rate.  He has had steroid treatment with no effect.  We will have to give him a course of steroids again, but first we have to rule out any intracranial pathology and we have ordered a stat CT and I will see him after that.      Sincerely,       MD DONYA Menchaca MD             D: 10/02/2018   T: 10/02/2018   MT: AKA      Name:     ALYSSA SAMUEL   MRN:      [id number]        Account:      [id number]   :      1945           Service Date: 10/02/2018      Document: [id number]

## 2018-10-02 NOTE — LETTER
10/2/2018       RE: Danielle Cook  [address]  [facility] [address]     Dear Colleague,    Thank you for referring your patient, Danielle Cook, to the [facility] NEUROLOGY at [facility]. Please see a copy of my visit note below.    Service Date: 10/02/2018      RE: Danielle Cook   MRN: [id number]   : 1945      Dear Dr. Flores:        Mr. Cook was seen in the clinic today.  He is quite distressed with his headaches which have continued.  Since last Wednesday he says they got terribly worse.  His wife is with him and is quite concerned.  He says the pain is as it has been before, on the crown his head, goes down his neck.  He says the skin there is a little bit sensitive, says like nails sticking in his head.  He says his legs are weak.  He has not had fever, nausea, vomiting or any other symptoms.  He does have multiple medical problems and this includes cirrhosis of the liver, has had anemia and did receive infusion therapy.  We have worked him up for these headaches before with a negative MRI/MRA.  He has had a very high sedimentation rate which was followed through with a CPK that was normal at 41.  His last sedimentation rate was in April when I last saw him and it was 115.  We asked these medical person if this very high sed rate could be due to his cirrhosis and multiple medical issues, anemia, and he thought it would, but I need to recheck his note.  He has had trace cryoglobulin with a cryo IgM elevated.      PHYSICAL EXAMINATION:     GENERAL:   He looks very distressed today.     NEUROLOGIC:   On his exam shows mental status exam is unchanged.  Cranial nerves are okay.  I could take a very short glimpse at the optic nerve and it looks okay in the right eye.  His eye movements are full.  His face is symmetrical.  Actually, his neck is pretty supple.  There are no meningeal signs.  There is no particular tenderness over the  scalp.  His arm strengths are pretty good.  There is no asterixis.  His reflexes are obtainable.  He does have a few beats of clonus in the lower extremities.  Negative Babinski.  Negative exam for neuropathy.      His gait is slow.  Romberg is negative.  Strength in his legs and arms is very good.      In summary, persistent with an exacerbation since Friday of his headaches on the top of the head.  He has a very high sed rate.  He has had steroid treatment with no effect.  We will have to give him a course of steroids again, but first we have to rule out any intracranial pathology and we have ordered a stat CT and I will see him after that.         D: 10/02/2018   T: 10/02/2018   MT: AKA      Name:     DANIELLE SAMUEL   MRN:      [id number]        Account:      [id number]   :      1945           Service Date: 10/02/2018      Document: [id number]      Service Date: 10/02/2018      [place] [address]      RE: Danielle Samuel   MRN: [id number]   : 1945      Dear Dr. Flores:      We saw Mr. Samuel today and please see a previous complete note and we obtained a CT scan urgently which was essentially unchanged from before.      I have discussed the situation curbside with some of the internists and they feel that the steroids should be for shorter course because of some risk of infection with the cirrhosis.  I hope you approve of that.  He was very distressed, so I went ahead and talked at length to his wife and him about the steroids, risk of infection and they are in agreement to take them.  I think a trial of steroids is warranted and we have started him on 60 mg a day for 4 days, then going down to 40 mg for 4 days and then 20 mg at which time I will see him in the clinic next Tuesday to monitor his progress and check another sed rate.  He was in agreement with that and the risk involved.        We will stay in touch.         D:  10/02/2018   T: 10/02/2018   MT: AKA      Name:     ALYSSA SAMUEL   MRN:      [id number]        Account:      [id number]   :      1945           Service Date: 10/02/2018      Document: [id number]       Again, thank you for allowing me to participate in the care of your patient.      Sincerely,    Av De León MD    CC:  [place] [address]

## 2018-10-02 NOTE — PROGRESS NOTES
Service Date: 10/02/2018      Andrey Flores MD   [facility] [place] [address]      RE: Danielle Samuel   MRN: [id number]   : 1945      Dear Dr. Flores:      We saw Mr. Samuel today and please see a previous complete note and we obtained a CT scan urgently which was essentially unchanged from before.      I have discussed the situation curbside with some of the internists and they feel that the steroids should be for shorter course because of some risk of infection with the cirrhosis.  I hope you approve of that.  He was very distressed, so I went ahead and talked at length to his wife and him about the steroids, risk of infection and they are in agreement to take them.  I think a trial of steroids is warranted and we have started him on 60 mg a day for 4 days, then going down to 40 mg for 4 days and then 20 mg at which time I will see him in the clinic next Tuesday to monitor his progress and check another sed rate.  He was in agreement with that and the risk involved.        We will stay in touch.      Sincerely,       MD DONYA Menchaca MD             D: 10/02/2018   T: 10/02/2018   MT: AKA      Name:     DANIELLE SAMUEL   MRN:      [id number]        Account:      [id number]   :      1945           Service Date: 10/02/2018      Document: [id number]

## 2018-10-03 DIAGNOSIS — K74.60 CIRRHOSIS OF LIVER WITHOUT ASCITES, UNSPECIFIED HEPATIC CIRRHOSIS TYPE (H): Primary | ICD-10-CM

## 2018-10-03 ASSESSMENT — PATIENT HEALTH QUESTIONNAIRE - PHQ9: SUM OF ALL RESPONSES TO PHQ QUESTIONS 1-9: 0

## 2018-10-09 ENCOUNTER — HOSPITAL ENCOUNTER (INPATIENT)
Facility: CLINIC | Age: 73
LOS: 2 days | Discharge: HOME | DRG: 432 | End: 2018-10-12
Attending: EMERGENCY MEDICINE | Admitting: HOSPITALIST
Payer: COMMERCIAL

## 2018-10-09 ENCOUNTER — OFFICE VISIT (OUTPATIENT)
Dept: NEUROLOGY | Facility: CLINIC | Age: 73
End: 2018-10-09
Payer: COMMERCIAL

## 2018-10-09 ENCOUNTER — APPOINTMENT (OUTPATIENT)
Dept: CT IMAGING | Facility: CLINIC | Age: 73
DRG: 432 | End: 2018-10-09
Attending: EMERGENCY MEDICINE
Payer: COMMERCIAL

## 2018-10-09 VITALS
DIASTOLIC BLOOD PRESSURE: 65 MMHG | OXYGEN SATURATION: 96 % | HEART RATE: 101 BPM | HEIGHT: 68 IN | SYSTOLIC BLOOD PRESSURE: 107 MMHG | WEIGHT: 169 LBS | BODY MASS INDEX: 25.61 KG/M2

## 2018-10-09 DIAGNOSIS — K21.00 ESOPHAGITIS, REFLUX: ICD-10-CM

## 2018-10-09 DIAGNOSIS — J02.9 SORE THROAT: ICD-10-CM

## 2018-10-09 DIAGNOSIS — R51.9 INTRACTABLE HEADACHE, UNSPECIFIED CHRONICITY PATTERN, UNSPECIFIED HEADACHE TYPE: Primary | ICD-10-CM

## 2018-10-09 DIAGNOSIS — K74.60 CIRRHOSIS OF LIVER WITHOUT ASCITES, UNSPECIFIED HEPATIC CIRRHOSIS TYPE (H): ICD-10-CM

## 2018-10-09 DIAGNOSIS — K92.2 GASTROINTESTINAL HEMORRHAGE, UNSPECIFIED GASTROINTESTINAL HEMORRHAGE TYPE: Primary | ICD-10-CM

## 2018-10-09 LAB
ABO + RH BLD: NORMAL
ABO + RH BLD: NORMAL
ALBUMIN SERPL-MCNC: 2.8 G/DL (ref 3.4–5)
ALP SERPL-CCNC: 376 U/L (ref 40–150)
ALT SERPL W P-5'-P-CCNC: 207 U/L (ref 0–70)
ANION GAP SERPL CALCULATED.3IONS-SCNC: 9 MMOL/L (ref 3–14)
APTT PPP: 22 SEC (ref 22–37)
AST SERPL W P-5'-P-CCNC: 138 U/L (ref 0–45)
BASOPHILS # BLD AUTO: 0 10E9/L (ref 0–0.2)
BASOPHILS NFR BLD AUTO: 0.1 %
BILIRUB SERPL-MCNC: 0.9 MG/DL (ref 0.2–1.3)
BLD GP AB SCN SERPL QL: NORMAL
BLOOD BANK CMNT PATIENT-IMP: NORMAL
BUN SERPL-MCNC: 43 MG/DL (ref 7–30)
CALCIUM SERPL-MCNC: 8.1 MG/DL (ref 8.5–10.1)
CHLORIDE SERPL-SCNC: 108 MMOL/L (ref 94–109)
CO2 SERPL-SCNC: 24 MMOL/L (ref 20–32)
CREAT SERPL-MCNC: 1.24 MG/DL (ref 0.66–1.25)
DIFFERENTIAL METHOD BLD: ABNORMAL
EOSINOPHIL # BLD AUTO: 0 10E9/L (ref 0–0.7)
EOSINOPHIL NFR BLD AUTO: 0 %
ERYTHROCYTE [DISTWIDTH] IN BLOOD BY AUTOMATED COUNT: ABNORMAL % (ref 10–15)
GFR SERPL CREATININE-BSD FRML MDRD: 57 ML/MIN/1.7M2
GLUCOSE SERPL-MCNC: 109 MG/DL (ref 70–99)
HCT VFR BLD AUTO: 38.4 % (ref 40–53)
HGB BLD-MCNC: 12.7 G/DL (ref 13.3–17.7)
IMM GRANULOCYTES # BLD: 0.2 10E9/L (ref 0–0.4)
IMM GRANULOCYTES NFR BLD: 1.6 %
INR PPP: 1.05 (ref 0.86–1.14)
LYMPHOCYTES # BLD AUTO: 0.8 10E9/L (ref 0.8–5.3)
LYMPHOCYTES NFR BLD AUTO: 5.9 %
MCH RBC QN AUTO: 30.1 PG (ref 26.5–33)
MCHC RBC AUTO-ENTMCNC: 33.1 G/DL (ref 31.5–36.5)
MCV RBC AUTO: 91 FL (ref 78–100)
MONOCYTES # BLD AUTO: 1.3 10E9/L (ref 0–1.3)
MONOCYTES NFR BLD AUTO: 9.7 %
NEUTROPHILS # BLD AUTO: 11.4 10E9/L (ref 1.6–8.3)
NEUTROPHILS NFR BLD AUTO: 82.7 %
NRBC # BLD AUTO: 0.2 10*3/UL
NRBC BLD AUTO-RTO: 1 /100
PLATELET # BLD AUTO: 197 10E9/L (ref 150–450)
PLATELET # BLD EST: ABNORMAL 10*3/UL
POTASSIUM SERPL-SCNC: 4.4 MMOL/L (ref 3.4–5.3)
PROT SERPL-MCNC: 7.2 G/DL (ref 6.8–8.8)
RBC # BLD AUTO: 4.22 10E12/L (ref 4.4–5.9)
SODIUM SERPL-SCNC: 142 MMOL/L (ref 133–144)
SPECIMEN EXP DATE BLD: NORMAL
TROPONIN I SERPL-MCNC: <0.015 UG/L (ref 0–0.04)
WBC # BLD AUTO: 13.8 10E9/L (ref 4–11)

## 2018-10-09 PROCEDURE — 85025 COMPLETE CBC W/AUTO DIFF WBC: CPT | Performed by: EMERGENCY MEDICINE

## 2018-10-09 PROCEDURE — 96366 THER/PROPH/DIAG IV INF ADDON: CPT | Performed by: EMERGENCY MEDICINE

## 2018-10-09 PROCEDURE — 25000128 H RX IP 250 OP 636: Performed by: EMERGENCY MEDICINE

## 2018-10-09 PROCEDURE — 93010 ELECTROCARDIOGRAM REPORT: CPT | Mod: Z6 | Performed by: EMERGENCY MEDICINE

## 2018-10-09 PROCEDURE — 86901 BLOOD TYPING SEROLOGIC RH(D): CPT | Performed by: EMERGENCY MEDICINE

## 2018-10-09 PROCEDURE — 99285 EMERGENCY DEPT VISIT HI MDM: CPT | Mod: 25 | Performed by: EMERGENCY MEDICINE

## 2018-10-09 PROCEDURE — 85730 THROMBOPLASTIN TIME PARTIAL: CPT | Performed by: EMERGENCY MEDICINE

## 2018-10-09 PROCEDURE — C9113 INJ PANTOPRAZOLE SODIUM, VIA: HCPCS | Performed by: EMERGENCY MEDICINE

## 2018-10-09 PROCEDURE — 86900 BLOOD TYPING SEROLOGIC ABO: CPT | Performed by: EMERGENCY MEDICINE

## 2018-10-09 PROCEDURE — 99223 1ST HOSP IP/OBS HIGH 75: CPT | Mod: GC | Performed by: HOSPITALIST

## 2018-10-09 PROCEDURE — 80053 COMPREHEN METABOLIC PANEL: CPT | Performed by: EMERGENCY MEDICINE

## 2018-10-09 PROCEDURE — 86850 RBC ANTIBODY SCREEN: CPT | Performed by: EMERGENCY MEDICINE

## 2018-10-09 PROCEDURE — 84484 ASSAY OF TROPONIN QUANT: CPT | Performed by: EMERGENCY MEDICINE

## 2018-10-09 PROCEDURE — 96365 THER/PROPH/DIAG IV INF INIT: CPT | Performed by: EMERGENCY MEDICINE

## 2018-10-09 PROCEDURE — 85610 PROTHROMBIN TIME: CPT | Performed by: EMERGENCY MEDICINE

## 2018-10-09 PROCEDURE — 74177 CT ABD & PELVIS W/CONTRAST: CPT

## 2018-10-09 PROCEDURE — 93005 ELECTROCARDIOGRAM TRACING: CPT | Performed by: EMERGENCY MEDICINE

## 2018-10-09 RX ORDER — IOPAMIDOL 755 MG/ML
104 INJECTION, SOLUTION INTRAVASCULAR ONCE
Status: COMPLETED | OUTPATIENT
Start: 2018-10-09 | End: 2018-10-09

## 2018-10-09 RX ADMIN — PANTOPRAZOLE SODIUM 40 MG: 40 INJECTION, POWDER, FOR SOLUTION INTRAVENOUS at 22:57

## 2018-10-09 RX ADMIN — IOPAMIDOL 104 ML: 755 INJECTION, SOLUTION INTRAVENOUS at 21:11

## 2018-10-09 ASSESSMENT — ENCOUNTER SYMPTOMS
ARTHRALGIAS: 0
DIFFICULTY URINATING: 0
ABDOMINAL DISTENTION: 1
EYE REDNESS: 0
NECK STIFFNESS: 0
COLOR CHANGE: 0
SHORTNESS OF BREATH: 0
FEVER: 0
BLOOD IN STOOL: 0
CONFUSION: 0
HEADACHES: 0

## 2018-10-09 ASSESSMENT — PAIN SCALES - GENERAL: PAINLEVEL: EXTREME PAIN (8)

## 2018-10-09 NOTE — MR AVS SNAPSHOT
After Visit Summary   10/9/2018    Danielle Cook    MRN: [id number]           Patient Information     Date Of Birth          1945        Visit Information        Provider Department      10/9/2018 2:45 PM Av De León MD; LANGUAGE [facility]        Today's Diagnoses     Intractable headache, unspecified chronicity pattern, unspecified headache type    -  1       Follow-ups after your visit        Follow-up notes from your care team     Return in about 4 weeks (around 11/6/2018).      Your next 10 appointments already scheduled     Oct 23, 2018 10:30 AM CDT   Lab with  LAB   [facility] ([facility])    [address]  1st Floor  [facility] [address] [phone]            Oct 23, 2018 11:30 AM CDT   (Arrive by 11:15 AM)   Return General Liver with Nataly Darnell MD   [facility] Hepatology ([facility])    [address]  [address]  [facility] [address] [phone]            Oct 23, 2018  2:00 PM CDT   (Arrive by 1:45 PM)   Return Visit with DEX Queen   [facility] Inst for Prostate and Urologic Cancers ([facility])    [address]  4th Floor  [facility] [address] [phone]            Dec 21, 2018  8:45 AM CST   Lab with UC LAB   [facility] ([facility])    [address]  1st Floor  [facility] [id number] [phone]            Dec 21, 2018  9:40 AM CST   (Arrive by 9:10 AM)   Return Visit with Beto Alvarado MD   [facility] Nephrology ([facility])    [address]  [address]  [facility] [address] [phone]              Who to contact     Please call your clinic at [phone] to:    Ask questions about your health    Make or cancel appointments    Discuss your medicines    Learn about your test results    Speak to your doctor            Additional Information  "About Your Visit        Care EveryWhere ID     This is your Care EveryWhere ID. This could be used by other organizations to access your [employer] medical records  [id number]        Your Vitals Were     Pulse Height Pulse Oximetry BMI (Body Mass Index)          101 1.727 m (5' 8\") 96% 25.7 kg/m2         Blood Pressure from Last 3 Encounters:   10/11/18 148/63   10/09/18 107/65   10/02/18 134/69    Weight from Last 3 Encounters:   10/10/18 77.1 kg (170 lb)   10/09/18 76.7 kg (169 lb)   10/02/18 73 kg (161 lb)              Today, you had the following     No orders found for display       Primary Care Provider Office Phone # Fax #    Andrey Zan Flores [phone]       [facility] [address] [address]        Equal Access to Services     [facility]: Hadii aad ku hadasho Soomaali, waaxda luqadaha, qaybta kaalmada adeegyada, waxay idiin hayaan adepalmira goinsaratiffany markham . So [facility] [phone].    ATENCIÓN: Si habla español, tiene a whitehead disposición servicios gratuitos de asistencia lingüística. Roque al [phone].    We comply with applicable federal civil rights laws and [place] laws. We do not discriminate on the basis of race, color, national origin, age, disability, sex, sexual orientation, or gender identity.            Thank you!     Thank you for choosing [facility] NEUROLOGY  for your care. Our goal is always to provide you with excellent care. Hearing back from our patients is one way we can continue to improve our services. Please take a few minutes to complete the written survey that you may receive in the mail after your visit with us. Thank you!             Your Updated Medication List - Protect others around you: Learn how to safely use, store and throw away your medicines at www.disposemymeds.org.          This list is accurate as of 10/9/18  5:03 PM.  Always use your most recent med list.                   Brand Name Dispense Instructions for use " Diagnosis    acetaminophen 325 MG tablet    TYLENOL    100 tablet    Take 1 tablet (325 mg) by mouth every 6 hours as needed for mild pain    Other chronic pain       * amLODIPine 5 MG tablet    NORVASC    30 tablet    Take 2 tablets (10 mg) by mouth daily    Essential hypertension with goal blood pressure less than 140/90       * amLODIPine 10 MG tablet    NORVASC          * aspirin 81 MG tablet     30 tablet    Take 1 tablet (81 mg) by mouth daily    Small vessel disease       * ASPIRIN LOW DOSE 81 MG EC tablet   Generic drug:  aspirin           ferrous fumarate 324 (106 Fe) MG Tabs tablet    FERRETTS     Take by mouth daily        Ferrous Sulfate 324 (65 Fe) MG Tbec           * gabapentin 300 MG tablet    NEURONTIN     Take 600 mg by mouth 3 times daily    Chronic hepatitis C (H), Renal insufficiency       * gabapentin 600 MG tablet    NEURONTIN          levothyroxine 50 MCG tablet    SYNTHROID/LEVOTHROID          lidocaine (viscous) 2 % solution    XYLOCAINE          nortriptyline 25 MG capsule    PAMELOR          omeprazole 40 MG capsule    priLOSEC    60 capsule    Take 1 capsule (40 mg) by mouth daily (with lunch)    Esophagitis, reflux       oxyCODONE IR 5 MG tablet    ROXICODONE          polyethylene glycol powder    MIRALAX/GLYCOLAX          predniSONE 20 MG tablet    DELTASONE    60 tablet    3 tabs po x 4 days, then 2 tabs x 4 days, then 1 tab daily until seen    Temporal arteritis syndrome (H)       tamsulosin 0.4 MG capsule    FLOMAX    60 capsule    Take 1 capsule (0.4 mg) by mouth daily    Nocturia       * Notice:  This list has 6 medication(s) that are the same as other medications prescribed for you. Read the directions carefully, and ask your doctor or other care provider to review them with you.

## 2018-10-09 NOTE — ED PROVIDER NOTES
History     Chief Complaint   Patient presents with     Melena     Black stool for 2-3 days     The history is provided by the patient and the spouse. A  was used.     Danielle Cook is a 73 year old male with a history of hypertension, hyperlipidemia stage III CKD and chronic hep C w/o coma who presents to the Emergency Department for an evaluation of melena. Patient complains of experiencing black stools for the past 3 days. Patient denies a history of these symptoms or a bloody stools.  Patient also complains of abdominal distention and discomfort.  Patient denied swelling in his lower extremities. No other symptoms noted.      I spoke with the neurologist who sent patient from the clinic.  He states that patient was recently started on high-dose prednisone for his headaches.  He was in clinic today when he mentioned that his been having black stools for the past several days.  He also notes some abdominal discomfort.  He has not had previous similar occurrences in the past.        Past Medical History:   Diagnosis Date     CKD (chronic kidney disease)      CKD (chronic kidney disease) stage 3, GFR 30-59 ml/min (H)      Hep C w/o coma, chronic (H)      Hepatitis C virus infection      Hypertension        Past Surgical History:   Procedure Laterality Date     COLONOSCOPY N/A 1/19/2018    Procedure: COLONOSCOPY;  COMBINED ESOPHAGOSCOPY, GASTROSCOPY, DUODENOSCOPY (EGD) REMOVE TUMOR/POYP/LESION BY SNARE, CONTROL BLEED,BANDING/LIGATION OF VARICES Colonoscopy;  Surgeon: Xavier Sutotn MD;  Location:  GI     ESOPHAGOSCOPY, GASTROSCOPY, DUODENOSCOPY (EGD), COMBINED N/A 11/4/2015    Procedure: COMBINED ESOPHAGOSCOPY, GASTROSCOPY, DUODENOSCOPY (EGD), BIOPSY SINGLE OR MULTIPLE;  Surgeon: Inocente Do MD;  Location:  GI     ESOPHAGOSCOPY, GASTROSCOPY, DUODENOSCOPY (EGD), COMBINED N/A 3/29/2018    Procedure: COMBINED ESOPHAGOSCOPY, GASTROSCOPY, DUODENOSCOPY (EGD);  egd;  Surgeon: Abdirashid  Danielle RODRIGUEZ MD;  Location:  GI     ESOPHAGOSCOPY, GASTROSCOPY, DUODENOSCOPY (EGD), COMBINED N/A 9/10/2018    Procedure: COMBINED ESOPHAGOSCOPY, GASTROSCOPY, DUODENOSCOPY (EGD);  egd;  Surgeon: Xavier Sutton MD;  Location: [facility]       Family History   Problem Relation Age of Onset     Hypertension Father        Social History   Substance Use Topics     Smoking status: Never Smoker     Smokeless tobacco: Never Used     Alcohol use No     No current facility-administered medications for this encounter.      Current Outpatient Prescriptions   Medication     acetaminophen (TYLENOL) 325 MG tablet     amLODIPine (NORVASC) 10 MG tablet     amLODIPine (NORVASC) 5 MG tablet     aspirin 81 MG tablet     ASPIRIN LOW DOSE 81 MG EC tablet     ferrous fumarate (FERRETTS) 324 (106 Fe) MG TABS tablet     Ferrous Sulfate 324 (65 Fe) MG TBEC     gabapentin (NEURONTIN) 300 MG tablet     gabapentin (NEURONTIN) 600 MG tablet     levothyroxine (SYNTHROID/LEVOTHROID) 50 MCG tablet     lidocaine, viscous, (XYLOCAINE) 2 % solution     nortriptyline (PAMELOR) 25 MG capsule     omeprazole (PRILOSEC) 40 MG capsule     oxyCODONE IR (ROXICODONE) 5 MG tablet     polyethylene glycol (MIRALAX/GLYCOLAX) powder     predniSONE (DELTASONE) 20 MG tablet     tamsulosin (FLOMAX) 0.4 MG capsule      No Known Allergies    I have reviewed the Medications, Allergies, Past Medical and Surgical History, and Social History in the Epic system.    Review of Systems   Constitutional: Negative for fever.   HENT: Negative for congestion.    Eyes: Negative for redness.   Respiratory: Negative for shortness of breath.    Cardiovascular: Negative for chest pain.   Gastrointestinal: Positive for abdominal distention. Negative for blood in stool. Abdominal pain: Discomfort.   Genitourinary: Negative for difficulty urinating.   Musculoskeletal: Negative for arthralgias and neck stiffness.   Skin: Negative for color change.   Neurological: Negative for  headaches.   Psychiatric/Behavioral: Negative for confusion.   All other systems reviewed and are negative.      Physical Exam   BP: 139/72  Heart Rate: 67  Temp: 97.6  F (36.4  C)  Resp: 12  SpO2: 100 %      Physical Exam   Constitutional: He is oriented to person, place, and time. He appears well-developed and well-nourished. No distress.   HENT:   Head: Normocephalic and atraumatic.   Mouth/Throat: No oropharyngeal exudate.   Eyes: Pupils are equal, round, and reactive to light. Right eye exhibits no discharge. Left eye exhibits no discharge. No scleral icterus.   Neck: Normal range of motion. Neck supple.   Cardiovascular: Normal rate, regular rhythm, normal heart sounds and intact distal pulses.  Exam reveals no gallop and no friction rub.    No murmur heard.  Pulmonary/Chest: Effort normal and breath sounds normal. No respiratory distress. He has no wheezes. He exhibits no tenderness.   Abdominal: Soft. Bowel sounds are normal. He exhibits distension. There is no tenderness.   Genitourinary: Rectal exam shows guaiac positive stool.   Musculoskeletal: Normal range of motion. He exhibits no edema, tenderness or deformity.   Neurological: He is alert and oriented to person, place, and time. No cranial nerve deficit.   Skin: Skin is warm and dry. No rash noted. He is not diaphoretic. No erythema. No pallor.   Psychiatric: He has a normal mood and affect.   Nursing note and vitals reviewed.      ED Course     ED Course     Results for orders placed or performed during the hospital encounter of 10/09/18   CT Abdomen Pelvis w Contrast    Narrative    EXAMINATION: CT ABDOMEN PELVIS W CONTRAST, 10/9/2018 9:21 PM    TECHNIQUE:  Helical CT images from the lung bases through the  symphysis pubis were obtained  with IV contrast. Contrast dose:  iopamidol (ISOVUE-370) solution 104 mL    COMPARISON: Abdominal MRI 5/2/2018    HISTORY: Abd distension/pain. Low GFR. Hx of cirrhosis;     FINDINGS:  Subsegmental atelectasis and  parenchymal bands in the right lower lobe  and lingula. Redemonstrated enlarged cardiophrenic lymph nodes. The  largest is on series 5 image 59 in the right cardiophrenic fat  measuring 18 mm, which had measured 14 mm on 5/2/2018.    Redemonstrated changes of liver cirrhosis. No suspicious liver lesions  seen. The gallbladder is not distended. Pericholecystic fluid, which  is nonspecific in the setting of liver disease and ascites. No  splenomegaly. Redemonstrated small sliding hiatal hernia, including  significant herniation of mesenteric fat into the esophageal hiatus.  Unremarkable adrenal glands. Symmetric enhancement of the kidneys. No  renal mass. No hydronephrosis. No renal stone. No ureteral stone or  ureteral nephrosis. Normal urinary bladder.    No bowel obstruction. New mild to moderate ascites. No suspiciously  enlarged lymph nodes in the abdomen or pelvis.    No aggressive osseous lesion. AVN of the hips.      Impression    IMPRESSION:   1. Redemonstrated findings of cirrhosis. New evidence of worsening  portal hypertension, including new mild to moderate ascites since the  MRI on 5/2/2018. No liver mass visualized on this single phase study.  2. Pericholecystic fluid, nonspecific in the setting of liver disease  and ascites. No abnormal gallbladder distention or gallstone.  3. Redemonstrated moderate sliding hiatal hernia containing  significant mesenteric fat.    I have personally reviewed the examination and initial interpretation  and I agree with the findings.    JOEY ROWLAND MD   CBC with platelets differential   Result Value Ref Range    WBC 13.8 (H) 4.0 - 11.0 10e9/L    RBC Count 4.22 (L) 4.4 - 5.9 10e12/L    Hemoglobin 12.7 (L) 13.3 - 17.7 g/dL    Hematocrit 38.4 (L) 40.0 - 53.0 %    MCV 91 78 - 100 fl    MCH 30.1 26.5 - 33.0 pg    MCHC 33.1 31.5 - 36.5 g/dL    RDW Dimorphic population - unable to calculate 10.0 - 15.0 %    Platelet Count 197 150 - 450 10e9/L    Diff Method Automated Method      % Neutrophils 82.7 %    % Lymphocytes 5.9 %    % Monocytes 9.7 %    % Eosinophils 0.0 %    % Basophils 0.1 %    % Immature Granulocytes 1.6 %    Nucleated RBCs 1 (H) 0 /100    Absolute Neutrophil 11.4 (H) 1.6 - 8.3 10e9/L    Absolute Lymphocytes 0.8 0.8 - 5.3 10e9/L    Absolute Monocytes 1.3 0.0 - 1.3 10e9/L    Absolute Eosinophils 0.0 0.0 - 0.7 10e9/L    Absolute Basophils 0.0 0.0 - 0.2 10e9/L    Abs Immature Granulocytes 0.2 0 - 0.4 10e9/L    Absolute Nucleated RBC 0.2     Platelet Estimate Confirming automated cell count    INR   Result Value Ref Range    INR 1.05 0.86 - 1.14   Partial thromboplastin time   Result Value Ref Range    PTT 22 22 - 37 sec   Comprehensive metabolic panel   Result Value Ref Range    Sodium 142 133 - 144 mmol/L    Potassium 4.4 3.4 - 5.3 mmol/L    Chloride 108 94 - 109 mmol/L    Carbon Dioxide 24 20 - 32 mmol/L    Anion Gap 9 3 - 14 mmol/L    Glucose 109 (H) 70 - 99 mg/dL    Urea Nitrogen 43 (H) 7 - 30 mg/dL    Creatinine 1.24 0.66 - 1.25 mg/dL    GFR Estimate 57 (L) >60 mL/min/1.7m2    GFR Estimate If Black 69 >60 mL/min/1.7m2    Calcium 8.1 (L) 8.5 - 10.1 mg/dL    Bilirubin Total 0.9 0.2 - 1.3 mg/dL    Albumin 2.8 (L) 3.4 - 5.0 g/dL    Protein Total 7.2 6.8 - 8.8 g/dL    Alkaline Phosphatase 376 (H) 40 - 150 U/L     (H) 0 - 70 U/L     (H) 0 - 45 U/L   Troponin I   Result Value Ref Range    Troponin I ES <0.015 0.000 - 0.045 ug/L   EKG 12-lead, tracing only   Result Value Ref Range    Interpretation ECG Click View Image link to view waveform and result    ABO/Rh type and screen   Result Value Ref Range    ABO O     RH(D) Pos     Antibody Screen Neg     Test Valid Only At          [facility]    Specimen Expires 10/12/2018      Procedures             EKG Interpretation:      Interpreted by Alessio Richards  Time reviewed: 1837  Symptoms at time of EKG: None   Rhythm: normal sinus   Rate: 67  Axis: Normal  Ectopy: premature  ventricular contractions (infrequent)  Conduction: normal  ST Segments/ T Waves: T wave inversion V4 and V5  Q Waves: none  Comparison to prior: No old EKG available    Clinical Impression: no acute changes                Critical Care time:  none             Labs Ordered and Resulted from Time of ED Arrival Up to the Time of Departure from the ED - No data to display         Assessments & Plan (with Medical Decision Making)   Is a 73-year-old male with a history of cirrhosis who presents with GI bleed.  He has been having melena for the past 3 days.  He was recently started on steroids for his chronic headaches.  H&H is currently stable.  He also has considerable patient of his transaminases from previous which appears to be a worsening of his cirrhosis.  He also has new ascites.  Patient was given Protonix in the emergency department.  Will admit for further monitoring and possible treatment.    I have reviewed the nursing notes.    I have reviewed the findings, diagnosis, plan and need for follow up with the patient.    New Prescriptions    No medications on file       Final diagnoses:   None     Tomas JAMISON, am serving as a trained medical scribe to document services personally performed by Alessio Richards DO, based on the provider's statements to me.      Alessio JAMISON DO, was physically present and have reviewed and verified the accuracy of this note documented by Tomas Talley.     10/9/2018   [facility], [place], EMERGENCY DEPARTMENT     Alessio Richards DO  10/09/18 [id number]

## 2018-10-09 NOTE — LETTER
Transition Communication Hand-off for Care Transitions to Next Level of Care Provider    Name: Danielle Cook  : 1945  MRN #: [id number]  Primary Care Provider: Andrey Flores     Primary Clinic: [facility]  [facility] [address]     Reason for Hospitalization:    Gastrointestinal hemorrhage, unspecified gastrointestinal hemorrhage type [K92.2]    Admit Date/Time: 10/9/2018  5:03 PM  Discharge Date: 10/12/2018    Payor Source: Payor: [employer] / Plan: [employer] [place] / Product Type: HMO /     Follow-up plan:  Future Appointments  Date Time Provider Department Center   10/23/2018 10:30 AM  [facility]   10/23/2018 11:30 AM Nataly Darnell MD [facility]   10/23/2018 2:00 PM Olivia Romero PA [facility]   2018 8:45 AM  [facility]   2018 9:40 AM Beto Alvarado MD [facility]       Key Recommendations:  Please review AVS    Cayla Hughes RN, BSN, PHN  Medicine Care Coordinator  [facility], 5 & 9 and [facility]        AVS/Discharge Summary is the source of truth; this is a helpful guide for improved communication of patient story

## 2018-10-09 NOTE — ED NOTES
Bed: ED09  Expected date: 10/9/18  Expected time:   Means of arrival:   Comments:  Jack Cook, [phone], from neurology clinic with concern for GI bleed (recently started on prednisone for HA)

## 2018-10-09 NOTE — PROGRESS NOTES
Service Date: 10/09/2018      Andrey Flores MD   [facility] [place] [address]      RE: Danielle Cook   MRN: [id number]   : 1945      Dear Dr. Flores:      I have seen Mr. Cook several times in the recent past with severe intractable headaches.  He was here a couple of days ago and was having unbearable pain and tenderness of his scalp.  He has had a very high sedimentation rate, part of which could be related to his chronic liver disease and cirrhosis.  We discussed this curbside with one of the attendings in the Medicine Clinic and they said we could use steroids for treatment of possible temporal arteritis in spite of the liver cirrhosis and hepatic syndrome.  We started him on 60 mg a day with a quick taper course and had him come back.  He came back today and is reporting to have black stools.  He said he has never had them before.  He also feel his abdomen has become more distended.  He is having some discomfort but not severe.        PHYSICAL EXAMINATION:   GENERAL:   He does show his discomfort with a tender abdomen.     VITAL SIGNS:   Blood pressure 107/65.  His pulse is 101.  O2 sats are 96.        We will send him to the emergency room because of the tarry stools for further evaluation and we will notify his Dr. Darnell about this development.      Sincerely,       MD DONYA Peterson MD             D: 10/09/2018   T: 10/09/2018   MT: AKA      Name:     DANIELLE COOK   MRN:      [id number]        Account:      [id number]   :      1945           Service Date: 10/09/2018      Document: [id number]

## 2018-10-09 NOTE — IP AVS SNAPSHOT
[address] [address]    Phone:  [phone]                                       After Visit Summary   10/9/2018    Danielle Cook    MRN: [id number]           After Visit Summary Signature Page     I have received my discharge instructions, and my questions have been answered. I have discussed any challenges I see with this plan with the nurse or doctor.    ..........................................................................................................................................  Patient/Patient Representative Signature      ..........................................................................................................................................  Patient Representative Print Name and Relationship to Patient    ..................................................               ................................................  Date                                   Time    ..........................................................................................................................................  Reviewed by Signature/Title    ...................................................              ..............................................  Date                                               Time          22EPIC Rev 08/18

## 2018-10-09 NOTE — LETTER
10/9/2018       RE: Danielle Cook  [address]  [facility] [address]     Dear Colleague,    Thank you for referring your patient, Danielle Cook, to the [facility] NEUROLOGY at [facility]. Please see a copy of my visit note below.    Service Date: 10/09/2018      Andrey Flores MD   [facility]    [address] [place] [address]      RE: Danielle Cook   MRN: [id number]   : 1945      Dear Dr. Flores:      I have seen Mr. Cook several times in the recent past with severe intractable headaches.  He was here a couple of days ago and was having unbearable pain and tenderness of his scalp.  He has had a very high sedimentation rate, part of which could be related to his chronic liver disease and cirrhosis.  We discussed this curbside with one of the attendings in the Medicine Clinic and they said we could use steroids for treatment of possible temporal arteritis in spite of the liver cirrhosis and hepatic syndrome.  We started him on 60 mg a day with a quick taper course and had him come back.  He came back today and is reporting to have black stools.  He said he has never had them before.  He also feel his abdomen has become more distended.  He is having some discomfort but not severe.        PHYSICAL EXAMINATION:   GENERAL:   He does show his discomfort with a tender abdomen.     VITAL SIGNS:   Blood pressure 107/65.  His pulse is 101.  O2 sats are 96.        We will send him to the emergency room because of the tarry stools for further evaluation and we will notify his Dr. Darnell about this development.      Sincerely,       Av De León MD

## 2018-10-09 NOTE — IP AVS SNAPSHOT
MRN:[id number]                      After Visit Summary   10/9/2018    Danielle Cook    MRN: [id number]           Thank you!     Thank you for choosing [facility] for your care. Our goal is always to provide you with excellent care. Hearing back from our patients is one way we can continue to improve our services. Please take a few minutes to complete the written survey that you may receive in the mail after you visit with us. Thank you!        Patient Information     Date Of Birth          1945        Designated Caregiver       Most Recent Value    Caregiver    Will someone help with your care after discharge? no      About your hospital stay     You were admitted on:  October 10, 2018 You last received care in the:  [address] [facility]    You were discharged on:  October 12, 2018        Reason for your hospital stay       GI bleeding                  Who to Call     For medical emergencies, please call 911.  For non-urgent questions about your medical care, please call your primary care provider or clinic, [phone]  For questions related to your surgery, please call your surgery clinic        Attending Provider     Provider Specialty    Alessio Richards DO Emergency Medicine    Zelda López MD Internal Medicine    [facility], Griffin Griffin MD Internal Medicine       Primary Care Provider Office Phone # Fax #    Andrey Zan Flores [phone]       When to contact your care team       Severe abdominal pain, brown or red vomit, black or bloody stools, dizziness/lightheadedness, fatigue. Let your doctor or neurologist know right away if you have unilateral scalp headache, jaw pain or vision changes.                  After Care Instructions     Activity       Your activity upon discharge: as tolerated            Diet       Follow this diet upon discharge:  regular                  Follow-up Appointments     Adult [facility] Follow-up and recommended labs  and tests       Follow up with your primary care provider early next week for hospital follow up and hemoglobin recheck.     Follow up with your primary care provider or neurologist if your headaches recur or worsen. Or as previously scheduled.    Appointments on [place] and/or [facility] (with [facility] or [facility] provider or service). Call [phone] if you haven't heard regarding these appointments within 7 days of discharge.                  Your next 10 appointments already scheduled     Oct 23, 2018 10:30 AM CDT   Lab with  LAB   [facility] ([facility])    [address]  1st Floor  [facility] [address]   [phone]            Oct 23, 2018 11:30 AM CDT   (Arrive by 11:15 AM)   Return General Liver with Nataly Darnell MD   [facility] Hepatology ([facility])    [address] [place] [address]  [address]  [facility] [address]   [phone]            Oct 23, 2018  2:00 PM CDT   (Arrive by 1:45 PM)   Return Visit with DEX Queen   [facility]rology and Inst for Prostate and Urologic Cancers ([facility])    [address] [place] [address]  4th Floor  [facility] [address]   [phone]            Dec 21, 2018  8:45 AM CST   Lab with  LAB   [facility] ([facility])    [address] [place] [address]  1st Floor  [facility] [address]   [phone]            Dec 21, 2018  9:40 AM CST   (Arrive by 9:10 AM)   Return Visit with Beto Alvarado MD   [facility] Nephrology ([facility])    [address] [place] [address]  [address]  [facility] [address]   [phone]              Pending Results     Date and Time Order Name Status Description    10/11/2018 2022 Hepatitis E Antibody IgG In process     10/11/2018 2022 Hepatitis Be antigen In process     10/11/2018 2022 Hepatitis C RNA quantitative In process     10/10/2018 0940 Cytology non gyn (Fluid) In process     10/10/2018 0940 fluid culture -  "Aerobic Bacterial Preliminary             Statement of Approval     Ordered          10/12/18 1409  I have reviewed and agree with all the recommendations and orders detailed in this document.  EFFECTIVE NOW     Approved and electronically signed by:  Antonio Pace MD             Admission Information     Date & Time Provider Department Dept. Phone    10/9/2018 Griffin Lynn MD [address] [facility] [place] [phone]      Your Vitals Were     Blood Pressure Pulse Temperature Respirations Height Weight    140/64 (BP Location: Right arm) 65 97.7  F (36.5  C) (Oral) 18 1.727 m (5' 8\") 76.1 kg (167 lb 12.8 oz)    Pulse Oximetry BMI (Body Mass Index)                96% 25.51 kg/m2          Care EveryWhere ID     This is your Care EveryWhere ID. This could be used by other organizations to access your [facility] medical records  [id number]        Equal Access to Services     ISA SORIANO : Hadii janette donnelly Sorohith, waaxda luqadaha, qaybta kaalmada ademeeta, jeannie markham . So [facility] [phone].    ATENCIÓN: Si habla español, tiene a whitehead disposición servicios gratuitos de asistencia lingüística. Llame al [phone].    We comply with applicable federal civil rights laws and [place] laws. We do not discriminate on the basis of race, color, national origin, age, disability, sex, sexual orientation, or gender identity.               Review of your medicines      START taking        Dose / Directions    benzocaine-menthol 15-3.6 MG lozenge   Commonly known as:  CEPACOL   Used for:  Sore throat        Dose:  1 lozenge   Place 1 lozenge inside cheek every 2 hours as needed for sore throat   Quantity:  84 lozenge   Refills:  0       ciprofloxacin 500 MG tablet   Commonly known as:  CIPRO   Used for:  Gastrointestinal hemorrhage, unspecified gastrointestinal hemorrhage type        Dose:  500 mg   Start taking on:  10/13/2018   Take 1 tablet (500 mg) by mouth every 12 hours for " 4 days   Quantity:  8 tablet   Refills:  0         CONTINUE these medicines which may have CHANGED, or have new prescriptions. If we are uncertain of the size of tablets/capsules you have at home, strength may be listed as something that might have changed.        Dose / Directions    amLODIPine 10 MG tablet   Commonly known as:  NORVASC   This may have changed:  Another medication with the same name was removed. Continue taking this medication, and follow the directions you see here.        Refills:  0       ASPIRIN LOW DOSE 81 MG EC tablet   This may have changed:  Another medication with the same name was removed. Continue taking this medication, and follow the directions you see here.   Generic drug:  aspirin        Refills:  0       gabapentin 300 MG tablet   Commonly known as:  NEURONTIN   This may have changed:  Another medication with the same name was removed. Continue taking this medication, and follow the directions you see here.   Used for:  Chronic hepatitis C (H), Renal insufficiency        Dose:  600 mg   Take 600 mg by mouth 3 times daily   Refills:  0       omeprazole 40 MG capsule   Commonly known as:  priLOSEC   This may have changed:  when to take this   Used for:  Esophagitis, reflux        Dose:  40 mg   Take 1 capsule (40 mg) by mouth 2 times daily   Quantity:  60 capsule   Refills:  2         CONTINUE these medicines which have NOT CHANGED        Dose / Directions    acetaminophen 325 MG tablet   Commonly known as:  TYLENOL   Used for:  Other chronic pain        Dose:  325 mg   Take 1 tablet (325 mg) by mouth every 6 hours as needed for mild pain   Quantity:  100 tablet   Refills:  3       ferrous fumarate 324 (106 Fe) MG Tabs tablet   Commonly known as:  FERRETTS        Take by mouth daily   Refills:  0       levothyroxine 50 MCG tablet   Commonly known as:  SYNTHROID/LEVOTHROID        Refills:  0       lidocaine (viscous) 2 % solution   Commonly known as:  XYLOCAINE        Refills:  0        nortriptyline 25 MG capsule   Commonly known as:  PAMELOR        Refills:  0       oxyCODONE IR 5 MG tablet   Commonly known as:  ROXICODONE        Refills:  0       polyethylene glycol powder   Commonly known as:  MIRALAX/GLYCOLAX        Refills:  0       tamsulosin 0.4 MG capsule   Commonly known as:  FLOMAX   Used for:  Nocturia        Dose:  0.4 mg   Take 1 capsule (0.4 mg) by mouth daily   Quantity:  60 capsule   Refills:  5         STOP taking     Ferrous Sulfate 324 (65 Fe) MG Tbec           predniSONE 20 MG tablet   Commonly known as:  DELTASONE                Where to get your medicines      These medications were sent to [facility] [place], [place] - [address]  [address] [address]     Phone:  [phone]     benzocaine-menthol 15-3.6 MG lozenge    ciprofloxacin 500 MG tablet                Protect others around you: Learn how to safely use, store and throw away your medicines at www.disposemymeds.org.        ANTIBIOTIC INSTRUCTION     You've Been Prescribed an Antibiotic - Now What?  Your healthcare team thinks that you or your loved one might have an infection. Some infections can be treated with antibiotics, which are powerful, life-saving drugs. Like all medications, antibiotics have side effects and should only be used when necessary. There are some important things you should know about your antibiotic treatment.      Your healthcare team may run tests before you start taking an antibiotic.    Your team may take samples (e.g., from your blood, urine or other areas) to run tests to look for bacteria. These test can be important to determine if you need an antibiotic at all and, if you do, which antibiotic will work best.      Within a few days, your healthcare team might change or even stop your antibiotic.    Your team may start you on an antibiotic while they are working to find out what is making you sick.    Your team might change your antibiotic  because test results show that a different antibiotic would be better to treat your infection.    In some cases, once your team has more information, they learn that you do not need an antibiotic at all. They may find out that you don't have an infection, or that the antibiotic you're taking won't work against your infection. For example, an infection caused by a virus can't be treated with antibiotics. Staying on an antibiotic when you don't need it is more likely to be harmful than helpful.      You may experience side effects from your antibiotic.    Like all medications, antibiotics have side effects. Some of these can be serious.    Let you healthcare team know if you have any known allergies when you are admitted to the hospital.    One significant side effect of nearly all antibiotics is the risk of severe and sometimes deadly diarrhea caused by Clostridium difficile (C. Difficile). This occurs when a person takes antibiotics because some good germs are destroyed. Antibiotic use allows C. diificile to take over, putting patients at high risk for this serious infection.    As a patient or caregiver, it is important to understand your or your loved one's antibiotic treatment. It is especially important for caregivers to speak up when patients can't speak for themselves. Here are some important questions to ask your healthcare team.    What infection is this antibiotic treating and how do you know I have that infection?    What side effects might occur from this antibiotic?    How long will I need to take this antibiotic?    Is it safe to take this antibiotic with other medications or supplements (e.g., vitamins) that I am taking?     Are there any special directions I need to know about taking this antibiotic? For example, should I take it with food?    How will I be monitored to know whether my infection is responding to the antibiotic?    What tests may help to make sure the right antibiotic is prescribed for  me?      Information provided by:  www.cdc.gov/getsmart  U.S. Department of Health and Human Services  Centers for disease Control and Prevention  National Center for Emerging and Zoonotic Infectious Diseases  Division of Healthcare Quality Promotion             Medication List: This is a list of all your medications and when to take them. Check marks below indicate your daily home schedule. Keep this list as a reference.      Medications           Morning Afternoon Evening Bedtime As Needed    acetaminophen 325 MG tablet   Commonly known as:  TYLENOL   Take 1 tablet (325 mg) by mouth every 6 hours as needed for mild pain   Last time this was given:  325 mg on 10/11/2018  9:59 AM                                amLODIPine 10 MG tablet   Commonly known as:  NORVASC   Last time this was given:  10 mg on 10/12/2018  9:42 AM                                ASPIRIN LOW DOSE 81 MG EC tablet   Generic drug:  aspirin                                benzocaine-menthol 15-3.6 MG lozenge   Commonly known as:  CEPACOL   Place 1 lozenge inside cheek every 2 hours as needed for sore throat   Last time this was given:  1 lozenge on 10/12/2018 11:48 AM                                ciprofloxacin 500 MG tablet   Commonly known as:  CIPRO   Take 1 tablet (500 mg) by mouth every 12 hours for 4 days   Start taking on:  10/13/2018                                ferrous fumarate 324 (106 Fe) MG Tabs tablet   Commonly known as:  FERRETTS   Take by mouth daily   Last time this was given:  324 mg on 10/12/2018  9:41 AM                                gabapentin 300 MG tablet   Commonly known as:  NEURONTIN   Take 600 mg by mouth 3 times daily                                levothyroxine 50 MCG tablet   Commonly known as:  SYNTHROID/LEVOTHROID                                lidocaine (viscous) 2 % solution   Commonly known as:  XYLOCAINE                                nortriptyline 25 MG capsule   Commonly known as:  PAMELOR                                 omeprazole 40 MG capsule   Commonly known as:  priLOSEC   Take 1 capsule (40 mg) by mouth 2 times daily                                oxyCODONE IR 5 MG tablet   Commonly known as:  ROXICODONE   Last time this was given:  5 mg on 10/12/2018  5:45 AM                                polyethylene glycol powder   Commonly known as:  MIRALAX/GLYCOLAX                                tamsulosin 0.4 MG capsule   Commonly known as:  FLOMAX   Take 1 capsule (0.4 mg) by mouth daily   Last time this was given:  0.4 mg on 10/12/2018  9:41 AM

## 2018-10-09 NOTE — ED TRIAGE NOTES
Pt was at clinic with abd pain and bloating. Black stools were discussed and clinic suggested to present to the E.D.

## 2018-10-09 NOTE — ED NOTES
Initial Assessment: VSS. Pt has c/o abd discomfort and black stools. Language barrier noted. Family at bedside .  services called.

## 2018-10-10 ENCOUNTER — APPOINTMENT (OUTPATIENT)
Dept: ULTRASOUND IMAGING | Facility: CLINIC | Age: 73
DRG: 432 | End: 2018-10-10
Attending: INTERNAL MEDICINE
Payer: COMMERCIAL

## 2018-10-10 ENCOUNTER — OFFICE VISIT (OUTPATIENT)
Dept: INTERPRETER SERVICES | Facility: CLINIC | Age: 73
End: 2018-10-10
Payer: COMMERCIAL

## 2018-10-10 ENCOUNTER — OFFICE VISIT (OUTPATIENT)
Dept: INTERPRETER SERVICES | Facility: CLINIC | Age: 73
End: 2018-10-10

## 2018-10-10 PROBLEM — R10.9 ABDOMINAL PAIN: Status: ACTIVE | Noted: 2018-10-10

## 2018-10-10 LAB
ALBUMIN FLD-MCNC: 0.4 G/DL
APPEARANCE FLD: NORMAL
COLOR FLD: YELLOW
ERYTHROCYTE [DISTWIDTH] IN BLOOD BY AUTOMATED COUNT: ABNORMAL % (ref 10–15)
ERYTHROCYTE [SEDIMENTATION RATE] IN BLOOD BY WESTERGREN METHOD: 21 MM/H (ref 0–20)
GLUCOSE FLD-MCNC: 114 MG/DL
GRAM STN SPEC: NORMAL
GRAM STN SPEC: NORMAL
HCT VFR BLD AUTO: 37.2 % (ref 40–53)
HGB BLD-MCNC: 11.9 G/DL (ref 13.3–17.7)
INTERPRETATION ECG - MUSE: NORMAL
LYMPHOCYTES NFR FLD MANUAL: 12 %
MCH RBC QN AUTO: 29.9 PG (ref 26.5–33)
MCHC RBC AUTO-ENTMCNC: 32 G/DL (ref 31.5–36.5)
MCV RBC AUTO: 94 FL (ref 78–100)
NEUTS BAND NFR FLD MANUAL: 16 %
OTHER CELLS FLD MANUAL: 72 %
PLATELET # BLD AUTO: 158 10E9/L (ref 150–450)
PROT FLD-MCNC: 0.8 G/DL
RBC # BLD AUTO: 3.98 10E12/L (ref 4.4–5.9)
SPECIMEN SOURCE FLD: NORMAL
SPECIMEN SOURCE: NORMAL
UPPER GI ENDOSCOPY: NORMAL
WBC # BLD AUTO: 9.1 10E9/L (ref 4–11)
WBC # FLD AUTO: 144 /UL

## 2018-10-10 PROCEDURE — 25000132 ZZH RX MED GY IP 250 OP 250 PS 637: Performed by: INTERNAL MEDICINE

## 2018-10-10 PROCEDURE — 49083 ABD PARACENTESIS W/IMAGING: CPT | Performed by: STUDENT IN AN ORGANIZED HEALTH CARE EDUCATION/TRAINING PROGRAM

## 2018-10-10 PROCEDURE — 99223 1ST HOSP IP/OBS HIGH 75: CPT | Mod: AI | Performed by: INTERNAL MEDICINE

## 2018-10-10 PROCEDURE — 25000132 ZZH RX MED GY IP 250 OP 250 PS 637: Performed by: STUDENT IN AN ORGANIZED HEALTH CARE EDUCATION/TRAINING PROGRAM

## 2018-10-10 PROCEDURE — T1013 SIGN LANG/ORAL INTERPRETER: HCPCS | Mod: U3

## 2018-10-10 PROCEDURE — 25000131 ZZH RX MED GY IP 250 OP 636 PS 637: Performed by: INTERNAL MEDICINE

## 2018-10-10 PROCEDURE — G0500 MOD SEDAT ENDO SERVICE >5YRS: HCPCS | Performed by: INTERNAL MEDICINE

## 2018-10-10 PROCEDURE — 25000128 H RX IP 250 OP 636: Performed by: STUDENT IN AN ORGANIZED HEALTH CARE EDUCATION/TRAINING PROGRAM

## 2018-10-10 PROCEDURE — 82945 GLUCOSE OTHER FLUID: CPT | Performed by: INTERNAL MEDICINE

## 2018-10-10 PROCEDURE — 00000102 ZZHCL STATISTIC CYTO WRIGHT STAIN TC: Performed by: INTERNAL MEDICINE

## 2018-10-10 PROCEDURE — 25000128 H RX IP 250 OP 636: Performed by: INTERNAL MEDICINE

## 2018-10-10 PROCEDURE — 88305 TISSUE EXAM BY PATHOLOGIST: CPT | Performed by: INTERNAL MEDICINE

## 2018-10-10 PROCEDURE — 93975 VASCULAR STUDY: CPT | Mod: TC

## 2018-10-10 PROCEDURE — C9113 INJ PANTOPRAZOLE SODIUM, VIA: HCPCS | Performed by: STUDENT IN AN ORGANIZED HEALTH CARE EDUCATION/TRAINING PROGRAM

## 2018-10-10 PROCEDURE — 45398 COLONOSCOPY W/BAND LIGATION: CPT | Performed by: INTERNAL MEDICINE

## 2018-10-10 PROCEDURE — 89051 BODY FLUID CELL COUNT: CPT | Performed by: INTERNAL MEDICINE

## 2018-10-10 PROCEDURE — 85652 RBC SED RATE AUTOMATED: CPT | Performed by: INTERNAL MEDICINE

## 2018-10-10 PROCEDURE — 85027 COMPLETE CBC AUTOMATED: CPT | Performed by: STUDENT IN AN ORGANIZED HEALTH CARE EDUCATION/TRAINING PROGRAM

## 2018-10-10 PROCEDURE — 25000125 ZZHC RX 250: Performed by: INTERNAL MEDICINE

## 2018-10-10 PROCEDURE — 87070 CULTURE OTHR SPECIMN AEROBIC: CPT | Performed by: INTERNAL MEDICINE

## 2018-10-10 PROCEDURE — 36415 COLL VENOUS BLD VENIPUNCTURE: CPT | Performed by: INTERNAL MEDICINE

## 2018-10-10 PROCEDURE — 80048 BASIC METABOLIC PNL TOTAL CA: CPT | Performed by: STUDENT IN AN ORGANIZED HEALTH CARE EDUCATION/TRAINING PROGRAM

## 2018-10-10 PROCEDURE — 36415 COLL VENOUS BLD VENIPUNCTURE: CPT | Performed by: STUDENT IN AN ORGANIZED HEALTH CARE EDUCATION/TRAINING PROGRAM

## 2018-10-10 PROCEDURE — 82042 OTHER SOURCE ALBUMIN QUAN EA: CPT | Performed by: INTERNAL MEDICINE

## 2018-10-10 PROCEDURE — 00000155 ZZHCL STATISTIC H-CELL BLOCK W/STAIN: Performed by: INTERNAL MEDICINE

## 2018-10-10 PROCEDURE — 40000556 ZZH STATISTIC PERIPHERAL IV START W US GUIDANCE

## 2018-10-10 PROCEDURE — 88112 CYTOPATH CELL ENHANCE TECH: CPT | Performed by: INTERNAL MEDICINE

## 2018-10-10 PROCEDURE — 87205 SMEAR GRAM STAIN: CPT | Performed by: INTERNAL MEDICINE

## 2018-10-10 PROCEDURE — 06L38CZ OCCLUSION OF ESOPHAGEAL VEIN WITH EXTRALUMINAL DEVICE, VIA NATURAL OR ARTIFICIAL OPENING ENDOSCOPIC: ICD-10-PCS | Performed by: INTERNAL MEDICINE

## 2018-10-10 PROCEDURE — 25000131 ZZH RX MED GY IP 250 OP 636 PS 637: Performed by: STUDENT IN AN ORGANIZED HEALTH CARE EDUCATION/TRAINING PROGRAM

## 2018-10-10 PROCEDURE — 12000001 ZZH R&B MED SURG/OB UMMC

## 2018-10-10 PROCEDURE — 84157 ASSAY OF PROTEIN OTHER: CPT | Performed by: INTERNAL MEDICINE

## 2018-10-10 RX ORDER — NALOXONE HYDROCHLORIDE 0.4 MG/ML
.1-.4 INJECTION, SOLUTION INTRAMUSCULAR; INTRAVENOUS; SUBCUTANEOUS
Status: DISCONTINUED | OUTPATIENT
Start: 2018-10-10 | End: 2018-10-12 | Stop reason: HOSPADM

## 2018-10-10 RX ORDER — LEVOTHYROXINE SODIUM 50 UG/1
50 TABLET ORAL DAILY
Status: DISCONTINUED | OUTPATIENT
Start: 2018-10-10 | End: 2018-10-10

## 2018-10-10 RX ORDER — PROCHLORPERAZINE MALEATE 5 MG
5 TABLET ORAL EVERY 6 HOURS PRN
Status: DISCONTINUED | OUTPATIENT
Start: 2018-10-10 | End: 2018-10-12 | Stop reason: HOSPADM

## 2018-10-10 RX ORDER — CEFTRIAXONE 1 G/1
1 INJECTION, POWDER, FOR SOLUTION INTRAMUSCULAR; INTRAVENOUS EVERY 24 HOURS
Status: DISCONTINUED | OUTPATIENT
Start: 2018-10-10 | End: 2018-10-12 | Stop reason: HOSPADM

## 2018-10-10 RX ORDER — HYDROMORPHONE HYDROCHLORIDE 1 MG/ML
.3-.5 INJECTION, SOLUTION INTRAMUSCULAR; INTRAVENOUS; SUBCUTANEOUS EVERY 4 HOURS PRN
Status: DISCONTINUED | OUTPATIENT
Start: 2018-10-10 | End: 2018-10-10

## 2018-10-10 RX ORDER — PROCHLORPERAZINE 25 MG
12.5 SUPPOSITORY, RECTAL RECTAL EVERY 12 HOURS PRN
Status: DISCONTINUED | OUTPATIENT
Start: 2018-10-10 | End: 2018-10-12 | Stop reason: HOSPADM

## 2018-10-10 RX ORDER — FENTANYL CITRATE 50 UG/ML
INJECTION, SOLUTION INTRAMUSCULAR; INTRAVENOUS PRN
Status: DISCONTINUED | OUTPATIENT
Start: 2018-10-10 | End: 2018-10-10 | Stop reason: HOSPADM

## 2018-10-10 RX ORDER — SIMETHICONE
LIQUID (ML) MISCELLANEOUS PRN
Status: DISCONTINUED | OUTPATIENT
Start: 2018-10-10 | End: 2018-10-10 | Stop reason: HOSPADM

## 2018-10-10 RX ORDER — ONDANSETRON 4 MG/1
4 TABLET, ORALLY DISINTEGRATING ORAL EVERY 6 HOURS PRN
Status: DISCONTINUED | OUTPATIENT
Start: 2018-10-10 | End: 2018-10-12 | Stop reason: HOSPADM

## 2018-10-10 RX ORDER — OXYCODONE HYDROCHLORIDE 5 MG/1
5 TABLET ORAL EVERY 6 HOURS PRN
Status: DISCONTINUED | OUTPATIENT
Start: 2018-10-10 | End: 2018-10-12 | Stop reason: HOSPADM

## 2018-10-10 RX ORDER — METOCLOPRAMIDE HYDROCHLORIDE 5 MG/ML
5 INJECTION INTRAMUSCULAR; INTRAVENOUS EVERY 6 HOURS PRN
Status: DISCONTINUED | OUTPATIENT
Start: 2018-10-10 | End: 2018-10-12 | Stop reason: HOSPADM

## 2018-10-10 RX ORDER — HYDROMORPHONE HYDROCHLORIDE 1 MG/ML
.3-.5 INJECTION, SOLUTION INTRAMUSCULAR; INTRAVENOUS; SUBCUTANEOUS EVERY 4 HOURS PRN
Status: COMPLETED | OUTPATIENT
Start: 2018-10-10 | End: 2018-10-10

## 2018-10-10 RX ORDER — METOCLOPRAMIDE 5 MG/1
5 TABLET ORAL EVERY 6 HOURS PRN
Status: DISCONTINUED | OUTPATIENT
Start: 2018-10-10 | End: 2018-10-12 | Stop reason: HOSPADM

## 2018-10-10 RX ORDER — ONDANSETRON 2 MG/ML
4 INJECTION INTRAMUSCULAR; INTRAVENOUS EVERY 6 HOURS PRN
Status: DISCONTINUED | OUTPATIENT
Start: 2018-10-10 | End: 2018-10-12 | Stop reason: HOSPADM

## 2018-10-10 RX ORDER — ACETAMINOPHEN 325 MG/1
325 TABLET ORAL EVERY 6 HOURS PRN
Status: DISCONTINUED | OUTPATIENT
Start: 2018-10-10 | End: 2018-10-12 | Stop reason: HOSPADM

## 2018-10-10 RX ADMIN — ONDANSETRON 4 MG: 4 TABLET, ORALLY DISINTEGRATING ORAL at 17:40

## 2018-10-10 RX ADMIN — OCTREOTIDE ACETATE 50 MCG/HR: 200 INJECTION, SOLUTION INTRAVENOUS; SUBCUTANEOUS at 10:34

## 2018-10-10 RX ADMIN — SODIUM CHLORIDE 8 MG/HR: 9 INJECTION, SOLUTION INTRAVENOUS at 19:46

## 2018-10-10 RX ADMIN — OXYCODONE HYDROCHLORIDE 5 MG: 5 TABLET ORAL at 16:09

## 2018-10-10 RX ADMIN — SODIUM CHLORIDE 1000 ML: 9 INJECTION, SOLUTION INTRAVENOUS at 10:32

## 2018-10-10 RX ADMIN — PROCHLORPERAZINE EDISYLATE 5 MG: 5 INJECTION INTRAMUSCULAR; INTRAVENOUS at 22:15

## 2018-10-10 RX ADMIN — ACETAMINOPHEN 325 MG: 325 TABLET, FILM COATED ORAL at 17:43

## 2018-10-10 RX ADMIN — Medication 0.5 MG: at 19:26

## 2018-10-10 RX ADMIN — ACETAMINOPHEN 325 MG: 325 TABLET, FILM COATED ORAL at 02:47

## 2018-10-10 RX ADMIN — CEFTRIAXONE 1 G: 1 INJECTION, POWDER, FOR SOLUTION INTRAMUSCULAR; INTRAVENOUS at 10:33

## 2018-10-10 RX ADMIN — OXYCODONE HYDROCHLORIDE 5 MG: 5 TABLET ORAL at 21:44

## 2018-10-10 RX ADMIN — BENZOCAINE AND MENTHOL 1 LOZENGE: 15; 3.6 LOZENGE ORAL at 19:46

## 2018-10-10 RX ADMIN — Medication 0.5 MG: at 15:12

## 2018-10-10 ASSESSMENT — ACTIVITIES OF DAILY LIVING (ADL)
ADLS_ACUITY_SCORE: 10

## 2018-10-10 NOTE — PLAN OF CARE
Problem: Patient Care Overview  Goal: Plan of Care/Patient Progress Review  Outcome: No Change  Patient denies pain. Up independently. Able to understand little English. I-pad used with GI MD and  used with Medicine MD. Abd ultrasound completed. Went down to Endoscopy by litter around 1345 with . Octreotide drip infusing. Continue to monitor and follow plan of care.    ** Patient back from Endoscopy around 1450 after having 4 bands applied and in extreme pain. Endoscopy RN mentioned that it is normal for pain due to the bands. MD aware and IV pain medication ordered. Will administer when verified by pharmacist.

## 2018-10-10 NOTE — PROGRESS NOTES
Medicine Procedure Note:    Procedure Name: Diagnostic Paracentesis  Proceduralist (primary): Dr. Maldonado  Pre-procedure diagnosis: ascites  Post-procedure diagnosis: ascites  Indication: Abdominal pain    Consent was obtained from Mr. Cook using an  and placed in the chart.   Ultrasound used to locate ascites; optimal pocket found to be in LLQ, and marked.  POCUS images permanently saved in the EMR.  Universal protocol performed with nursing.   7cc of 1% lidocaine used anesthetize with 25 gauge needle.   22 gauge needle to obtain fluid under ultrasound guidance, which was cloudy yellow in appearance, and was sent to lab. 20cc total fluid obtained.   Patient tolerated without any apparent complications.  Primary team notified.    Dragan Maldonado MD, MPH

## 2018-10-10 NOTE — OR NURSING
EGD under conscious sedation.  4 bands applied.  Pt tolerated procedure.  Report called to [facility] at 1426hrs.

## 2018-10-10 NOTE — H&P
Internal Medicine History and Physical - [facility], [place]  Date of Admission:  10/9/2018         Assessment and Plan:   Danielle Cook is a 73 year old male with a past medical history of cirrhosis 2/2 hepatitis C (s/p rx) c/b varices s/p banding, GERD presenting with melenic stools for the past three days with workup significant for elevated BUN in setting of stable Cr. Hb 12.7 and pt hemodynamically stable.     # Suspected UGIB  Pt presents with suspected acute UGIB in settting of recent steroid use w/ history of cirrhosis. Likely UGI source given melenic stools. No evidence of hemodynamic instability. Pt denies hematemesis and hematochezia.  Pt typed and screened in the ED. Will defer transfusion given mild anemia with Hb of 12.7. Orthostatics pending.  - Maintain Two PIVs  - Given pantoprazole 40 mg in ED  -  IVF resuscitation with 1 liter LR at 250 mL/hr   - Hold NSAIDs, steroids, ASA  - NPO for pending endoscopy  - GI consult for EGD/Colonoscopy    #History of cirrhosis 2/2 hepatitis C  #Elevated transaminases  # MELD-Na 9  CT abdomen with changes of liver cirrhosis without lesions, new mild to moderate ascites since prior MRI in May 2018. Per prior GI note, pt previously with compensated cirrhosis without ascites.  - Hepatology consulted  MELD-Na score: 9 at 10/9/2018  5:54 PM  MELD score: 9 at 10/9/2018  5:54 PM  Calculated from:  Serum Creatinine: 1.24 mg/dL at 10/9/2018  5:54 PM  Serum Sodium: 142 mmol/L (Rounded to 137) at 10/9/2018  5:54 PM  Total Bilirubin: 0.9 mg/dL (Rounded to 1) at 10/9/2018  5:54 PM  INR(ratio): 1.05 at 10/9/2018  5:54 PM  Age: 73 years    # Leukocytosis  Elevated WBC count in context of recent steroid use. PMN predominant. Pt afebrile without other signs of infection.    # Headache  Will discontinue prednisone as above given current suspected GI bleed    Chronic Medical Conditions  # HTN  - Hold PTA amlodipine    # Pain  Assessment:  Current Pain Score 9/10/2018   Patient currently in pain? yes   Pain score (0-10) -   Pain location Throat   Pain descriptors Hellen thompson pain level was assessed and he currently denies pain.      Diet:  NPO after midnight for likely EGD  Fluids: LR at 250 ml/hr  DVT Prophylaxis: Pneumatic Compression Devices  Code Status: Full Code    Disposition Plan   Expected discharge: 2 - 3 days; recommended to prior living arrangement once hemoglobin stable and cause of melena identified.     Entered: Zahira Bhakta 10/10/2018, 1:27 AM   Information in the above section will display in the discharge planner report.    The patient will be staffed in the AM.    Zahira Bhakta  [facility]   Pager: [phone]  Please see sticky note for cross cover information           Chief Complaint:     Melena  History is obtained from the patient using  at bedside. Patient is well-informed about his health, however, elements of HPI were lost in translation.         History of Present Illness:     Danielle Cook is a 73 year old [place] male who has a history of CKD 3, HTN, HCV s/p, complicated by cirrhosis, and PMR who is admitted for melenic stools for the last three days. Liver cirrhosis is well compensated with a MELD-Na of 9. Patient has undergone multiple EGDs and variceal banding with most recent EGD 9/10 with grade I varices and portal hypertensive gastropathy. No further findings noted. Patient does have history of recurrent headache and was started on a one week course of steroids by neurology. Subsequently developed melenic stools three days ago with mild intermittent epigastric pain. Pt denies SOB, palpitations, LE edema. Endorses some mild lightheadedness. He stopped taking ASA and prednisone the day prior to presentation with concerns for blood in his stools.           Review of Systems:     The 10 point Review of Systems is  negative other than noted in the HPI or here.          Past Medical History:     I have reviewed this patient's medical history and updated it with pertinent information if needed.   Past Medical History:   Diagnosis Date     CKD (chronic kidney disease)      CKD (chronic kidney disease) stage 3, GFR 30-59 ml/min (H)      Hep C w/o coma, chronic (H)      Hepatitis C virus infection      Hypertension              Past Surgical History:     I have reviewed this patient's surgical history and updated it with pertinent information if needed.  Past Surgical History:   Procedure Laterality Date     COLONOSCOPY N/A 1/19/2018    Procedure: COLONOSCOPY;  COMBINED ESOPHAGOSCOPY, GASTROSCOPY, DUODENOSCOPY (EGD) REMOVE TUMOR/POYP/LESION BY SNARE, CONTROL BLEED,BANDING/LIGATION OF VARICES Colonoscopy;  Surgeon: Xavier Sutton MD;  Location:  GI     ESOPHAGOSCOPY, GASTROSCOPY, DUODENOSCOPY (EGD), COMBINED N/A 11/4/2015    Procedure: COMBINED ESOPHAGOSCOPY, GASTROSCOPY, DUODENOSCOPY (EGD), BIOPSY SINGLE OR MULTIPLE;  Surgeon: Inocente Do MD;  Location:  GI     ESOPHAGOSCOPY, GASTROSCOPY, DUODENOSCOPY (EGD), COMBINED N/A 3/29/2018    Procedure: COMBINED ESOPHAGOSCOPY, GASTROSCOPY, DUODENOSCOPY (EGD);  egd;  Surgeon: Danielle Mendenhall MD;  Location: [facility]     ESOPHAGOSCOPY, GASTROSCOPY, DUODENOSCOPY (EGD), COMBINED N/A 9/10/2018    Procedure: COMBINED ESOPHAGOSCOPY, GASTROSCOPY, DUODENOSCOPY (EGD);  egd;  Surgeon: Xavier Sutton MD;  Location:  OR             Social History:     Social History   Substance Use Topics     Smoking status: Never Smoker     Smokeless tobacco: Never Used     Alcohol use No            Family History:     I have reviewed this patient's family history and updated it with pertinent information if needed.   Family History   Problem Relation Age of Onset     Hypertension Father             Medications:     Prior to Admission Medications   Prescriptions Last Dose Informant Patient Reported?  Taking?   ASPIRIN LOW DOSE 81 MG EC tablet Past Week at Unknown time  Yes Yes   Ferrous Sulfate 324 (65 Fe) MG TBEC Past Month at Unknown time  Yes Yes   acetaminophen (TYLENOL) 325 MG tablet Past Week at Unknown time  No Yes   Sig: Take 1 tablet (325 mg) by mouth every 6 hours as needed for mild pain   amLODIPine (NORVASC) 10 MG tablet Unknown at Unknown time  Yes No   amLODIPine (NORVASC) 5 MG tablet Unknown at Unknown time  No No   Sig: Take 2 tablets (10 mg) by mouth daily   aspirin 81 MG tablet Past Week at Unknown time  No Yes   Sig: Take 1 tablet (81 mg) by mouth daily   ferrous fumarate (FERRETTS) 324 (106 Fe) MG TABS tablet Past Month at Unknown time  Yes Yes   Sig: Take by mouth daily   gabapentin (NEURONTIN) 300 MG tablet 10/9/2018 at Unknown time  Yes Yes   Sig: Take 600 mg by mouth 3 times daily    gabapentin (NEURONTIN) 600 MG tablet 10/9/2018 at Unknown time  Yes Yes   levothyroxine (SYNTHROID/LEVOTHROID) 50 MCG tablet 10/9/2018 at Unknown time  Yes Yes   lidocaine, viscous, (XYLOCAINE) 2 % solution Unknown at Unknown time  Yes No   nortriptyline (PAMELOR) 25 MG capsule 10/9/2018 at Unknown time  Yes Yes   omeprazole (PRILOSEC) 40 MG capsule 10/9/2018 at Unknown time  No Yes   Sig: Take 1 capsule (40 mg) by mouth daily (with lunch)   oxyCODONE IR (ROXICODONE) 5 MG tablet Unknown at Unknown time  Yes No   polyethylene glycol (MIRALAX/GLYCOLAX) powder Unknown at Unknown time  Yes No   predniSONE (DELTASONE) 20 MG tablet 10/9/2018 at Unknown time  No Yes   Sig: 3 tabs po x 4 days, then 2 tabs x 4 days, then 1 tab daily until seen   tamsulosin (FLOMAX) 0.4 MG capsule Past Month at Unknown time  No Yes   Sig: Take 1 capsule (0.4 mg) by mouth daily      Facility-Administered Medications: None            Allergies:     No Known Allergies         Physical Exam:     Vital Signs: Temp: 97.8  F (36.6  C) Temp src: Oral BP: 144/72 Pulse: 86 Heart Rate: 78 Resp: 20 SpO2: 100 % O2 Device: None (Room air)    Weight:  170 lbs 0 oz    General Appearance: Alert, lying in bed, pleasant and conversant, NAD  Eyes: EOMI, no scleral icterus or conjunctival pallor  HEENT: NCAT  Respiratory: Lungs CTAB from the anterior without wheezes, crackles, or rhonchi  Cardiovascular: RRR, no MRG  GI: abdomen soft, NTND, BS +  Skin: warm and dry without rash on exposed skin  Musculoskeletal: no gross joint deformity  Neurologic: alert and oriented without focal deficits  Psychiatric: does not appear to respond to internal stimuli         Data:       Recent Labs  Lab 10/09/18  1754   WBC 13.8*   HGB 12.7*   MCV 91      INR 1.05      POTASSIUM 4.4   CHLORIDE 108   CO2 24   BUN 43*   CR 1.24   ANIONGAP 9   ABDIRASHID 8.1*   *   ALBUMIN 2.8*   PROTTOTAL 7.2   BILITOTAL 0.9   ALKPHOS 376*   *   *   TROPI <0.015         Recent Results (from the past 24 hour(s))   CT Abdomen Pelvis w Contrast    Narrative    EXAMINATION: CT ABDOMEN PELVIS W CONTRAST, 10/9/2018 9:21 PM    TECHNIQUE:  Helical CT images from the lung bases through the  symphysis pubis were obtained  with IV contrast. Contrast dose:  iopamidol (ISOVUE-370) solution 104 mL    COMPARISON: Abdominal MRI 5/2/2018    HISTORY: Abd distension/pain. Low GFR. Hx of cirrhosis;     FINDINGS:  Subsegmental atelectasis and parenchymal bands in the right lower lobe  and lingula. Redemonstrated enlarged cardiophrenic lymph nodes. The  largest is on series 5 image 59 in the right cardiophrenic fat  measuring 18 mm, which had measured 14 mm on 5/2/2018.    Redemonstrated changes of liver cirrhosis. No suspicious liver lesions  seen. The gallbladder is not distended. Pericholecystic fluid, which  is nonspecific in the setting of liver disease and ascites. No  splenomegaly. Redemonstrated small sliding hiatal hernia, including  significant herniation of mesenteric fat into the esophageal hiatus.  Unremarkable adrenal glands. Symmetric enhancement of the kidneys. No  renal mass. No  hydronephrosis. No renal stone. No ureteral stone or  ureteral nephrosis. Normal urinary bladder.    No bowel obstruction. New mild to moderate ascites. No suspiciously  enlarged lymph nodes in the abdomen or pelvis.    No aggressive osseous lesion. AVN of the hips.      Impression    IMPRESSION:   1. Redemonstrated findings of cirrhosis. New evidence of worsening  portal hypertension, including new mild to moderate ascites since the  MRI on 5/2/2018. No liver mass visualized on this single phase study.  2. Pericholecystic fluid, nonspecific in the setting of liver disease  and ascites. No abnormal gallbladder distention or gallstone.  3. Redemonstrated moderate sliding hiatal hernia containing  significant mesenteric fat.    I have personally reviewed the examination and initial interpretation  and I agree with the findings.    JOEY ROWLAND MD

## 2018-10-10 NOTE — PLAN OF CARE
Problem: Patient Care Overview  Goal: Plan of Care/Patient Progress Review  Outcome: No Change  Reason for admission: Black stools.   Hx: hepatitis C, Cirrhosis, GERD  Neuro: A&O.    Activity: Up independently in room.   Vitals: VSS on RA.   LDAS: R PIV S/L.   Cardiac: No acute changes this shift.   Respiratory: Stable on RA.    Skin: No skin issues noted.   Pain: C/o a headache, managed with Tylenol.    Diet: NPO.   Plan: EGD/colonoscopy consult today. Continue to monitor and follow POC.

## 2018-10-10 NOTE — ED NOTES
[facility], [place]   ED Nurse to Floor Handoff     Danielle Cook is a 73 year old male who speaks Thai and lives with a spouse,  in a home  They arrived in the ED by ambulance from clinic    ED Chief Complaint: Melena (Black stool for 2-3 days)    ED Dx;   Final diagnoses:   Gastrointestinal hemorrhage, unspecified gastrointestinal hemorrhage type         Needed?: Yes    Allergies: No Known Allergies.  Past Medical Hx:   Past Medical History:   Diagnosis Date     CKD (chronic kidney disease)      CKD (chronic kidney disease) stage 3, GFR 30-59 ml/min (H)      Hep C w/o coma, chronic (H)      Hepatitis C virus infection      Hypertension       Baseline Mental status: WDL  Current Mental Status changes: at basesline    Infection present or suspected this encounter: no  Sepsis suspected: No  Isolation type: No active isolations     Activity level - Baseline/Home:  Independent  Activity Level - Current:   Independent    Bariatric equipment needed?: No    In the ED these meds were given:   Medications   iopamidol (ISOVUE-370) solution 104 mL (104 mLs Intravenous Given 10/9/18 2111)   sodium chloride (PF) 0.9% PF flush 76 mL (76 mLs Intravenous Given 10/9/18 2111)   pantoprazole (PROTONIX) 40 mg IV push injection (40 mg Intravenous Given 10/9/18 2257)       Drips running?  No    Home pump  No    Current LDAs  Peripheral IV 10/09/18 Right Upper forearm (Active)   Site Assessment WDL 10/9/2018  5:55 PM   Number of days:0       Labs results:   Labs Ordered and Resulted from Time of ED Arrival Up to the Time of Departure from the ED   CBC WITH PLATELETS DIFFERENTIAL - Abnormal; Notable for the following:        Result Value    WBC 13.8 (*)     RBC Count 4.22 (*)     Hemoglobin 12.7 (*)     Hematocrit 38.4 (*)     Nucleated RBCs 1 (*)     Absolute Neutrophil 11.4 (*)     All other components within normal limits   COMPREHENSIVE METABOLIC PANEL - Abnormal; Notable for the  following:     Glucose 109 (*)     Urea Nitrogen 43 (*)     GFR Estimate 57 (*)     Calcium 8.1 (*)     Albumin 2.8 (*)     Alkaline Phosphatase 376 (*)      (*)      (*)     All other components within normal limits   INR   PARTIAL THROMBOPLASTIN TIME   TROPONIN I   PERIPHERAL IV CATHETER   ABO/RH TYPE AND SCREEN       Imaging Studies:   Recent Results (from the past 24 hour(s))   CT Abdomen Pelvis w Contrast    Narrative    EXAMINATION: CT ABDOMEN PELVIS W CONTRAST, 10/9/2018 9:21 PM    TECHNIQUE:  Helical CT images from the lung bases through the  symphysis pubis were obtained  with IV contrast. Contrast dose:  iopamidol (ISOVUE-370) solution 104 mL    COMPARISON: Abdominal MRI 5/2/2018    HISTORY: Abd distension/pain. Low GFR. Hx of cirrhosis;     FINDINGS:  Subsegmental atelectasis and parenchymal bands in the right lower lobe  and lingula. Redemonstrated enlarged cardiophrenic lymph nodes. The  largest is on series 5 image 59 in the right cardiophrenic fat  measuring 18 mm, which had measured 14 mm on 5/2/2018.    Redemonstrated changes of liver cirrhosis. No suspicious liver lesions  seen. The gallbladder is not distended. Pericholecystic fluid, which  is nonspecific in the setting of liver disease and ascites. No  splenomegaly. Redemonstrated small sliding hiatal hernia, including  significant herniation of mesenteric fat into the esophageal hiatus.  Unremarkable adrenal glands. Symmetric enhancement of the kidneys. No  renal mass. No hydronephrosis. No renal stone. No ureteral stone or  ureteral nephrosis. Normal urinary bladder.    No bowel obstruction. New mild to moderate ascites. No suspiciously  enlarged lymph nodes in the abdomen or pelvis.    No aggressive osseous lesion. AVN of the hips.      Impression    IMPRESSION:   1. Redemonstrated findings of cirrhosis. New evidence of worsening  portal hypertension, including new mild to moderate ascites since the  MRI on 5/2/2018. No liver  mass visualized on this single phase study.  2. Pericholecystic fluid, nonspecific in the setting of liver disease  and ascites. No abnormal gallbladder distention or gallstone.  3. Redemonstrated moderate sliding hiatal hernia containing  significant mesenteric fat.    I have personally reviewed the examination and initial interpretation  and I agree with the findings.    JOEY ROWLAND MD       Recent vital signs:   /76  Temp 98.2  F (36.8  C) (Oral)  Resp 30  SpO2 100%    Cardiac Rhythm: Normal Sinus  Pt needs tele? No  Skin/wound Issues: None    Code Status: Full Code    Pain control: good    Nausea control: good    Abnormal labs/tests/findings requiring intervention:     Family present during ED course? Yes   Family Comments/Social Situation comments: pt needs      Tasks needing completion: None    Paula Solis RN  [facility]--     [phone] [facility]

## 2018-10-10 NOTE — CONSULTS
GASTROENTEROLOGY CONSULTATION      Date of Admission: 10/9/2018           Reason for Consultation:     We were asked by Dr. Lynn to evaluate this patient with HCV cirrhosis with GI bleeding         ASSESSMENT AND RECOMMENDATIONS:     Danielle Cook is a 73-year-old male with medical history significant for CKD stage III, HTN, GERD, hypothyroidism, iron deficiency anemia and HCV cirrhosis (completed HCV treatment with Harvoni and ribavirin in 2015, last seen by Dr. Block in 8/2018 ) complicated by EV s/p banding in 1/2018 who presented to the ED on 10/9 for black stool, concerning for GI bleed and decompensated liver cirrhosis.      # Upper GI bleeding   # Grade II EV from EGD s/p banded x 4   # Mild portal hypertensive gastropathy   Patient presented with melena. Vitals and hemoglobin are stable. Patient underwent banded of grade II EV with gastric polyp resection in 1/2018. Last EGD (9/10/2018) showed grade I esophageal varices and portal hypertensive gastropathy. Repeat EGD today (10/10/2018) showed grade II esophageal varices s/p completely eradicated, banded, portal hypertensive gastropathy and medium-sized hiatal hernia. Melena could be from EV bleeding but now no active bleeding.       # Decompensated HCV cirrhosis  HCV genotype 4 was diagnosed in 2013. He was successfully treated with Havoni and ribavarin in 2015. Last viral load check in 4/2016 showed SVR. MRI abdomen in 5/2018 showed no evidence of HCC. CT abdomen and pelvis with contrast on admission (10/9) showed new evidence of worsening portal hypertension, including new mild to moderate ascites. AST, ALT and ALP were also elevated. MELD-Na on admission was 9. Precipitating factor for decompensated is likely GI bleeding. No HE at this point.          Recommendations:    - Clear liquid diet when patient is awake   - Continue IV PPI, octreotide and ceftriaxone for now   - Trending hemoglobin and MELD-Na lab daily   - Diagnostic abdominal  "paracentesis, send for cell diff/count, albumin, total protein, gram stain/culture     - Hepatology team will continue to follow     -------------------------------------------------------------------------------------------------------------------    Thank you for involving us in this patient's care. Please do not hesitate to contact the GI service with any questions or concerns. Pt care plan discussed with Dr. Austin, GI staff physician.    Laurelt \"Mac\" MD Chip  PGY-2, Internal Medicine  P: [phone]            History of Present Illness:     Danielle Cook is a 73-year-old male with medical history significant for CKD stage III, HTN, GERD, hypothyroidism, iron deficiency anemia and compensated HCV cirrhosis (completed HCV treatment with Harvoni and ribavirin in 2015, last seen by Dr. Block in 8/2018 ) complicated by EV s/p banding in 1/2018 who presented to the ED for black stool. History was obtained from the patient with professional  via ipad, the history was limited due to language. On 10/2/2018, he was seen by neurology because of headache (negative workups in the past except high ESR), he received prednisone 60 mg with tapering. Four days after prednisone was started, he noticed that his stool turned \"black\" and \"soft\". He had bowel movement about 1 time per day associated with dizziness when changing the position and nausea without vomiting. Last bowel movement was on 10/9 morning which was black. He denies abdominal distension, abdominal pain, fever, chills, changing in skin color. He has never had GIB before in the past. He admitted that he was taking some NSAIDS for headache 2-3 times per day (unclear the name of the medication). Upon admission, he was hemodynamically stable. HGB was 12.7. CT abdomen and pelvis with contrast showed new evidence of worsening portal hypertension, including new mild to moderate ascites since the MRI on 5/2/2018. Hepatology was consulted for GI " bleeding and decompensated liver cirrhosis.                      Past Medical History:     Reviewed and edited as appropriate  Past Medical History:   Diagnosis Date     CKD (chronic kidney disease)      CKD (chronic kidney disease) stage 3, GFR 30-59 ml/min (H)      Hep C w/o coma, chronic (H)      Hepatitis C virus infection      Hypertension           Past Surgical History:     Reviewed and edited as appropriate   Past Surgical History:   Procedure Laterality Date     COLONOSCOPY N/A 1/19/2018    Procedure: COLONOSCOPY;  COMBINED ESOPHAGOSCOPY, GASTROSCOPY, DUODENOSCOPY (EGD) REMOVE TUMOR/POYP/LESION BY SNARE, CONTROL BLEED,BANDING/LIGATION OF VARICES Colonoscopy;  Surgeon: Xavier Sutton MD;  Location: [facility]     ESOPHAGOSCOPY, GASTROSCOPY, DUODENOSCOPY (EGD), COMBINED N/A 11/4/2015    Procedure: COMBINED ESOPHAGOSCOPY, GASTROSCOPY, DUODENOSCOPY (EGD), BIOPSY SINGLE OR MULTIPLE;  Surgeon: Inocente Do MD;  Location: [facility]     ESOPHAGOSCOPY, GASTROSCOPY, DUODENOSCOPY (EGD), COMBINED N/A 3/29/2018    Procedure: COMBINED ESOPHAGOSCOPY, GASTROSCOPY, DUODENOSCOPY (EGD);  egd;  Surgeon: Danielle Mendenhall MD;  Location: [facility]     ESOPHAGOSCOPY, GASTROSCOPY, DUODENOSCOPY (EGD), COMBINED N/A 9/10/2018    Procedure: COMBINED ESOPHAGOSCOPY, GASTROSCOPY, DUODENOSCOPY (EGD);  egd;  Surgeon: Xavier Sutton MD;  Location: [facility]          Previous Endoscopy:     Results for orders placed or performed during the hospital encounter of 01/19/18   COLONOSCOPY   Result Value Ref Range    COLONOSCOPY       [address] [address] [phone]     Endoscopy Department  _______________________________________________________________________________  Patient Name: Danielle Cook           Procedure Date: 1/19/2018 2:02 PM  MRN: [id number]                       Account Number: [id number]  YOB: 1945               Admit Type: Outpatient  Age: 72                                 Gender: Male  Note Status: Finalized                Attending MD: Xavier Sutton MD  Pause for the Cause: time out performed Total Sedation Time:   _______________________________________________________________________________     Procedure:           Colonoscopy  Indications:         Iron deficiency anemia  Providers:           Xavier Sutton MD, Dwight Thomas, RN  Referring MD:        Nataly Darnell MD  Medicines:           See the other procedure note for documentation of the                        administered medications  Complications:       No immediate complic ations. Estimated blood loss: None.  _______________________________________________________________________________  Procedure:           Pre-Anesthesia Assessment:                       - Prior to the procedure, a History and Physical was                        performed, and patient medications and allergies were                        reviewed. The patient is competent. The risks and                        benefits of the procedure and the sedation options and                        risks were discussed with the patient. All questions                        were answered and informed consent was obtained. Patient                        identification and proposed procedure were verified by                        the physician and the nurse in the procedure room.                        Mental Status Examination: alert and oriented. Airway                        Examination: normal oropharyngeal airway and neck                        mobility. Respiratory Examination: clear to                         auscultation. CV Examination: normal. Prophylactic                        Antibiotics: The patient does not require prophylactic                        antibiotics. Prior Anticoagulants: The patient has taken                        no previous anticoagulant or antiplatelet agents. ASA                        Grade Assessment: III - A patient with severe  systemic                        disease. After reviewing the risks and benefits, the                        patient was deemed in satisfactory condition to undergo                        the procedure. The anesthesia plan was to use moderate                        sedation / analgesia (conscious sedation). Immediately                        prior to administration of medications, the patient was                        re-assessed for adequacy to receive sedatives. The heart                        rate, respiratory rate, oxygen saturations, blood                        pressure, adequacy of pulmonary ventilation, and                         response to care were monitored throughout the                        procedure. The physical status of the patient was                        re-assessed after the procedure.                       After obtaining informed consent, the colonoscope was                        passed under direct vision. Throughout the procedure,                        the patient's blood pressure, pulse, and oxygen                        saturations were monitored continuously. The Colonoscope                        was introduced through the anus and advanced to the                        cecum, identified by appendiceal orifice and ileocecal                        valve. The colonoscopy was performed without difficulty.                        The patient tolerated the procedure well. The quality of                        the bowel preparation was evaluated using the BBPS                        ([place] Bowel Preparation Scale) with scores of: Right                        Colon = 2 (mi nor amount of residual staining, small                        fragments of stool and/or opaque liquid, but mucosa seen                        well), Transverse Colon = 3 (entire mucosa seen well                        with no residual staining, small fragments of stool or                        opaque liquid) and Left  Colon = 3 (entire mucosa seen                        well with no residual staining, small fragments of stool                        or opaque liquid). The total BBPS score equals 8. The                        quality of the bowel preparation was good.                                                                                   Findings:       The perianal and digital rectal examinations were normal. Pertinent        negatives include no palpable rectal lesions.       The entire examined colon appeared normal.       The retroflexed view of the distal rectum and anal verge was normal and        showed no anal or rectal abnormalities.                                                                                    Impression:          - The entire examined colon is normal.                       - The distal rectum and anal verge are normal on                        retroflexion view.                       - No specimens collected.  Recommendation:      - Discharge patient to home (ambulatory).                       - See EGD report performed today. If iron deficiency                        anemia persists, could consider capsule endoscopy.                                                                                     Xavier Sutton MD  ________________  Xavier Sutton MD  1/19/2018 4:35:42 PM  I was physically present for the entire viewing portion of the exam.  __________________________  Signature of teaching physician  [id number] Sutton MD  Number of Addenda: 0    Note Initiated On: 1/19/2018 2:02 PM  Scope In:  Scope Out:            Social History:     Reviewed and edited as appropriate  Social History     Social History     Marital status:      Spouse name: N/A     Number of children: N/A     Years of education: N/A     Occupational History     Not on file.     Social History Main Topics     Smoking status: Never Smoker     Smokeless tobacco: Never Used     Alcohol use No     Drug use: No      "Sexual activity: Not on file     Other Topics Concern     Not on file     Social History Narrative          Family History:     Reviewed and edited as appropriate  Family History   Problem Relation Age of Onset     Hypertension Father      No known history of colorectal cancer, liver disease, or inflammatory bowel disease.         Allergies:     Reviewed and edited as appropriate   No Known Allergies         Medications:     No current facility-administered medications on file prior to encounter.   Current Outpatient Prescriptions on File Prior to Encounter:  acetaminophen (TYLENOL) 325 MG tablet Take 1 tablet (325 mg) by mouth every 6 hours as needed for mild pain   aspirin 81 MG tablet Take 1 tablet (81 mg) by mouth daily   ASPIRIN LOW DOSE 81 MG EC tablet    ferrous fumarate (FERRETTS) 324 (106 Fe) MG TABS tablet Take by mouth daily   Ferrous Sulfate 324 (65 Fe) MG TBEC    gabapentin (NEURONTIN) 300 MG tablet Take 600 mg by mouth 3 times daily    gabapentin (NEURONTIN) 600 MG tablet    levothyroxine (SYNTHROID/LEVOTHROID) 50 MCG tablet    nortriptyline (PAMELOR) 25 MG capsule    omeprazole (PRILOSEC) 40 MG capsule Take 1 capsule (40 mg) by mouth daily (with lunch)   predniSONE (DELTASONE) 20 MG tablet 3 tabs po x 4 days, then 2 tabs x 4 days, then 1 tab daily until seen   tamsulosin (FLOMAX) 0.4 MG capsule Take 1 capsule (0.4 mg) by mouth daily   amLODIPine (NORVASC) 10 MG tablet    amLODIPine (NORVASC) 5 MG tablet Take 2 tablets (10 mg) by mouth daily   lidocaine, viscous, (XYLOCAINE) 2 % solution    oxyCODONE IR (ROXICODONE) 5 MG tablet    polyethylene glycol (MIRALAX/GLYCOLAX) powder           Review of Systems:     A complete review of systems was performed and is negative except as noted in the HPI           Physical Exam:   /68 (BP Location: Right arm)  Pulse 86  Temp 96.3  F (35.7  C) (Oral)  Resp 20  Ht 1.727 m (5' 8\")  Wt 77.1 kg (170 lb)  SpO2 97%  BMI 25.85 kg/m2  Wt:   Wt Readings from " Last 2 Encounters:   10/10/18 77.1 kg (170 lb)   10/09/18 76.7 kg (169 lb)      Constitutional: Cooperative, pleasant, not dyspneic/diaphoretic, no acute distress  HEENT: Sclera anicteric/injected, normal oropharynx without ulcers or exudate, mucus membranes moist  Lymph: No axillary, submandibular, supraclavicular or inguinal lymphadenopathy  CV: No edema, normal S1 S2, no murmur   Respiratory: Unlabored breathing, clear to auscultation, no spider nevi   Abd: Mildly distended, +BS, positive ascites, no hepatosplenomegaly, nontender, no peritoneal signs  Skin: Warm, perfused, no jaundice, no rash   Neuro: AAO x 3, no asterixis  Rectal exam: No mass, presents of melenic stool          Data:     Labs and imaging below were independently reviewed and interpreted    BMP  Recent Labs  Lab 10/09/18  1754      POTASSIUM 4.4   CHLORIDE 108   ABDIRASHID 8.1*   CO2 24   BUN 43*   CR 1.24   *     CBC  Recent Labs  Lab 10/09/18  1754   WBC 13.8*   RBC 4.22*   HGB 12.7*   HCT 38.4*   MCV 91   MCH 30.1   MCHC 33.1   RDW Dimorphic population - unable to calculate        INR  Recent Labs  Lab 10/09/18  1754   INR 1.05     LFTs  Recent Labs  Lab 10/09/18  1754   ALKPHOS 376*   *   *   BILITOTAL 0.9   PROTTOTAL 7.2   ALBUMIN 2.8*     CT abdomen and pelvis with contrast (10/9/2018)  IMPRESSION:   1. Redemonstrated findings of cirrhosis. New evidence of worsening  portal hypertension, including new mild to moderate ascites since the  MRI on 5/2/2018. No liver mass visualized on this single phase study.  2. Pericholecystic fluid, nonspecific in the setting of liver disease  and ascites. No abnormal gallbladder distention or gallstone.  3. Redemonstrated moderate sliding hiatal hernia containing  significant mesenteric fat.    ATTENDING NOTE, GASTROENTEROLOGY/HEPATOLOGY    I saw and discussed this patient with the fellow and participated in the decision making. I agree with the fellow's note. Rashida Austin  MD

## 2018-10-11 ENCOUNTER — OFFICE VISIT (OUTPATIENT)
Dept: INTERPRETER SERVICES | Facility: CLINIC | Age: 73
End: 2018-10-11
Payer: COMMERCIAL

## 2018-10-11 LAB
ALBUMIN SERPL-MCNC: 2.8 G/DL (ref 3.4–5)
ALP SERPL-CCNC: 323 U/L (ref 40–150)
ALT SERPL W P-5'-P-CCNC: 142 U/L (ref 0–70)
ANION GAP SERPL CALCULATED.3IONS-SCNC: 10 MMOL/L (ref 3–14)
ANION GAP SERPL CALCULATED.3IONS-SCNC: 11 MMOL/L (ref 3–14)
AST SERPL W P-5'-P-CCNC: 72 U/L (ref 0–45)
BILIRUB SERPL-MCNC: 1.2 MG/DL (ref 0.2–1.3)
BUN SERPL-MCNC: 28 MG/DL (ref 7–30)
BUN SERPL-MCNC: 33 MG/DL (ref 7–30)
CALCIUM SERPL-MCNC: 7.7 MG/DL (ref 8.5–10.1)
CALCIUM SERPL-MCNC: 7.8 MG/DL (ref 8.5–10.1)
CHLORIDE SERPL-SCNC: 111 MMOL/L (ref 94–109)
CHLORIDE SERPL-SCNC: 112 MMOL/L (ref 94–109)
CO2 SERPL-SCNC: 21 MMOL/L (ref 20–32)
CO2 SERPL-SCNC: 23 MMOL/L (ref 20–32)
CREAT SERPL-MCNC: 1.05 MG/DL (ref 0.66–1.25)
CREAT SERPL-MCNC: 1.05 MG/DL (ref 0.66–1.25)
ERYTHROCYTE [DISTWIDTH] IN BLOOD BY AUTOMATED COUNT: ABNORMAL % (ref 10–15)
GFR SERPL CREATININE-BSD FRML MDRD: 69 ML/MIN/1.7M2
GFR SERPL CREATININE-BSD FRML MDRD: 69 ML/MIN/1.7M2
GLUCOSE SERPL-MCNC: 127 MG/DL (ref 70–99)
GLUCOSE SERPL-MCNC: 144 MG/DL (ref 70–99)
HCT VFR BLD AUTO: 37.3 % (ref 40–53)
HGB BLD-MCNC: 11.9 G/DL (ref 13.3–17.7)
INR PPP: 1.15 (ref 0.86–1.14)
MCH RBC QN AUTO: 29.8 PG (ref 26.5–33)
MCHC RBC AUTO-ENTMCNC: 31.9 G/DL (ref 31.5–36.5)
MCV RBC AUTO: 94 FL (ref 78–100)
PLATELET # BLD AUTO: 155 10E9/L (ref 150–450)
POTASSIUM SERPL-SCNC: 3.9 MMOL/L (ref 3.4–5.3)
POTASSIUM SERPL-SCNC: 4.1 MMOL/L (ref 3.4–5.3)
PROT SERPL-MCNC: 6.6 G/DL (ref 6.8–8.8)
RBC # BLD AUTO: 3.99 10E12/L (ref 4.4–5.9)
SODIUM SERPL-SCNC: 143 MMOL/L (ref 133–144)
SODIUM SERPL-SCNC: 144 MMOL/L (ref 133–144)
WBC # BLD AUTO: 6.8 10E9/L (ref 4–11)

## 2018-10-11 PROCEDURE — 25000132 ZZH RX MED GY IP 250 OP 250 PS 637: Performed by: STUDENT IN AN ORGANIZED HEALTH CARE EDUCATION/TRAINING PROGRAM

## 2018-10-11 PROCEDURE — T1013 SIGN LANG/ORAL INTERPRETER: HCPCS | Mod: U3

## 2018-10-11 PROCEDURE — C9113 INJ PANTOPRAZOLE SODIUM, VIA: HCPCS | Performed by: STUDENT IN AN ORGANIZED HEALTH CARE EDUCATION/TRAINING PROGRAM

## 2018-10-11 PROCEDURE — 25000128 H RX IP 250 OP 636: Performed by: STUDENT IN AN ORGANIZED HEALTH CARE EDUCATION/TRAINING PROGRAM

## 2018-10-11 PROCEDURE — 25000128 H RX IP 250 OP 636: Performed by: INTERNAL MEDICINE

## 2018-10-11 PROCEDURE — 36415 COLL VENOUS BLD VENIPUNCTURE: CPT | Performed by: INTERNAL MEDICINE

## 2018-10-11 PROCEDURE — 85027 COMPLETE CBC AUTOMATED: CPT | Performed by: INTERNAL MEDICINE

## 2018-10-11 PROCEDURE — 80053 COMPREHEN METABOLIC PANEL: CPT | Performed by: INTERNAL MEDICINE

## 2018-10-11 PROCEDURE — 99233 SBSQ HOSP IP/OBS HIGH 50: CPT | Mod: GC | Performed by: INTERNAL MEDICINE

## 2018-10-11 PROCEDURE — 12000001 ZZH R&B MED SURG/OB UMMC

## 2018-10-11 PROCEDURE — 25000132 ZZH RX MED GY IP 250 OP 250 PS 637: Performed by: INTERNAL MEDICINE

## 2018-10-11 PROCEDURE — 85610 PROTHROMBIN TIME: CPT | Performed by: INTERNAL MEDICINE

## 2018-10-11 PROCEDURE — 25000131 ZZH RX MED GY IP 250 OP 636 PS 637: Performed by: INTERNAL MEDICINE

## 2018-10-11 RX ORDER — AMLODIPINE BESYLATE 10 MG/1
10 TABLET ORAL DAILY
Status: DISCONTINUED | OUTPATIENT
Start: 2018-10-12 | End: 2018-10-12 | Stop reason: HOSPADM

## 2018-10-11 RX ORDER — PANTOPRAZOLE SODIUM 40 MG/1
40 TABLET, DELAYED RELEASE ORAL
Status: DISCONTINUED | OUTPATIENT
Start: 2018-10-11 | End: 2018-10-12 | Stop reason: HOSPADM

## 2018-10-11 RX ORDER — TAMSULOSIN HYDROCHLORIDE 0.4 MG/1
0.4 CAPSULE ORAL DAILY
Status: DISCONTINUED | OUTPATIENT
Start: 2018-10-12 | End: 2018-10-12 | Stop reason: HOSPADM

## 2018-10-11 RX ORDER — NORTRIPTYLINE HCL 25 MG
25 CAPSULE ORAL AT BEDTIME
Status: DISCONTINUED | OUTPATIENT
Start: 2018-10-11 | End: 2018-10-12 | Stop reason: HOSPADM

## 2018-10-11 RX ORDER — FERROUS FUMARATE 324(106)MG
324 TABLET ORAL DAILY
Status: DISCONTINUED | OUTPATIENT
Start: 2018-10-12 | End: 2018-10-12 | Stop reason: HOSPADM

## 2018-10-11 RX ADMIN — PANTOPRAZOLE SODIUM 40 MG: 40 TABLET, DELAYED RELEASE ORAL at 18:11

## 2018-10-11 RX ADMIN — ACETAMINOPHEN 325 MG: 325 TABLET, FILM COATED ORAL at 09:59

## 2018-10-11 RX ADMIN — SODIUM CHLORIDE 8 MG/HR: 9 INJECTION, SOLUTION INTRAVENOUS at 13:30

## 2018-10-11 RX ADMIN — BENZOCAINE AND MENTHOL 1 LOZENGE: 15; 3.6 LOZENGE ORAL at 18:14

## 2018-10-11 RX ADMIN — BENZOCAINE AND MENTHOL 1 LOZENGE: 15; 3.6 LOZENGE ORAL at 08:01

## 2018-10-11 RX ADMIN — OXYCODONE HYDROCHLORIDE 5 MG: 5 TABLET ORAL at 18:11

## 2018-10-11 RX ADMIN — OCTREOTIDE ACETATE 50 MCG/HR: 200 INJECTION, SOLUTION INTRAVENOUS; SUBCUTANEOUS at 11:16

## 2018-10-11 RX ADMIN — CEFTRIAXONE 1 G: 1 INJECTION, POWDER, FOR SOLUTION INTRAMUSCULAR; INTRAVENOUS at 09:49

## 2018-10-11 RX ADMIN — BENZOCAINE AND MENTHOL 1 LOZENGE: 15; 3.6 LOZENGE ORAL at 02:00

## 2018-10-11 RX ADMIN — SODIUM CHLORIDE 8 MG/HR: 9 INJECTION, SOLUTION INTRAVENOUS at 05:17

## 2018-10-11 ASSESSMENT — ACTIVITIES OF DAILY LIVING (ADL)
ADLS_ACUITY_SCORE: 9
ADLS_ACUITY_SCORE: 10

## 2018-10-11 NOTE — PROGRESS NOTES
"[facility]    Hepatology Follow-up    CC: # Melena       Dx: # Decompensated HCV cirrhosis   # Grade II EV s/p band    # Mild portal hypertensive gastropathy    # CKD stage III   # DERIAN   # Hypothyroidism       24 hour events:   - no acute event overnight   - Hgb stable     Subjective:  - no complaints this morning     Medications  Current Facility-Administered Medications   Medication Dose Route Frequency     cefTRIAXone  1 g Intravenous Q24H       Review of systems  A 10-point review of systems was negative.    Examination  /77 (BP Location: Right arm)  Pulse 87  Temp 96.8  F (36  C) (Oral)  Resp 16  Ht 1.727 m (5' 8\")  Wt 77.1 kg (170 lb)  SpO2 100%  BMI 25.85 kg/m2    Intake/Output Summary (Last 24 hours) at 10/11/18 0810  Last data filed at 10/10/18 2200   Gross per 24 hour   Intake              800 ml   Output              900 ml   Net             -100 ml       Gen- no acute distress  CVS- RRR  RS- CTA  Abd- soft, non tender, non distended   Extr- no edema   Neuro-A&Ox3  Skin- no rash  Psych- normal mood  Lym- no LAD      Laboratory  Lab Results   Component Value Date     10/11/2018    POTASSIUM 3.9 10/11/2018    CHLORIDE 111 10/11/2018    CO2 23 10/11/2018    BUN 28 10/11/2018    CR 1.05 10/11/2018     Lab Results   Component Value Date    BILITOTAL 1.2 10/11/2018     10/11/2018    AST 72 10/11/2018    ALKPHOS 323 10/11/2018     Lab Results   Component Value Date    WBC 6.8 10/11/2018    HGB 11.9 10/11/2018    MCV 94 10/11/2018     10/11/2018     Lab Results   Component Value Date    INR 1.15 10/11/2018     MELD-Na score: 10 at 10/11/2018  5:26 AM  MELD score: 10 at 10/11/2018  5:26 AM  Calculated from:  Serum Creatinine: 1.05 mg/dL at 10/11/2018  5:26 AM  Serum Sodium: 144 mmol/L (Rounded to 137) at 10/11/2018  5:26 AM  Total Bilirubin: 1.2 mg/dL at 10/11/2018  5:26 AM  INR(ratio): 1.15 at 10/11/2018  5:26 AM  Age: 73 years      Radiology  US TIPS " 10/9/18:   - normal liver doppler with antegrade flow       Assessment    74 yo M admitted for melena with stable Hgb in the setting of decompensated HCV cirrhosis complicated by EV s/p banding in 1/2018, CKD stage III, chronic DERIAN of unclear source, and chronic intractible headache started on steroid taper 1 week ago.  Hgb stable at 12.  Also noted mixed pattern of LFT elevation with ALT//128, .    EGD 10/10/18 showed non bleeding grade II EV s/p 4 bands with complete eradication, and mild PHG. No source of bleeding was found.        # Melena: unclear source of bleeding. Differentials include small bowel AVMs, ulcerations. Patient had multiple EGDs in the past for variceal surveillance and no active bleeding was found. He had colonoscopy in 1/2018 without active bleeding found. Has not had capsule. Hgb remained stable       # Transaminitis, mixed pattern:  Started to rise after initiated high dose steroids for intractable headache. He had history of HCV treated with Havoni and ribavarin in 2015, last viral load 4/2016 was undetectable. Hepatitis B negative in 2015. Differentials include DILI (high dose steroids), acute worsening of chronic HCV 2/2 steroids (low suspicion as HCV likely got eradicated per chart), Reactivation of HBV (HBV last checked in 2015) vs. Acute decompensation of liver cirrhosis 2/2 bleeding (low suspicion)       # Decompensated HCV liver cirrhosis with EV s/p banding and ascites: paracentesis 10/10 showed no evidence of SBP, SAAG >1.1 and TP <2.5 suggest portal hypertension. Completed HCV treatment in 2015.  MELD 10.  HCC screening up to date.       Recommendations  - Ok to discontinue Octreotide gtt.   - Transition PPI gtt to PPI BID  - Repeat endoscopy in 2 months for surveillance   - IV ceftriaxone prophylaxis for 7 days -> can transition to Cipro 500 mg daily on discharge.    - Check HCV viral load.   - Check HBV virology: HBVsAg, surface Ab, core Ab, HBVe AG, HAV, and  hepatitis E.   - Advance diet as tolerates   - If patient has recurrent melena, may be benefit from capsule endoscopy to evaluate small bowel.   - Follow up with liver clinic as outpatient     Patient is discussed with Dr. Carie Rowell MD  Hepatology  # [phone]    Physician Attestation  I, Nataly Darnell, saw and examined this patient, and in agreement with Dr. Rowell and their findings as well as plan of care as documented in the above note.    I personally reviewed vital signs, medications, labs and imaging.    Nataly Darnell MD  Hepatology staff  Patient seen on: 10/11/2018

## 2018-10-11 NOTE — PLAN OF CARE
Problem: Patient Care Overview  Goal: Plan of Care/Patient Progress Review  Outcome: No Change  Pt AxO, VSS on RA, and up ad matthew in room. Had EGD today and a para this evening with help of iPad interpretation. Wife at bedside, speaks some english and is very responsive to pt's needs. Pt had two episodes of emesis. Given zofran x2. Pt had broth for dinner and tolerated well. Given IV dilaudid x1 and oxy x2. Pt still having throat pain and using lozenge. Cooperative with cares and no other complaints at this time. Continue to monitor.

## 2018-10-11 NOTE — PLAN OF CARE
Problem: Patient Care Overview  Goal: Plan of Care/Patient Progress Review  Outcome: No Change  VSS on room air. Alert and oriented x 4. Reports pain in throat, managed w/ PRN lozenges and pain in esophagus, managed w/ tylenol or oxycodone. Continuous protonix drip running at 10 ml/hr. Continuous ocreotide drip running at 10 ml/hr. NS running TKO. Transfers independently. Continue to monitor pain and follow w/ plan of care.

## 2018-10-11 NOTE — PROGRESS NOTES
[facility], [place]    Internal Medicine Progress Note - [place] 5 Service    Assessment & Plan   Danielle Cook is a 73 year old male with a past medical history of GERD, BPH, HA with concern for GCA and HCV cirrhosis (s/p tx) c/b varices s/p banding admitted with melena, concern for acute UGIB.      #Acute variceal bleeding  #melena  Presented with melanotic stools. Hgb without significant anemia, though had iron transfusion in recent past. Despite this had a degree of presyncope. Vitals otherwise stable. Underwent EGD on 10/11, noted slightly worsened EV c/t prior and 4 bands applied, mild PHG noted. No acute bleed noted. Recently started steroids for presumed GCA, could be precipitating factor? Post procedure, hgb stable, VSS, asymptomatic.  - GI following, appreciate assistance  - change PPI to PO BID  - consider discontinuing octreotide gtt  - continue ceftriaxone ppx  - daily hgb  - CLD ADAT  - avoid nsaids    #Decompensated HCV cirrhosis c/b EV and new ascites  - etiology of decompensation unclear, though LTFs improving  - MELD 10  - dx para on 10/10 negative for SBP  - hepatic US with doppler negative for clot  - daily MELD labs  - s/p HCV treatment  - consider beta blocker at discharge    #chronic HA  - followed by neuro, recently started on steroid burst  - sed rate down trending  - prednisone held in setting of acute GIB  - restart nortriptyline  - consider finishing steroid taper with PPI, follow up neurology as outpatient    #iron def anemia  - restart pta iron supplement    #HTN  - restart pta amlodipine    #BPH  - restart pta tamsulosin    Diet: Room Service  Advance Diet as Tolerated: Clear Liquid Diet  Fluids: none  Lines: PIV  Carlton Catheter: not present  DVT Prophylaxis: none, acute bleed  Code Status: Full Code  Expected discharge: Tomorrow, recommended to prior living arrangement once no signs of acute GIB, HDS, safe discharge plan.    The patient's care was  discussed with the Attending Physician, Dr. Lynn.    Antonio Floyd  [facility] 5  Pager: [phone]  Please see sticky note for cross cover information    Interval History   No acute events. Feeling well today. Denies abd pain. No melena because he has not had BM. Denies n/v. Feeling hungry, thirsty. Denies dizziness, lightheadedness. Denies HA.    4 point ROS otherwise negative    Physical Exam   Vital Signs: Temp: 97.6  F (36.4  C) Temp src: Oral BP: 148/63 Pulse: 87 Heart Rate: 70 Resp: 18 SpO2: 100 % O2 Device: None (Room air)    Weight: 167 lbs 12.8 oz  General Appearance: Sitting in bed, NAD  Respiratory: CTAB, no w/r/r  Cardiovascular: RRR, no murmur  GI: distended but soft, non tender, BS+  Other: No asterixis     Data   I have reviewed all pertinent labs, notes and imaging from the last 24 hours    Internal Medicine Staff Addendum  Date of Service: 10/11/2018    I have seen and examined this patient, reviewed the data and discussed the plan of care. I agree with the above documentation including plan and ddx unless otherwise stated:     # feels better after paracenteses. No hematemesis, melena now. Consider starting propranolol or nadolol in AM    Griffin Lynn MD  Internal Medicine Hospitalist  [facility]  Attending pager: [phone]

## 2018-10-11 NOTE — PLAN OF CARE
Problem: Patient Care Overview  Goal: Plan of Care/Patient Progress Review  Outcome: No Change  Pt alert and orieneted. Vitally stable on ra. Up ad matthew. Denies n/v. Up to bathroom and urinated a few times. Continues with octreotide and protonix drip. C/o sore throat; prn lozenge administered X1. Otherwise slept through the night. Continue to monitor and implement poc.

## 2018-10-12 ENCOUNTER — OFFICE VISIT (OUTPATIENT)
Dept: INTERPRETER SERVICES | Facility: CLINIC | Age: 73
End: 2018-10-12

## 2018-10-12 VITALS
RESPIRATION RATE: 18 BRPM | BODY MASS INDEX: 25.43 KG/M2 | OXYGEN SATURATION: 96 % | SYSTOLIC BLOOD PRESSURE: 140 MMHG | TEMPERATURE: 97.7 F | HEIGHT: 68 IN | WEIGHT: 167.8 LBS | HEART RATE: 65 BPM | DIASTOLIC BLOOD PRESSURE: 64 MMHG

## 2018-10-12 LAB
ALBUMIN SERPL-MCNC: 2.6 G/DL (ref 3.4–5)
ALP SERPL-CCNC: 288 U/L (ref 40–150)
ALT SERPL W P-5'-P-CCNC: 104 U/L (ref 0–70)
ANION GAP SERPL CALCULATED.3IONS-SCNC: 9 MMOL/L (ref 3–14)
AST SERPL W P-5'-P-CCNC: 45 U/L (ref 0–45)
BILIRUB SERPL-MCNC: 2 MG/DL (ref 0.2–1.3)
BUN SERPL-MCNC: 21 MG/DL (ref 7–30)
CALCIUM SERPL-MCNC: 7.6 MG/DL (ref 8.5–10.1)
CHLORIDE SERPL-SCNC: 108 MMOL/L (ref 94–109)
CO2 SERPL-SCNC: 24 MMOL/L (ref 20–32)
CREAT SERPL-MCNC: 1.07 MG/DL (ref 0.66–1.25)
ERYTHROCYTE [DISTWIDTH] IN BLOOD BY AUTOMATED COUNT: ABNORMAL % (ref 10–15)
GFR SERPL CREATININE-BSD FRML MDRD: 68 ML/MIN/1.7M2
GLUCOSE SERPL-MCNC: 122 MG/DL (ref 70–99)
HAV IGG SER QL IA: REACTIVE
HBV CORE AB SERPL QL IA: REACTIVE
HBV CORE IGM SERPL QL IA: NONREACTIVE
HBV SURFACE AB SERPL IA-ACNC: 1.86 M[IU]/ML
HBV SURFACE AG SERPL QL IA: NONREACTIVE
HCT VFR BLD AUTO: 34.9 % (ref 40–53)
HGB BLD-MCNC: 11.2 G/DL (ref 13.3–17.7)
INR PPP: 1.16 (ref 0.86–1.14)
MCH RBC QN AUTO: 30 PG (ref 26.5–33)
MCHC RBC AUTO-ENTMCNC: 32.1 G/DL (ref 31.5–36.5)
MCV RBC AUTO: 94 FL (ref 78–100)
PLATELET # BLD AUTO: 134 10E9/L (ref 150–450)
POTASSIUM SERPL-SCNC: 3.7 MMOL/L (ref 3.4–5.3)
PROT SERPL-MCNC: 6.4 G/DL (ref 6.8–8.8)
RBC # BLD AUTO: 3.73 10E12/L (ref 4.4–5.9)
SODIUM SERPL-SCNC: 141 MMOL/L (ref 133–144)
WBC # BLD AUTO: 8 10E9/L (ref 4–11)

## 2018-10-12 PROCEDURE — 87522 HEPATITIS C REVRS TRNSCRPJ: CPT | Performed by: INTERNAL MEDICINE

## 2018-10-12 PROCEDURE — 86704 HEP B CORE ANTIBODY TOTAL: CPT | Performed by: INTERNAL MEDICINE

## 2018-10-12 PROCEDURE — 25000128 H RX IP 250 OP 636: Performed by: INTERNAL MEDICINE

## 2018-10-12 PROCEDURE — 86705 HEP B CORE ANTIBODY IGM: CPT | Performed by: INTERNAL MEDICINE

## 2018-10-12 PROCEDURE — 86790 VIRUS ANTIBODY NOS: CPT | Performed by: INTERNAL MEDICINE

## 2018-10-12 PROCEDURE — 85610 PROTHROMBIN TIME: CPT | Performed by: INTERNAL MEDICINE

## 2018-10-12 PROCEDURE — 87350 HEPATITIS BE AG IA: CPT | Performed by: INTERNAL MEDICINE

## 2018-10-12 PROCEDURE — 85027 COMPLETE CBC AUTOMATED: CPT | Performed by: INTERNAL MEDICINE

## 2018-10-12 PROCEDURE — T1013 SIGN LANG/ORAL INTERPRETER: HCPCS | Mod: U3

## 2018-10-12 PROCEDURE — 99239 HOSP IP/OBS DSCHRG MGMT >30: CPT | Mod: GC | Performed by: INTERNAL MEDICINE

## 2018-10-12 PROCEDURE — 36415 COLL VENOUS BLD VENIPUNCTURE: CPT | Performed by: INTERNAL MEDICINE

## 2018-10-12 PROCEDURE — 80053 COMPREHEN METABOLIC PANEL: CPT | Performed by: INTERNAL MEDICINE

## 2018-10-12 PROCEDURE — 25000132 ZZH RX MED GY IP 250 OP 250 PS 637: Performed by: INTERNAL MEDICINE

## 2018-10-12 PROCEDURE — 25000132 ZZH RX MED GY IP 250 OP 250 PS 637: Performed by: STUDENT IN AN ORGANIZED HEALTH CARE EDUCATION/TRAINING PROGRAM

## 2018-10-12 PROCEDURE — 86706 HEP B SURFACE ANTIBODY: CPT | Performed by: INTERNAL MEDICINE

## 2018-10-12 PROCEDURE — 87340 HEPATITIS B SURFACE AG IA: CPT | Performed by: INTERNAL MEDICINE

## 2018-10-12 PROCEDURE — 86708 HEPATITIS A ANTIBODY: CPT | Performed by: INTERNAL MEDICINE

## 2018-10-12 RX ORDER — OMEPRAZOLE 40 MG/1
40 CAPSULE, DELAYED RELEASE ORAL 2 TIMES DAILY
Qty: 60 CAPSULE | Refills: 2 | COMMUNITY
Start: 2018-10-12

## 2018-10-12 RX ORDER — CIPROFLOXACIN 500 MG/1
500 TABLET, FILM COATED ORAL EVERY 12 HOURS
Qty: 8 TABLET | Refills: 0 | Status: SHIPPED | OUTPATIENT
Start: 2018-10-13 | End: 2018-10-17

## 2018-10-12 RX ADMIN — BENZOCAINE AND MENTHOL 1 LOZENGE: 15; 3.6 LOZENGE ORAL at 11:48

## 2018-10-12 RX ADMIN — PANTOPRAZOLE SODIUM 40 MG: 40 TABLET, DELAYED RELEASE ORAL at 09:41

## 2018-10-12 RX ADMIN — TAMSULOSIN HYDROCHLORIDE 0.4 MG: 0.4 CAPSULE ORAL at 09:41

## 2018-10-12 RX ADMIN — Medication 324 MG: at 09:41

## 2018-10-12 RX ADMIN — OXYCODONE HYDROCHLORIDE 5 MG: 5 TABLET ORAL at 05:45

## 2018-10-12 RX ADMIN — AMLODIPINE BESYLATE 10 MG: 10 TABLET ORAL at 09:42

## 2018-10-12 RX ADMIN — CEFTRIAXONE 1 G: 1 INJECTION, POWDER, FOR SOLUTION INTRAMUSCULAR; INTRAVENOUS at 09:40

## 2018-10-12 RX ADMIN — BENZOCAINE AND MENTHOL 1 LOZENGE: 15; 3.6 LOZENGE ORAL at 09:41

## 2018-10-12 ASSESSMENT — ACTIVITIES OF DAILY LIVING (ADL)
ADLS_ACUITY_SCORE: 9

## 2018-10-12 NOTE — PLAN OF CARE
Problem: Patient Care Overview  Goal: Plan of Care/Patient Progress Review  Outcome: Improving  Pt A&O x4, VS stable on RA. PIV saline locked. Up independently in room, voiding spontaneously, no BM this shift. C/o mild abdominal pain, gave oxy x1 with relief. Slept most of shift. No acute events. Continue to monitor and update MD with changes.

## 2018-10-12 NOTE — PROGRESS NOTES
Pt discharged home today w/ spouse at 1445. VS were stable and pt denied use of a wheelchair. Discharge instructions were given to pt and spouse w/ use of video . Pt and spouse verbalized understanding. Last dose of IV ceftriaxone was given at 0940 and pt. Switched to oral antibiotics at home. Last medication given for pain was benzocaine-menthol lozenge at 1150. Peripheral IV removed prior to discharge. All pt belongings sent w/ pt and spouse. Pt and spouse to  discharge medications at discharge pharmacy and follow up w/ scheduled appointments.

## 2018-10-12 NOTE — PLAN OF CARE
Problem: Patient Care Overview  Goal: Plan of Care/Patient Progress Review  Outcome: Improving  Pt AxO, VSS on RA, and independent in the room. Protonix drip and octreotide drip both discontinued tonight. Pain controlled with oxycodone. Tolerated regular food brought from home tonight. Throat pain controlled with lozenge. Voiding well and had one BM tonight. Continue to monitor.

## 2018-10-13 LAB
HBV E AG SERPL QL IA: NEGATIVE
HEV IGG SER QL: NEGATIVE

## 2018-10-13 NOTE — DISCHARGE SUMMARY
[facility], [place]    Internal Medicine Discharge Summary- Abraham Service    Date of Admission:  10/9/2018  Date of Discharge:  10/12/2018  2:50 PM  Discharging Attending Provider: Dr. Lynn  Discharge Team: Abraham 5    Discharge Diagnoses   GI Bleed  Melena  Decompensated cirrhosis c/b grade II EV s/p banding  Chronic headache    Follow-ups Needed After Discharge   1. Follow up with PCP early next week for hemoglobin recheck.  2. Follow up Hepatology as previously scheduled. Repeat endoscopy in 2 months for surveillance.  3. Follow up with neurology for headaches    Hospital Course   Danielle Cook was admitted on 10/9/2018 for melena, concern for GIB.  The following problems were addressed during his hospitalization:    #Acute variceal bleeding  #melena  Presented with melanotic stools. Hgb without significant anemia, though had iron transfusion in recent past. Despite this had a degree of presyncope. Vitals otherwise stable. He was started on a protonix and octreotide drip and received antibiotic prophylaxis due to previous EV noted on EGD. Underwent EGD on 10/11, noted slightly worsened EV c/t prior and 4 bands applied, mild PHG noted. No acute bleed noted. Recently started steroids for presumed GCA, could be precipitating factor, though no acute bleeding noted in upper GI tract. Post procedure and day of discharge, hgb stable, VSS, asymptomatic.   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 MELD 10. Liver US with doppler negative for clot. Dx paracentesis negative for SBP. Only new factor recently is steroids for headache. These were discontinued on admission.  At discharge his LFTs were normalizing. Hepatitis studies pending at discharge. Per GI team, no indication for beta blocker at this time. Will follow up with his primary hepatologist at previously scheduled appointment.     #chronic HA  - followed by neuro, recently started on steroid burst for presumed GCA  - finished course of prednisone (per neuro note, steroids likely made no appreciable difference)  - sed rate down trending  - no unilateral headache or scalp tenderness, no vision changes, no jaw claudication  - will continue his usual chronic HA regimen  - recommend follow up with neurology for discussion of continued steroids  - per GI, another burst of steroids would be ok as there was no true identified acute UGIB and hepatic effects likely limited  - could consider artery biopsy for GCA or steroid sparing agent  - additionally could consider beta blocker for both HA ppx, HTN treatment and EV    Consultations This Hospital Stay   GI LUMINAL ADULT IP CONSULT  GI LUMINAL ADULT IP CONSULT  GI HEPATOLOGY ADULT IP CONSULT  INTERNAL MEDICINE PROCEDURE TEAM ADULT IP CONSULT [place] - PARACENTESIS  VASCULAR ACCESS CARE ADULT IP CONSULT    Code Status   Full Code     The patient was discussed with Dr. Shane Floyd MD  [facility]  Pager: [phone]  ______________________________________________________________________    Physical Exam   Vital Signs: Temp: 97.7  F (36.5  C) Temp src: Oral BP: 140/64 Pulse: 65 Heart Rate: 73 Resp: 18 SpO2: 96 % O2 Device: None (Room air)    Weight: 167 lbs 12.8 oz    General Appearance: Sitting in bed, NAD  Respiratory: CTAB, no w/r/r  Cardiovascular: RRR, no m/g/r  GI: distended but soft, non tender, no rigidity or guarding, BS+  Neuro: no scalp tenderness, EOMI, no jaw tenderness/claudication  Other: no asterixis    Significant Results and Procedures      EGD  Impression:                - Grade II esophageal varices. Completely                  eradicated. Banded.                        - Portal hypertensive gastropathy.                        - Normal examined duodenum.                        - Medium-sized hiatal hernia.                        - An endoclip was found in the stomach.                        - No specimens collected.    Most Recent 3 CBC's:  Recent Labs   Lab Test  10/12/18   0722  10/11/18   0526  10/10/18   2330   WBC  8.0  6.8  9.1   HGB  11.2*  11.9*  11.9*   MCV  94  94  94   PLT  134*  155  158     Most Recent 3 BMP's:  Recent Labs   Lab Test  10/12/18   0722  10/11/18   0526  10/10/18   2330   NA  141  144  143   POTASSIUM  3.7  3.9  4.1   CHLORIDE  108  111*  112*   CO2  24  23  21   BUN  21  28  33*   CR  1.07  1.05  1.05   ANIONGAP  9  11  10   ABDIRASHID  7.6*  7.8*  7.7*   GLC  122*  127*  144*     Most Recent 2 LFT's:  Recent Labs   Lab Test  10/12/18   0722  10/11/18   0526   AST  45  72*   ALT  104*  142*   ALKPHOS  288*  323*   BILITOTAL  2.0*  1.2     Most Recent 3 INR's:  Recent Labs   Lab Test  10/12/18   0722  10/11/18   0526  10/09/18   1754   INR  1.16*  1.15*  1.05     Most Recent ESR & CRP:  Recent Labs   Lab Test  10/10/18   1019  04/30/18   1322   SED  21*  115*   CRP   --   3.2   ,   Results for orders placed or performed during the hospital encounter of 10/09/18   CT Abdomen Pelvis w Contrast    Narrative    EXAMINATION: CT ABDOMEN PELVIS W CONTRAST, 10/9/2018 9:21 PM    TECHNIQUE:  Helical CT images from the lung bases through the  symphysis pubis were obtained  with IV contrast. Contrast dose:  iopamidol (ISOVUE-370) solution 104 mL    COMPARISON: Abdominal MRI 5/2/2018    HISTORY: Abd distension/pain. Low GFR. Hx of cirrhosis;     FINDINGS:  Subsegmental atelectasis and parenchymal bands in the right lower lobe  and lingula. Redemonstrated enlarged cardiophrenic lymph nodes. The  largest is on series 5 image 59 in the right cardiophrenic fat  measuring 18 mm, which had measured 14 mm on 5/2/2018.    Redemonstrated  changes of liver cirrhosis. No suspicious liver lesions  seen. The gallbladder is not distended. Pericholecystic fluid, which  is nonspecific in the setting of liver disease and ascites. No  splenomegaly. Redemonstrated small sliding hiatal hernia, including  significant herniation of mesenteric fat into the esophageal hiatus.  Unremarkable adrenal glands. Symmetric enhancement of the kidneys. No  renal mass. No hydronephrosis. No renal stone. No ureteral stone or  ureteral nephrosis. Normal urinary bladder.    No bowel obstruction. New mild to moderate ascites. No suspiciously  enlarged lymph nodes in the abdomen or pelvis.    No aggressive osseous lesion. AVN of the hips.      Impression    IMPRESSION:   1. Redemonstrated findings of cirrhosis. New evidence of worsening  portal hypertension, including new mild to moderate ascites since the  MRI on 5/2/2018. No liver mass visualized on this single phase study.  2. Pericholecystic fluid, nonspecific in the setting of liver disease  and ascites. No abnormal gallbladder distention or gallstone.  3. Redemonstrated moderate sliding hiatal hernia containing  significant mesenteric fat.    I have personally reviewed the examination and initial interpretation  and I agree with the findings.    JOEY ROWLAND MD   US Abdominal Doppler Complete    Narrative    EXAMINATION: US ABDOMEN OR PELVIS DOPPLER COMPLETE, 10/10/2018 12:18  PM     COMPARISON: 1/22/2018    HISTORY: hx hcv cirrhosis, acute hepatocellular injury/risk alk phos  with worsening ascites, presumed UGIB. RUQ US with doppler, rule out  thrombosis    TECHNIQUE: The abdomen was scanned in standard fashion with  specialized ultrasound transducer(s) using both gray-scale, color  Doppler, and spectral flow techniques.    Findings:    Liver: The liver demonstrates normal homogeneous echotexture. No  evidence of a focal hepatic mass.     Extrahepatic portal vein flow is antegrade, measuring 14 cm/sec.  Right portal vein  flow is antegrade, measuring 13 cm/sec.  Left portal vein flow is antegrade, measuring 18 cm/sec.    Flow in the hepatic artery is towards the liver and:  41 cm/sec peak systolic  0.84 resistive index.     The splenic vein is not visualized.  The left, middle, and right  hepatic veins are patent with flow towards the IVC. The IVC is patent  with flow towards the heart.   The aorta is not dilated.    Fluid: No evidence of ascites or pleural effusions.      Impression    Impression:   1.  Unremarkable hepatic ultrasound with Doppler assessment.    I have personally reviewed the examination and initial interpretation  and I agree with the findings.    ISAEL GOMEZ MD   POC US Guide for Paracentesis    Impression    Consent was obtained from Mr. Cook using an  and placed in the chart.   Ultrasound used to locate ascites; optimal pocket found to be in LLQ, and marked.  POCUS images permanently saved in the EMR.  Universal protocol performed with nursing.   7cc of 1% lidocaine used anesthetize with 25 gauge needle.   22 gauge needle to obtain fluid under ultrasound guidance, which was cloudy yellow in appearance, and was sent to lab. 20cc total fluid obtained.   Patient tolerated without any apparent complications.  Primary team notified.       Pending Results   These results will be followed up by GI  Unresulted Labs Ordered in the Past 30 Days of this Admission     Date and Time Order Name Status Description    10/12/2018 0722 Hepatitis B core antibody IgM In process     10/11/2018 2022 Hepatitis E Antibody IgG In process     10/11/2018 2022 Hepatitis Be antigen In process     10/11/2018 2022 Hepatitis C RNA quantitative In process     10/10/2018 0940 Cytology non gyn (Fluid) In process     10/10/2018 0940 fluid culture - Aerobic Bacterial Preliminary              Primary Care Physician   Andrey Flores    Discharge Disposition   Discharged to home  Condition at discharge: Stable    Discharge Orders      Reason for your hospital stay   GI bleeding     Adult [facility] Follow-up and recommended labs and tests   Follow up with your primary care provider early next week for hospital follow up and hemoglobin recheck.     Follow up with your primary care provider or neurologist if your headaches recur or worsen. Or as previously scheduled.    Appointments on [place] and/or [facility] (with [facility] or [facility] provider or service). Call [phone] if you haven't heard regarding these appointments within 7 days of discharge.     Activity   Your activity upon discharge: as tolerated     When to contact your care team   Severe abdominal pain, brown or red vomit, black or bloody stools, dizziness/lightheadedness, fatigue. Let your doctor or neurologist know right away if you have unilateral scalp headache, jaw pain or vision changes.     Full Code     Diet   Follow this diet upon discharge:  regular       Discharge Medications   Discharge Medication List as of 10/12/2018  2:14 PM      START taking these medications    Details   benzocaine-menthol (CEPACOL) 15-3.6 MG lozenge Place 1 lozenge inside cheek every 2 hours as needed for sore throat, Disp-84 lozenge, R-0, E-Prescribe      ciprofloxacin (CIPRO) 500 MG tablet Take 1 tablet (500 mg) by mouth every 12 hours for 4 days, Disp-8 tablet, R-0, E-Prescribe         CONTINUE these medications which have CHANGED    Details   omeprazole (PRILOSEC) 40 MG capsule Take 1 capsule (40 mg) by mouth 2 times daily, Disp-60 capsule, R-2, Historical         CONTINUE these medications which have NOT CHANGED    Details   acetaminophen (TYLENOL) 325 MG tablet Take 1 tablet (325 mg) by mouth every 6 hours as needed for mild pain, Disp-100 tablet, R-3, E-PrescribeYour can take up to 2000 mg ( no more than 6 tablets) per day.      ASPIRIN LOW DOSE 81 MG EC tablet LI, Historical      ferrous fumarate (FERRETTS) 324 (106 Fe) MG TABS tablet Take by mouth daily, Historical      gabapentin  (NEURONTIN) 300 MG tablet Take 600 mg by mouth 3 times daily , Historical      levothyroxine (SYNTHROID/LEVOTHROID) 50 MCG tablet Historical      nortriptyline (PAMELOR) 25 MG capsule Historical      tamsulosin (FLOMAX) 0.4 MG capsule Take 1 capsule (0.4 mg) by mouth daily, Disp-60 capsule, R-5, E-Prescribe      amLODIPine (NORVASC) 10 MG tablet Historical      lidocaine, viscous, (XYLOCAINE) 2 % solution Historical      oxyCODONE IR (ROXICODONE) 5 MG tablet Historical      polyethylene glycol (MIRALAX/GLYCOLAX) powder Historical         STOP taking these medications       aspirin 81 MG tablet Comments:   Reason for Stopping:         Ferrous Sulfate 324 (65 Fe) MG TBEC Comments:   Reason for Stopping:         predniSONE (DELTASONE) 20 MG tablet Comments:   Reason for Stopping:             Allergies   No Known Allergies    Staff Addendum to Discharge Summary  Date of Service: 10/12/2018  I have seen and examined the patient, reviewed the data and discussed the plan of care with the service team.  I agree with the above documentation.    >30 minutes spent in discharge, including >50% in counseling and coordination of care, medication review and plan of care recommended on follow up. Questions were answered at length with patient including medication counseling and follow-up care.     Andrey Garcia  (PCP) was contacted electronically at the time of discharge, so as to bridge from hospital to outpatient care.   It was our pleasure to care for the patient during this hospitalization. Please do not hesitate to contact me should there be questions regarding the hospital course or discharge plan.      Griffin Lynn MD   of Medicine  [facility]

## 2018-10-15 ENCOUNTER — PATIENT OUTREACH (OUTPATIENT)
Dept: CARE COORDINATION | Facility: CLINIC | Age: 73
End: 2018-10-15

## 2018-10-15 LAB
BACTERIA SPEC CULT: NO GROWTH
COPATH REPORT: NORMAL
SPECIMEN SOURCE: NORMAL

## 2018-10-16 LAB
HCV RNA SERPL NAA+PROBE-ACNC: NORMAL [IU]/ML
HCV RNA SERPL NAA+PROBE-LOG IU: NORMAL LOG IU/ML

## 2018-10-23 ENCOUNTER — OFFICE VISIT (OUTPATIENT)
Dept: GASTROENTEROLOGY | Facility: CLINIC | Age: 73
End: 2018-10-23
Attending: INTERNAL MEDICINE
Payer: COMMERCIAL

## 2018-10-23 ENCOUNTER — PRE VISIT (OUTPATIENT)
Dept: UROLOGY | Facility: CLINIC | Age: 73
End: 2018-10-23

## 2018-10-23 VITALS
OXYGEN SATURATION: 100 % | TEMPERATURE: 98.2 F | SYSTOLIC BLOOD PRESSURE: 129 MMHG | WEIGHT: 168.4 LBS | HEIGHT: 68 IN | HEART RATE: 69 BPM | DIASTOLIC BLOOD PRESSURE: 76 MMHG | BODY MASS INDEX: 25.52 KG/M2

## 2018-10-23 DIAGNOSIS — R18.8 ASCITES OF LIVER: ICD-10-CM

## 2018-10-23 DIAGNOSIS — K74.60 CIRRHOSIS OF LIVER WITHOUT ASCITES, UNSPECIFIED HEPATIC CIRRHOSIS TYPE (H): ICD-10-CM

## 2018-10-23 DIAGNOSIS — K76.9 CHRONIC LIVER DISEASE: Primary | ICD-10-CM

## 2018-10-23 LAB
ALBUMIN SERPL-MCNC: 2.6 G/DL (ref 3.4–5)
ALP SERPL-CCNC: 429 U/L (ref 40–150)
ALT SERPL W P-5'-P-CCNC: 43 U/L (ref 0–70)
ANION GAP SERPL CALCULATED.3IONS-SCNC: 8 MMOL/L (ref 3–14)
AST SERPL W P-5'-P-CCNC: 50 U/L (ref 0–45)
BILIRUB DIRECT SERPL-MCNC: 1.5 MG/DL (ref 0–0.2)
BILIRUB SERPL-MCNC: 2 MG/DL (ref 0.2–1.3)
BUN SERPL-MCNC: 8 MG/DL (ref 7–30)
CALCIUM SERPL-MCNC: 7.9 MG/DL (ref 8.5–10.1)
CHLORIDE SERPL-SCNC: 108 MMOL/L (ref 94–109)
CO2 SERPL-SCNC: 23 MMOL/L (ref 20–32)
CREAT SERPL-MCNC: 1.12 MG/DL (ref 0.66–1.25)
ERYTHROCYTE [DISTWIDTH] IN BLOOD BY AUTOMATED COUNT: 19.9 % (ref 10–15)
GFR SERPL CREATININE-BSD FRML MDRD: 64 ML/MIN/1.7M2
GLUCOSE SERPL-MCNC: 98 MG/DL (ref 70–99)
HCT VFR BLD AUTO: 37.5 % (ref 40–53)
HGB BLD-MCNC: 12.1 G/DL (ref 13.3–17.7)
INR PPP: 1.05 (ref 0.86–1.14)
MCH RBC QN AUTO: 30.5 PG (ref 26.5–33)
MCHC RBC AUTO-ENTMCNC: 32.3 G/DL (ref 31.5–36.5)
MCV RBC AUTO: 95 FL (ref 78–100)
PLATELET # BLD AUTO: 125 10E9/L (ref 150–450)
POTASSIUM SERPL-SCNC: 3.9 MMOL/L (ref 3.4–5.3)
PROT SERPL-MCNC: 7.4 G/DL (ref 6.8–8.8)
RBC # BLD AUTO: 3.97 10E12/L (ref 4.4–5.9)
SODIUM SERPL-SCNC: 140 MMOL/L (ref 133–144)
WBC # BLD AUTO: 4.5 10E9/L (ref 4–11)

## 2018-10-23 PROCEDURE — 36415 COLL VENOUS BLD VENIPUNCTURE: CPT | Performed by: INTERNAL MEDICINE

## 2018-10-23 PROCEDURE — G0463 HOSPITAL OUTPT CLINIC VISIT: HCPCS | Mod: ZF

## 2018-10-23 PROCEDURE — 80048 BASIC METABOLIC PNL TOTAL CA: CPT | Performed by: INTERNAL MEDICINE

## 2018-10-23 PROCEDURE — 80076 HEPATIC FUNCTION PANEL: CPT | Performed by: INTERNAL MEDICINE

## 2018-10-23 PROCEDURE — 85027 COMPLETE CBC AUTOMATED: CPT | Performed by: INTERNAL MEDICINE

## 2018-10-23 PROCEDURE — 85610 PROTHROMBIN TIME: CPT | Performed by: INTERNAL MEDICINE

## 2018-10-23 RX ORDER — FUROSEMIDE 20 MG
20 TABLET ORAL DAILY
Qty: 30 TABLET | Refills: 1 | Status: SHIPPED | OUTPATIENT
Start: 2018-10-23 | End: 2018-11-22

## 2018-10-23 RX ORDER — SPIRONOLACTONE 50 MG/1
50 TABLET, FILM COATED ORAL DAILY
Qty: 30 TABLET | Refills: 1 | Status: SHIPPED | OUTPATIENT
Start: 2018-10-23 | End: 2018-11-22

## 2018-10-23 ASSESSMENT — PAIN SCALES - GENERAL: PAINLEVEL: NO PAIN (0)

## 2018-10-23 NOTE — NURSING NOTE
"Chief Complaint   Patient presents with     RECHECK     2 mo follow up     Blood pressure 129/76, pulse 69, temperature 98.2  F (36.8  C), temperature source Oral, height 1.727 m (5' 8\"), weight 76.4 kg (168 lb 6.4 oz), SpO2 100 %.    Pt states all medication are the same  ENA PENN CMA    "

## 2018-10-23 NOTE — LETTER
10/23/2018      RE: Danielle Cook  [address]  [facility] [address]       Hepatology Clinic note  Danielle Cook   Date of Birth 1945  Date of Service 10/23/2018         Impression/plan:   Danielle Cook is a 73 year old male with CKD 3, hypertension, hypothyroidism, DERIAN, and decompensated cirrhosis secondary to cured hepatitis C, complicated by esophageal varices status post banding, and ascites, presents with his wife for follow-up.    He decompensated more recently with presence of ascites and EV requiring banding.   MELD-Na of 11.     - Ascites: starting diuretics: lasix 20 mg daily, aldactone 50 mg daily.  Check labs in ~ 1 week. Stressed the importance of 2 gram sodium diet restriction    - Recent decline in liver function (and elevated liver enzymes):  --Appears related to GI bleeding episode and hospitalization. Currently, enzymes are improving.   --One question is whether a second etiology for his liver disease exist, besides cured HCV, in light of elevated F-actin, h/o hypothyroidism, however, I doubt an entity such as AIH is present as his status and numbers actually worsened while on prednisone.   -- although initially I suggested a liver biopsy, I do not believe there is a strong indication for it at this time based on: he has clear evidence of cirrhosis, and before recent prednisone and hospitalization: his liver enzymes and synthetic function were stable at baseline.      Therefore, the focus at this time, from the liver standpoint, will be on managing ascites by diuretics adjustment, varices surveillance (due for repeat mid-Nov) and HCC screening (due for repeat in 4/2019)    RTC in 3 months or sooner as needed    Nataly Darnell MD  [facility] Hepatology clinic    -----------------------------------------------------       HPI:   Danielle Cook is a 73 year old male with cured hepatitis C related cirrhosis, who presents for  follow-up.    Please refer to prior clinic note for details of initial presentation.   Since last clinic visit, he required hospitalization in early October for GI bleeding.  It appears that he was on high-dose prednisone for 1 week and attempt to manage his chronic headaches associated with very high sedimentation rate.    He informs me the prednisone had no effect on his symptoms.  He later was admitted to the hospital with melena and dropping his hemoglobin.    He underwent an upper endoscopy on 10/10/2018 with grade 2 esophageal varices S/P banding.  He reports to be stable without acute problems since discharge.  Had no further evidence of GI bleeding.  Continues to have headaches and feeling of malaise.  Otherwise no fevers  Chills, change in bowel habits, jaundice, confusion, or leg swelling.  He does have worsening abdominal distention.    Was on prednisone x 1 week in early October, symptoms did not change except for bleeding, resulting in hospitalization.          Past Medical History:     Past Medical History:   Diagnosis Date     CKD (chronic kidney disease)      CKD (chronic kidney disease) stage 3, GFR 30-59 ml/min (H)      Hep C w/o coma, chronic (H)      Hepatitis C virus infection      Hypertension             Past Surgical History:     Past Surgical History:   Procedure Laterality Date     COLONOSCOPY N/A 1/19/2018    Procedure: COLONOSCOPY;  COMBINED ESOPHAGOSCOPY, GASTROSCOPY, DUODENOSCOPY (EGD) REMOVE TUMOR/POYP/LESION BY SNARE, CONTROL BLEED,BANDING/LIGATION OF VARICES Colonoscopy;  Surgeon: Xavier Sutton MD;  Location:  GI     ESOPHAGOSCOPY, GASTROSCOPY, DUODENOSCOPY (EGD), COMBINED N/A 11/4/2015    Procedure: COMBINED ESOPHAGOSCOPY, GASTROSCOPY, DUODENOSCOPY (EGD), BIOPSY SINGLE OR MULTIPLE;  Surgeon: Inocente Do MD;  Location:  GI     ESOPHAGOSCOPY, GASTROSCOPY, DUODENOSCOPY (EGD), COMBINED N/A 3/29/2018    Procedure: COMBINED ESOPHAGOSCOPY, GASTROSCOPY, DUODENOSCOPY (EGD);  egd;   "Surgeon: Danielle Mendenhall MD;  Location:  GI     ESOPHAGOSCOPY, GASTROSCOPY, DUODENOSCOPY (EGD), COMBINED N/A 9/10/2018    Procedure: COMBINED ESOPHAGOSCOPY, GASTROSCOPY, DUODENOSCOPY (EGD);  egd;  Surgeon: Xavier Sutton MD;  [facility]            Medications:     Current Outpatient Prescriptions   Medication     acetaminophen (TYLENOL) 325 MG tablet     amLODIPine (NORVASC) 10 MG tablet     ASPIRIN LOW DOSE 81 MG EC tablet     benzocaine-menthol (CEPACOL) 15-3.6 MG lozenge     ferrous fumarate (FERRETTS) 324 (106 Fe) MG TABS tablet     gabapentin (NEURONTIN) 300 MG tablet     levothyroxine (SYNTHROID/LEVOTHROID) 50 MCG tablet     lidocaine, viscous, (XYLOCAINE) 2 % solution     nortriptyline (PAMELOR) 25 MG capsule     omeprazole (PRILOSEC) 40 MG capsule     oxyCODONE IR (ROXICODONE) 5 MG tablet     polyethylene glycol (MIRALAX/GLYCOLAX) powder     tamsulosin (FLOMAX) 0.4 MG capsule     No current facility-administered medications for this visit.             Allergies:   No Known Allergies         Social History:     Social History     Social History     Marital status:      Spouse name: N/A     Number of children: N/A     Years of education: N/A     Occupational History     Not on file.     Social History Main Topics     Smoking status: Never Smoker     Smokeless tobacco: Never Used     Alcohol use No     Drug use: No     Sexual activity: Not on file     Other Topics Concern     Not on file     Social History Narrative            Family History:     Family History   Problem Relation Age of Onset     Hypertension Father               Review of Systems:   10 points ROS was obtained and highlighted in the HPI, otherwise negative.          Physical Exam:   VS:  /76  Pulse 69  Temp 98.2  F (36.8  C) (Oral)  Ht 1.727 m (5' 8\")  Wt 76.4 kg (168 lb 6.4 oz)  SpO2 100%  BMI 25.61 kg/m2      Gen: A&Ox3, NAD  HEENT: non-icteric, oropharynx clear  CV: RRR  Lung: CTAB  Abd: soft, NT, " distended with positive fluid wave, spleen is enlarged, liver is not palpable.   Ext: no edema, intact pulses.   Skin: stigmata of chronic liver disease.   Neuro: no focal deficits, grossly intact, no asterixis   Psych: appropriate mood and affects         Data:   Reviewed in person and significant for:  Lab Results   Component Value Date    WBC 4.5 10/23/2018     Lab Results   Component Value Date    RBC 3.97 10/23/2018     Lab Results   Component Value Date    HGB 12.1 10/23/2018     Lab Results   Component Value Date    HCT 37.5 10/23/2018     No components found for: MCT  Lab Results   Component Value Date    MCV 95 10/23/2018     Lab Results   Component Value Date    MCH 30.5 10/23/2018     Lab Results   Component Value Date    MCHC 32.3 10/23/2018     Lab Results   Component Value Date    RDW 19.9 10/23/2018     Lab Results   Component Value Date     10/23/2018     Last Comprehensive Metabolic Panel:  Sodium   Date Value Ref Range Status   10/23/2018 140 133 - 144 mmol/L Final     Potassium   Date Value Ref Range Status   10/23/2018 3.9 3.4 - 5.3 mmol/L Final     Chloride   Date Value Ref Range Status   10/23/2018 108 94 - 109 mmol/L Final     Carbon Dioxide   Date Value Ref Range Status   10/23/2018 23 20 - 32 mmol/L Final     Anion Gap   Date Value Ref Range Status   10/23/2018 8 3 - 14 mmol/L Final     Glucose   Date Value Ref Range Status   10/23/2018 98 70 - 99 mg/dL Final     Urea Nitrogen   Date Value Ref Range Status   10/23/2018 8 7 - 30 mg/dL Final     Creatinine   Date Value Ref Range Status   10/23/2018 1.12 0.66 - 1.25 mg/dL Final     GFR Estimate   Date Value Ref Range Status   10/23/2018 64 >60 mL/min/1.7m2 Final     Comment:     Non  GFR Calc     Calcium   Date Value Ref Range Status   10/23/2018 7.9 (L) 8.5 - 10.1 mg/dL Final     Liver Function Studies -   Recent Labs   Lab Test  10/23/18   1034   PROTTOTAL  7.4   ALBUMIN  2.6*   BILITOTAL  2.0*   ALKPHOS  429*   AST   50*   ALT  43       Nataly Darnell MD

## 2018-10-23 NOTE — TELEPHONE ENCOUNTER
Patient with history of BPH coming in for follow up. Patient chart reviewed, no need for call, all records available and ready for appointment.

## 2018-10-23 NOTE — PROGRESS NOTES
Hepatology Clinic note  Danielle Cook   Date of Birth 1945  Date of Service 10/23/2018         Impression/plan:   Danielle Cook is a 73 year old male with CKD 3, hypertension, hypothyroidism, DERIAN, and decompensated cirrhosis secondary to cured hepatitis C, complicated by esophageal varices status post banding, and ascites, presents with his wife for follow-up.    He decompensated more recently with presence of ascites and EV requiring banding.   MELD-Na of 11.     - Ascites: starting diuretics: lasix 20 mg daily, aldactone 50 mg daily.  Check labs in ~ 1 week. Stressed the importance of 2 gram sodium diet restriction    - Recent decline in liver function (and elevated liver enzymes):  --Appears related to GI bleeding episode and hospitalization. Currently, enzymes are improving.   --One question is whether a second etiology for his liver disease exist, besides cured HCV, in light of elevated F-actin, h/o hypothyroidism, however, I doubt an entity such as AIH is present as his status and numbers actually worsened while on prednisone.   -- although initially I suggested a liver biopsy, I do not believe there is a strong indication for it at this time based on: he has clear evidence of cirrhosis, and before recent prednisone and hospitalization: his liver enzymes and synthetic function were stable at baseline.      Therefore, the focus at this time, from the liver standpoint, will be on managing ascites by diuretics adjustment, varices surveillance (due for repeat mid-Nov) and HCC screening (due for repeat in 4/2019)    RTC in 3 months or sooner as needed    Nataly Darnell MD  [facility] Hepatology clinic    -----------------------------------------------------       HPI:   Danielle Cook is a 73 year old male with cured hepatitis C related cirrhosis, who presents for follow-up.    Please refer to prior clinic note for details of initial presentation.   Since last clinic  visit, he required hospitalization in early October for GI bleeding.  It appears that he was on high-dose prednisone for 1 week and attempt to manage his chronic headaches associated with very high sedimentation rate.    He informs me the prednisone had no effect on his symptoms.  He later was admitted to the hospital with melena and dropping his hemoglobin.    He underwent an upper endoscopy on 10/10/2018 with grade 2 esophageal varices S/P banding.  He reports to be stable without acute problems since discharge.  Had no further evidence of GI bleeding.  Continues to have headaches and feeling of malaise.  Otherwise no fevers  Chills, change in bowel habits, jaundice, confusion, or leg swelling.  He does have worsening abdominal distention.    Was on prednisone x 1 week in early October, symptoms did not change except for bleeding, resulting in hospitalization.          Past Medical History:     Past Medical History:   Diagnosis Date     CKD (chronic kidney disease)      CKD (chronic kidney disease) stage 3, GFR 30-59 ml/min (H)      Hep C w/o coma, chronic (H)      Hepatitis C virus infection      Hypertension             Past Surgical History:     Past Surgical History:   Procedure Laterality Date     COLONOSCOPY N/A 1/19/2018    Procedure: COLONOSCOPY;  COMBINED ESOPHAGOSCOPY, GASTROSCOPY, DUODENOSCOPY (EGD) REMOVE TUMOR/POYP/LESION BY SNARE, CONTROL BLEED,BANDING/LIGATION OF VARICES Colonoscopy;  Surgeon: Xavier Sutton MD;  Location:  GI     ESOPHAGOSCOPY, GASTROSCOPY, DUODENOSCOPY (EGD), COMBINED N/A 11/4/2015    Procedure: COMBINED ESOPHAGOSCOPY, GASTROSCOPY, DUODENOSCOPY (EGD), BIOPSY SINGLE OR MULTIPLE;  Surgeon: Inocente Do MD;  Location:  GI     ESOPHAGOSCOPY, GASTROSCOPY, DUODENOSCOPY (EGD), COMBINED N/A 3/29/2018    Procedure: COMBINED ESOPHAGOSCOPY, GASTROSCOPY, DUODENOSCOPY (EGD);  egd;  Surgeon: Danielle Mendenhall MD;  Location:  GI     ESOPHAGOSCOPY, GASTROSCOPY, DUODENOSCOPY  "(EGD), COMBINED N/A 9/10/2018    Procedure: COMBINED ESOPHAGOSCOPY, GASTROSCOPY, DUODENOSCOPY (EGD);  egd;  Surgeon: Xavier Sutton MD;  [facility]            Medications:     Current Outpatient Prescriptions   Medication     acetaminophen (TYLENOL) 325 MG tablet     amLODIPine (NORVASC) 10 MG tablet     ASPIRIN LOW DOSE 81 MG EC tablet     benzocaine-menthol (CEPACOL) 15-3.6 MG lozenge     ferrous fumarate (FERRETTS) 324 (106 Fe) MG TABS tablet     gabapentin (NEURONTIN) 300 MG tablet     levothyroxine (SYNTHROID/LEVOTHROID) 50 MCG tablet     lidocaine, viscous, (XYLOCAINE) 2 % solution     nortriptyline (PAMELOR) 25 MG capsule     omeprazole (PRILOSEC) 40 MG capsule     oxyCODONE IR (ROXICODONE) 5 MG tablet     polyethylene glycol (MIRALAX/GLYCOLAX) powder     tamsulosin (FLOMAX) 0.4 MG capsule     No current facility-administered medications for this visit.             Allergies:   No Known Allergies         Social History:     Social History     Social History     Marital status:      Spouse name: N/A     Number of children: N/A     Years of education: N/A     Occupational History     Not on file.     Social History Main Topics     Smoking status: Never Smoker     Smokeless tobacco: Never Used     Alcohol use No     Drug use: No     Sexual activity: Not on file     Other Topics Concern     Not on file     Social History Narrative            Family History:     Family History   Problem Relation Age of Onset     Hypertension Father               Review of Systems:   10 points ROS was obtained and highlighted in the HPI, otherwise negative.          Physical Exam:   VS:  /76  Pulse 69  Temp 98.2  F (36.8  C) (Oral)  Ht 1.727 m (5' 8\")  Wt 76.4 kg (168 lb 6.4 oz)  SpO2 100%  BMI 25.61 kg/m2      Gen: A&Ox3, NAD  HEENT: non-icteric, oropharynx clear  CV: RRR  Lung: CTAB  Abd: soft, NT, distended with positive fluid wave, spleen is enlarged, liver is not palpable.   Ext: no edema, intact " pulses.   Skin: stigmata of chronic liver disease.   Neuro: no focal deficits, grossly intact, no asterixis   Psych: appropriate mood and affects         Data:   Reviewed in person and significant for:  Lab Results   Component Value Date    WBC 4.5 10/23/2018     Lab Results   Component Value Date    RBC 3.97 10/23/2018     Lab Results   Component Value Date    HGB 12.1 10/23/2018     Lab Results   Component Value Date    HCT 37.5 10/23/2018     No components found for: MCT  Lab Results   Component Value Date    MCV 95 10/23/2018     Lab Results   Component Value Date    MCH 30.5 10/23/2018     Lab Results   Component Value Date    MCHC 32.3 10/23/2018     Lab Results   Component Value Date    RDW 19.9 10/23/2018     Lab Results   Component Value Date     10/23/2018     Last Comprehensive Metabolic Panel:  Sodium   Date Value Ref Range Status   10/23/2018 140 133 - 144 mmol/L Final     Potassium   Date Value Ref Range Status   10/23/2018 3.9 3.4 - 5.3 mmol/L Final     Chloride   Date Value Ref Range Status   10/23/2018 108 94 - 109 mmol/L Final     Carbon Dioxide   Date Value Ref Range Status   10/23/2018 23 20 - 32 mmol/L Final     Anion Gap   Date Value Ref Range Status   10/23/2018 8 3 - 14 mmol/L Final     Glucose   Date Value Ref Range Status   10/23/2018 98 70 - 99 mg/dL Final     Urea Nitrogen   Date Value Ref Range Status   10/23/2018 8 7 - 30 mg/dL Final     Creatinine   Date Value Ref Range Status   10/23/2018 1.12 0.66 - 1.25 mg/dL Final     GFR Estimate   Date Value Ref Range Status   10/23/2018 64 >60 mL/min/1.7m2 Final     Comment:     Non  GFR Calc     Calcium   Date Value Ref Range Status   10/23/2018 7.9 (L) 8.5 - 10.1 mg/dL Final     Liver Function Studies -   Recent Labs   Lab Test  10/23/18   1034   PROTTOTAL  7.4   ALBUMIN  2.6*   BILITOTAL  2.0*   ALKPHOS  429*   AST  50*   ALT  43

## 2018-10-23 NOTE — MR AVS SNAPSHOT
After Visit Summary   10/23/2018    Danielle Cook    MRN: [id number]           Patient Information     Date Of Birth          1945        Visit Information        Provider Department      10/23/2018 11:15 AM Nataly Darnell MD; [employer] [facility] Hepatology        Today's Diagnoses     Chronic liver disease    -  1    Ascites of liver           Follow-ups after your visit        Your next 10 appointments already scheduled     Oct 23, 2018  1:45 PM CDT   Return Visit with DEX Queen   [facility]rology and [facility] ([facility])    [address] [place] [address]  4th [facility] [id number] [phone]            Oct 31, 2018 12:00 PM CDT   LAB with  LAB   [facility] Lab ([facility])    [address] [place] [id number] [phone]           Please do not eat 10-12 hours before your appointment if you are coming in fasting for labs on lipids, cholesterol, or glucose (sugar). This does not apply to pregnant women. Water, hot tea and black coffee (with nothing added) are okay. Do not drink other fluids, diet soda or chew gum.            Dec 21, 2018  8:45 AM CST   Lab with  LAB   [facility] Lab ([facility])    [address] [place] [id number] [phone]            Dec 21, 2018  9:40 AM CST   (Arrive by 9:10 AM)   Return Visit with Beto Alvarado MD   [facility] Nephrology ([facility])    [address] [place] [address]  [address]  [facility] [id number] [phone]              Future tests that were ordered for you today     Open Future Orders        Priority Expected Expires Ordered    Basic metabolic panel Routine 10/31/2018 4/23/2019 10/23/2018    F Actin EIA with reflex Routine 10/31/2018 4/23/2019 10/23/2018    Immunoglobulins A G and M Routine 10/31/2018 4/23/2019 10/23/2018     "Mitochondrial M2 Antibody IgG Routine 10/31/2018 4/23/2019 10/23/2018    Cryoglobulin quantitative Routine 10/31/2018 4/23/2019 10/23/2018            Who to contact     If you have questions or need follow up information about today's clinic visit or your schedule please contact [facility] HEPATOLOGY directly at [phone].  Normal or non-critical lab and imaging results will be communicated to you by MyChart, letter or phone within 4 business days after the clinic has received the results. If you do not hear from us within 7 days, please contact the clinic through MyChart or phone. If you have a critical or abnormal lab result, we will notify you by phone as soon as possible.  Submit refill requests through [employer] or call your pharmacy and they will forward the refill request to us. Please allow 3 business days for your refill to be completed.          Additional Information About Your Visit        Care EveryWhere ID     This is your Care EveryWhere ID. This could be used by other organizations to access your [place] medical records  [id number]        Your Vitals Were     Pulse Temperature Height Pulse Oximetry BMI (Body Mass Index)       69 98.2  F (36.8  C) (Oral) 1.727 m (5' 8\") 100% 25.61 kg/m2        Blood Pressure from Last 3 Encounters:   10/23/18 129/76   10/12/18 140/64   10/09/18 107/65    Weight from Last 3 Encounters:   10/23/18 76.4 kg (168 lb 6.4 oz)   10/11/18 76.1 kg (167 lb 12.8 oz)   10/09/18 76.7 kg (169 lb)                 Today's Medication Changes          These changes are accurate as of 10/23/18 12:29 PM.  If you have any questions, ask your nurse or doctor.               Start taking these medicines.        Dose/Directions    furosemide 20 MG tablet   Commonly known as:  LASIX   Used for:  Ascites of liver   Started by:  Nataly Darnell MD        Dose:  20 mg   Take 1 tablet (20 mg) by mouth daily   Quantity:  30 tablet   Refills:  1       spironolactone 50 MG tablet   Commonly known as: "  ALDACTONE   Used for:  Ascites of liver   Started by:  Nataly Darnell MD        Dose:  50 mg   Take 1 tablet (50 mg) by mouth daily   Quantity:  30 tablet   Refills:  1            Where to get your medicines      These medications were sent to [facility] - [place], [place] - [address]  [address], [facility] [address]     Phone:  [phone]     furosemide 20 MG tablet    spironolactone 50 MG tablet                Primary Care Provider Office Phone # Fax #    Andrey Zan Flores [phone]       [facility] [address] [address]        Equal Access to Services     [facility]: Hadii aad ku hadasho Soomaali, waaxda luqadaha, qaybta kaalmada adeegyada, waxcurt dengin haynagin reid markham . So [facility] [phone].    ATENCIÓN: Si habla español, tiene a whitehead disposición servicios gratuitos de asistencia lingüística. [facility] [phone].    We comply with applicable federal civil rights laws and [place] laws. We do not discriminate on the basis of race, color, national origin, age, disability, sex, sexual orientation, or gender identity.            Thank you!     Thank you for choosing [facility] HEPATOLOGY  for your care. Our goal is always to provide you with excellent care. Hearing back from our patients is one way we can continue to improve our services. Please take a few minutes to complete the written survey that you may receive in the mail after your visit with us. Thank you!             Your Updated Medication List - Protect others around you: Learn how to safely use, store and throw away your medicines at www.disposemymeds.org.          This list is accurate as of 10/23/18 12:29 PM.  Always use your most recent med list.                   Brand Name Dispense Instructions for use Diagnosis    acetaminophen 325 MG tablet    TYLENOL    100 tablet    Take 1 tablet (325 mg) by mouth every 6 hours as needed for mild pain    Other  chronic pain       amLODIPine 10 MG tablet    NORVASC          ASPIRIN LOW DOSE 81 MG EC tablet   Generic drug:  aspirin           benzocaine-menthol 15-3.6 MG lozenge    CEPACOL    84 lozenge    Place 1 lozenge inside cheek every 2 hours as needed for sore throat    Sore throat       ferrous fumarate 324 (106 Fe) MG Tabs tablet    FERRETTS     Take by mouth daily        furosemide 20 MG tablet    LASIX    30 tablet    Take 1 tablet (20 mg) by mouth daily    Ascites of liver       gabapentin 300 MG tablet    NEURONTIN     Take 600 mg by mouth 3 times daily    Chronic hepatitis C (H), Renal insufficiency       levothyroxine 50 MCG tablet    SYNTHROID/LEVOTHROID          lidocaine (viscous) 2 % solution    XYLOCAINE          nortriptyline 25 MG capsule    PAMELOR          omeprazole 40 MG capsule    priLOSEC    60 capsule    Take 1 capsule (40 mg) by mouth 2 times daily    Esophagitis, reflux       oxyCODONE IR 5 MG tablet    ROXICODONE          polyethylene glycol powder    MIRALAX/GLYCOLAX          spironolactone 50 MG tablet    ALDACTONE    30 tablet    Take 1 tablet (50 mg) by mouth daily    Ascites of liver       tamsulosin 0.4 MG capsule    FLOMAX    60 capsule    Take 1 capsule (0.4 mg) by mouth daily    Nocturia

## 2018-10-31 DIAGNOSIS — K76.9 CHRONIC LIVER DISEASE: ICD-10-CM

## 2018-10-31 LAB
ANION GAP SERPL CALCULATED.3IONS-SCNC: 8 MMOL/L (ref 3–14)
BUN SERPL-MCNC: 12 MG/DL (ref 7–30)
CALCIUM SERPL-MCNC: 8.4 MG/DL (ref 8.5–10.1)
CHLORIDE SERPL-SCNC: 109 MMOL/L (ref 94–109)
CO2 SERPL-SCNC: 24 MMOL/L (ref 20–32)
CREAT SERPL-MCNC: 1.2 MG/DL (ref 0.66–1.25)
GFR SERPL CREATININE-BSD FRML MDRD: 59 ML/MIN/1.7M2
GLUCOSE SERPL-MCNC: 93 MG/DL (ref 70–99)
POTASSIUM SERPL-SCNC: 3.8 MMOL/L (ref 3.4–5.3)
SODIUM SERPL-SCNC: 140 MMOL/L (ref 133–144)

## 2018-11-01 LAB
IGA SERPL-MCNC: 235 MG/DL (ref 70–380)
IGG SERPL-MCNC: 1410 MG/DL (ref 695–1620)
IGM SERPL-MCNC: 286 MG/DL (ref 60–265)
MITOCHONDRIA M2 IGG SER-ACNC: 2 U/ML

## 2018-11-02 LAB
SMA IGG SER-ACNC: 57 UNITS (ref 0–19)
SMOOTH MUSCLE ABY IGG TITER: NORMAL

## 2018-11-05 ENCOUNTER — TELEPHONE (OUTPATIENT)
Dept: GASTROENTEROLOGY | Facility: CLINIC | Age: 73
End: 2018-11-05

## 2018-11-05 NOTE — TELEPHONE ENCOUNTER
Writer left a message for wife to return my call per Dr. Darnell orders as follows: How's his abdominal distention and weight after we started diuretics?   - if still distended, he can go for therapeutic paracentesis (standard orders).   - if improving continue same plan with BMP checked in 2 weeks.   - after looking at all his w/u and results, I do not think a liver biopsy is needed or will add much, so I am NOt recommending it.   - f/u plan as stated previously: 3 months with Dr. Leventhal     Will await return call.

## 2018-11-05 NOTE — TELEPHONE ENCOUNTER
Wife and pt stopped in to discuss the voice message left on there phone number. Pt states that the abdomen swelling has significantly improved and no need to proceed with yolie. Pt was pleased to know that he wouldn't have to proceed with a liver biopsy and plans to follow up with Dr. Leventhal when schedule posted. Writer plans to watch for Dr. Arreola;s schedule.

## 2018-11-06 LAB — CRYOGLOB SER QL: ABNORMAL %

## 2018-11-22 DIAGNOSIS — R18.8 ASCITES OF LIVER: ICD-10-CM

## 2018-11-23 RX ORDER — SPIRONOLACTONE 50 MG/1
TABLET, FILM COATED ORAL
Qty: 30 TABLET | Refills: 11 | Status: SHIPPED | OUTPATIENT
Start: 2018-11-23 | End: 2019-04-18 | Stop reason: SINTOL

## 2018-11-23 RX ORDER — FUROSEMIDE 20 MG
TABLET ORAL
Qty: 30 TABLET | Refills: 11 | Status: SHIPPED | OUTPATIENT
Start: 2018-11-23 | End: 2020-04-21

## 2018-12-12 ENCOUNTER — NURSE TRIAGE (OUTPATIENT)
Dept: INTERNAL MEDICINE | Facility: CLINIC | Age: 73
End: 2018-12-12

## 2018-12-12 DIAGNOSIS — N39.0 URINARY TRACT INFECTION: Primary | ICD-10-CM

## 2018-12-12 NOTE — TELEPHONE ENCOUNTER
[facility]: Nurse Triage Note  SITUATION/BACKGROUND                                                      Danielle Cook is a 73 year old male who calls with  c/o of low fever ( ? Degree) accompanied by pain in lower right back and abdomen.  Blood in urine x two yesterday. Feels like the low back and under umbilicus pain is worsening over past 24 hours. Denies constipation . No bruising noted.  He has mild nausea, no vomiting.  He would like to see Dr Romero ( [facility] 8/21/18)  ASSESSMENT@ that time was : 1) constipation 2) nocturia 3) BPH with obstructive luts      PMH:CKD 3, hypertension, hypothyroidism, DERIAN, and decompensated cirrhosis secondary to cured hepatitis C, complicated by esophageal varices status post banding, and ascites.  [facility] hepatology 10/23/2018      Description:  Low abdomen and low back toward right flank pain  Onset/duration: 24  Precip. factors:  None  Associated symptoms:  Warm, low fever? Nausea  Improves/worsens symptoms:   na  Pain scale (0-10)   6/10    MEDICATIONS: Flomax, Miralax, Aldactone, Prilosec, Levothyroxine Lasix, Pamelor     Allergies: No Known Allergies    ASSESSMENT      Patient with hematuria and flank/ abd pain [facility],  Would like to avoid ED if possible.    RECOMMENDATION/PLAN                                                      RECOMMENDED DISPOSITION:  See in 24-48, if worsening pain, fever, weakness >ED  Will comply with recommendation: would prefer clinic appt/ add on    If further questions/concerns or if symptoms do not improve, worsen or new symptoms develop, call your PCP or [phone] to talk with the Resident on call, as soon as possible.    Guideline used: pp11 abd pain  Telephone Triage Protocols for Nurses, Fifth Edition, Rashida Carson RN

## 2018-12-13 DIAGNOSIS — N39.0 URINARY TRACT INFECTION: ICD-10-CM

## 2018-12-13 LAB
ALBUMIN UR-MCNC: NEGATIVE MG/DL
APPEARANCE UR: CLEAR
BILIRUB UR QL STRIP: NEGATIVE
COLOR UR AUTO: YELLOW
GLUCOSE UR STRIP-MCNC: NEGATIVE MG/DL
HGB UR QL STRIP: NEGATIVE
HYALINE CASTS #/AREA URNS LPF: 5 /LPF (ref 0–2)
KETONES UR STRIP-MCNC: NEGATIVE MG/DL
LEUKOCYTE ESTERASE UR QL STRIP: NEGATIVE
MUCOUS THREADS #/AREA URNS LPF: PRESENT /LPF
NITRATE UR QL: NEGATIVE
PH UR STRIP: 5 PH (ref 5–7)
RBC #/AREA URNS AUTO: <1 /HPF (ref 0–2)
SOURCE: ABNORMAL
SP GR UR STRIP: 1.02 (ref 1–1.03)
SQUAMOUS #/AREA URNS AUTO: <1 /HPF (ref 0–1)
UROBILINOGEN UR STRIP-MCNC: 2 MG/DL (ref 0–2)
WBC #/AREA URNS AUTO: 2 /HPF (ref 0–5)

## 2018-12-14 LAB
BACTERIA SPEC CULT: NO GROWTH
Lab: NORMAL
SPECIMEN SOURCE: NORMAL

## 2018-12-17 NOTE — TELEPHONE ENCOUNTER
- His urinalysis is negative, so he doesn't have a urinary tract infection.     - Today he has absolutely no blood in his urine, which is good for him.     - We have a CT of the abdomen and pelvis from October which shows normal kidneys without masses or stones, so I am not worried about kidney masses     - If he is seeing blood in his urine, he does need a cystoscopy appointment with one of the urologists.  He will also need a urine cytology, but that can be collected at the time of the cystoscopy.     - Meanwhile, if he is having pain I would recommend that he see his primary care provider.  Alternatively, if symptoms continue to be worrisome he could go to the Emergency Department.     Patient called and told the information he stated no more blood or pain at this time. Sugar Holguin LPN Staff Nurse

## 2018-12-18 DIAGNOSIS — N18.30 CKD (CHRONIC KIDNEY DISEASE) STAGE 3, GFR 30-59 ML/MIN (H): Primary | ICD-10-CM

## 2018-12-21 ENCOUNTER — OFFICE VISIT (OUTPATIENT)
Dept: NEPHROLOGY | Facility: CLINIC | Age: 73
End: 2018-12-21
Attending: INTERNAL MEDICINE
Payer: COMMERCIAL

## 2018-12-21 VITALS
DIASTOLIC BLOOD PRESSURE: 73 MMHG | HEART RATE: 68 BPM | TEMPERATURE: 97.8 F | HEIGHT: 68 IN | OXYGEN SATURATION: 98 % | BODY MASS INDEX: 24.07 KG/M2 | WEIGHT: 158.8 LBS | SYSTOLIC BLOOD PRESSURE: 119 MMHG

## 2018-12-21 DIAGNOSIS — N18.30 CKD (CHRONIC KIDNEY DISEASE) STAGE 3, GFR 30-59 ML/MIN (H): Primary | ICD-10-CM

## 2018-12-21 DIAGNOSIS — I10 HYPERTENSION, UNSPECIFIED TYPE: ICD-10-CM

## 2018-12-21 DIAGNOSIS — N18.30 CKD (CHRONIC KIDNEY DISEASE) STAGE 3, GFR 30-59 ML/MIN (H): ICD-10-CM

## 2018-12-21 LAB
ALBUMIN SERPL-MCNC: 2.8 G/DL (ref 3.4–5)
ANION GAP SERPL CALCULATED.3IONS-SCNC: 8 MMOL/L (ref 3–14)
BUN SERPL-MCNC: 22 MG/DL (ref 7–30)
CALCIUM SERPL-MCNC: 8.2 MG/DL (ref 8.5–10.1)
CHLORIDE SERPL-SCNC: 106 MMOL/L (ref 94–109)
CO2 SERPL-SCNC: 24 MMOL/L (ref 20–32)
CREAT SERPL-MCNC: 1.37 MG/DL (ref 0.66–1.25)
CREAT UR-MCNC: 241 MG/DL
ERYTHROCYTE [DISTWIDTH] IN BLOOD BY AUTOMATED COUNT: 14.9 % (ref 10–15)
GFR SERPL CREATININE-BSD FRML MDRD: 51 ML/MIN/{1.73_M2}
GLUCOSE SERPL-MCNC: 100 MG/DL (ref 70–99)
HCT VFR BLD AUTO: 40.2 % (ref 40–53)
HGB BLD-MCNC: 13.2 G/DL (ref 13.3–17.7)
MCH RBC QN AUTO: 30.4 PG (ref 26.5–33)
MCHC RBC AUTO-ENTMCNC: 32.8 G/DL (ref 31.5–36.5)
MCV RBC AUTO: 93 FL (ref 78–100)
PHOSPHATE SERPL-MCNC: 2.7 MG/DL (ref 2.5–4.5)
PLATELET # BLD AUTO: 104 10E9/L (ref 150–450)
POTASSIUM SERPL-SCNC: 4.4 MMOL/L (ref 3.4–5.3)
PROT UR-MCNC: 0.22 G/L
PROT/CREAT 24H UR: 0.09 G/G CR (ref 0–0.2)
RBC # BLD AUTO: 4.34 10E12/L (ref 4.4–5.9)
SODIUM SERPL-SCNC: 137 MMOL/L (ref 133–144)
WBC # BLD AUTO: 4.7 10E9/L (ref 4–11)

## 2018-12-21 PROCEDURE — 85027 COMPLETE CBC AUTOMATED: CPT | Performed by: INTERNAL MEDICINE

## 2018-12-21 PROCEDURE — 36415 COLL VENOUS BLD VENIPUNCTURE: CPT | Performed by: INTERNAL MEDICINE

## 2018-12-21 PROCEDURE — 84156 ASSAY OF PROTEIN URINE: CPT | Performed by: INTERNAL MEDICINE

## 2018-12-21 PROCEDURE — 80069 RENAL FUNCTION PANEL: CPT | Performed by: INTERNAL MEDICINE

## 2018-12-21 PROCEDURE — G0463 HOSPITAL OUTPT CLINIC VISIT: HCPCS | Mod: ZF

## 2018-12-21 ASSESSMENT — MIFFLIN-ST. JEOR: SCORE: 1444.81

## 2018-12-21 ASSESSMENT — PAIN SCALES - GENERAL: PAINLEVEL: NO PAIN (0)

## 2018-12-21 NOTE — PROGRESS NOTES
Nephrology Clinic    ASSESSMENT AND RECOMMENDATIONS:     1.CKD: creatinine baseline 1.3-1.4 mg/dl since 2013 with eGFR 60s. Etiology is likely renovascular disease due to longstanding HTN.  Today , creat 1.2 which is higher than last 6 months but certainly in his range.  2. HTN: BP goal is 140/90 OR LESS.Now at goal.      Follow up in 4 months  Because of recent trend of increased creat (may be part of his variability).      REASON FOR VISIT: CKD follow up     HISTORY OF PRESENT ILLNESS:    Danielle Cook is a 73 year old male who presents for CKD follow up. Since last seen , was hospitalized in October for GI bleed caused by varices. Also had a single paracentesis. Now feels well and has no complaints. Denies weakness, edema, loss of appetite, dizziness, dyspnea.    Seen with .          PAST MEDICAL HISTORY:  Past Medical History:   Diagnosis Date     CKD (chronic kidney disease)      CKD (chronic kidney disease) stage 3, GFR 30-59 ml/min (H)      Hep C w/o coma, chronic (H)      Hepatitis C virus infection      Hypertension      PAST SURGICAL HISTORY:  Past Surgical History:   Procedure Laterality Date     COLONOSCOPY N/A 1/19/2018    Procedure: COLONOSCOPY;  COMBINED ESOPHAGOSCOPY, GASTROSCOPY, DUODENOSCOPY (EGD) REMOVE TUMOR/POYP/LESION BY SNARE, CONTROL BLEED,BANDING/LIGATION OF VARICES Colonoscopy;  Surgeon: Xavier Sutton MD;  Location:  GI     ESOPHAGOSCOPY, GASTROSCOPY, DUODENOSCOPY (EGD), COMBINED N/A 11/4/2015    Procedure: COMBINED ESOPHAGOSCOPY, GASTROSCOPY, DUODENOSCOPY (EGD), BIOPSY SINGLE OR MULTIPLE;  Surgeon: Inocente Do MD;  Location:  GI     ESOPHAGOSCOPY, GASTROSCOPY, DUODENOSCOPY (EGD), COMBINED N/A 3/29/2018    Procedure: COMBINED ESOPHAGOSCOPY, GASTROSCOPY, DUODENOSCOPY (EGD);  egd;  Surgeon: Danielle Mendenhall MD;  Location:  GI     ESOPHAGOSCOPY, GASTROSCOPY, DUODENOSCOPY (EGD), COMBINED N/A 9/10/2018    Procedure: COMBINED ESOPHAGOSCOPY, GASTROSCOPY,  DUODENOSCOPY (EGD);  egd;  Surgeon: Xavier Sutton MD;  Location:  OR     MEDICATIONS:  Prescription Medications as of 12/21/2018       Rx Number Disp Refills Start End Last Dispensed Date Next Fill Date Owning Pharmacy    acetaminophen (TYLENOL) 325 MG tablet  100 tablet 3 10/14/2016    [facility] [place], [place] - [address]    Sig: Take 1 tablet (325 mg) by mouth every 6 hours as needed for mild pain    Class: E-Prescribe    Notes to Pharmacy: Your can take up to 2000 mg ( no more than 6 tablets) per day.    Route: Oral    amLODIPine (NORVASC) 10 MG tablet    8/18/2018        Class: Historical    ASPIRIN LOW DOSE 81 MG EC tablet    8/6/2018        Class: Historical    benzocaine-menthol (CEPACOL) 15-3.6 MG lozenge  84 lozenge 0 10/12/2018    [place], [place] - [address] [place] [address]    Sig: Place 1 lozenge inside cheek every 2 hours as needed for sore throat    Class: E-Prescribe    Route: Buccal    ferrous fumarate (FERRETTS) 324 (106 Fe) MG TABS tablet            Sig: Take by mouth daily    Class: Historical    Route: Oral    furosemide (LASIX) 20 MG tablet  30 tablet 11 11/23/2018    [facility] - [place], [place] - [address] [facility]    Sig: Take 1 tablet (20 mg) by mouth daily    Class: E-Prescribe    gabapentin (NEURONTIN) 300 MG tablet            Sig: Take 600 mg by mouth 3 times daily     Class: Historical    Route: Oral    levothyroxine (SYNTHROID/LEVOTHROID) 50 MCG tablet    8/15/2018        Class: Historical    lidocaine, viscous, (XYLOCAINE) 2 % solution    1/19/2018        Class: Historical    nortriptyline (PAMELOR) 25 MG capsule    3/4/2018        Class: Historical    omeprazole (PRILOSEC) 40 MG capsule  60 capsule 2 10/12/2018    [address]    Sig: Take 1 capsule (40 mg) by mouth 2 times daily    Class: Historical    Route: Oral    oxyCODONE IR (ROXICODONE) 5 MG tablet    1/22/2018     "    Class: Historical    Earliest Fill Date: 1/22/2018    polyethylene glycol (MIRALAX/GLYCOLAX) powder    6/13/2018        Class: Historical    spironolactone (ALDACTONE) 50 MG tablet  30 tablet 11 11/23/2018    [address]    Sig: Take 1 tablet (50 mg) by mouth daily    Class: E-Prescribe    tamsulosin (FLOMAX) 0.4 MG capsule  60 capsule 5 8/21/2018    [address]    Sig: Take 1 capsule (0.4 mg) by mouth daily    Class: E-Prescribe    Route: Oral         ALLERGIES:    No Known Allergies  REVIEW OF SYSTEMS:  A comprehensive review of systems was performed and found to be negative except as described here or above.   SOCIAL HISTORY:   Social History     Socioeconomic History     Marital status:      Spouse name: Not on file     Number of children: Not on file     Years of education: Not on file     Highest education level: Not on file   Social Needs     Financial resource strain: Not on file     Food insecurity - worry: Not on file     Food insecurity - inability: Not on file     Transportation needs - medical: Not on file     Transportation needs - non-medical: Not on file   Occupational History     Not on file   Tobacco Use     Smoking status: Never Smoker     Smokeless tobacco: Never Used   Substance and Sexual Activity     Alcohol use: No     Alcohol/week: 0.0 oz     Drug use: No     Sexual activity: Not on file   Other Topics Concern     Parent/sibling w/ CABG, MI or angioplasty before 65F 55M? Not Asked   Social History Narrative     Not on file     FAMILY MEDICAL HISTORY:   Family History   Problem Relation Age of Onset     Hypertension Father      PHYSICAL EXAM:     /73   Pulse 68   Temp 97.8  F (36.6  C) (Oral)   Ht 1.727 m (5' 8\")   Wt 72 kg (158 lb 12.8 oz)   SpO2 98%   BMI 24.15 kg/m    GENERAL APPEARANCE: alert and no distress, thin, healthy appearing  Head: no tenderness over scalp or paraspinal " mm.  EYES: nonicteric  NECK: supple, no adenopathy  RESP: lungs clear to auscultation   CV: regular rhythm, normal rate, no rub  ABDOMEN: soft, nontender, normal bowel sounds, some abdominal distension (? Non-tense ascites vs obesity)  Extremities : no edema  MS:  no muscle tenderness  SKIN: no rash  NEURO: mentation and speech normal   PSYCH: affect normal/bright     LABS:     Recent Labs   Lab Test 10/31/18  1217 10/23/18  1034 10/12/18  0722 10/11/18  0526  10/09/18  1754  04/30/18  1322  12/15/17  0738  12/16/16  0842  10/06/15  1027    140 141 144   < > 142   < >  --    < > 139   < > 142   < > 140   POTASSIUM 3.8 3.9 3.7 3.9   < > 4.4   < >  --    < > 3.7   < > 4.5   < > 3.8   CHLORIDE 109 108 108 111*   < > 108   < >  --    < > 106   < > 109   < > 109   CO2 24 23 24 23   < > 24   < >  --    < > 26   < > 26   < > 26   ANIONGAP 8 8 9 11   < > 9   < >  --    < > 7   < > 7   < > 6   GLC 93 98 122* 127*   < > 109*   < >  --    < > 99   < > 99   < > 80   BUN 12 8 21 28   < > 43*   < >  --    < > 12   < > 13   < > 9   CR 1.20 1.12 1.07 1.05   < > 1.24   < >  --    < > 1.35*   < > 1.39*   < > 1.27*   GFRESTIMATED 59* 64 68 69   < > 57*   < >  --    < > 52*   < > 50*   < > 56*   GFRESTBLACK 72 78 82 84   < > 69   < >  --    < > 63   < > 61   < > 68   ABDIRASHID 8.4* 7.9* 7.6* 7.8*   < > 8.1*   < >  --    < > 8.1*   < > 8.5   < > 8.2*   MAG  --   --   --   --   --   --   --  2.4*  --   --   --   --   --   --    PHOS  --   --   --   --   --   --   --  2.3*  --  2.9  --  3.2  --  2.8   PROTTOTAL  --  7.4 6.4* 6.6*  --  7.2   < >  --    < >  --    < >  --    < > 7.2   ALBUMIN  --  2.6* 2.6* 2.8*  --  2.8*   < >  --    < > 2.8*   < > 2.8*   < > 2.6*   BILITOTAL  --  2.0* 2.0* 1.2  --  0.9   < >  --    < >  --    < >  --    < > 1.3   ALKPHOS  --  429* 288* 323*  --  376*   < >  --    < >  --    < >  --    < > 174*   AST  --  50* 45 72*  --  138*   < >  --    < >  --    < >  --    < > 77*   ALT  --  43 104* 142*  --  207*   <  >  --    < >  --    < >  --    < > 50    < > = values in this interval not displayed.     CBC  Recent Labs   Lab Test 12/21/18  0842 10/23/18  1034 10/12/18  0722 10/11/18  0526   HGB 13.2* 12.1* 11.2* 11.9*   WBC 4.7 4.5 8.0 6.8   RBC 4.34* 3.97* 3.73* 3.99*   HCT 40.2 37.5* 34.9* 37.3*   MCV 93 95 94 94   MCH 30.4 30.5 30.0 29.8   MCHC 32.8 32.3 32.1 31.9   RDW 14.9 19.9* Dimorphic population - unable to calculate Dimorphic population - unable to calculate   * 125* 134* 155     INR  Recent Labs   Lab Test 10/23/18  1034 10/12/18  0722 10/11/18  0526 10/09/18  1754   INR 1.05 1.16* 1.15* 1.05   PTT  --   --   --  22     ABGNo lab results found.   URINE STUDIES  Recent Labs   Lab Test 12/13/18  1318 08/21/18  1550 04/30/18  1327 08/15/14  1047   COLOR Yellow Shikha Yellow Yellow   APPEARANCE Clear Clear Clear Clear   URINEGLC Negative Negative Negative Negative   URINEBILI Negative Negative Negative Negative   URINEKETONE Negative Negative Negative Negative   SG 1.016 1.018 1.020 1.007   UBLD Negative Negative Negative Negative   URINEPH 5.0 6.0 6.0 7.0   PROTEIN Negative Negative Negative Negative   NITRITE Negative Negative Negative Negative   LEUKEST Negative Negative Negative Negative   RBCU <1 <1 <1 10*   WBCU 2 <1 1 8*     Recent Labs   Lab Test 12/15/17  0755 12/16/16  0852 10/06/15  1030 12/12/14  0835 08/15/14  1047 12/31/13  1319 09/24/13  1439   UTPG 0.10 0.10 0.12 0.16 0.16 0.06 0.03     PTH  Recent Labs   Lab Test 04/30/18  1322 12/16/16  0842 08/15/14  1028 09/24/13  1303   PTHI 100* 88* 81* 65     IRON STUDIES  Recent Labs   Lab Test 04/30/18  1322 10/18/17  1209 12/16/16  0842 08/15/14  1028 09/24/13  1303   IRON 20* 42 47 27* 38    374 414 437* 447*   IRONSAT 5* 11* 11* 6* 9*   MARYAM 8* 13* 10* 14* 19*       Beto Alvarado MD

## 2018-12-21 NOTE — NURSING NOTE
"Chief Complaint   Patient presents with     RECHECK     1 year follow up     Blood pressure 119/73, pulse 68, temperature 97.8  F (36.6  C), temperature source Oral, height 1.727 m (5' 8\"), weight 72 kg (158 lb 12.8 oz), SpO2 98 %.    Patient states all medications are the same  ENA PENN CMA    "

## 2018-12-21 NOTE — LETTER
12/21/2018       RE: Danielle Cook  [address]  [facility] [address]     Dear Colleague,    Thank you for referring your patient, Danielle Cook, to the [facility] NEPHROLOGY at [facility]. Please see a copy of my visit note below.    Nephrology Clinic    ASSESSMENT AND RECOMMENDATIONS:     1.CKD: creatinine baseline 1.3-1.4 mg/dl since 2013 with eGFR 60s. Etiology is likely renovascular disease due to longstanding HTN.  Today , creat 1.2 which is higher than last 6 months but certainly in his range.  2. HTN: BP goal is 140/90 OR LESS.Now at goal.      Follow up in 4 months  Because of recent trend of increased creat (may be part of his variability).      REASON FOR VISIT: CKD follow up     HISTORY OF PRESENT ILLNESS:    Danielle Cook is a 73 year old male who presents for CKD follow up. Since last seen , was hospitalized in October for GI bleed caused by varices. Also had a single paracentesis. Now feels well and has no complaints. Denies weakness, edema, loss of appetite, dizziness, dyspnea.    Seen with .          PAST MEDICAL HISTORY:  Past Medical History:   Diagnosis Date     CKD (chronic kidney disease)      CKD (chronic kidney disease) stage 3, GFR 30-59 ml/min (H)      Hep C w/o coma, chronic (H)      Hepatitis C virus infection      Hypertension      PAST SURGICAL HISTORY:  Past Surgical History:   Procedure Laterality Date     COLONOSCOPY N/A 1/19/2018    Procedure: COLONOSCOPY;  COMBINED ESOPHAGOSCOPY, GASTROSCOPY, DUODENOSCOPY (EGD) REMOVE TUMOR/POYP/LESION BY SNARE, CONTROL BLEED,BANDING/LIGATION OF VARICES Colonoscopy;  Surgeon: Xavier Sutton MD;  Location:  GI     ESOPHAGOSCOPY, GASTROSCOPY, DUODENOSCOPY (EGD), COMBINED N/A 11/4/2015    Procedure: COMBINED ESOPHAGOSCOPY, GASTROSCOPY, DUODENOSCOPY (EGD), BIOPSY SINGLE OR MULTIPLE;  Surgeon: Inocente Do MD;  Location:  GI     ESOPHAGOSCOPY, GASTROSCOPY,  DUODENOSCOPY (EGD), COMBINED N/A 3/29/2018    Procedure: COMBINED ESOPHAGOSCOPY, GASTROSCOPY, DUODENOSCOPY (EGD);  egd;  Surgeon: Danielle Mendenhall MD;  Location:  GI     ESOPHAGOSCOPY, GASTROSCOPY, DUODENOSCOPY (EGD), COMBINED N/A 9/10/2018    Procedure: COMBINED ESOPHAGOSCOPY, GASTROSCOPY, DUODENOSCOPY (EGD);  egd;  Surgeon: Xavier Sutton MD;  Location:  OR     MEDICATIONS:  Prescription Medications as of 12/21/2018       Rx Number Disp Refills Start End Last Dispensed Date Next Fill Date Owning Pharmacy    acetaminophen (TYLENOL) 325 MG tablet  100 tablet 3 10/14/2016    [place], [place] - [address] [place] [address]    Sig: Take 1 tablet (325 mg) by mouth every 6 hours as needed for mild pain    Class: E-Prescribe    Notes to Pharmacy: Your can take up to 2000 mg ( no more than 6 tablets) per day.    Route: Oral    amLODIPine (NORVASC) 10 MG tablet    8/18/2018        Class: Historical    ASPIRIN LOW DOSE 81 MG EC tablet    8/6/2018        Class: Historical    benzocaine-menthol (CEPACOL) 15-3.6 MG lozenge  84 lozenge 0 10/12/2018    [place], [place] - [address] [facility]    Sig: Place 1 lozenge inside cheek every 2 hours as needed for sore throat    Class: E-Prescribe    Route: Buccal    ferrous fumarate (FERRETTS) 324 (106 Fe) MG TABS tablet            Sig: Take by mouth daily    Class: Historical    Route: Oral    furosemide (LASIX) 20 MG tablet  30 tablet 11 11/23/2018    [place], [place] - [address] [facility]    Sig: Take 1 tablet (20 mg) by mouth daily    Class: E-Prescribe    gabapentin (NEURONTIN) 300 MG tablet            Sig: Take 600 mg by mouth 3 times daily     Class: Historical    Route: Oral    levothyroxine (SYNTHROID/LEVOTHROID) 50 MCG tablet    8/15/2018        Class: Historical    lidocaine, viscous, (XYLOCAINE) 2 % solution    1/19/2018        Class: Historical    nortriptyline (PAMELOR) 25 MG capsule    3/4/2018     "    Class: Historical    omeprazole (PRILOSEC) 40 MG capsule  60 capsule 2 10/12/2018    [place], [place] - [address] [facility]    Sig: Take 1 capsule (40 mg) by mouth 2 times daily    Class: Historical    Route: Oral    oxyCODONE IR (ROXICODONE) 5 MG tablet    1/22/2018        Class: Historical    Earliest Fill Date: 1/22/2018    polyethylene glycol (MIRALAX/GLYCOLAX) powder    6/13/2018        Class: Historical    spironolactone (ALDACTONE) 50 MG tablet  30 tablet 11 11/23/2018    [address]    Sig: Take 1 tablet (50 mg) by mouth daily    Class: E-Prescribe    tamsulosin (FLOMAX) 0.4 MG capsule  60 capsule 5 8/21/2018    [address]    Sig: Take 1 capsule (0.4 mg) by mouth daily    Class: E-Prescribe    Route: Oral         ALLERGIES:    No Known Allergies    SOCIAL HISTORY:   Social History     Socioeconomic History     Marital status:      Spouse name: Not on file     Number of children: Not on file     Years of education: Not on file     Highest education level: Not on file   Social Needs     Financial resource strain: Not on file     Food insecurity - worry: Not on file     Food insecurity - inability: Not on file     Transportation needs - medical: Not on file     Transportation needs - non-medical: Not on file   Occupational History     Not on file   Tobacco Use     Smoking status: Never Smoker     Smokeless tobacco: Never Used   Substance and Sexual Activity     Alcohol use: No     Alcohol/week: 0.0 oz     Drug use: No     Sexual activity: Not on file   Other Topics Concern     Parent/sibling w/ CABG, MI or angioplasty before 65F 55M? Not Asked   Social History Narrative     Not on file     FAMILY MEDICAL HISTORY:   Family History   Problem Relation Age of Onset     Hypertension Father      PHYSICAL EXAM:     /73   Pulse 68   Temp 97.8  F (36.6  C) (Oral)   Ht 1.727 m (5' 8\")  "  Wt 72 kg (158 lb 12.8 oz)   SpO2 98%   BMI 24.15 kg/m     GENERAL APPEARANCE: alert and no distress, thin, healthy appearing  Head: no tenderness over scalp or paraspinal mm.  EYES: nonicteric  NECK: supple, no adenopathy  RESP: lungs clear to auscultation   CV: regular rhythm, normal rate, no rub  ABDOMEN: soft, nontender, normal bowel sounds, some abdominal distension (? Non-tense ascites vs obesity)  Extremities : no edema  MS:  no muscle tenderness  SKIN: no rash  NEURO: mentation and speech normal   PSYCH: affect normal/bright     LABS:     Recent Labs   Lab Test 10/31/18  1217 10/23/18  1034 10/12/18  0722 10/11/18  0526  10/09/18  1754  04/30/18  1322  12/15/17  0738  12/16/16  0842  10/06/15  1027    140 141 144   < > 142   < >  --    < > 139   < > 142   < > 140   POTASSIUM 3.8 3.9 3.7 3.9   < > 4.4   < >  --    < > 3.7   < > 4.5   < > 3.8   CHLORIDE 109 108 108 111*   < > 108   < >  --    < > 106   < > 109   < > 109   CO2 24 23 24 23   < > 24   < >  --    < > 26   < > 26   < > 26   ANIONGAP 8 8 9 11   < > 9   < >  --    < > 7   < > 7   < > 6   GLC 93 98 122* 127*   < > 109*   < >  --    < > 99   < > 99   < > 80   BUN 12 8 21 28   < > 43*   < >  --    < > 12   < > 13   < > 9   CR 1.20 1.12 1.07 1.05   < > 1.24   < >  --    < > 1.35*   < > 1.39*   < > 1.27*   GFRESTIMATED 59* 64 68 69   < > 57*   < >  --    < > 52*   < > 50*   < > 56*   GFRESTBLACK 72 78 82 84   < > 69   < >  --    < > 63   < > 61   < > 68   ABDIRASHID 8.4* 7.9* 7.6* 7.8*   < > 8.1*   < >  --    < > 8.1*   < > 8.5   < > 8.2*   MAG  --   --   --   --   --   --   --  2.4*  --   --   --   --   --   --    PHOS  --   --   --   --   --   --   --  2.3*  --  2.9  --  3.2  --  2.8   PROTTOTAL  --  7.4 6.4* 6.6*  --  7.2   < >  --    < >  --    < >  --    < > 7.2   ALBUMIN  --  2.6* 2.6* 2.8*  --  2.8*   < >  --    < > 2.8*   < > 2.8*   < > 2.6*   BILITOTAL  --  2.0* 2.0* 1.2  --  0.9   < >  --    < >  --    < >  --    < > 1.3   ALKPHOS  --  429*  288* 323*  --  376*   < >  --    < >  --    < >  --    < > 174*   AST  --  50* 45 72*  --  138*   < >  --    < >  --    < >  --    < > 77*   ALT  --  43 104* 142*  --  207*   < >  --    < >  --    < >  --    < > 50    < > = values in this interval not displayed.     CBC  Recent Labs   Lab Test 12/21/18  0842 10/23/18  1034 10/12/18  0722 10/11/18  0526   HGB 13.2* 12.1* 11.2* 11.9*   WBC 4.7 4.5 8.0 6.8   RBC 4.34* 3.97* 3.73* 3.99*   HCT 40.2 37.5* 34.9* 37.3*   MCV 93 95 94 94   MCH 30.4 30.5 30.0 29.8   MCHC 32.8 32.3 32.1 31.9   RDW 14.9 19.9* Dimorphic population - unable to calculate Dimorphic population - unable to calculate   * 125* 134* 155     INR  Recent Labs   Lab Test 10/23/18  1034 10/12/18  0722 10/11/18  0526 10/09/18  1754   INR 1.05 1.16* 1.15* 1.05   PTT  --   --   --  22     ABGNo lab results found.   URINE STUDIES  Recent Labs   Lab Test 12/13/18  1318 08/21/18  1550 04/30/18  1327 08/15/14  1047   COLOR Yellow Shikha Yellow Yellow   APPEARANCE Clear Clear Clear Clear   URINEGLC Negative Negative Negative Negative   URINEBILI Negative Negative Negative Negative   URINEKETONE Negative Negative Negative Negative   SG 1.016 1.018 1.020 1.007   UBLD Negative Negative Negative Negative   URINEPH 5.0 6.0 6.0 7.0   PROTEIN Negative Negative Negative Negative   NITRITE Negative Negative Negative Negative   LEUKEST Negative Negative Negative Negative   RBCU <1 <1 <1 10*   WBCU 2 <1 1 8*     Recent Labs   Lab Test 12/15/17  0755 12/16/16  0852 10/06/15  1030 12/12/14  0835 08/15/14  1047 12/31/13  1319 09/24/13  1439   UTPG 0.10 0.10 0.12 0.16 0.16 0.06 0.03     PTH  Recent Labs   Lab Test 04/30/18  1322 12/16/16  0842 08/15/14  1028 09/24/13  1303   PTHI 100* 88* 81* 65     IRON STUDIES  Recent Labs   Lab Test 04/30/18  1322 10/18/17  1209 12/16/16  0842 08/15/14  1028 09/24/13  1303   IRON 20* 42 47 27* 38    374 414 437* 447*   IRONSAT 5* 11* 11* 6* 9*   MARYAM 8* 13* 10* 14* 19*       Beto  COCO Alvarado MD

## 2019-01-16 DIAGNOSIS — K74.60 CIRRHOSIS OF LIVER WITHOUT ASCITES, UNSPECIFIED HEPATIC CIRRHOSIS TYPE (H): Primary | ICD-10-CM

## 2019-01-28 ENCOUNTER — TELEPHONE (OUTPATIENT)
Dept: GASTROENTEROLOGY | Facility: CLINIC | Age: 74
End: 2019-01-28

## 2019-01-28 ENCOUNTER — OFFICE VISIT (OUTPATIENT)
Dept: GASTROENTEROLOGY | Facility: CLINIC | Age: 74
End: 2019-01-28
Attending: INTERNAL MEDICINE
Payer: COMMERCIAL

## 2019-01-28 VITALS
BODY MASS INDEX: 23.76 KG/M2 | TEMPERATURE: 98 F | SYSTOLIC BLOOD PRESSURE: 126 MMHG | RESPIRATION RATE: 18 BRPM | DIASTOLIC BLOOD PRESSURE: 74 MMHG | OXYGEN SATURATION: 99 % | WEIGHT: 156.8 LBS | HEART RATE: 81 BPM | HEIGHT: 68 IN

## 2019-01-28 DIAGNOSIS — K74.60 CIRRHOSIS OF LIVER WITHOUT ASCITES, UNSPECIFIED HEPATIC CIRRHOSIS TYPE (H): ICD-10-CM

## 2019-01-28 DIAGNOSIS — E44.1 MILD PROTEIN-CALORIE MALNUTRITION (H): ICD-10-CM

## 2019-01-28 DIAGNOSIS — I85.10 SECONDARY ESOPHAGEAL VARICES WITHOUT BLEEDING (H): ICD-10-CM

## 2019-01-28 DIAGNOSIS — K74.60 CIRRHOSIS OF LIVER WITHOUT ASCITES, UNSPECIFIED HEPATIC CIRRHOSIS TYPE (H): Primary | ICD-10-CM

## 2019-01-28 DIAGNOSIS — B18.2 CHRONIC HEPATITIS C VIRUS INFECTION WITH CIRRHOSIS (H): Primary | ICD-10-CM

## 2019-01-28 DIAGNOSIS — Z86.19 HISTORY OF HEPATITIS C: ICD-10-CM

## 2019-01-28 DIAGNOSIS — K74.60 CHRONIC HEPATITIS C VIRUS INFECTION WITH CIRRHOSIS (H): Primary | ICD-10-CM

## 2019-01-28 DIAGNOSIS — Z12.9 SCREENING FOR CANCER: ICD-10-CM

## 2019-01-28 PROBLEM — D64.9 ANEMIA: Status: RESOLVED | Noted: 2018-07-03 | Resolved: 2019-01-28

## 2019-01-28 LAB
ALBUMIN SERPL-MCNC: 3 G/DL (ref 3.4–5)
ALP SERPL-CCNC: 384 U/L (ref 40–150)
ALT SERPL W P-5'-P-CCNC: 37 U/L (ref 0–70)
ANION GAP SERPL CALCULATED.3IONS-SCNC: 4 MMOL/L (ref 3–14)
AST SERPL W P-5'-P-CCNC: 44 U/L (ref 0–45)
BILIRUB DIRECT SERPL-MCNC: 0.7 MG/DL (ref 0–0.2)
BILIRUB SERPL-MCNC: 1.3 MG/DL (ref 0.2–1.3)
BUN SERPL-MCNC: 19 MG/DL (ref 7–30)
CALCIUM SERPL-MCNC: 8.4 MG/DL (ref 8.5–10.1)
CHLORIDE SERPL-SCNC: 108 MMOL/L (ref 94–109)
CO2 SERPL-SCNC: 26 MMOL/L (ref 20–32)
CREAT SERPL-MCNC: 1.49 MG/DL (ref 0.66–1.25)
ERYTHROCYTE [DISTWIDTH] IN BLOOD BY AUTOMATED COUNT: 15.4 % (ref 10–15)
GFR SERPL CREATININE-BSD FRML MDRD: 46 ML/MIN/{1.73_M2}
GLUCOSE SERPL-MCNC: 98 MG/DL (ref 70–99)
HCT VFR BLD AUTO: 43 % (ref 40–53)
HGB BLD-MCNC: 14.6 G/DL (ref 13.3–17.7)
INR PPP: 1.06 (ref 0.86–1.14)
MCH RBC QN AUTO: 31.2 PG (ref 26.5–33)
MCHC RBC AUTO-ENTMCNC: 34 G/DL (ref 31.5–36.5)
MCV RBC AUTO: 92 FL (ref 78–100)
PLATELET # BLD AUTO: 124 10E9/L (ref 150–450)
POTASSIUM SERPL-SCNC: 4.3 MMOL/L (ref 3.4–5.3)
PROT SERPL-MCNC: 7.8 G/DL (ref 6.8–8.8)
RBC # BLD AUTO: 4.68 10E12/L (ref 4.4–5.9)
SODIUM SERPL-SCNC: 139 MMOL/L (ref 133–144)
WBC # BLD AUTO: 5.2 10E9/L (ref 4–11)

## 2019-01-28 PROCEDURE — 80076 HEPATIC FUNCTION PANEL: CPT | Performed by: INTERNAL MEDICINE

## 2019-01-28 PROCEDURE — 80048 BASIC METABOLIC PNL TOTAL CA: CPT | Performed by: INTERNAL MEDICINE

## 2019-01-28 PROCEDURE — 36415 COLL VENOUS BLD VENIPUNCTURE: CPT | Performed by: INTERNAL MEDICINE

## 2019-01-28 PROCEDURE — 85027 COMPLETE CBC AUTOMATED: CPT | Performed by: INTERNAL MEDICINE

## 2019-01-28 PROCEDURE — 85610 PROTHROMBIN TIME: CPT | Performed by: INTERNAL MEDICINE

## 2019-01-28 PROCEDURE — G0463 HOSPITAL OUTPT CLINIC VISIT: HCPCS | Mod: ZF

## 2019-01-28 ASSESSMENT — MIFFLIN-ST. JEOR: SCORE: 1435.74

## 2019-01-28 ASSESSMENT — PAIN SCALES - GENERAL: PAINLEVEL: NO PAIN (0)

## 2019-01-28 NOTE — LETTER
"1/28/2019       RE: Danielle Cook  [address]  [facility] [address]     Dear Colleague,    Thank you for referring your patient, Danielle Cook, to the [facility] HEPATOLOGY at [facility]. Please see a copy of my visit note below.    Date of Service: 1/28/2019     Subjective:            Danielle Cook is a 73 year old male presenting for evaluation of liver disease    Due to language barrier, an  was present during the history-taking and subsequent discussion (and for part of the physical exam) with this patient.      History of Present Illness   Danielle Cook is a 73 year old male with past medical history of chronic kidney disease, Chronic hepatitis C (s/p SVR) with cirrhosis complicated by esophageal varices and mild protein calorie malnutrition who presents in routine follow up.      Since last seen, he continues to note intermittent \"burning pains\" in his b/l upper extremities and occasionally in his chest.  He reports significant pain and discomfort with his last EGD (he had banding).   He was admitted in October 2018 for concerns of a GI bleed and underwent an EGD on 10/11/2018 which demonstrated large varices and had 4 bands placed.    Denies issues with confusion, impaired memory, gait disturbance, nausea, emesis, fevers.    - His HCV was treated with a combination of Harvoni and RBV, with SVR in early 2016    Past Medical History:  Past Medical History:   Diagnosis Date     CKD (chronic kidney disease)      CKD (chronic kidney disease) stage 3, GFR 30-59 ml/min (H)      Hep C w/o coma, chronic (H)      Hepatitis C virus infection      Hypertension        Surgical History:  Past Surgical History:   Procedure Laterality Date     COLONOSCOPY N/A 1/19/2018    Procedure: COLONOSCOPY;  COMBINED ESOPHAGOSCOPY, GASTROSCOPY, DUODENOSCOPY (EGD) REMOVE TUMOR/POYP/LESION BY SNARE, CONTROL BLEED,BANDING/LIGATION OF VARICES " Colonoscopy;  Surgeon: Xavier Sutton MD;  Location: [facility] GI     ESOPHAGOSCOPY, GASTROSCOPY, DUODENOSCOPY (EGD), COMBINED N/A 11/4/2015    Procedure: COMBINED ESOPHAGOSCOPY, GASTROSCOPY, DUODENOSCOPY (EGD), BIOPSY SINGLE OR MULTIPLE;  Surgeon: Inocente Do MD;  Location: [facility] GI     ESOPHAGOSCOPY, GASTROSCOPY, DUODENOSCOPY (EGD), COMBINED N/A 3/29/2018    Procedure: COMBINED ESOPHAGOSCOPY, GASTROSCOPY, DUODENOSCOPY (EGD);  egd;  Surgeon: Danielle Mendenhall MD;  Location: [facility]     ESOPHAGOSCOPY, GASTROSCOPY, DUODENOSCOPY (EGD), COMBINED N/A 9/10/2018    Procedure: COMBINED ESOPHAGOSCOPY, GASTROSCOPY, DUODENOSCOPY (EGD);  egd;  Surgeon: Xavier Sutton MD;  Location: [facility] OR       Social History:  Social History     Socioeconomic History     Marital status:      Spouse name: Not on file     Number of children: Not on file     Years of education: Not on file     Highest education level: Not on file   Social Needs     Financial resource strain: Not on file     Food insecurity - worry: Not on file     Food insecurity - inability: Not on file     Transportation needs - medical: Not on file     Transportation needs - non-medical: Not on file   Occupational History     Not on file   Tobacco Use     Smoking status: Never Smoker     Smokeless tobacco: Never Used   Substance and Sexual Activity     Alcohol use: No     Alcohol/week: 0.0 oz     Drug use: No     Sexual activity: Not on file   Other Topics Concern     Parent/sibling w/ CABG, MI or angioplasty before 65F 55M? Not Asked   Social History Narrative     Not on file       Family History:  Family History   Problem Relation Age of Onset     Hypertension Father    - Denies a family history of liver disease         Medications:  Current Outpatient Medications   Medication     acetaminophen (TYLENOL) 325 MG tablet     amLODIPine (NORVASC) 10 MG tablet     furosemide (LASIX) 20 MG tablet     gabapentin (NEURONTIN) 300 MG tablet     omeprazole (PRILOSEC) 40 MG  "capsule     spironolactone (ALDACTONE) 50 MG tablet     tamsulosin (FLOMAX) 0.4 MG capsule     ASPIRIN LOW DOSE 81 MG EC tablet     ferrous fumarate (FERRETTS) 324 (106 Fe) MG TABS tablet     levothyroxine (SYNTHROID/LEVOTHROID) 50 MCG tablet     lidocaine, viscous, (XYLOCAINE) 2 % solution     nortriptyline (PAMELOR) 25 MG capsule     No current facility-administered medications for this visit.        Review of Systems  A complete 10 point review of systems was asked and answered in the negative unless specifically commented upon in the HPI    Objective:           Vitals:    01/28/19 1252   BP: 126/74   BP Location: Right arm   Patient Position: Sitting   Cuff Size: Adult Regular   Pulse: 81   Resp: 18   Temp: 98  F (36.7  C)   TempSrc: Oral   SpO2: 99%   Weight: 71.1 kg (156 lb 12.8 oz)   Height: 1.727 m (5' 8\")     Body mass index is 23.84 kg/m .     Physical Exam    Vitals reviewed.   Constitutional: Well-developed, well-nourished, in no apparent distress.    HEENT: Normocephalic. No scleral icterus. Moist oral mucosa. Dentition normal  Neck/Lymph: Normal ROM, supple. No thyromegaly.  No lymphadenopathy  Cardiac:  Regular rate and rhythm.   Respiratory: Normal respiratory excursion.  No wheezes or rales  GI:  Abdomen soft, non-distended, non-tender. BS present. No shifting dullness. No overt hepatosplenomegaly.     Skin:  Skin is warm and dry. No rash noted.  No jaundice. No spider nevi noted.  No palmar erythema  Peripheral Vascular: No lower extremity edema. 2+ pulses in all extremities  Musculoskeletal:  ROM intact, normal muscle bulk    Psychiatric: Normal mood and affect. Behavior is normal.  Neuro:  No asterixis, No tremor    Labs and Diagnostic tests:  Lab Results   Component Value Date     01/28/2019    POTASSIUM 4.3 01/28/2019    CHLORIDE 108 01/28/2019    CO2 26 01/28/2019    BUN 19 01/28/2019    CR 1.49 01/28/2019     Lab Results   Component Value Date    BILITOTAL 1.3 01/28/2019    ALT 37 " 01/28/2019    AST 44 01/28/2019    ALKPHOS 384 01/28/2019     Lab Results   Component Value Date    ALBUMIN 3.0 01/28/2019    PROTTOTAL 7.8 01/28/2019      Lab Results   Component Value Date    WBC 5.2 01/28/2019    HGB 14.6 01/28/2019    MCV 92 01/28/2019     01/28/2019     Lab Results   Component Value Date    INR 1.06 01/28/2019       MELD-Na score: 12 at 1/28/2019 11:28 AM  MELD score: 12 at 1/28/2019 11:28 AM  Calculated from:  Serum Creatinine: 1.49 mg/dL at 1/28/2019 11:28 AM  Serum Sodium: 139 mmol/L (Rounded to 137 mmol/L) at 1/28/2019 11:28 AM  Total Bilirubin: 1.3 mg/dL at 1/28/2019 11:28 AM  INR(ratio): 1.06 at 1/28/2019 11:28 AM  Age: 73 years    Imaging:  MRI 5/2/2018  1. Cirrhosis and evidence of portal hypertension including portosystemic collaterals, small volume of ascites and findings of portal enteropathy.  2. No suspicious liver lesions meeting diagnostic criteria for HCC.   3. Moderate sliding hiatal hernia.      Procedures:  EGD: 10/2018  Findings:        Grade II varices were found in the middle third of the esophagus and in        the lower third of the esophagus. They were medium in size. Four bands        were successfully placed with complete eradication, resulting in        deflation of varices. There was no bleeding during, and at the end, of        the procedure.        Mild portal hypertensive gastropathy was found in the stomach.        The examined duodenum was normal.        A medium-sized hiatal hernia was present.        An endoclip was found on the greater curvature of the stomach.     Colonoscopy: 1/19/2018  Findings:        The perianal and digital rectal examinations were normal. Pertinent        negatives include no palpable rectal lesions.        The entire examined colon appeared normal.        The retroflexed view of the distal rectum and anal verge was normal and        showed no anal or rectal abnormalities.     Assessment and Plan:    Cirrhosis: (new problem for  me)  - Secondary to chronic hepatitis C, now s/p treatment with SVR of Hepatitis C  - His main signs of decompensation are esophageal varices.  He has no ascites, HE at this time.  - MELD on labs today 12    Liver Transplant Candidacy: (new problem for me)  - At this time he does not warrant transplant evaluation  - Barrier to liver transplantation in the future will likely be CKD and age    Hepatocellular Cancer Screening: (new problem for me)  He is enrolled in a screening program  - Recommend screening for HCC every 6 months with either abdominal ultrasound or by alternating abdominal ultrasound with EITHER a triple/quad phase CT Liver with IV contrast OR a Quad phase MRI Liver with IV contrast.  AFP levels should be checked every 6 months at time of imaging screen.  - We have ordered an MRI Abdomen with IV contrast now  - Pending AFP    Ascites: No acute issues  - Continue to follow a sodium restricted (<2g sodium diet)     Hepatic Encephalopathy: no acute issues    Esophageal Varices: (New problem to me)  - History of large varices and GI bleed s/p EGD 10/2018  - Recommend repeating an upper GI endoscopy now .  If evidence of medium/large esophageal varices, would recommend esophageal varix band ligation and if band ligation is performed, please continue to repeat until eraditation of varices.    Kidney Health: Has CKD stage 3 (eGFR < 60, >30)  - Encourage more free water intake based on current Cr    Immobility/Frailty: no acute issues    Nutrition:  As with most patients with chronic liver disease, there is a significant degree of protein malnutrition.  Dicussed need to change dietary habits to improve overall protein balance.  Discussed the importance of eating between 1.2-1.5g/kg/day lean protein like eggs, fish, chicken, nuts, and legumes, in addition to a diet rich in fresh fruits and vegetables.  Continue to follow a sodium restricted (<2g sodium diet) and discussed the need to minimize the intake of  carbohydrates and sugars, to avoid obesity.   - Strongly encourage protein supplements 2-3 times daily (Boost, Ensure, [place] Instant Milk, etc.) to meet protein and caloric intake.  - Recommend a bedtime snack with protein and complex carbohydrate to minimize risk of muscle catabolism overnight    Routine Health Care in Patient with Chronic Liver Disease:  - All patients with liver disease, particularly those with cholestatic liver disease, are at an increased risk for osteoporosis.  We strongly recommend screening for Vitamin D deficiency at least twice yearly with aggressive supplementation/replacement as indicated.    - We also recommend a screening DEXA scan to evaluate for osteoporosis.  If present, should treat with calcium, Vitamin D supplementation, and recommend consideration of bisphosphonate therapy.  Also recommend follow up DEXA scans to evaluate for improvement of bone density on therapy.  - All patients with liver disease should avoid the use of Non-steroidal Anti-Inflammatory (NSAID) medications as they can cause significant injury to the kidneys in this population    Follow Up:  6 months, with labs every 3 months     Thank you very much for the opportunity to participate in the care of this patient.  If you have any further questions, please don't hesitate to contact our office.    Thomas M. Leventhal, M.D.   of Medicine  Advanced & Transplant Hepatology  [facility]      Again, thank you for allowing me to participate in the care of your patient.      Sincerely,    Thomas M. Leventhal, MD

## 2019-01-28 NOTE — PROGRESS NOTES
"Date of Service: 1/28/2019     Subjective:            Danielle Cook is a 73 year old male presenting for evaluation of liver disease    Due to language barrier, an  was present during the history-taking and subsequent discussion (and for part of the physical exam) with this patient.      History of Present Illness   Danielle Cook is a 73 year old male with past medical history of chronic kidney disease, Chronic hepatitis C (s/p SVR) with cirrhosis complicated by esophageal varices and mild protein calorie malnutrition who presents in routine follow up.      Since last seen, he continues to note intermittent \"burning pains\" in his b/l upper extremities and occasionally in his chest.  He reports significant pain and discomfort with his last EGD (he had banding).   He was admitted in October 2018 for concerns of a GI bleed and underwent an EGD on 10/11/2018 which demonstrated large varices and had 4 bands placed.    Denies issues with confusion, impaired memory, gait disturbance, nausea, emesis, fevers.    - His HCV was treated with a combination of Harvoni and RBV, with SVR in early 2016    Past Medical History:  Past Medical History:   Diagnosis Date     CKD (chronic kidney disease)      CKD (chronic kidney disease) stage 3, GFR 30-59 ml/min (H)      Hep C w/o coma, chronic (H)      Hepatitis C virus infection      Hypertension        Surgical History:  Past Surgical History:   Procedure Laterality Date     COLONOSCOPY N/A 1/19/2018    Procedure: COLONOSCOPY;  COMBINED ESOPHAGOSCOPY, GASTROSCOPY, DUODENOSCOPY (EGD) REMOVE TUMOR/POYP/LESION BY SNARE, CONTROL BLEED,BANDING/LIGATION OF VARICES Colonoscopy;  Surgeon: Xavier Sutton MD;  Location:  GI     ESOPHAGOSCOPY, GASTROSCOPY, DUODENOSCOPY (EGD), COMBINED N/A 11/4/2015    Procedure: COMBINED ESOPHAGOSCOPY, GASTROSCOPY, DUODENOSCOPY (EGD), BIOPSY SINGLE OR MULTIPLE;  Surgeon: Inocente Do MD;  Location:  GI     ESOPHAGOSCOPY, GASTROSCOPY, " DUODENOSCOPY (EGD), COMBINED N/A 3/29/2018    Procedure: COMBINED ESOPHAGOSCOPY, GASTROSCOPY, DUODENOSCOPY (EGD);  egd;  Surgeon: Danielle Mendenhall MD;  Location:  GI     ESOPHAGOSCOPY, GASTROSCOPY, DUODENOSCOPY (EGD), COMBINED N/A 9/10/2018    Procedure: COMBINED ESOPHAGOSCOPY, GASTROSCOPY, DUODENOSCOPY (EGD);  egd;  Surgeon: Xavier Sutton MD;  Location:  OR       Social History:  Social History     Socioeconomic History     Marital status:      Spouse name: Not on file     Number of children: Not on file     Years of education: Not on file     Highest education level: Not on file   Social Needs     Financial resource strain: Not on file     Food insecurity - worry: Not on file     Food insecurity - inability: Not on file     Transportation needs - medical: Not on file     Transportation needs - non-medical: Not on file   Occupational History     Not on file   Tobacco Use     Smoking status: Never Smoker     Smokeless tobacco: Never Used   Substance and Sexual Activity     Alcohol use: No     Alcohol/week: 0.0 oz     Drug use: No     Sexual activity: Not on file   Other Topics Concern     Parent/sibling w/ CABG, MI or angioplasty before 65F 55M? Not Asked   Social History Narrative     Not on file       Family History:  Family History   Problem Relation Age of Onset     Hypertension Father    - Denies a family history of liver disease         Medications:  Current Outpatient Medications   Medication     acetaminophen (TYLENOL) 325 MG tablet     amLODIPine (NORVASC) 10 MG tablet     furosemide (LASIX) 20 MG tablet     gabapentin (NEURONTIN) 300 MG tablet     omeprazole (PRILOSEC) 40 MG capsule     spironolactone (ALDACTONE) 50 MG tablet     tamsulosin (FLOMAX) 0.4 MG capsule     ASPIRIN LOW DOSE 81 MG EC tablet     ferrous fumarate (FERRETTS) 324 (106 Fe) MG TABS tablet     levothyroxine (SYNTHROID/LEVOTHROID) 50 MCG tablet     lidocaine, viscous, (XYLOCAINE) 2 % solution     nortriptyline  "(PAMELOR) 25 MG capsule     No current facility-administered medications for this visit.        Review of Systems  A complete 10 point review of systems was asked and answered in the negative unless specifically commented upon in the HPI    Objective:           Vitals:    01/28/19 1252   BP: 126/74   BP Location: Right arm   Patient Position: Sitting   Cuff Size: Adult Regular   Pulse: 81   Resp: 18   Temp: 98  F (36.7  C)   TempSrc: Oral   SpO2: 99%   Weight: 71.1 kg (156 lb 12.8 oz)   Height: 1.727 m (5' 8\")     Body mass index is 23.84 kg/m .     Physical Exam    Vitals reviewed.   Constitutional: Well-developed, well-nourished, in no apparent distress.    HEENT: Normocephalic. No scleral icterus. Moist oral mucosa. Dentition normal  Neck/Lymph: Normal ROM, supple. No thyromegaly.  No lymphadenopathy  Cardiac:  Regular rate and rhythm.   Respiratory: Normal respiratory excursion.  No wheezes or rales  GI:  Abdomen soft, non-distended, non-tender. BS present. No shifting dullness. No overt hepatosplenomegaly.     Skin:  Skin is warm and dry. No rash noted.  No jaundice. No spider nevi noted.  No palmar erythema  Peripheral Vascular: No lower extremity edema. 2+ pulses in all extremities  Musculoskeletal:  ROM intact, normal muscle bulk    Psychiatric: Normal mood and affect. Behavior is normal.  Neuro:  No asterixis, No tremor    Labs and Diagnostic tests:  Lab Results   Component Value Date     01/28/2019    POTASSIUM 4.3 01/28/2019    CHLORIDE 108 01/28/2019    CO2 26 01/28/2019    BUN 19 01/28/2019    CR 1.49 01/28/2019     Lab Results   Component Value Date    BILITOTAL 1.3 01/28/2019    ALT 37 01/28/2019    AST 44 01/28/2019    ALKPHOS 384 01/28/2019     Lab Results   Component Value Date    ALBUMIN 3.0 01/28/2019    PROTTOTAL 7.8 01/28/2019      Lab Results   Component Value Date    WBC 5.2 01/28/2019    HGB 14.6 01/28/2019    MCV 92 01/28/2019     01/28/2019     Lab Results   Component Value " Date    INR 1.06 01/28/2019       MELD-Na score: 12 at 1/28/2019 11:28 AM  MELD score: 12 at 1/28/2019 11:28 AM  Calculated from:  Serum Creatinine: 1.49 mg/dL at 1/28/2019 11:28 AM  Serum Sodium: 139 mmol/L (Rounded to 137 mmol/L) at 1/28/2019 11:28 AM  Total Bilirubin: 1.3 mg/dL at 1/28/2019 11:28 AM  INR(ratio): 1.06 at 1/28/2019 11:28 AM  Age: 73 years    Imaging:  MRI 5/2/2018  1. Cirrhosis and evidence of portal hypertension including portosystemic collaterals, small volume of ascites and findings of portal enteropathy.  2. No suspicious liver lesions meeting diagnostic criteria for HCC.   3. Moderate sliding hiatal hernia.      Procedures:  EGD: 10/2018  Findings:        Grade II varices were found in the middle third of the esophagus and in        the lower third of the esophagus. They were medium in size. Four bands        were successfully placed with complete eradication, resulting in        deflation of varices. There was no bleeding during, and at the end, of        the procedure.        Mild portal hypertensive gastropathy was found in the stomach.        The examined duodenum was normal.        A medium-sized hiatal hernia was present.        An endoclip was found on the greater curvature of the stomach.     Colonoscopy: 1/19/2018  Findings:        The perianal and digital rectal examinations were normal. Pertinent        negatives include no palpable rectal lesions.        The entire examined colon appeared normal.        The retroflexed view of the distal rectum and anal verge was normal and        showed no anal or rectal abnormalities.     Assessment and Plan:    Cirrhosis: (new problem for me)  - Secondary to chronic hepatitis C, now s/p treatment with SVR of Hepatitis C  - His main signs of decompensation are esophageal varices.  He has no ascites, HE at this time.  - MELD on labs today 12    Liver Transplant Candidacy: (new problem for me)  - At this time he does not warrant transplant  evaluation  - Barrier to liver transplantation in the future will likely be CKD and age    Hepatocellular Cancer Screening: (new problem for me)  He is enrolled in a screening program  - Recommend screening for HCC every 6 months with either abdominal ultrasound or by alternating abdominal ultrasound with EITHER a triple/quad phase CT Liver with IV contrast OR a Quad phase MRI Liver with IV contrast.  AFP levels should be checked every 6 months at time of imaging screen.  - We have ordered an MRI Abdomen with IV contrast now  - Pending AFP    Ascites: No acute issues  - Continue to follow a sodium restricted (<2g sodium diet)     Hepatic Encephalopathy: no acute issues    Esophageal Varices: (New problem to me)  - History of large varices and GI bleed s/p EGD 10/2018  - Recommend repeating an upper GI endoscopy now .  If evidence of medium/large esophageal varices, would recommend esophageal varix band ligation and if band ligation is performed, please continue to repeat until eraditation of varices.    Kidney Health: Has CKD stage 3 (eGFR < 60, >30)  - Encourage more free water intake based on current Cr    Immobility/Frailty: no acute issues    Nutrition:  As with most patients with chronic liver disease, there is a significant degree of protein malnutrition.  Dicussed need to change dietary habits to improve overall protein balance.  Discussed the importance of eating between 1.2-1.5g/kg/day lean protein like eggs, fish, chicken, nuts, and legumes, in addition to a diet rich in fresh fruits and vegetables.  Continue to follow a sodium restricted (<2g sodium diet) and discussed the need to minimize the intake of carbohydrates and sugars, to avoid obesity.   - Strongly encourage protein supplements 2-3 times daily (Boost, Ensure, [place] Instant Milk, etc.) to meet protein and caloric intake.  - Recommend a bedtime snack with protein and complex carbohydrate to minimize risk of muscle catabolism  overnight    Routine Health Care in Patient with Chronic Liver Disease:  - All patients with liver disease, particularly those with cholestatic liver disease, are at an increased risk for osteoporosis.  We strongly recommend screening for Vitamin D deficiency at least twice yearly with aggressive supplementation/replacement as indicated.    - We also recommend a screening DEXA scan to evaluate for osteoporosis.  If present, should treat with calcium, Vitamin D supplementation, and recommend consideration of bisphosphonate therapy.  Also recommend follow up DEXA scans to evaluate for improvement of bone density on therapy.  - All patients with liver disease should avoid the use of Non-steroidal Anti-Inflammatory (NSAID) medications as they can cause significant injury to the kidneys in this population    Follow Up:  6 months, with labs every 3 months     Thank you very much for the opportunity to participate in the care of this patient.  If you have any further questions, please don't hesitate to contact our office.    Thomas M. Leventhal, M.D.   of Medicine  Advanced & Transplant Hepatology  [facility]

## 2019-01-28 NOTE — TELEPHONE ENCOUNTER
----- Message from Thomas Michael Leventhal, MD sent at 1/28/2019  1:20 PM CST -----  Can we please set up Q 3 month labs (BMP, hepatic panel, CBC, INR) Q 3 months AND AFP Q6 months?  - To start in April 2019    Thanks!    Bob

## 2019-01-28 NOTE — PATIENT INSTRUCTIONS
Please schedule an MRI Abdomen with contrast    Please schedule EGD (screening for varices) with Leventhal    (Same day if possible)    Return to clinic in 6 months    Labs checked every 3 months, with next check in April 2019

## 2019-01-28 NOTE — NURSING NOTE
"Chief Complaint   Patient presents with     RECHECK     Cirrhosis       Vital signs:  Temp: 98  F (36.7  C) Temp src: Oral BP: 126/74 Pulse: 81   Resp: 18 SpO2: 99 %     Height: 172.7 cm (5' 8\") Weight: 71.1 kg (156 lb 12.8 oz)  Estimated body mass index is 23.84 kg/m  as calculated from the following:    Height as of this encounter: 1.727 m (5' 8\").    Weight as of this encounter: 71.1 kg (156 lb 12.8 oz).          Elham Schwartz [facility]  1/28/2019 12:54 PM      "

## 2019-04-15 ENCOUNTER — TELEPHONE (OUTPATIENT)
Dept: GASTROENTEROLOGY | Facility: CLINIC | Age: 74
End: 2019-04-15

## 2019-04-15 DIAGNOSIS — K74.60 CIRRHOSIS OF LIVER WITHOUT ASCITES, UNSPECIFIED HEPATIC CIRRHOSIS TYPE (H): Primary | ICD-10-CM

## 2019-04-15 NOTE — TELEPHONE ENCOUNTER
: [] N/A   [x] Yes:  Language Vincentian/  ID: [id number]     with request pt contact Endoscopy Pre-assessment RN to review upcoming procedure information.  Telephone call-back number provided.    Gricelda Hutton, RN  [facility] End[facility]    Additional Information regarding appointment:      Patient scheduled for:  [x] EGD  [] Colonoscopy  [] EUS  [] Flex Sig   [] Other:     Indication for procedure. [] Screening   [x] Chronic hepatitis C virus infection with cirrhosis    Procedure Provider:  Leventhal      Referring ProviderElisabeth Bhakta    Arrival time verified: Wed; 4/17/19; 0900    Facility location verified:   [x][facility] - [address], 1st Floor, [address]  [][address] [facility] [address], 5th floor       Prep Type:   []Golytely eRx: ;  [] MoviPrep: , [] MiraLax: , [] Other:   [x]NPO /p MN, No solid food /p 2200 the night before    Anticoagulants or blood thinners: []None [x] ASA 81mg [] Warfarin  [] Warfarin + Lovenox bridge [] Plavix [] Effient [] Eliquis  [] Xarelto  [] Brilinta [] NSAIDS  [] Other    LAST anticoagulant dose: Date/Time: May continue ASA 81mg  INR: N/A    Electronic implanted devices: [x] No  [] IPG  []  ICD  []  LVAD  []     H&P / Pre op physical completed: [x] N/A, [] Complete, Date , [] Scheduled, Date , [] No,     Additional Information: None at this time    _______________________________________________

## 2019-04-17 ENCOUNTER — HOSPITAL ENCOUNTER (OUTPATIENT)
Facility: CLINIC | Age: 74
Discharge: HOME | End: 2019-04-17
Attending: INTERNAL MEDICINE | Admitting: INTERNAL MEDICINE
Payer: COMMERCIAL

## 2019-04-17 VITALS
SYSTOLIC BLOOD PRESSURE: 117 MMHG | DIASTOLIC BLOOD PRESSURE: 68 MMHG | OXYGEN SATURATION: 95 % | HEART RATE: 63 BPM | RESPIRATION RATE: 25 BRPM

## 2019-04-17 DIAGNOSIS — N18.30 CKD (CHRONIC KIDNEY DISEASE) STAGE 3, GFR 30-59 ML/MIN (H): Primary | ICD-10-CM

## 2019-04-17 PROCEDURE — 25000125 ZZHC RX 250: Performed by: INTERNAL MEDICINE

## 2019-04-17 PROCEDURE — G0500 MOD SEDAT ENDO SERVICE >5YRS: HCPCS | Performed by: INTERNAL MEDICINE

## 2019-04-17 PROCEDURE — 40000104 ZZH STATISTIC MODERATE SEDATION < 10 MIN: Performed by: INTERNAL MEDICINE

## 2019-04-17 PROCEDURE — 25000128 H RX IP 250 OP 636: Performed by: INTERNAL MEDICINE

## 2019-04-17 PROCEDURE — 43235 EGD DIAGNOSTIC BRUSH WASH: CPT | Performed by: INTERNAL MEDICINE

## 2019-04-17 RX ORDER — ONDANSETRON 2 MG/ML
4 INJECTION INTRAMUSCULAR; INTRAVENOUS EVERY 6 HOURS PRN
Status: DISCONTINUED | OUTPATIENT
Start: 2019-04-17 | End: 2019-04-17 | Stop reason: HOSPADM

## 2019-04-17 RX ORDER — ONDANSETRON 2 MG/ML
4 INJECTION INTRAMUSCULAR; INTRAVENOUS
Status: DISCONTINUED | OUTPATIENT
Start: 2019-04-17 | End: 2019-04-17 | Stop reason: HOSPADM

## 2019-04-17 RX ORDER — FLUMAZENIL 0.1 MG/ML
0.2 INJECTION, SOLUTION INTRAVENOUS
Status: DISCONTINUED | OUTPATIENT
Start: 2019-04-17 | End: 2019-04-17 | Stop reason: HOSPADM

## 2019-04-17 RX ORDER — FENTANYL CITRATE 50 UG/ML
INJECTION, SOLUTION INTRAMUSCULAR; INTRAVENOUS PRN
Status: DISCONTINUED | OUTPATIENT
Start: 2019-04-17 | End: 2019-04-17 | Stop reason: HOSPADM

## 2019-04-17 RX ORDER — LIDOCAINE 40 MG/G
CREAM TOPICAL
Status: DISCONTINUED | OUTPATIENT
Start: 2019-04-17 | End: 2019-04-17 | Stop reason: HOSPADM

## 2019-04-17 RX ORDER — ONDANSETRON 4 MG/1
4 TABLET, ORALLY DISINTEGRATING ORAL EVERY 6 HOURS PRN
Status: DISCONTINUED | OUTPATIENT
Start: 2019-04-17 | End: 2019-04-17 | Stop reason: HOSPADM

## 2019-04-17 RX ORDER — NALOXONE HYDROCHLORIDE 0.4 MG/ML
.1-.4 INJECTION, SOLUTION INTRAMUSCULAR; INTRAVENOUS; SUBCUTANEOUS
Status: DISCONTINUED | OUTPATIENT
Start: 2019-04-17 | End: 2019-04-17 | Stop reason: HOSPADM

## 2019-04-17 NOTE — OR NURSING
Pt had egd and trying to move away from scope entire procedure. VSS. No interventions. Pt sleeping now.

## 2019-04-18 ENCOUNTER — OFFICE VISIT (OUTPATIENT)
Dept: NEPHROLOGY | Facility: CLINIC | Age: 74
End: 2019-04-18
Attending: INTERNAL MEDICINE
Payer: COMMERCIAL

## 2019-04-18 VITALS
HEART RATE: 76 BPM | OXYGEN SATURATION: 96 % | SYSTOLIC BLOOD PRESSURE: 129 MMHG | HEIGHT: 68 IN | TEMPERATURE: 97.7 F | WEIGHT: 170.6 LBS | DIASTOLIC BLOOD PRESSURE: 70 MMHG | BODY MASS INDEX: 25.85 KG/M2

## 2019-04-18 DIAGNOSIS — L98.9 SKIN LESION: Primary | ICD-10-CM

## 2019-04-18 DIAGNOSIS — N18.30 CKD (CHRONIC KIDNEY DISEASE) STAGE 3, GFR 30-59 ML/MIN (H): ICD-10-CM

## 2019-04-18 DIAGNOSIS — E87.6 HYPOKALEMIA: ICD-10-CM

## 2019-04-18 LAB
ALBUMIN SERPL-MCNC: 2.7 G/DL (ref 3.4–5)
ANION GAP SERPL CALCULATED.3IONS-SCNC: 7 MMOL/L (ref 3–14)
BUN SERPL-MCNC: 9 MG/DL (ref 7–30)
CALCIUM SERPL-MCNC: 8.2 MG/DL (ref 8.5–10.1)
CHLORIDE SERPL-SCNC: 108 MMOL/L (ref 94–109)
CO2 SERPL-SCNC: 23 MMOL/L (ref 20–32)
CREAT SERPL-MCNC: 1.09 MG/DL (ref 0.66–1.25)
CREAT UR-MCNC: 165 MG/DL
DEPRECATED CALCIDIOL+CALCIFEROL SERPL-MC: 8 UG/L (ref 20–75)
ERYTHROCYTE [DISTWIDTH] IN BLOOD BY AUTOMATED COUNT: 13.3 % (ref 10–15)
GFR SERPL CREATININE-BSD FRML MDRD: 67 ML/MIN/{1.73_M2}
GLUCOSE SERPL-MCNC: 109 MG/DL (ref 70–99)
HCT VFR BLD AUTO: 38.6 % (ref 40–53)
HGB BLD-MCNC: 12.6 G/DL (ref 13.3–17.7)
MCH RBC QN AUTO: 30.5 PG (ref 26.5–33)
MCHC RBC AUTO-ENTMCNC: 32.6 G/DL (ref 31.5–36.5)
MCV RBC AUTO: 94 FL (ref 78–100)
PHOSPHATE SERPL-MCNC: 1.8 MG/DL (ref 2.5–4.5)
PLATELET # BLD AUTO: 137 10E9/L (ref 150–450)
POTASSIUM SERPL-SCNC: 3.2 MMOL/L (ref 3.4–5.3)
PROT UR-MCNC: 0.24 G/L
PROT/CREAT 24H UR: 0.14 G/G CR (ref 0–0.2)
PTH-INTACT SERPL-MCNC: 138 PG/ML (ref 18–80)
RBC # BLD AUTO: 4.13 10E12/L (ref 4.4–5.9)
SODIUM SERPL-SCNC: 138 MMOL/L (ref 133–144)
WBC # BLD AUTO: 5 10E9/L (ref 4–11)

## 2019-04-18 PROCEDURE — 80069 RENAL FUNCTION PANEL: CPT | Performed by: INTERNAL MEDICINE

## 2019-04-18 PROCEDURE — 83970 ASSAY OF PARATHORMONE: CPT | Performed by: INTERNAL MEDICINE

## 2019-04-18 PROCEDURE — 82306 VITAMIN D 25 HYDROXY: CPT | Performed by: INTERNAL MEDICINE

## 2019-04-18 PROCEDURE — 84156 ASSAY OF PROTEIN URINE: CPT | Performed by: INTERNAL MEDICINE

## 2019-04-18 PROCEDURE — G0463 HOSPITAL OUTPT CLINIC VISIT: HCPCS | Mod: ZF

## 2019-04-18 PROCEDURE — 36415 COLL VENOUS BLD VENIPUNCTURE: CPT | Performed by: INTERNAL MEDICINE

## 2019-04-18 PROCEDURE — 85027 COMPLETE CBC AUTOMATED: CPT | Performed by: INTERNAL MEDICINE

## 2019-04-18 RX ORDER — POTASSIUM CHLORIDE 1.5 G/1.58G
20 POWDER, FOR SOLUTION ORAL DAILY
Qty: 30 TABLET | Refills: 1 | Status: SHIPPED | OUTPATIENT
Start: 2019-04-18 | End: 2020-08-27

## 2019-04-18 ASSESSMENT — MIFFLIN-ST. JEOR: SCORE: 1493.34

## 2019-04-18 ASSESSMENT — PAIN SCALES - GENERAL: PAINLEVEL: NO PAIN (0)

## 2019-04-18 NOTE — LETTER
2019       RE: Danielle Cook  [address]  [facility] [place] [address]     Dear Colleague,    Thank you for referring your patient, Danielle Cook, to the [facility] NEPHROLOGY at [facility]. Please see a copy of my visit note below.      Nephrology Clinic    Shira Patterson MD  2019     Name: Danielle Cook  MRN: [id number]  Age: 74 year old  : 1945  Referring provider: Andrey Flores     Assessment and Plan:     1.Elevated creatinine: creatinine of 1.3-1.4 mg/dl since  with eGFR 60s. Etiology is likely renovascular disease due to longstanding HTN. Today, creat is 1.1 mg/dl. UA without hematuria or proteinuria.     2. HTN: BP goal is < 140/90. At goal with taking amlodipine alone (patient stopped taking his furosemide and spironolactone due to adverse side effects).     3. Hypokalemia: K of 3.2. Likely secondary to poor PO intake.   --Start taking potassium chloride, 20 mEq q day   --Repeat BMP in one week     4. History of Hepatitis C: Treated in 2016 per patient. Has upcoming appointment with hepatology.     5. Skin lesion: reports newer lesions over his arms which are painful to touch and has some central clearing. Will referl to dermatology.    Follow-up: Return in about 6 months (around 10/18/2019).     Shira Patterson MD     Reason For Visit:   CKD follow up     HPI:   Danielle Cook is a 74 year old male with a history of Hep C, CKD and Hypertension here for follow up. This patient was previously a patient of Dr. Tate who recently retired and so is new to me today. Last seen in neph clinic approximately 4 months ago. He has overall been doing well since last visit. No specific complaints. He tells me that he does check his blood pressure at home, which varies on a daily basis. He is taking his amlodipine, but tells me that he has not been taking his furosemide or spironolactone for over a month because he  "feels his breasts are getting bigger and then has felt itchy and has noticed \"scars\" on his body. He also endorses some leg swelling. He also endorses having a poor appetite.      Review of Systems:   Pertinent items are noted in HPI or as below, remainder of complete ROS is negative.      Active Medications:      acetaminophen (TYLENOL) 325 MG tablet, Take 1 tablet (325 mg) by mouth every 6 hours as needed for mild pain, Disp: 100 tablet, Rfl: 3     amLODIPine (NORVASC) 10 MG tablet, , Disp: , Rfl:      ASPIRIN LOW DOSE 81 MG EC tablet, , Disp: , Rfl:      ferrous fumarate (FERRETTS) 324 (106 Fe) MG TABS tablet, Take by mouth daily, Disp: , Rfl:      furosemide (LASIX) 20 MG tablet, Take 1 tablet (20 mg) by mouth daily, Disp: 30 tablet, Rfl: 11     gabapentin (NEURONTIN) 300 MG tablet, Take 600 mg by mouth 3 times daily , Disp: , Rfl:      levothyroxine (SYNTHROID/LEVOTHROID) 50 MCG tablet, , Disp: , Rfl:      lidocaine, viscous, (XYLOCAINE) 2 % solution, , Disp: , Rfl:      nortriptyline (PAMELOR) 25 MG capsule, , Disp: , Rfl:      omeprazole (PRILOSEC) 40 MG capsule, Take 1 capsule (40 mg) by mouth 2 times daily, Disp: 60 capsule, Rfl: 2     spironolactone (ALDACTONE) 50 MG tablet, Take 1 tablet (50 mg) by mouth daily, Disp: 30 tablet, Rfl: 11     tamsulosin (FLOMAX) 0.4 MG capsule, Take 1 capsule (0.4 mg) by mouth daily, Disp: 60 capsule, Rfl: 5     Allergies:   Patient has no known allergies.      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     Physical Exam:  /70   Pulse 76   Temp 97.7  F (36.5  C) (Oral)   Ht 1.727 m (5' 8\")   Wt 77.4 kg (170 lb 9.6 oz)   SpO2 96%   BMI 25.94 kg/m      GENERAL APPEARANCE: alert and no distress  EYES: nonicteric  HENT: mouth without ulcers " or lesions  NECK: supple, no adenopathy  RESP: lungs clear to auscultation   CV: regular rhythm, normal rate, no rub  Extremities: no clubbing, cyanosis, or edema  MS: no evidence of inflammation in joints, no muscle tenderness  SKIN: skin rash consistent with discoid with central clearing which is tender to touch.   NEURO: mentation intact and speech normal  PSYCH: affect normal/bright       Laboratory:  CMP  Recent Labs   Lab Test 04/18/19  1328 01/28/19  1128 12/21/18  0842 10/31/18  1217 10/23/18  1034 10/12/18  0722 10/11/18  0526  04/30/18  1322  12/15/17  0738    139 137 140 140 141 144   < >  --    < > 139   POTASSIUM 3.2* 4.3 4.4 3.8 3.9 3.7 3.9   < >  --    < > 3.7   CHLORIDE 108 108 106 109 108 108 111*   < >  --    < > 106   CO2 23 26 24 24 23 24 23   < >  --    < > 26   ANIONGAP 7 4 8 8 8 9 11   < >  --    < > 7   * 98 100* 93 98 122* 127*   < >  --    < > 99   BUN 9 19 22 12 8 21 28   < >  --    < > 12   CR 1.09 1.49* 1.37* 1.20 1.12 1.07 1.05   < >  --    < > 1.35*   GFRESTIMATED 67 46* 51* 59* 64 68 69   < >  --    < > 52*   GFRESTBLACK 78 53* 59* 72 78 82 84   < >  --    < > 63   ABDIRASHID 8.2* 8.4* 8.2* 8.4* 7.9* 7.6* 7.8*   < >  --    < > 8.1*   MAG  --   --   --   --   --   --   --   --  2.4*  --   --    PHOS 1.8*  --  2.7  --   --   --   --   --  2.3*  --  2.9   PROTTOTAL  --  7.8  --   --  7.4 6.4* 6.6*   < >  --    < >  --    ALBUMIN 2.7* 3.0* 2.8*  --  2.6* 2.6* 2.8*   < >  --    < > 2.8*   BILITOTAL  --  1.3  --   --  2.0* 2.0* 1.2   < >  --    < >  --    ALKPHOS  --  384*  --   --  429* 288* 323*   < >  --    < >  --    AST  --  44  --   --  50* 45 72*   < >  --    < >  --    ALT  --  37  --   --  43 104* 142*   < >  --    < >  --     < > = values in this interval not displayed.     CBC  Recent Labs   Lab Test 04/18/19  1328 01/28/19  1128 12/21/18  0842 10/23/18  1034   HGB 12.6* 14.6 13.2* 12.1*   WBC 5.0 5.2 4.7 4.5   RBC 4.13* 4.68 4.34* 3.97*   HCT 38.6* 43.0 40.2 37.5*   MCV 94  92 93 95   MCH 30.5 31.2 30.4 30.5   MCHC 32.6 34.0 32.8 32.3   RDW 13.3 15.4* 14.9 19.9*   * 124* 104* 125*     INR  Recent Labs   Lab Test 01/28/19  1128 10/23/18  1034 10/12/18  0722 10/11/18  0526 10/09/18  1754   INR 1.06 1.05 1.16* 1.15* 1.05   PTT  --   --   --   --  22     URINE STUDIES  Recent Labs   Lab Test 12/13/18  1318 08/21/18  1550 04/30/18  1327 08/15/14  1047   COLOR Yellow Shikha Yellow Yellow   APPEARANCE Clear Clear Clear Clear   URINEGLC Negative Negative Negative Negative   URINEBILI Negative Negative Negative Negative   URINEKETONE Negative Negative Negative Negative   SG 1.016 1.018 1.020 1.007   UBLD Negative Negative Negative Negative   URINEPH 5.0 6.0 6.0 7.0   PROTEIN Negative Negative Negative Negative   NITRITE Negative Negative Negative Negative   LEUKEST Negative Negative Negative Negative   RBCU <1 <1 <1 10*   WBCU 2 <1 1 8*     Recent Labs   Lab Test 04/18/19  1330 12/21/18  0850 12/15/17  0755 12/16/16  0852 10/06/15  1030 12/12/14  0835 08/15/14  1047 12/31/13  1319 09/24/13  1439   UTPG 0.14 0.09 0.10 0.10 0.12 0.16 0.16 0.06 0.03     PTH  Recent Labs   Lab Test 04/30/18  1322 12/16/16  0842 08/15/14  1028 09/24/13  1303   PTHI 100* 88* 81* 65     IRON STUDIES   Recent Labs   Lab Test 04/30/18  1322 10/18/17  1209 12/16/16  0842 08/15/14  1028 09/24/13  1303   IRON 20* 42 47 27* 38    374 414 437* 447*   IRONSAT 5* 11* 11* 6* 9*   MARYAM 8* 13* 10* 14* 19*       Scribe Disclosure:   I, Virginia Wu, am serving as a scribe to document services personally performed by Shira Patterson MD at this visit, based upon the provider's statements to me. All documentation has been reviewed by the aforementioned provider prior to being entered into the official medical record.       Again, thank you for allowing me to participate in the care of your patient.      Sincerely,    Shira Patterson MD

## 2019-04-18 NOTE — PATIENT INSTRUCTIONS
1. Start taking potassium tablets 20 mEq per day  2. Make an appointment with a dermatologist for your skin lesions.

## 2019-04-18 NOTE — PROGRESS NOTES
"  Nephrology Clinic    Shira Patterson MD  2019     Name: Danielle Cook  MRN: [id number]  Age: 74 year old  : 1945  Referring provider: Andrey Flores     Assessment and Plan:     1.Elevated creatinine: creatinine of 1.3-1.4 mg/dl since  with eGFR 60s. Etiology is likely renovascular disease due to longstanding HTN. Today, creat is 1.1 mg/dl. UA without hematuria or proteinuria.     2. HTN: BP goal is < 140/90. At goal with taking amlodipine alone (patient stopped taking his furosemide and spironolactone due to adverse side effects).     3. Hypokalemia: K of 3.2. Likely secondary to poor PO intake.   --Start taking potassium chloride, 20 mEq q day   --Repeat BMP in one week     4. History of Hepatitis C: Treated in 2016 per patient. Has upcoming appointment with hepatology.     5. Skin lesion: reports newer lesions over his arms which are painful to touch and has some central clearing. Will referl to dermatology.    Follow-up: Return in about 6 months (around 10/18/2019).     Shira Patterson MD     Reason For Visit:   CKD follow up     HPI:   Danielle Cook is a 74 year old male with a history of Hep C, CKD and Hypertension here for follow up. This patient was previously a patient of Dr. Tate who recently retired and so is new to me today. Last seen in neph clinic approximately 4 months ago. He has overall been doing well since last visit. No specific complaints. He tells me that he does check his blood pressure at home, which varies on a daily basis. He is taking his amlodipine, but tells me that he has not been taking his furosemide or spironolactone for over a month because he feels his breasts are getting bigger and then has felt itchy and has noticed \"scars\" on his body. He also endorses some leg swelling. He also endorses having a poor appetite.      Review of Systems:   Pertinent items are noted in HPI or as below, remainder of complete ROS is negative.      Active " "Medications:      acetaminophen (TYLENOL) 325 MG tablet, Take 1 tablet (325 mg) by mouth every 6 hours as needed for mild pain, Disp: 100 tablet, Rfl: 3     amLODIPine (NORVASC) 10 MG tablet, , Disp: , Rfl:      ASPIRIN LOW DOSE 81 MG EC tablet, , Disp: , Rfl:      ferrous fumarate (FERRETTS) 324 (106 Fe) MG TABS tablet, Take by mouth daily, Disp: , Rfl:      furosemide (LASIX) 20 MG tablet, Take 1 tablet (20 mg) by mouth daily, Disp: 30 tablet, Rfl: 11     gabapentin (NEURONTIN) 300 MG tablet, Take 600 mg by mouth 3 times daily , Disp: , Rfl:      levothyroxine (SYNTHROID/LEVOTHROID) 50 MCG tablet, , Disp: , Rfl:      lidocaine, viscous, (XYLOCAINE) 2 % solution, , Disp: , Rfl:      nortriptyline (PAMELOR) 25 MG capsule, , Disp: , Rfl:      omeprazole (PRILOSEC) 40 MG capsule, Take 1 capsule (40 mg) by mouth 2 times daily, Disp: 60 capsule, Rfl: 2     spironolactone (ALDACTONE) 50 MG tablet, Take 1 tablet (50 mg) by mouth daily, Disp: 30 tablet, Rfl: 11     tamsulosin (FLOMAX) 0.4 MG capsule, Take 1 capsule (0.4 mg) by mouth daily, Disp: 60 capsule, Rfl: 5     Allergies:   Patient has no known allergies.      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     Physical Exam:  /70   Pulse 76   Temp 97.7  F (36.5  C) (Oral)   Ht 1.727 m (5' 8\")   Wt 77.4 kg (170 lb 9.6 oz)   SpO2 96%   BMI 25.94 kg/m     GENERAL APPEARANCE: alert and no distress  EYES: nonicteric  HENT: mouth without ulcers or lesions  NECK: supple, no adenopathy  RESP: lungs clear to auscultation   CV: regular rhythm, normal rate, no rub  Extremities: no clubbing, cyanosis, or edema  MS: no evidence of inflammation in joints, no muscle tenderness  SKIN: skin rash consistent with discoid with central clearing which " is tender to touch.   NEURO: mentation intact and speech normal  PSYCH: affect normal/bright       Laboratory:  CMP  Recent Labs   Lab Test 04/18/19  1328 01/28/19  1128 12/21/18  0842 10/31/18  1217 10/23/18  1034 10/12/18  0722 10/11/18  0526  04/30/18  1322  12/15/17  0738    139 137 140 140 141 144   < >  --    < > 139   POTASSIUM 3.2* 4.3 4.4 3.8 3.9 3.7 3.9   < >  --    < > 3.7   CHLORIDE 108 108 106 109 108 108 111*   < >  --    < > 106   CO2 23 26 24 24 23 24 23   < >  --    < > 26   ANIONGAP 7 4 8 8 8 9 11   < >  --    < > 7   * 98 100* 93 98 122* 127*   < >  --    < > 99   BUN 9 19 22 12 8 21 28   < >  --    < > 12   CR 1.09 1.49* 1.37* 1.20 1.12 1.07 1.05   < >  --    < > 1.35*   GFRESTIMATED 67 46* 51* 59* 64 68 69   < >  --    < > 52*   GFRESTBLACK 78 53* 59* 72 78 82 84   < >  --    < > 63   ABDIRASHID 8.2* 8.4* 8.2* 8.4* 7.9* 7.6* 7.8*   < >  --    < > 8.1*   MAG  --   --   --   --   --   --   --   --  2.4*  --   --    PHOS 1.8*  --  2.7  --   --   --   --   --  2.3*  --  2.9   PROTTOTAL  --  7.8  --   --  7.4 6.4* 6.6*   < >  --    < >  --    ALBUMIN 2.7* 3.0* 2.8*  --  2.6* 2.6* 2.8*   < >  --    < > 2.8*   BILITOTAL  --  1.3  --   --  2.0* 2.0* 1.2   < >  --    < >  --    ALKPHOS  --  384*  --   --  429* 288* 323*   < >  --    < >  --    AST  --  44  --   --  50* 45 72*   < >  --    < >  --    ALT  --  37  --   --  43 104* 142*   < >  --    < >  --     < > = values in this interval not displayed.     CBC  Recent Labs   Lab Test 04/18/19  1328 01/28/19  1128 12/21/18  0842 10/23/18  1034   HGB 12.6* 14.6 13.2* 12.1*   WBC 5.0 5.2 4.7 4.5   RBC 4.13* 4.68 4.34* 3.97*   HCT 38.6* 43.0 40.2 37.5*   MCV 94 92 93 95   MCH 30.5 31.2 30.4 30.5   MCHC 32.6 34.0 32.8 32.3   RDW 13.3 15.4* 14.9 19.9*   * 124* 104* 125*     INR  Recent Labs   Lab Test 01/28/19  1128 10/23/18  1034 10/12/18  0722 10/11/18  0526 10/09/18  1754   INR 1.06 1.05 1.16* 1.15* 1.05   PTT  --   --   --   --  22     URINE  STUDIES  Recent Labs   Lab Test 12/13/18  1318 08/21/18  1550 04/30/18  1327 08/15/14  1047   COLOR Yellow Shikha Yellow Yellow   APPEARANCE Clear Clear Clear Clear   URINEGLC Negative Negative Negative Negative   URINEBILI Negative Negative Negative Negative   URINEKETONE Negative Negative Negative Negative   SG 1.016 1.018 1.020 1.007   UBLD Negative Negative Negative Negative   URINEPH 5.0 6.0 6.0 7.0   PROTEIN Negative Negative Negative Negative   NITRITE Negative Negative Negative Negative   LEUKEST Negative Negative Negative Negative   RBCU <1 <1 <1 10*   WBCU 2 <1 1 8*     Recent Labs   Lab Test 04/18/19  1330 12/21/18  0850 12/15/17  0755 12/16/16  0852 10/06/15  1030 12/12/14  0835 08/15/14  1047 12/31/13  1319 09/24/13  1439   UTPG 0.14 0.09 0.10 0.10 0.12 0.16 0.16 0.06 0.03     PTH  Recent Labs   Lab Test 04/30/18  1322 12/16/16  0842 08/15/14  1028 09/24/13  1303   PTHI 100* 88* 81* 65     IRON STUDIES   Recent Labs   Lab Test 04/30/18  1322 10/18/17  1209 12/16/16  0842 08/15/14  1028 09/24/13  1303   IRON 20* 42 47 27* 38    374 414 437* 447*   IRONSAT 5* 11* 11* 6* 9*   MARYAM 8* 13* 10* 14* 19*       Scribe Disclosure:   I, Virginia Wu, am serving as a scribe to document services personally performed by Shira Patterson MD at this visit, based upon the provider's statements to me. All documentation has been reviewed by the aforementioned provider prior to being entered into the official medical record.

## 2019-04-18 NOTE — NURSING NOTE
"Chief Complaint   Patient presents with     RECHECK     Cirrhosis of liver      /70   Pulse 76   Temp 97.7  F (36.5  C) (Oral)   Ht 1.727 m (5' 8\")   Wt 77.4 kg (170 lb 9.6 oz)   SpO2 96%   BMI 25.94 kg/m    Birdie Edwards MA    "

## 2019-04-18 NOTE — LETTER
"2019      RE: Danielle Cook  [address]  [facility] [address]         Nephrology Clinic    Shira Patterson MD  2019     Name: Danielle Cook  MRN: [id number]  Age: 74 year old  : 1945  Referring provider: Andrey Flores     Assessment and Plan:     1.Elevated creatinine: creatinine of 1.3-1.4 mg/dl since  with eGFR 60s. Etiology is likely renovascular disease due to longstanding HTN. Today, creat is 1.1 mg/dl. UA without hematuria or proteinuria.     2. HTN: BP goal is < 140/90. At goal with taking amlodipine alone (patient stopped taking his furosemide and spironolactone due to adverse side effects).     3. Hypokalemia: K of 3.2. Likely secondary to poor PO intake.   --Start taking potassium chloride, 20 mEq q day   --Repeat BMP in one week     4. History of Hepatitis C: Treated in 2016 per patient. Has upcoming appointment with hepatology.     5. Skin lesion: reports newer lesions over his arms which are painful to touch and has some central clearing. Will referl to dermatology.    Follow-up: Return in about 6 months (around 10/18/2019).     Shira Patterson MD     Reason For Visit:   CKD follow up     HPI:   Danielle Cook is a 74 year old male with a history of Hep C, CKD and Hypertension here for follow up. This patient was previously a patient of Dr. Tate who recently retired and so is new to me today. Last seen in neph clinic approximately 4 months ago. He has overall been doing well since last visit. No specific complaints. He tells me that he does check his blood pressure at home, which varies on a daily basis. He is taking his amlodipine, but tells me that he has not been taking his furosemide or spironolactone for over a month because he feels his breasts are getting bigger and then has felt itchy and has noticed \"scars\" on his body. He also endorses some leg swelling. He also endorses having a poor appetite.      Review of Systems: " "  Pertinent items are noted in HPI or as below, remainder of complete ROS is negative.      Active Medications:      acetaminophen (TYLENOL) 325 MG tablet, Take 1 tablet (325 mg) by mouth every 6 hours as needed for mild pain, Disp: 100 tablet, Rfl: 3     amLODIPine (NORVASC) 10 MG tablet, , Disp: , Rfl:      ASPIRIN LOW DOSE 81 MG EC tablet, , Disp: , Rfl:      ferrous fumarate (FERRETTS) 324 (106 Fe) MG TABS tablet, Take by mouth daily, Disp: , Rfl:      furosemide (LASIX) 20 MG tablet, Take 1 tablet (20 mg) by mouth daily, Disp: 30 tablet, Rfl: 11     gabapentin (NEURONTIN) 300 MG tablet, Take 600 mg by mouth 3 times daily , Disp: , Rfl:      levothyroxine (SYNTHROID/LEVOTHROID) 50 MCG tablet, , Disp: , Rfl:      lidocaine, viscous, (XYLOCAINE) 2 % solution, , Disp: , Rfl:      nortriptyline (PAMELOR) 25 MG capsule, , Disp: , Rfl:      omeprazole (PRILOSEC) 40 MG capsule, Take 1 capsule (40 mg) by mouth 2 times daily, Disp: 60 capsule, Rfl: 2     spironolactone (ALDACTONE) 50 MG tablet, Take 1 tablet (50 mg) by mouth daily, Disp: 30 tablet, Rfl: 11     tamsulosin (FLOMAX) 0.4 MG capsule, Take 1 capsule (0.4 mg) by mouth daily, Disp: 60 capsule, Rfl: 5     Allergies:   Patient has no known allergies.      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     Physical Exam:  /70   Pulse 76   Temp 97.7  F (36.5  C) (Oral)   Ht 1.727 m (5' 8\")   Wt 77.4 kg (170 lb 9.6 oz)   SpO2 96%   BMI 25.94 kg/m      GENERAL APPEARANCE: alert and no distress  EYES: nonicteric  HENT: mouth without ulcers or lesions  NECK: supple, no adenopathy  RESP: lungs clear to auscultation   CV: regular rhythm, normal rate, no rub  Extremities: no clubbing, cyanosis, or edema  MS: no evidence of inflammation in " joints, no muscle tenderness  SKIN: skin rash consistent with discoid with central clearing which is tender to touch.   NEURO: mentation intact and speech normal  PSYCH: affect normal/bright       Laboratory:  CMP  Recent Labs   Lab Test 04/18/19  1328 01/28/19  1128 12/21/18  0842 10/31/18  1217 10/23/18  1034 10/12/18  0722 10/11/18  0526  04/30/18  1322  12/15/17  0738    139 137 140 140 141 144   < >  --    < > 139   POTASSIUM 3.2* 4.3 4.4 3.8 3.9 3.7 3.9   < >  --    < > 3.7   CHLORIDE 108 108 106 109 108 108 111*   < >  --    < > 106   CO2 23 26 24 24 23 24 23   < >  --    < > 26   ANIONGAP 7 4 8 8 8 9 11   < >  --    < > 7   * 98 100* 93 98 122* 127*   < >  --    < > 99   BUN 9 19 22 12 8 21 28   < >  --    < > 12   CR 1.09 1.49* 1.37* 1.20 1.12 1.07 1.05   < >  --    < > 1.35*   GFRESTIMATED 67 46* 51* 59* 64 68 69   < >  --    < > 52*   GFRESTBLACK 78 53* 59* 72 78 82 84   < >  --    < > 63   ABDIRASHID 8.2* 8.4* 8.2* 8.4* 7.9* 7.6* 7.8*   < >  --    < > 8.1*   MAG  --   --   --   --   --   --   --   --  2.4*  --   --    PHOS 1.8*  --  2.7  --   --   --   --   --  2.3*  --  2.9   PROTTOTAL  --  7.8  --   --  7.4 6.4* 6.6*   < >  --    < >  --    ALBUMIN 2.7* 3.0* 2.8*  --  2.6* 2.6* 2.8*   < >  --    < > 2.8*   BILITOTAL  --  1.3  --   --  2.0* 2.0* 1.2   < >  --    < >  --    ALKPHOS  --  384*  --   --  429* 288* 323*   < >  --    < >  --    AST  --  44  --   --  50* 45 72*   < >  --    < >  --    ALT  --  37  --   --  43 104* 142*   < >  --    < >  --     < > = values in this interval not displayed.     CBC  Recent Labs   Lab Test 04/18/19  1328 01/28/19  1128 12/21/18  0842 10/23/18  1034   HGB 12.6* 14.6 13.2* 12.1*   WBC 5.0 5.2 4.7 4.5   RBC 4.13* 4.68 4.34* 3.97*   HCT 38.6* 43.0 40.2 37.5*   MCV 94 92 93 95   MCH 30.5 31.2 30.4 30.5   MCHC 32.6 34.0 32.8 32.3   RDW 13.3 15.4* 14.9 19.9*   * 124* 104* 125*     INR  Recent Labs   Lab Test 01/28/19  1128 10/23/18  1034 10/12/18  0722  10/11/18  0526 10/09/18  1754   INR 1.06 1.05 1.16* 1.15* 1.05   PTT  --   --   --   --  22     URINE STUDIES  Recent Labs   Lab Test 12/13/18  1318 08/21/18  1550 04/30/18  1327 08/15/14  1047   COLOR Yellow Shikha Yellow Yellow   APPEARANCE Clear Clear Clear Clear   URINEGLC Negative Negative Negative Negative   URINEBILI Negative Negative Negative Negative   URINEKETONE Negative Negative Negative Negative   SG 1.016 1.018 1.020 1.007   UBLD Negative Negative Negative Negative   URINEPH 5.0 6.0 6.0 7.0   PROTEIN Negative Negative Negative Negative   NITRITE Negative Negative Negative Negative   LEUKEST Negative Negative Negative Negative   RBCU <1 <1 <1 10*   WBCU 2 <1 1 8*     Recent Labs   Lab Test 04/18/19  1330 12/21/18  0850 12/15/17  0755 12/16/16  0852 10/06/15  1030 12/12/14  0835 08/15/14  1047 12/31/13  1319 09/24/13  1439   UTPG 0.14 0.09 0.10 0.10 0.12 0.16 0.16 0.06 0.03     PTH  Recent Labs   Lab Test 04/30/18  1322 12/16/16  0842 08/15/14  1028 09/24/13  1303   PTHI 100* 88* 81* 65     IRON STUDIES   Recent Labs   Lab Test 04/30/18  1322 10/18/17  1209 12/16/16  0842 08/15/14  1028 09/24/13  1303   IRON 20* 42 47 27* 38    374 414 437* 447*   IRONSAT 5* 11* 11* 6* 9*   MARYAM 8* 13* 10* 14* 19*       Scribe Disclosure:   I, Virginia Wu, am serving as a scribe to document services personally performed by Shira Patterson MD at this visit, based upon the provider's statements to me. All documentation has been reviewed by the aforementioned provider prior to being entered into the official medical record.       Shira Patterson MD

## 2019-04-24 ENCOUNTER — TRANSFERRED RECORDS (OUTPATIENT)
Dept: HEALTH INFORMATION MANAGEMENT | Facility: CLINIC | Age: 74
End: 2019-04-24

## 2019-04-24 ENCOUNTER — MEDICAL CORRESPONDENCE (OUTPATIENT)
Dept: HEALTH INFORMATION MANAGEMENT | Facility: CLINIC | Age: 74
End: 2019-04-24

## 2019-04-24 ENCOUNTER — TELEPHONE (OUTPATIENT)
Dept: RHEUMATOLOGY | Facility: CLINIC | Age: 74
End: 2019-04-24

## 2019-04-24 NOTE — TELEPHONE ENCOUNTER
[facility] Call Center    Phone Message    May a detailed message be left on voicemail: yes    Reason for Call: Other: pt is being referred by Dr. Oscar Flores from DX: Elevated ESR, but needs MD possibly more complex per [facility] staff calling in. Please call.      Action Taken: Message routed to:  [facility] ([facility] Rheum

## 2019-04-24 NOTE — LETTER
Medical Records Request For Appointment  URGENT!            To: [facility] From: Yuli HOOKER CMA - Referral Specialist Liaison  [facility]   Fax: [phone] Fax: [phone]   Phone: [phone] Phone: [phone]   Date:  May 7, 2019 Mailing Address: [facility]  Attn:Yuli HOOKER CMA, Referral Specialist Liaison   [address], mail [id number]  [place] [address]     PATIENT: Danielle Cook  : 1945  REFERRED BY:  Mary Flores MD  Please fax medical records to fax # [phone] for patient s upcoming appointment on 2019 in the Adult Rheumatology Clinic.  Patient is being seen for: elevated ESR    PLEASE SEND 1-2 YEARS OF THE FOLLOWING INFORMATION IF APPLICABLE:     Referring provider notes/labs/imaging     Previous rheumatology office visit notes      Previous specialists relating to rheumatology(cardiology, pulmonary, GI, dermatology)     All lab results     Imaging results interpretation and CD (x-ray, CT, MRI)     Pathology/biopsy results (related only)     All Diagnostic procedure results including PFT (volume and interptetation), ECHO, EMG, HRCT of chest, esophagram, swallowing study, right heart cath, motility study, endoscopy   **Please push images to [email or web address] PACS or mail the imaging disc**   ** If no records related to Rheumatology, please send most recent office visit notes and labs associated to the referral**       Confidentiality Notice:  The document(s) accompanying this fax may contain protected health information under state and federal law and is legally privileged.  The information is only for the use of the intended recipient named above.  The recipient or person responsible for delivering this information is prohibited by law from disclosing this information without proper authorization to any other party, unless required to do so by law or regulation.  If you are not the intended recipient, you are hereby notified that any review,  dissemination, distribution, or copying of this message is strictly prohibited.  If you have received this communication in error, please notify us immediately by telephone to arrange for the return or destruction of the faxed documents.  Thank you.

## 2019-04-26 LAB — UPPER GI ENDOSCOPY: NORMAL

## 2019-04-29 ENCOUNTER — ANCILLARY PROCEDURE (OUTPATIENT)
Dept: MRI IMAGING | Facility: CLINIC | Age: 74
End: 2019-04-29
Attending: INTERNAL MEDICINE
Payer: COMMERCIAL

## 2019-04-29 ENCOUNTER — OFFICE VISIT (OUTPATIENT)
Dept: GASTROENTEROLOGY | Facility: CLINIC | Age: 74
End: 2019-04-29
Attending: INTERNAL MEDICINE
Payer: COMMERCIAL

## 2019-04-29 VITALS
BODY MASS INDEX: 25.07 KG/M2 | WEIGHT: 165.4 LBS | SYSTOLIC BLOOD PRESSURE: 127 MMHG | OXYGEN SATURATION: 98 % | HEIGHT: 68 IN | DIASTOLIC BLOOD PRESSURE: 70 MMHG | HEART RATE: 74 BPM

## 2019-04-29 DIAGNOSIS — K74.60 CHRONIC HEPATITIS C VIRUS INFECTION WITH CIRRHOSIS (H): ICD-10-CM

## 2019-04-29 DIAGNOSIS — Z12.9 SCREENING FOR CANCER: ICD-10-CM

## 2019-04-29 DIAGNOSIS — E87.6 HYPOKALEMIA: ICD-10-CM

## 2019-04-29 DIAGNOSIS — N18.30 CKD (CHRONIC KIDNEY DISEASE) STAGE 3, GFR 30-59 ML/MIN (H): ICD-10-CM

## 2019-04-29 DIAGNOSIS — K74.60 CIRRHOSIS OF LIVER WITHOUT ASCITES, UNSPECIFIED HEPATIC CIRRHOSIS TYPE (H): ICD-10-CM

## 2019-04-29 DIAGNOSIS — E44.0 MODERATE PROTEIN-CALORIE MALNUTRITION (H): ICD-10-CM

## 2019-04-29 DIAGNOSIS — K74.60 CIRRHOSIS OF LIVER WITHOUT ASCITES, UNSPECIFIED HEPATIC CIRRHOSIS TYPE (H): Primary | ICD-10-CM

## 2019-04-29 DIAGNOSIS — Z86.19 HISTORY OF HEPATITIS C: ICD-10-CM

## 2019-04-29 DIAGNOSIS — B18.2 CHRONIC HEPATITIS C VIRUS INFECTION WITH CIRRHOSIS (H): ICD-10-CM

## 2019-04-29 DIAGNOSIS — I85.10 SECONDARY ESOPHAGEAL VARICES WITHOUT BLEEDING (H): ICD-10-CM

## 2019-04-29 LAB
AFP SERPL-MCNC: 1.7 UG/L (ref 0–8)
ALBUMIN SERPL-MCNC: 2.8 G/DL (ref 3.4–5)
ALP SERPL-CCNC: 347 U/L (ref 40–150)
ALT SERPL W P-5'-P-CCNC: 33 U/L (ref 0–70)
ANION GAP SERPL CALCULATED.3IONS-SCNC: 7 MMOL/L (ref 3–14)
AST SERPL W P-5'-P-CCNC: 44 U/L (ref 0–45)
BILIRUB DIRECT SERPL-MCNC: 0.7 MG/DL (ref 0–0.2)
BILIRUB SERPL-MCNC: 1.1 MG/DL (ref 0.2–1.3)
BUN SERPL-MCNC: 10 MG/DL (ref 7–30)
CALCIUM SERPL-MCNC: 8.4 MG/DL (ref 8.5–10.1)
CHLORIDE SERPL-SCNC: 107 MMOL/L (ref 94–109)
CO2 SERPL-SCNC: 23 MMOL/L (ref 20–32)
CREAT SERPL-MCNC: 1.2 MG/DL (ref 0.66–1.25)
ERYTHROCYTE [DISTWIDTH] IN BLOOD BY AUTOMATED COUNT: 13.2 % (ref 10–15)
GFR SERPL CREATININE-BSD FRML MDRD: 60 ML/MIN/{1.73_M2}
GLUCOSE SERPL-MCNC: 121 MG/DL (ref 70–99)
HCT VFR BLD AUTO: 39.4 % (ref 40–53)
HGB BLD-MCNC: 13.1 G/DL (ref 13.3–17.7)
INR PPP: 1.16 (ref 0.86–1.14)
MCH RBC QN AUTO: 30.9 PG (ref 26.5–33)
MCHC RBC AUTO-ENTMCNC: 33.2 G/DL (ref 31.5–36.5)
MCV RBC AUTO: 93 FL (ref 78–100)
PLATELET # BLD AUTO: 119 10E9/L (ref 150–450)
POTASSIUM SERPL-SCNC: 3.9 MMOL/L (ref 3.4–5.3)
PROT SERPL-MCNC: 7.6 G/DL (ref 6.8–8.8)
RBC # BLD AUTO: 4.24 10E12/L (ref 4.4–5.9)
SODIUM SERPL-SCNC: 137 MMOL/L (ref 133–144)
WBC # BLD AUTO: 4.6 10E9/L (ref 4–11)

## 2019-04-29 PROCEDURE — 85027 COMPLETE CBC AUTOMATED: CPT | Performed by: INTERNAL MEDICINE

## 2019-04-29 PROCEDURE — 36415 COLL VENOUS BLD VENIPUNCTURE: CPT | Performed by: INTERNAL MEDICINE

## 2019-04-29 PROCEDURE — 80048 BASIC METABOLIC PNL TOTAL CA: CPT | Performed by: INTERNAL MEDICINE

## 2019-04-29 PROCEDURE — G0463 HOSPITAL OUTPT CLINIC VISIT: HCPCS | Mod: ZF

## 2019-04-29 PROCEDURE — 85610 PROTHROMBIN TIME: CPT | Performed by: INTERNAL MEDICINE

## 2019-04-29 PROCEDURE — 82105 ALPHA-FETOPROTEIN SERUM: CPT | Performed by: INTERNAL MEDICINE

## 2019-04-29 PROCEDURE — 80076 HEPATIC FUNCTION PANEL: CPT | Performed by: INTERNAL MEDICINE

## 2019-04-29 RX ORDER — GADOBUTROL 604.72 MG/ML
7.5 INJECTION INTRAVENOUS ONCE
Status: ACTIVE | OUTPATIENT
Start: 2019-04-29

## 2019-04-29 RX ORDER — GADOBUTROL 604.72 MG/ML
7.5 INJECTION INTRAVENOUS ONCE
Status: COMPLETED | OUTPATIENT
Start: 2019-04-29 | End: 2019-04-29

## 2019-04-29 RX ADMIN — GADOBUTROL 7.5 ML: 604.72 INJECTION INTRAVENOUS at 13:09

## 2019-04-29 ASSESSMENT — PAIN SCALES - GENERAL: PAINLEVEL: NO PAIN (0)

## 2019-04-29 ASSESSMENT — MIFFLIN-ST. JEOR: SCORE: 1469.75

## 2019-04-29 NOTE — LETTER
"4/29/2019       RE: Danielle Cook  [address]  [facility] [address]     Dear Colleague,    Thank you for referring your patient, Danielle Cook, to the [facility] HEPATOLOGY at [facility]. Please see a copy of my visit note below.    Date of Service: 1/28/2019     Subjective:            Danielle Cook is a 74 year old male presenting for evaluation of liver disease    Due to language barrier, an  was present during the history-taking and subsequent discussion (and for part of the physical exam) with this patient.      History of Present Illness   Danielle Cook is a 74 year old male with past medical history of chronic kidney disease, Chronic hepatitis C (s/p SVR) with cirrhosis complicated by esophageal varices and mild protein calorie malnutrition who presents in routine follow up.    Since last seen he is doing quite well.  He underwent an EGD to screen for varices on April 17, 2019.  This exam demonstrated small esophageal varices, a small hiatal hernia, and mild portal hypertensive gastropathy.  He tolerated the procedure well.  He notes that he a little bit of pain in his chest after the procedure, but this is essentially at his baseline.  He notes that he is eating \"normal\" but feels that he could increase the amount of calories that he is getting in in a day    He otherwise admits that he is thinking clearly, has good concentration.  Denies significant issues with fluid overload.  Denies issues with diarrhea or constipation.  Has not had any issues with hematemesis, emesis, or melena.    He is scheduled for an MRI abdomen with contrast for HCC screening today     He was admitted in October 2018 for concerns of a GI bleed and underwent an EGD on 10/11/2018 which demonstrated large varices and had 4 bands placed  - His HCV was treated with a combination of Harvoni and RBV, with SVR in early 2016    Past Medical " History:  Past Medical History:   Diagnosis Date     Bad headache      CKD (chronic kidney disease)      CKD (chronic kidney disease) stage 3, GFR 30-59 ml/min (H)      Hep C w/o coma, chronic (H)      Hepatitis C virus infection      Hypertension        Surgical History:  Past Surgical History:   Procedure Laterality Date     COLONOSCOPY N/A 1/19/2018    Procedure: COLONOSCOPY;  COMBINED ESOPHAGOSCOPY, GASTROSCOPY, DUODENOSCOPY (EGD) REMOVE TUMOR/POYP/LESION BY SNARE, CONTROL BLEED,BANDING/LIGATION OF VARICES Colonoscopy;  Surgeon: Xavier Sutton MD;  [facility]     ESOPHAGOSCOPY, GASTROSCOPY, DUODENOSCOPY (EGD), COMBINED N/A 11/4/2015    Procedure: COMBINED ESOPHAGOSCOPY, GASTROSCOPY, DUODENOSCOPY (EGD), BIOPSY SINGLE OR MULTIPLE;  Surgeon: Inocente Do MD;  Location:  GI     ESOPHAGOSCOPY, GASTROSCOPY, DUODENOSCOPY (EGD), COMBINED N/A 3/29/2018    Procedure: COMBINED ESOPHAGOSCOPY, GASTROSCOPY, DUODENOSCOPY (EGD);  egd;  Surgeon: Danielle Mendenhall MD;  Location:  GI     ESOPHAGOSCOPY, GASTROSCOPY, DUODENOSCOPY (EGD), COMBINED N/A 9/10/2018    Procedure: COMBINED ESOPHAGOSCOPY, GASTROSCOPY, DUODENOSCOPY (EGD);  egd;  Surgeon: Xavier Sutton MD;  Location:  OR     ESOPHAGOSCOPY, GASTROSCOPY, DUODENOSCOPY (EGD), COMBINED N/A 4/17/2019    Procedure: COMBINED ESOPHAGOSCOPY, GASTROSCOPY, DUODENOSCOPY (EGD);  Surgeon: Leventhal, Thomas Michael, MD;  Location:  GI     EYE SURGERY Bilateral     cataracts       Social History:  Social History     Socioeconomic History     Marital status:      Spouse name: Not on file     Number of children: Not on file     Years of education: Not on file     Highest education level: Not on file   Occupational History     Not on file   Social Needs     Financial resource strain: Not on file     Food insecurity:     Worry: Not on file     Inability: Not on file     Transportation needs:     Medical: Not on file     Non-medical: Not on file   Tobacco Use      Smoking status: Never Smoker     Smokeless tobacco: Never Used   Substance and Sexual Activity     Alcohol use: No     Alcohol/week: 0.0 oz     Drug use: No     Sexual activity: Not on file   Lifestyle     Physical activity:     Days per week: Not on file     Minutes per session: Not on file     Stress: Not on file   Relationships     Social connections:     Talks on phone: Not on file     Gets together: Not on file     Attends Alevism service: Not on file     Active member of club or organization: Not on file     Attends meetings of clubs or organizations: Not on file     Relationship status: Not on file     Intimate partner violence:     Fear of current or ex partner: Not on file     Emotionally abused: Not on file     Physically abused: Not on file     Forced sexual activity: Not on file   Other Topics Concern     Parent/sibling w/ CABG, MI or angioplasty before 65F 55M? Not Asked   Social History Narrative     Not on file       Family History:  Family History   Problem Relation Age of Onset     Hypertension Father    - Denies a family history of liver disease         Medications:  Current Outpatient Medications   Medication     acetaminophen (TYLENOL) 325 MG tablet     amLODIPine (NORVASC) 10 MG tablet     ASPIRIN LOW DOSE 81 MG EC tablet     ferrous fumarate (FERRETTS) 324 (106 Fe) MG TABS tablet     furosemide (LASIX) 20 MG tablet     gabapentin (NEURONTIN) 300 MG tablet     levothyroxine (SYNTHROID/LEVOTHROID) 50 MCG tablet     lidocaine, viscous, (XYLOCAINE) 2 % solution     nortriptyline (PAMELOR) 25 MG capsule     omeprazole (PRILOSEC) 40 MG capsule     potassium chloride (KLOR-CON) 20 MEQ packet     tamsulosin (FLOMAX) 0.4 MG capsule     No current facility-administered medications for this visit.      Facility-Administered Medications Ordered in Other Visits   Medication     gadobutrol (GADAVIST) injection 7.5 mL       Review of Systems  A complete 10 point review of systems was asked and answered in  "the negative unless specifically commented upon in the HPI    Objective:           Vitals:    04/29/19 1405   BP: 127/70   Pulse: 74   SpO2: 98%   Weight: 75 kg (165 lb 6.4 oz)   Height: 1.727 m (5' 8\")     Body mass index is 25.15 kg/m .     Physical Exam    Vitals reviewed.   Constitutional: Well-developed, well-nourished, in no apparent distress.    HEENT: Normocephalic. No scleral icterus. Moist oral mucosa. Dentition normal  Neck/Lymph: Normal ROM, supple. No thyromegaly.  No lymphadenopathy  Cardiac:  Regular rate and rhythm.   Respiratory: Normal respiratory excursion.  No wheezes or rales  GI:  Abdomen soft, non-distended, non-tender. BS present. No shifting dullness. No overt hepatosplenomegaly.     Skin:  Skin is warm and dry. No rash noted.  No jaundice. No spider nevi noted.  No palmar erythema  Peripheral Vascular: No lower extremity edema. 2+ pulses in all extremities  Musculoskeletal:  ROM intact, normal muscle bulk    Psychiatric: Normal mood and affect. Behavior is normal.  Neuro:  No asterixis, No tremor    Labs and Diagnostic tests:    MELD-Na score: 11 at 4/29/2019 11:54 AM  MELD score: 11 at 4/29/2019 11:54 AM  Calculated from:  Serum Creatinine: 1.20 mg/dL at 4/29/2019 11:54 AM  Serum Sodium: 137 mmol/L at 4/29/2019 11:54 AM  Total Bilirubin: 1.1 mg/dL at 4/29/2019 11:54 AM  INR(ratio): 1.16 at 4/29/2019 11:54 AM  Age: 74 years    Imaging:  MRI 5/2/2018  1. Cirrhosis and evidence of portal hypertension including portosystemic collaterals, small volume of ascites and findings of portal enteropathy.  2. No suspicious liver lesions meeting diagnostic criteria for HCC.   3. Moderate sliding hiatal hernia.    Abd US 10/9/2018  Impression:   1.  Unremarkable hepatic ultrasound with Doppler assessment.    Procedures:  EGD: 10/2018  Findings:        Grade II varices were found in the middle third of the esophagus and in        the lower third of the esophagus. They were medium in size. Four bands       "  were successfully placed with complete eradication, resulting in        deflation of varices. There was no bleeding during, and at the end, of        the procedure.        Mild portal hypertensive gastropathy was found in the stomach.        The examined duodenum was normal.        A medium-sized hiatal hernia was present.        An endoclip was found on the greater curvature of the stomach.     EGD 4/17/2019  Findings:        Non-bleeding small (< 5 mm) varices were found in the lower third of the        esophagus,. No stigmata of recent bleeding were evident and no red jesus        signs were present. Stigmata of prior treatment were evident. Scarring        from prior treatment was visible.        A small hiatal hernia was present.        The lower third of the esophagus was mildly tortuous.        An endoclip was found on the greater curvature of the stomach.        Mild portal hypertensive gastropathy was found in the entire examined        stomach.        The examined duodenum was normal.                                           Colonoscopy: 1/19/2018  Findings:        The perianal and digital rectal examinations were normal. Pertinent        negatives include no palpable rectal lesions.        The entire examined colon appeared normal.        The retroflexed view of the distal rectum and anal verge was normal and        showed no anal or rectal abnormalities.     Assessment and Plan:    Cirrhosis:   - Secondary to chronic hepatitis C, now s/p treatment with SVR of Hepatitis C  - His main signs of decompensation are esophageal varices.  He has no ascites, HE at this time.  - MELD on labs today 11  - No indication for transplant evaluation at this time  - Had discussion with patient that I am hopeful that his liver is re-compensating after clearance of HCV and he may be able to sustain his current quality of life for several years  - MELD labs Q 6 months    Liver Transplant Candidacy:  - At this time he does  not warrant transplant evaluation  - Barrier to liver transplantation in the future will likely be CKD and age    Hepatocellular Cancer Screening:   - Abd US from 10/2018 without mass lesions  - Will repeat an MRI with contrast today  - Recommend screening for HCC every 6 months with either abdominal ultrasound or by alternating abdominal ultrasound with EITHER a triple/quad phase CT Liver with IV contrast OR a Quad phase MRI Liver with IV contrast.  AFP levels should be checked every 6 months at time of imaging screen.    Ascites: No acute issues  - Continue to follow a sodium restricted (<2g sodium diet)     Hepatic Encephalopathy: no acute issues    Esophageal Varices:  - History of large varices and GI bleed s/p EGD 10/2018.  Repeat exam on 4/17/19 with small varices  - Recommend repeating an upper GI endoscopy 1 year.  If evidence of medium/large esophageal varices, would recommend esophageal varix band ligation and if band ligation is performed, please continue to repeat until eraditation of varices.    Kidney Health: Has CKD stage 3 (eGFR < 60, >30)  - Encourage more free water intake based on current Cr    Immobility/Frailty: no acute issues    Nutrition:  As with most patients with chronic liver disease, there is a significant degree of protein malnutrition.  Dicussed need to change dietary habits to improve overall protein balance.  Discussed the importance of eating between 1.2-1.5g/kg/day lean protein like eggs, fish, chicken, nuts, and legumes, in addition to a diet rich in fresh fruits and vegetables.  Continue to follow a sodium restricted (<2g sodium diet) and discussed the need to minimize the intake of carbohydrates and sugars, to avoid obesity.   - Strongly encourage protein supplements 2-3 times daily (Boost, Ensure, [place] Instant Milk, etc.) to meet protein and caloric intake.  - Recommend a bedtime snack with protein and complex carbohydrate to minimize risk of muscle catabolism  overnight    Routine Health Care in Patient with Chronic Liver Disease:  - All patients with liver disease, particularly those with cholestatic liver disease, are at an increased risk for osteoporosis.  We strongly recommend screening for Vitamin D deficiency at least twice yearly with aggressive supplementation/replacement as indicated.    - We also recommend a screening DEXA scan to evaluate for osteoporosis.  If present, should treat with calcium, Vitamin D supplementation, and recommend consideration of bisphosphonate therapy.  Also recommend follow up DEXA scans to evaluate for improvement of bone density on therapy.  - All patients with liver disease should avoid the use of Non-steroidal Anti-Inflammatory (NSAID) medications as they can cause significant injury to the kidneys in this population    Follow Up:  6 months     Thank you very much for the opportunity to participate in the care of this patient.  If you have any further questions, please don't hesitate to contact our office.    Thomas M. Leventhal, M.D.   of Medicine  Advanced & Transplant Hepatology  [facility]      Again, thank you for allowing me to participate in the care of your patient.      Sincerely,    Thomas M. Leventhal, MD

## 2019-04-29 NOTE — PROGRESS NOTES
"Date of Service: 1/28/2019     Subjective:            Danielle Cook is a 74 year old male presenting for evaluation of liver disease    Due to language barrier, an  was present during the history-taking and subsequent discussion (and for part of the physical exam) with this patient.      History of Present Illness   Danielle Cook is a 74 year old male with past medical history of chronic kidney disease, Chronic hepatitis C (s/p SVR) with cirrhosis complicated by esophageal varices and mild protein calorie malnutrition who presents in routine follow up.    Since last seen he is doing quite well.  He underwent an EGD to screen for varices on April 17, 2019.  This exam demonstrated small esophageal varices, a small hiatal hernia, and mild portal hypertensive gastropathy.  He tolerated the procedure well.  He notes that he a little bit of pain in his chest after the procedure, but this is essentially at his baseline.  He notes that he is eating \"normal\" but feels that he could increase the amount of calories that he is getting in in a day    He otherwise admits that he is thinking clearly, has good concentration.  Denies significant issues with fluid overload.  Denies issues with diarrhea or constipation.  Has not had any issues with hematemesis, emesis, or melena.    He is scheduled for an MRI abdomen with contrast for HCC screening today     He was admitted in October 2018 for concerns of a GI bleed and underwent an EGD on 10/11/2018 which demonstrated large varices and had 4 bands placed  - His HCV was treated with a combination of Harvoni and RBV, with SVR in early 2016    Past Medical History:  Past Medical History:   Diagnosis Date     Bad headache      CKD (chronic kidney disease)      CKD (chronic kidney disease) stage 3, GFR 30-59 ml/min (H)      Hep C w/o coma, chronic (H)      Hepatitis C virus infection      Hypertension        Surgical History:  Past Surgical History:   Procedure " Laterality Date     COLONOSCOPY N/A 1/19/2018    Procedure: COLONOSCOPY;  COMBINED ESOPHAGOSCOPY, GASTROSCOPY, DUODENOSCOPY (EGD) REMOVE TUMOR/POYP/LESION BY SNARE, CONTROL BLEED,BANDING/LIGATION OF VARICES Colonoscopy;  Surgeon: Xavier Sutton MD;  Location: [facility]     ESOPHAGOSCOPY, GASTROSCOPY, DUODENOSCOPY (EGD), COMBINED N/A 11/4/2015    Procedure: COMBINED ESOPHAGOSCOPY, GASTROSCOPY, DUODENOSCOPY (EGD), BIOPSY SINGLE OR MULTIPLE;  Surgeon: Inocente Do MD;  Location:  GI     ESOPHAGOSCOPY, GASTROSCOPY, DUODENOSCOPY (EGD), COMBINED N/A 3/29/2018    Procedure: COMBINED ESOPHAGOSCOPY, GASTROSCOPY, DUODENOSCOPY (EGD);  egd;  Surgeon: Danielle Mendenhall MD;  Location:  GI     ESOPHAGOSCOPY, GASTROSCOPY, DUODENOSCOPY (EGD), COMBINED N/A 9/10/2018    Procedure: COMBINED ESOPHAGOSCOPY, GASTROSCOPY, DUODENOSCOPY (EGD);  egd;  Surgeon: Xavier Sutton MD;  Location: [facility]     ESOPHAGOSCOPY, GASTROSCOPY, DUODENOSCOPY (EGD), COMBINED N/A 4/17/2019    Procedure: COMBINED ESOPHAGOSCOPY, GASTROSCOPY, DUODENOSCOPY (EGD);  Surgeon: Leventhal, Thomas Michael, MD;  Location:  GI     EYE SURGERY Bilateral     cataracts       Social History:  Social History     Socioeconomic History     Marital status:      Spouse name: Not on file     Number of children: Not on file     Years of education: Not on file     Highest education level: Not on file   Occupational History     Not on file   Social Needs     Financial resource strain: Not on file     Food insecurity:     Worry: Not on file     Inability: Not on file     Transportation needs:     Medical: Not on file     Non-medical: Not on file   Tobacco Use     Smoking status: Never Smoker     Smokeless tobacco: Never Used   Substance and Sexual Activity     Alcohol use: No     Alcohol/week: 0.0 oz     Drug use: No     Sexual activity: Not on file   Lifestyle     Physical activity:     Days per week: Not on file     Minutes per session: Not on file     Stress: Not  "on file   Relationships     Social connections:     Talks on phone: Not on file     Gets together: Not on file     Attends [employer] service: Not on file     Active member of club or organization: Not on file     Attends meetings of clubs or organizations: Not on file     Relationship status: Not on file     Intimate partner violence:     Fear of current or ex partner: Not on file     Emotionally abused: Not on file     Physically abused: Not on file     Forced sexual activity: Not on file   Other Topics Concern     Parent/sibling w/ CABG, MI or angioplasty before 65F 55M? Not Asked   Social History Narrative     Not on file       Family History:  Family History   Problem Relation Age of Onset     Hypertension Father    - Denies a family history of liver disease         Medications:  Current Outpatient Medications   Medication     acetaminophen (TYLENOL) 325 MG tablet     amLODIPine (NORVASC) 10 MG tablet     ASPIRIN LOW DOSE 81 MG EC tablet     ferrous fumarate (FERRETTS) 324 (106 Fe) MG TABS tablet     furosemide (LASIX) 20 MG tablet     gabapentin (NEURONTIN) 300 MG tablet     levothyroxine (SYNTHROID/LEVOTHROID) 50 MCG tablet     lidocaine, viscous, (XYLOCAINE) 2 % solution     nortriptyline (PAMELOR) 25 MG capsule     omeprazole (PRILOSEC) 40 MG capsule     potassium chloride (KLOR-CON) 20 MEQ packet     tamsulosin (FLOMAX) 0.4 MG capsule     No current facility-administered medications for this visit.      Facility-Administered Medications Ordered in Other Visits   Medication     gadobutrol (GADAVIST) injection 7.5 mL       Review of Systems  A complete 10 point review of systems was asked and answered in the negative unless specifically commented upon in the HPI    Objective:           Vitals:    04/29/19 1405   BP: 127/70   Pulse: 74   SpO2: 98%   Weight: 75 kg (165 lb 6.4 oz)   Height: 1.727 m (5' 8\")     Body mass index is 25.15 kg/m .     Physical Exam    Vitals reviewed.   Constitutional: Well-developed, " well-nourished, in no apparent distress.    HEENT: Normocephalic. No scleral icterus. Moist oral mucosa. Dentition normal  Neck/Lymph: Normal ROM, supple. No thyromegaly.  No lymphadenopathy  Cardiac:  Regular rate and rhythm.   Respiratory: Normal respiratory excursion.  No wheezes or rales  GI:  Abdomen soft, non-distended, non-tender. BS present. No shifting dullness. No overt hepatosplenomegaly.     Skin:  Skin is warm and dry. No rash noted.  No jaundice. No spider nevi noted.  No palmar erythema  Peripheral Vascular: No lower extremity edema. 2+ pulses in all extremities  Musculoskeletal:  ROM intact, normal muscle bulk    Psychiatric: Normal mood and affect. Behavior is normal.  Neuro:  No asterixis, No tremor    Labs and Diagnostic tests:    MELD-Na score: 11 at 4/29/2019 11:54 AM  MELD score: 11 at 4/29/2019 11:54 AM  Calculated from:  Serum Creatinine: 1.20 mg/dL at 4/29/2019 11:54 AM  Serum Sodium: 137 mmol/L at 4/29/2019 11:54 AM  Total Bilirubin: 1.1 mg/dL at 4/29/2019 11:54 AM  INR(ratio): 1.16 at 4/29/2019 11:54 AM  Age: 74 years    Imaging:  MRI 5/2/2018  1. Cirrhosis and evidence of portal hypertension including portosystemic collaterals, small volume of ascites and findings of portal enteropathy.  2. No suspicious liver lesions meeting diagnostic criteria for HCC.   3. Moderate sliding hiatal hernia.    Abd US 10/9/2018  Impression:   1.  Unremarkable hepatic ultrasound with Doppler assessment.    Procedures:  EGD: 10/2018  Findings:        Grade II varices were found in the middle third of the esophagus and in        the lower third of the esophagus. They were medium in size. Four bands        were successfully placed with complete eradication, resulting in        deflation of varices. There was no bleeding during, and at the end, of        the procedure.        Mild portal hypertensive gastropathy was found in the stomach.        The examined duodenum was normal.        A medium-sized hiatal  hernia was present.        An endoclip was found on the greater curvature of the stomach.     EGD 4/17/2019  Findings:        Non-bleeding small (< 5 mm) varices were found in the lower third of the        esophagus,. No stigmata of recent bleeding were evident and no red jesus        signs were present. Stigmata of prior treatment were evident. Scarring        from prior treatment was visible.        A small hiatal hernia was present.        The lower third of the esophagus was mildly tortuous.        An endoclip was found on the greater curvature of the stomach.        Mild portal hypertensive gastropathy was found in the entire examined        stomach.        The examined duodenum was normal.                                           Colonoscopy: 1/19/2018  Findings:        The perianal and digital rectal examinations were normal. Pertinent        negatives include no palpable rectal lesions.        The entire examined colon appeared normal.        The retroflexed view of the distal rectum and anal verge was normal and        showed no anal or rectal abnormalities.     Assessment and Plan:    Cirrhosis:   - Secondary to chronic hepatitis C, now s/p treatment with SVR of Hepatitis C  - His main signs of decompensation are esophageal varices.  He has no ascites, HE at this time.  - MELD on labs today 11  - No indication for transplant evaluation at this time  - Had discussion with patient that I am hopeful that his liver is re-compensating after clearance of HCV and he may be able to sustain his current quality of life for several years  - MELD labs Q 6 months    Liver Transplant Candidacy:  - At this time he does not warrant transplant evaluation  - Barrier to liver transplantation in the future will likely be CKD and age    Hepatocellular Cancer Screening:   - Abd US from 10/2018 without mass lesions  - Will repeat an MRI with contrast today  - Recommend screening for HCC every 6 months with either abdominal  ultrasound or by alternating abdominal ultrasound with EITHER a triple/quad phase CT Liver with IV contrast OR a Quad phase MRI Liver with IV contrast.  AFP levels should be checked every 6 months at time of imaging screen.    Ascites: No acute issues  - Continue to follow a sodium restricted (<2g sodium diet)     Hepatic Encephalopathy: no acute issues    Esophageal Varices:  - History of large varices and GI bleed s/p EGD 10/2018.  Repeat exam on 4/17/19 with small varices  - Recommend repeating an upper GI endoscopy 1 year.  If evidence of medium/large esophageal varices, would recommend esophageal varix band ligation and if band ligation is performed, please continue to repeat until eraditation of varices.    Kidney Health: Has CKD stage 3 (eGFR < 60, >30)  - Encourage more free water intake based on current Cr    Immobility/Frailty: no acute issues    Nutrition:  As with most patients with chronic liver disease, there is a significant degree of protein malnutrition.  Dicussed need to change dietary habits to improve overall protein balance.  Discussed the importance of eating between 1.2-1.5g/kg/day lean protein like eggs, fish, chicken, nuts, and legumes, in addition to a diet rich in fresh fruits and vegetables.  Continue to follow a sodium restricted (<2g sodium diet) and discussed the need to minimize the intake of carbohydrates and sugars, to avoid obesity.   - Strongly encourage protein supplements 2-3 times daily (Boost, Ensure, Derwent Instant Milk, etc.) to meet protein and caloric intake.  - Recommend a bedtime snack with protein and complex carbohydrate to minimize risk of muscle catabolism overnight    Routine Health Care in Patient with Chronic Liver Disease:  - All patients with liver disease, particularly those with cholestatic liver disease, are at an increased risk for osteoporosis.  We strongly recommend screening for Vitamin D deficiency at least twice yearly with aggressive  supplementation/replacement as indicated.    - We also recommend a screening DEXA scan to evaluate for osteoporosis.  If present, should treat with calcium, Vitamin D supplementation, and recommend consideration of bisphosphonate therapy.  Also recommend follow up DEXA scans to evaluate for improvement of bone density on therapy.  - All patients with liver disease should avoid the use of Non-steroidal Anti-Inflammatory (NSAID) medications as they can cause significant injury to the kidneys in this population    Follow Up:  6 months     Thank you very much for the opportunity to participate in the care of this patient.  If you have any further questions, please don't hesitate to contact our office.    Thomas M. Leventhal, M.D.   of Medicine  Advanced & Transplant Hepatology  [facility]

## 2019-04-29 NOTE — LETTER
"4/29/2019      RE: Danielle Cook  [address]  [facility] [id number]       Date of Service: 1/28/2019     Subjective:            Danielle Cook is a 74 year old male presenting for evaluation of liver disease    Due to language barrier, an  was present during the history-taking and subsequent discussion (and for part of the physical exam) with this patient.      History of Present Illness   Danielle Cook is a 74 year old male with past medical history of chronic kidney disease, Chronic hepatitis C (s/p SVR) with cirrhosis complicated by esophageal varices and mild protein calorie malnutrition who presents in routine follow up.    Since last seen he is doing quite well.  He underwent an EGD to screen for varices on April 17, 2019.  This exam demonstrated small esophageal varices, a small hiatal hernia, and mild portal hypertensive gastropathy.  He tolerated the procedure well.  He notes that he a little bit of pain in his chest after the procedure, but this is essentially at his baseline.  He notes that he is eating \"normal\" but feels that he could increase the amount of calories that he is getting in in a day    He otherwise admits that he is thinking clearly, has good concentration.  Denies significant issues with fluid overload.  Denies issues with diarrhea or constipation.  Has not had any issues with hematemesis, emesis, or melena.    He is scheduled for an MRI abdomen with contrast for HCC screening today     He was admitted in October 2018 for concerns of a GI bleed and underwent an EGD on 10/11/2018 which demonstrated large varices and had 4 bands placed  - His HCV was treated with a combination of Harvoni and RBV, with SVR in early 2016    Past Medical History:  Past Medical History:   Diagnosis Date     Bad headache      CKD (chronic kidney disease)      CKD (chronic kidney disease) stage 3, GFR 30-59 ml/min (H)      Hep C w/o coma, chronic (H)      Hepatitis " C virus infection      Hypertension        Surgical History:  Past Surgical History:   Procedure Laterality Date     COLONOSCOPY N/A 1/19/2018    Procedure: COLONOSCOPY;  COMBINED ESOPHAGOSCOPY, GASTROSCOPY, DUODENOSCOPY (EGD) REMOVE TUMOR/POYP/LESION BY SNARE, CONTROL BLEED,BANDING/LIGATION OF VARICES Colonoscopy;  Surgeon: Xavier Sutton MD;  [facility]     ESOPHAGOSCOPY, GASTROSCOPY, DUODENOSCOPY (EGD), COMBINED N/A 11/4/2015    Procedure: COMBINED ESOPHAGOSCOPY, GASTROSCOPY, DUODENOSCOPY (EGD), BIOPSY SINGLE OR MULTIPLE;  Surgeon: Inocente Do MD;  [facility]     ESOPHAGOSCOPY, GASTROSCOPY, DUODENOSCOPY (EGD), COMBINED N/A 3/29/2018    Procedure: COMBINED ESOPHAGOSCOPY, GASTROSCOPY, DUODENOSCOPY (EGD);  egd;  Surgeon: Danielle Mendenhall MD;  [facility]     ESOPHAGOSCOPY, GASTROSCOPY, DUODENOSCOPY (EGD), COMBINED N/A 9/10/2018    Procedure: COMBINED ESOPHAGOSCOPY, GASTROSCOPY, DUODENOSCOPY (EGD);  egd;  Surgeon: Xavier Sutton MD;  [facility]     ESOPHAGOSCOPY, GASTROSCOPY, DUODENOSCOPY (EGD), COMBINED N/A 4/17/2019    Procedure: COMBINED ESOPHAGOSCOPY, GASTROSCOPY, DUODENOSCOPY (EGD);  Surgeon: Leventhal, Thomas Michael, MD;  Location:  GI     EYE SURGERY Bilateral     cataracts       Social History:  Social History     Socioeconomic History     Marital status:      Spouse name: Not on file     Number of children: Not on file     Years of education: Not on file     Highest education level: Not on file   Occupational History     Not on file   Social Needs     Financial resource strain: Not on file     Food insecurity:     Worry: Not on file     Inability: Not on file     Transportation needs:     Medical: Not on file     Non-medical: Not on file   Tobacco Use     Smoking status: Never Smoker     Smokeless tobacco: Never Used   Substance and Sexual Activity     Alcohol use: No     Alcohol/week: 0.0 oz     Drug use: No     Sexual activity: Not on file   Lifestyle      "Physical activity:     Days per week: Not on file     Minutes per session: Not on file     Stress: Not on file   Relationships     Social connections:     Talks on phone: Not on file     Gets together: Not on file     Attends Worship service: Not on file     Active member of club or organization: Not on file     Attends meetings of clubs or organizations: Not on file     Relationship status: Not on file     Intimate partner violence:     Fear of current or ex partner: Not on file     Emotionally abused: Not on file     Physically abused: Not on file     Forced sexual activity: Not on file   Other Topics Concern     Parent/sibling w/ CABG, MI or angioplasty before 65F 55M? Not Asked   Social History Narrative     Not on file       Family History:  Family History   Problem Relation Age of Onset     Hypertension Father    - Denies a family history of liver disease         Medications:  Current Outpatient Medications   Medication     acetaminophen (TYLENOL) 325 MG tablet     amLODIPine (NORVASC) 10 MG tablet     ASPIRIN LOW DOSE 81 MG EC tablet     ferrous fumarate (FERRETTS) 324 (106 Fe) MG TABS tablet     furosemide (LASIX) 20 MG tablet     gabapentin (NEURONTIN) 300 MG tablet     levothyroxine (SYNTHROID/LEVOTHROID) 50 MCG tablet     lidocaine, viscous, (XYLOCAINE) 2 % solution     nortriptyline (PAMELOR) 25 MG capsule     omeprazole (PRILOSEC) 40 MG capsule     potassium chloride (KLOR-CON) 20 MEQ packet     tamsulosin (FLOMAX) 0.4 MG capsule     No current facility-administered medications for this visit.      Facility-Administered Medications Ordered in Other Visits   Medication     gadobutrol (GADAVIST) injection 7.5 mL       Review of Systems  A complete 10 point review of systems was asked and answered in the negative unless specifically commented upon in the HPI    Objective:           Vitals:    04/29/19 1405   BP: 127/70   Pulse: 74   SpO2: 98%   Weight: 75 kg (165 lb 6.4 oz)   Height: 1.727 m (5' 8\") "     Body mass index is 25.15 kg/m .     Physical Exam    Vitals reviewed.   Constitutional: Well-developed, well-nourished, in no apparent distress.    HEENT: Normocephalic. No scleral icterus. Moist oral mucosa. Dentition normal  Neck/Lymph: Normal ROM, supple. No thyromegaly.  No lymphadenopathy  Cardiac:  Regular rate and rhythm.   Respiratory: Normal respiratory excursion.  No wheezes or rales  GI:  Abdomen soft, non-distended, non-tender. BS present. No shifting dullness. No overt hepatosplenomegaly.     Skin:  Skin is warm and dry. No rash noted.  No jaundice. No spider nevi noted.  No palmar erythema  Peripheral Vascular: No lower extremity edema. 2+ pulses in all extremities  Musculoskeletal:  ROM intact, normal muscle bulk    Psychiatric: Normal mood and affect. Behavior is normal.  Neuro:  No asterixis, No tremor    Labs and Diagnostic tests:    MELD-Na score: 11 at 4/29/2019 11:54 AM  MELD score: 11 at 4/29/2019 11:54 AM  Calculated from:  Serum Creatinine: 1.20 mg/dL at 4/29/2019 11:54 AM  Serum Sodium: 137 mmol/L at 4/29/2019 11:54 AM  Total Bilirubin: 1.1 mg/dL at 4/29/2019 11:54 AM  INR(ratio): 1.16 at 4/29/2019 11:54 AM  Age: 74 years    Imaging:  MRI 5/2/2018  1. Cirrhosis and evidence of portal hypertension including portosystemic collaterals, small volume of ascites and findings of portal enteropathy.  2. No suspicious liver lesions meeting diagnostic criteria for HCC.   3. Moderate sliding hiatal hernia.    Abd US 10/9/2018  Impression:   1.  Unremarkable hepatic ultrasound with Doppler assessment.    Procedures:  EGD: 10/2018  Findings:        Grade II varices were found in the middle third of the esophagus and in        the lower third of the esophagus. They were medium in size. Four bands        were successfully placed with complete eradication, resulting in        deflation of varices. There was no bleeding during, and at the end, of        the procedure.        Mild portal hypertensive  gastropathy was found in the stomach.        The examined duodenum was normal.        A medium-sized hiatal hernia was present.        An endoclip was found on the greater curvature of the stomach.     EGD 4/17/2019  Findings:        Non-bleeding small (< 5 mm) varices were found in the lower third of the        esophagus,. No stigmata of recent bleeding were evident and no red jesus        signs were present. Stigmata of prior treatment were evident. Scarring        from prior treatment was visible.        A small hiatal hernia was present.        The lower third of the esophagus was mildly tortuous.        An endoclip was found on the greater curvature of the stomach.        Mild portal hypertensive gastropathy was found in the entire examined        stomach.        The examined duodenum was normal.                                           Colonoscopy: 1/19/2018  Findings:        The perianal and digital rectal examinations were normal. Pertinent        negatives include no palpable rectal lesions.        The entire examined colon appeared normal.        The retroflexed view of the distal rectum and anal verge was normal and        showed no anal or rectal abnormalities.     Assessment and Plan:    Cirrhosis:   - Secondary to chronic hepatitis C, now s/p treatment with SVR of Hepatitis C  - His main signs of decompensation are esophageal varices.  He has no ascites, HE at this time.  - MELD on labs today 11  - No indication for transplant evaluation at this time  - Had discussion with patient that I am hopeful that his liver is re-compensating after clearance of HCV and he may be able to sustain his current quality of life for several years  - MELD labs Q 6 months    Liver Transplant Candidacy:  - At this time he does not warrant transplant evaluation  - Barrier to liver transplantation in the future will likely be CKD and age    Hepatocellular Cancer Screening:   - Abd US from 10/2018 without mass lesions  - Will  repeat an MRI with contrast today  - Recommend screening for HCC every 6 months with either abdominal ultrasound or by alternating abdominal ultrasound with EITHER a triple/quad phase CT Liver with IV contrast OR a Quad phase MRI Liver with IV contrast.  AFP levels should be checked every 6 months at time of imaging screen.    Ascites: No acute issues  - Continue to follow a sodium restricted (<2g sodium diet)     Hepatic Encephalopathy: no acute issues    Esophageal Varices:  - History of large varices and GI bleed s/p EGD 10/2018.  Repeat exam on 4/17/19 with small varices  - Recommend repeating an upper GI endoscopy 1 year.  If evidence of medium/large esophageal varices, would recommend esophageal varix band ligation and if band ligation is performed, please continue to repeat until eraditation of varices.    Kidney Health: Has CKD stage 3 (eGFR < 60, >30)  - Encourage more free water intake based on current Cr    Immobility/Frailty: no acute issues    Nutrition:  As with most patients with chronic liver disease, there is a significant degree of protein malnutrition.  Dicussed need to change dietary habits to improve overall protein balance.  Discussed the importance of eating between 1.2-1.5g/kg/day lean protein like eggs, fish, chicken, nuts, and legumes, in addition to a diet rich in fresh fruits and vegetables.  Continue to follow a sodium restricted (<2g sodium diet) and discussed the need to minimize the intake of carbohydrates and sugars, to avoid obesity.   - Strongly encourage protein supplements 2-3 times daily (Boost, Ensure, [place] Instant Milk, etc.) to meet protein and caloric intake.  - Recommend a bedtime snack with protein and complex carbohydrate to minimize risk of muscle catabolism overnight    Routine Health Care in Patient with Chronic Liver Disease:  - All patients with liver disease, particularly those with cholestatic liver disease, are at an increased risk for osteoporosis.  We  strongly recommend screening for Vitamin D deficiency at least twice yearly with aggressive supplementation/replacement as indicated.    - We also recommend a screening DEXA scan to evaluate for osteoporosis.  If present, should treat with calcium, Vitamin D supplementation, and recommend consideration of bisphosphonate therapy.  Also recommend follow up DEXA scans to evaluate for improvement of bone density on therapy.  - All patients with liver disease should avoid the use of Non-steroidal Anti-Inflammatory (NSAID) medications as they can cause significant injury to the kidneys in this population    Follow Up:  6 months     Thank you very much for the opportunity to participate in the care of this patient.  If you have any further questions, please don't hesitate to contact our office.    Thomas M. Leventhal, M.D.   of Medicine  Advanced & Transplant Hepatology  [facility]      Thomas M. Leventhal, MD

## 2019-05-01 NOTE — TELEPHONE ENCOUNTER
Notification of referral has been received in the Rheumatology clinic for elevated ESR from Mary Flores MD at [facility] on 4/24/2019. Our referral process is as follows:     The Rheumatology Clinic will follow up with the patient in 2-3 weeks to start the intake process by completing an intake form and discuss their medical history with them over the phone. In some cases, a Rheumatologist here at [facility] may not be the right doctor to for the patient. Alternative options will be suggested if that is the case.   Yuli Martin CMA  5/1/2019 10:59 AM

## 2019-05-07 NOTE — TELEPHONE ENCOUNTER
General Rheumatology Intake Form    Reason for referral: elevated ESR  Referring provider: Mary Flores MD at [facility]    Who is your primary care provider/clinic? Dr. Flores    Past Rheumatologist: No     Have you ever been diagnosed with fibromyalgia/generalized pain syndrome/chronic pain/chronic fatigue? No.    Who manages your care for this issue now? Dr. Garber     What is your most urgent concern at this time? None per pt at this time    Have you seen any specialist related to the reason you are coming here? Nephrology and Neurology at [facility]    Where are we expecting records (labs, imaging or pathology) from? Records request letter faxed to [facility] at [phone]    /Offered an appointment on 8/6/2019 at 11:00am with Dr. Jain, patient accepted and was instructed to arrive 15 minutes prior to appointment and asked to bring a current medication list. Patient verbalized understanding. Task sent to Clinic Coordinators to schedule appointment.         Yuli Martin CMA  5/7/2019 3:30 PM

## 2019-05-13 ENCOUNTER — OFFICE VISIT (OUTPATIENT)
Dept: DERMATOLOGY | Facility: CLINIC | Age: 74
End: 2019-05-13
Attending: INTERNAL MEDICINE
Payer: COMMERCIAL

## 2019-05-13 DIAGNOSIS — L85.3 XEROSIS OF SKIN: ICD-10-CM

## 2019-05-13 DIAGNOSIS — L28.0 LICHEN SIMPLEX CHRONICUS: Primary | ICD-10-CM

## 2019-05-13 RX ORDER — MOMETASONE FUROATE 1 MG/G
OINTMENT TOPICAL DAILY
Qty: 45 G | Refills: 3 | Status: SHIPPED | OUTPATIENT
Start: 2019-05-13 | End: 2019-09-16

## 2019-05-13 RX ORDER — EMOLLIENT BASE
CREAM (GRAM) TOPICAL 2 TIMES DAILY
Qty: 453 G | Refills: 11 | Status: SHIPPED | OUTPATIENT
Start: 2019-05-13

## 2019-05-13 ASSESSMENT — PAIN SCALES - GENERAL: PAINLEVEL: MODERATE PAIN (4)

## 2019-05-13 NOTE — NURSING NOTE
Dermatology Rooming Note    Danielle Cook's goals for this visit include:   Chief Complaint   Patient presents with     Derm Problem     Danielle is here for skin lesion on arms and legs      Arline Lubin, CMA

## 2019-05-13 NOTE — PATIENT INSTRUCTIONS
Mometasone 2 times per day for the itchy areas    Vanicream 2-3 times per day for dry skin. This will help prevent new itchy areas.

## 2019-05-13 NOTE — PROGRESS NOTES
[facility] Dermatology Note      Dermatology Problem List:  1. Lichen simplex chronicus    Encounter Date: May 13, 2019    CC:   Chief Complaint   Patient presents with     Derm Problem     Danielle is here for skin lesion on arms and legs          History of Present Illness:  Mr. Danielle Cook is a 74 year old male with history of treated Hep C, cirrhosis, and CKD who presents as a referral from Dr. Patterson for skin lesions. He reports very itchy areas on the bilateral forearms and left anterior ankle for the past 3 months. He scratches aggressively and causes the skin to open, which causes stinging. No known triggers or new medications associated with rash. He has never had this before. Denies history of eczema. No one in the family with similar rash. He has not treated the rash. He moisturizes infrequently and has very dry skin overall. He denies any changing, growing, bleeding, or tender skin lesions. No other skin concerns.    Past Medical History:   Patient Active Problem List   Diagnosis     CKD (chronic kidney disease) stage 3, GFR 30-59 ml/min (H)     Essential hypertension     Headache     History of hepatitis C     Cirrhosis of liver without ascites, unspecified hepatic cirrhosis type (H)     PMR (polymyalgia rheumatica) (H)     Abdominal pain, epigastric     Iron deficiency anemia     Ankle edema     Chronic coronary artery disease     Chest pain     Underimmunization status     Shortness of breath     GERD (gastroesophageal reflux disease)     Hepatitis C virus infection     Hyperlipidemia     Language difficulty     Paresthesia     Decrease in appetite     Poor sleep pattern     Renal insufficiency syndrome     Weakness     Syphilis     Late latent syphilis     Abdominal pain     Past Medical History:   Diagnosis Date     Bad headache      CKD (chronic kidney disease)      CKD (chronic kidney disease) stage 3, GFR 30-59 ml/min (H)      Hep C w/o coma, chronic (H)      Hepatitis  C virus infection      Hypertension      Past Surgical History:   Procedure Laterality Date     COLONOSCOPY N/A 1/19/2018    Procedure: COLONOSCOPY;  COMBINED ESOPHAGOSCOPY, GASTROSCOPY, DUODENOSCOPY (EGD) REMOVE TUMOR/POYP/LESION BY SNARE, CONTROL BLEED,BANDING/LIGATION OF VARICES Colonoscopy;  Surgeon: Xavier Sutton MD;  Location:  [facility]     ESOPHAGOSCOPY, GASTROSCOPY, DUODENOSCOPY (EGD), COMBINED N/A 11/4/2015    Procedure: COMBINED ESOPHAGOSCOPY, GASTROSCOPY, DUODENOSCOPY (EGD), BIOPSY SINGLE OR MULTIPLE;  Surgeon: Inocente Do MD;  Location:  GI     ESOPHAGOSCOPY, GASTROSCOPY, DUODENOSCOPY (EGD), COMBINED N/A 3/29/2018    Procedure: COMBINED ESOPHAGOSCOPY, GASTROSCOPY, DUODENOSCOPY (EGD);  egd;  Surgeon: Danielle Mendenhall MD;  Location:  GI     ESOPHAGOSCOPY, GASTROSCOPY, DUODENOSCOPY (EGD), COMBINED N/A 9/10/2018    Procedure: COMBINED ESOPHAGOSCOPY, GASTROSCOPY, DUODENOSCOPY (EGD);  egd;  Surgeon: Xavier Sutton MD;  [facility]     ESOPHAGOSCOPY, GASTROSCOPY, DUODENOSCOPY (EGD), COMBINED N/A 4/17/2019    Procedure: COMBINED ESOPHAGOSCOPY, GASTROSCOPY, DUODENOSCOPY (EGD);  Surgeon: Leventhal, Thomas Michael, MD;  Location:  GI     EYE SURGERY Bilateral     cataracts       Social History:  Patient reports that he has never smoked. He has never used smokeless tobacco. He reports that he does not drink alcohol or use drugs.    Family History:  Family History   Problem Relation Age of Onset     Hypertension Father        Medications:  Current Outpatient Medications   Medication Sig Dispense Refill     acetaminophen (TYLENOL) 325 MG tablet Take 1 tablet (325 mg) by mouth every 6 hours as needed for mild pain 100 tablet 3     amLODIPine (NORVASC) 10 MG tablet        ASPIRIN LOW DOSE 81 MG EC tablet        ferrous fumarate (FERRETTS) 324 (106 Fe) MG TABS tablet Take by mouth daily       furosemide (LASIX) 20 MG tablet Take 1 tablet (20 mg) by mouth daily 30 tablet 11     gabapentin  (NEURONTIN) 300 MG tablet Take 600 mg by mouth 3 times daily        levothyroxine (SYNTHROID/LEVOTHROID) 50 MCG tablet        lidocaine, viscous, (XYLOCAINE) 2 % solution        nortriptyline (PAMELOR) 25 MG capsule        omeprazole (PRILOSEC) 40 MG capsule Take 1 capsule (40 mg) by mouth 2 times daily 60 capsule 2     potassium chloride (KLOR-CON) 20 MEQ packet Take 20 mEq by mouth daily 30 tablet 1     tamsulosin (FLOMAX) 0.4 MG capsule Take 1 capsule (0.4 mg) by mouth daily 60 capsule 5        No Known Allergies      Review of Systems:  -As per HPI  -Constitutional: Otherwise feeling well today, in usual state of health.  -HEENT: Patient denies nonhealing oral sores.  -Skin: As above in HPI. No additional skin concerns.    Physical exam:  Vitals: There were no vitals taken for this visit.  GEN: This is a well developed, well-nourished male in no acute distress, in a pleasant mood.    SKIN: Focused examination of the arms, hands, legs, and feet was performed.  -Brown-purple lichenified plaques on the bilateral forearms and left anterior ankle  -Diffuse xerosis of the bilateral upper and lower extremities.  -No other lesions of concern on areas examined.     Impression/Plan:  1. Lichen simplex chronicus    Discussed association with dry skin. Encouraged frequent moisturizing.    Mometasone 0.1% ointment BID prn pruritis    Vanicream    Recommended gentle skin care      CC Shira Patterson MD  [address] [place] [address] on close of this encounter.  Follow-up prn.       Staff Physician Comments:  I saw and evaluated the patient with the resident and I agree with the assessment and plan as documented in the resident's note.    Dontae Fostre MD  Professor  Head of Dermato-Allergy Division  Department of Dermatology  [facility]      Staff Involved:  Resident(Seth)/Staff    Choco Ann MD  Dermatology Resident, PGY-2

## 2019-05-13 NOTE — LETTER
5/13/2019       RE: Danielle Cook  [address]  [facility] [id number]     Dear Colleague,    Thank you for referring your patient, Danielle Cook, to the [facility] DERMATOLOGY at [facility]. Please see a copy of my visit note below.    [facility] Dermatology Note      Dermatology Problem List:  1. Lichen simplex chronicus    Encounter Date: May 13, 2019    CC:   Chief Complaint   Patient presents with     Derm Problem     Danielle is here for skin lesion on arms and legs          History of Present Illness:  Mr. Danielle Cook is a 74 year old male with history of treated Hep C, cirrhosis, and CKD who presents as a referral from Dr. Patterson for skin lesions. He reports very itchy areas on the bilateral forearms and left anterior ankle for the past 3 months. He scratches aggressively and causes the skin to open, which causes stinging. No known triggers or new medications associated with rash. He has never had this before. Denies history of eczema. No one in the family with similar rash. He has not treated the rash. He moisturizes infrequently and has very dry skin overall. He denies any changing, growing, bleeding, or tender skin lesions. No other skin concerns.    Past Medical History:   Patient Active Problem List   Diagnosis     CKD (chronic kidney disease) stage 3, GFR 30-59 ml/min (H)     Essential hypertension     Headache     History of hepatitis C     Cirrhosis of liver without ascites, unspecified hepatic cirrhosis type (H)     PMR (polymyalgia rheumatica) (H)     Abdominal pain, epigastric     Iron deficiency anemia     Ankle edema     Chronic coronary artery disease     Chest pain     Underimmunization status     Shortness of breath     GERD (gastroesophageal reflux disease)     Hepatitis C virus infection     Hyperlipidemia     Language difficulty     Paresthesia     Decrease in appetite     Poor sleep pattern      Renal insufficiency syndrome     Weakness     Syphilis     Late latent syphilis     Abdominal pain     Past Medical History:   Diagnosis Date     Bad headache      CKD (chronic kidney disease)      CKD (chronic kidney disease) stage 3, GFR 30-59 ml/min (H)      Hep C w/o coma, chronic (H)      Hepatitis C virus infection      Hypertension      Past Surgical History:   Procedure Laterality Date     COLONOSCOPY N/A 1/19/2018    Procedure: COLONOSCOPY;  COMBINED ESOPHAGOSCOPY, GASTROSCOPY, DUODENOSCOPY (EGD) REMOVE TUMOR/POYP/LESION BY SNARE, CONTROL BLEED,BANDING/LIGATION OF VARICES Colonoscopy;  Surgeon: Xavier Sutton MD;  Location:  GI     ESOPHAGOSCOPY, GASTROSCOPY, DUODENOSCOPY (EGD), COMBINED N/A 11/4/2015    Procedure: COMBINED ESOPHAGOSCOPY, GASTROSCOPY, DUODENOSCOPY (EGD), BIOPSY SINGLE OR MULTIPLE;  Surgeon: Inocente Do MD;  Location:  GI     ESOPHAGOSCOPY, GASTROSCOPY, DUODENOSCOPY (EGD), COMBINED N/A 3/29/2018    Procedure: COMBINED ESOPHAGOSCOPY, GASTROSCOPY, DUODENOSCOPY (EGD);  egd;  Surgeon: Danielle Mendenhall MD;  Location:  GI     ESOPHAGOSCOPY, GASTROSCOPY, DUODENOSCOPY (EGD), COMBINED N/A 9/10/2018    Procedure: COMBINED ESOPHAGOSCOPY, GASTROSCOPY, DUODENOSCOPY (EGD);  egd;  Surgeon: Xavier Sutton MD;  Location:  OR     ESOPHAGOSCOPY, GASTROSCOPY, DUODENOSCOPY (EGD), COMBINED N/A 4/17/2019    Procedure: COMBINED ESOPHAGOSCOPY, GASTROSCOPY, DUODENOSCOPY (EGD);  Surgeon: Leventhal, Thomas Michael, MD;  Location:  GI     EYE SURGERY Bilateral     cataracts       Social History:  Patient reports that he has never smoked. He has never used smokeless tobacco. He reports that he does not drink alcohol or use drugs.    Family History:  Family History   Problem Relation Age of Onset     Hypertension Father        Medications:  Current Outpatient Medications   Medication Sig Dispense Refill     acetaminophen (TYLENOL) 325 MG tablet Take 1 tablet (325 mg) by mouth every 6 hours as  needed for mild pain 100 tablet 3     amLODIPine (NORVASC) 10 MG tablet        ASPIRIN LOW DOSE 81 MG EC tablet        ferrous fumarate (FERRETTS) 324 (106 Fe) MG TABS tablet Take by mouth daily       furosemide (LASIX) 20 MG tablet Take 1 tablet (20 mg) by mouth daily 30 tablet 11     gabapentin (NEURONTIN) 300 MG tablet Take 600 mg by mouth 3 times daily        levothyroxine (SYNTHROID/LEVOTHROID) 50 MCG tablet        lidocaine, viscous, (XYLOCAINE) 2 % solution        nortriptyline (PAMELOR) 25 MG capsule        omeprazole (PRILOSEC) 40 MG capsule Take 1 capsule (40 mg) by mouth 2 times daily 60 capsule 2     potassium chloride (KLOR-CON) 20 MEQ packet Take 20 mEq by mouth daily 30 tablet 1     tamsulosin (FLOMAX) 0.4 MG capsule Take 1 capsule (0.4 mg) by mouth daily 60 capsule 5        No Known Allergies      Review of Systems:  -As per HPI  -Constitutional: Otherwise feeling well today, in usual state of health.  -HEENT: Patient denies nonhealing oral sores.  -Skin: As above in HPI. No additional skin concerns.    Physical exam:  Vitals: There were no vitals taken for this visit.  GEN: This is a well developed, well-nourished male in no acute distress, in a pleasant mood.    SKIN: Focused examination of the arms, hands, legs, and feet was performed.  -Brown-purple lichenified plaques on the bilateral forearms and left anterior ankle  -Diffuse xerosis of the bilateral upper and lower extremities.  -No other lesions of concern on areas examined.     Impression/Plan:  1. Lichen simplex chronicus    Discussed association with dry skin. Encouraged frequent moisturizing.    Mometasone 0.1% ointment BID prn pruritis    Vanicream    Recommended gentle skin care      CC Shira Patterson MD  [address] [place] [address] on close of this encounter.  Follow-up prn.       Staff Physician Comments:  I saw and evaluated the patient with the resident and I agree with the assessment and plan as documented in  the resident's note.    Dontae Foster MD  Professor  Head of Dermato-Allergy Division  Department of Dermatology  [facility], [place], [place]      Staff Involved:  Resident(Seth)/Staff    Choco Ann MD  Dermatology Resident, PGY-2

## 2019-05-24 DIAGNOSIS — E55.9 VITAMIN D DEFICIENCY: Primary | ICD-10-CM

## 2019-05-24 NOTE — PROGRESS NOTES
Date: 5/24/2019    Time of Call: 4:20 PM     Diagnosis:  Vitamin d deficiency     [ VORB ] Ordering provider: Dr. Shira Patterson  Order: cholecalciferol 2000 units PO daily     Order received by: Yamileth Cummings LPN

## 2019-08-05 ENCOUNTER — PRE VISIT (OUTPATIENT)
Dept: RHEUMATOLOGY | Facility: CLINIC | Age: 74
End: 2019-08-05

## 2019-08-05 NOTE — TELEPHONE ENCOUNTER
Spoke to patient , called with a reminder about there upcoming appointment , also instructed to bring medications or medication list.    Nyasia Ortiz CMA

## 2019-08-06 ENCOUNTER — OFFICE VISIT (OUTPATIENT)
Dept: RHEUMATOLOGY | Facility: CLINIC | Age: 74
End: 2019-08-06
Attending: INTERNAL MEDICINE
Payer: COMMERCIAL

## 2019-08-06 VITALS
OXYGEN SATURATION: 98 % | WEIGHT: 166.2 LBS | HEART RATE: 82 BPM | BODY MASS INDEX: 25.27 KG/M2 | DIASTOLIC BLOOD PRESSURE: 71 MMHG | SYSTOLIC BLOOD PRESSURE: 133 MMHG | TEMPERATURE: 98.2 F

## 2019-08-06 DIAGNOSIS — M75.40 IMPINGEMENT SYNDROME OF SHOULDER REGION, UNSPECIFIED LATERALITY: Primary | ICD-10-CM

## 2019-08-06 DIAGNOSIS — M75.40 IMPINGEMENT SYNDROME OF SHOULDER REGION, UNSPECIFIED LATERALITY: ICD-10-CM

## 2019-08-06 DIAGNOSIS — K74.60 CIRRHOSIS OF LIVER WITHOUT ASCITES, UNSPECIFIED HEPATIC CIRRHOSIS TYPE (H): ICD-10-CM

## 2019-08-06 LAB
ALBUMIN SERPL-MCNC: 3 G/DL (ref 3.4–5)
ALP SERPL-CCNC: 313 U/L (ref 40–150)
ALT SERPL W P-5'-P-CCNC: 32 U/L (ref 0–70)
ANION GAP SERPL CALCULATED.3IONS-SCNC: 5 MMOL/L (ref 3–14)
AST SERPL W P-5'-P-CCNC: 41 U/L (ref 0–45)
BILIRUB DIRECT SERPL-MCNC: 0.6 MG/DL (ref 0–0.2)
BILIRUB SERPL-MCNC: 1.1 MG/DL (ref 0.2–1.3)
BUN SERPL-MCNC: 12 MG/DL (ref 7–30)
CALCIUM SERPL-MCNC: 8.1 MG/DL (ref 8.5–10.1)
CHLORIDE SERPL-SCNC: 109 MMOL/L (ref 94–109)
CO2 SERPL-SCNC: 24 MMOL/L (ref 20–32)
CREAT SERPL-MCNC: 1.2 MG/DL (ref 0.66–1.25)
CRP SERPL-MCNC: 4.1 MG/L (ref 0–8)
ERYTHROCYTE [DISTWIDTH] IN BLOOD BY AUTOMATED COUNT: 14.4 % (ref 10–15)
ERYTHROCYTE [SEDIMENTATION RATE] IN BLOOD BY WESTERGREN METHOD: 48 MM/H (ref 0–20)
GFR SERPL CREATININE-BSD FRML MDRD: 59 ML/MIN/{1.73_M2}
GLUCOSE SERPL-MCNC: 98 MG/DL (ref 70–99)
HCT VFR BLD AUTO: 38.3 % (ref 40–53)
HGB BLD-MCNC: 12.6 G/DL (ref 13.3–17.7)
INR PPP: 1.14 (ref 0.86–1.14)
MCH RBC QN AUTO: 29.7 PG (ref 26.5–33)
MCHC RBC AUTO-ENTMCNC: 32.9 G/DL (ref 31.5–36.5)
MCV RBC AUTO: 90 FL (ref 78–100)
PLATELET # BLD AUTO: 135 10E9/L (ref 150–450)
POTASSIUM SERPL-SCNC: 3.9 MMOL/L (ref 3.4–5.3)
PROT SERPL-MCNC: 8 G/DL (ref 6.8–8.8)
RBC # BLD AUTO: 4.24 10E12/L (ref 4.4–5.9)
SODIUM SERPL-SCNC: 138 MMOL/L (ref 133–144)
WBC # BLD AUTO: 5.6 10E9/L (ref 4–11)

## 2019-08-06 PROCEDURE — G0463 HOSPITAL OUTPT CLINIC VISIT: HCPCS | Mod: ZF

## 2019-08-06 PROCEDURE — 80076 HEPATIC FUNCTION PANEL: CPT | Performed by: INTERNAL MEDICINE

## 2019-08-06 PROCEDURE — 86140 C-REACTIVE PROTEIN: CPT | Performed by: INTERNAL MEDICINE

## 2019-08-06 PROCEDURE — 80048 BASIC METABOLIC PNL TOTAL CA: CPT | Performed by: INTERNAL MEDICINE

## 2019-08-06 PROCEDURE — 85610 PROTHROMBIN TIME: CPT | Performed by: INTERNAL MEDICINE

## 2019-08-06 PROCEDURE — 36415 COLL VENOUS BLD VENIPUNCTURE: CPT | Performed by: INTERNAL MEDICINE

## 2019-08-06 PROCEDURE — 85027 COMPLETE CBC AUTOMATED: CPT | Performed by: INTERNAL MEDICINE

## 2019-08-06 PROCEDURE — 85652 RBC SED RATE AUTOMATED: CPT | Performed by: INTERNAL MEDICINE

## 2019-08-06 RX ORDER — LEVOTHYROXINE SODIUM 75 UG/1
TABLET ORAL
Refills: 3 | COMMUNITY
Start: 2019-07-26

## 2019-08-06 ASSESSMENT — PAIN SCALES - GENERAL: PAINLEVEL: NO PAIN (0)

## 2019-08-06 NOTE — PROGRESS NOTES
[facility]  Rheumatology Clinic  Louis Jain MD  2019     Name: Danielle Cook  MRN: [id number]  Age: 74 year old  : 1945  Referring provider: Andrey Flores     Assessment and Plan:  Impingement syndrome of shoulder region, unspecified laterality  Chronic occipital headache and shoulder girdle arthralgia in a gentlemen with history of chronic liver disease. Exam reveals impingement signs at both shoulders, but preserved shoulder range of motion. Laboratory testing completed in 2018 showed CRP was normal 3.2. Sedimentation rate was 21 in 2018 and 115 in 2018. CBC in 2018 showed hematocrit at 39.4, otherwise normal. Urinalysis in 2018 was normal. Creatinine in 2019 was 1.2 which was stable since 2018.  Rheumatoid factor was 25 in . Chest X-Ray completed in  showed no pneumonia or pleural effusion. CT of the head with IV contrast from 2015 showed normal brain density and unremarkable bony structures of the skull.    Differential diagnosis of chronic headache in the setting of elevated inflammatory markers includes polymyalgia rheumatica  with giant cell arteritis. I have low index of suspicion for GCA because of the patient's ethnic background conferring low risk; the absence of occular or jaw symptoms; and the features of headache that suggest muscular strain rather than intrinsic cerebrovascular disease. We had a good discussion about polymyalgia rheumatica as a condition of unknown cause and no cure, and which is frequently dramatically improved with use of steroid therapy. I recommend a course of elham dose prednisone 20 mg daily for seven to 10 days as a diagnostic measure. I note that the patient developed tarry stool and Gi bleed leading to hospitalization in 2018 in close temporal association with use of prednisone. I will ask for approval from patient's GI providers before activating a prescription for prednisone. If  he remains at high risk for repeat bleeding, an alternative agent such as Actemra may be considered. Patient is not a candidate for methotrexate with his chronic liver disease.     I also recommend a course of physical therapy for suspected rotator cuff tendonitis, bilateral. I recommend instruction in range of motion exercises and a daily home exercise program for strengthening and range of motion for the shoulders. Subacromial injection may be considered prn incomplete improvement four to six weeks physical therapy. I will be in contact with the patient after interacting with GI and blood work results have returned.     Orders:  - CRP inflammation  - Erythrocyte sedimentation rate auto  - CBC with platelets  - PHYSICAL THERAPY REFERRAL    Follow-up: Return in about 2 months (around 10/6/2019).     HPI:   Danielle Cook is a 74 year old male with a history of gastroesophageal reflux disease who presents for evaluation of head, neck and shoulder pain.    In an October 2018 visit in Neurology, Dr. De León noted high sedimentation rate and intractable headache. He also recommended at that time for a short burst and taper of prednisone. One week later he was to got to the hospital for blood in the stool.     Patient saw Dr. Leventhal in April 2018 for chronic liver disease, history of GI bleed and esophageal virosis was noted. Patient has a history of Hepatitis C which was treated.      Patient saw Dr. Darnell in October 2018 for bleeding and gastroesophageal varices.  recommended management of varices and ascites as needed.     Today, he reports neck and transient head pain sometimes associated with swelling of the neck. His headaches are occasionally accompanied with numbness present in both arms. He reports he pain has been present for seven to eight years but has progressively worsened in the past two years. He takes over the counter pain medication with little alleviation of pain.    He has shoulder  pain worse with raising his shoulders above his head. The pain improves with movement of the shoulder joints. He denies pain in his hip or back. He has had chest pain. He has a dry cough. He has abdominal pain.     Of note, He was on prednisone in October 2018 for a short period.      Review of Systems:   Pertinent items are noted in HPI or as below, remainder of complete ROS is negative.      No recent problems with hearing or vision. No swallowing problems.   No breathing difficulty, shortness of breath, or wheezing  No chest pain or palpitations  No heart burn, indigestion, abdominal pain, nausea, vomiting, diarrhea  No urination problems, no bloody, cloudy urine, no dysuria  No numbing, tingling, weakness  No confusion  No rashes. No easy bleeding or bruising.   +Headaches  +Cough    Active Medications:   Current Outpatient Medications:      acetaminophen (TYLENOL) 325 MG tablet, Take 1 tablet (325 mg) by mouth every 6 hours as needed for mild pain, Disp: 100 tablet, Rfl: 3     amLODIPine (NORVASC) 10 MG tablet, , Disp: , Rfl:      ASPIRIN LOW DOSE 81 MG EC tablet, , Disp: , Rfl:      emollient (VANICREAM) external cream, Apply topically 2 times daily, Disp: 453 g, Rfl: 11     ferrous fumarate (FERRETTS) 324 (106 Fe) MG TABS tablet, Take by mouth daily, Disp: , Rfl:      furosemide (LASIX) 20 MG tablet, Take 1 tablet (20 mg) by mouth daily, Disp: 30 tablet, Rfl: 11     gabapentin (NEURONTIN) 300 MG tablet, Take 600 mg by mouth 3 times daily , Disp: , Rfl:      levothyroxine (SYNTHROID/LEVOTHROID) 50 MCG tablet, , Disp: , Rfl:      lidocaine, viscous, (XYLOCAINE) 2 % solution, , Disp: , Rfl:      mometasone (ELOCON) 0.1 % external ointment, Apply topically daily, Disp: 45 g, Rfl: 3     nortriptyline (PAMELOR) 25 MG capsule, , Disp: , Rfl:      omeprazole (PRILOSEC) 40 MG capsule, Take 1 capsule (40 mg) by mouth 2 times daily, Disp: 60 capsule, Rfl: 2     potassium chloride (KLOR-CON) 20 MEQ packet, Take 20 mEq by  mouth daily, Disp: 30 tablet, Rfl: 1     tamsulosin (FLOMAX) 0.4 MG capsule, Take 1 capsule (0.4 mg) by mouth daily, Disp: 60 capsule, Rfl: 5     vitamin D3 (CHOLECALCIFEROL) 1000 units (25 mcg) tablet, Take 2 tablets (2,000 Units) by mouth daily, Disp: 60 tablet, Rfl: 11  No current facility-administered medications for this visit.     Facility-Administered Medications Ordered in Other Visits:      gadobutrol (GADAVIST) injection 7.5 mL, 7.5 mL, Intravenous, Once, Leventhal, Thomas Michael, MD      Allergies:   Review of patient's allergies indicates no known allergies.        Past Medical History:  Chronic Kidney Disease, stage 3, GFR 30-59 ml/min   Hepatitis C without coma, chronic  Hypertension  Cirrhosis of liver without ascites, unspecified hepatic cirrhosis type  Polymyalgia rheumatica   Iron deficiency anemia  Chronic coronary artery disease  Hyperlipidemia  Language difficulty  Paresthesia  Renal insuffiencey syndrome  Weakness   Syphilis  Gastroesophageal reflux disease     Past Surgical History:  Colonoscopy (2018)  EGD combined (multiple, last 04/17/2019)  Cataract surgery, bilateral     Family History:   Hypertension- Father      Social History:   Denies tobacco use. Denies alcohol use.       Physical Exam:   /71 (BP Location: Left arm, Patient Position: Sitting, Cuff Size: Adult Regular)   Pulse 82   Temp 98.2  F (36.8  C) (Oral)   Wt 75.4 kg (166 lb 3.2 oz)   SpO2 98%   BMI 25.27 kg/m     Wt Readings from Last 4 Encounters:   08/06/19 75.4 kg (166 lb 3.2 oz)   04/29/19 75 kg (165 lb 6.4 oz)   04/18/19 77.4 kg (170 lb 9.6 oz)   01/28/19 71.1 kg (156 lb 12.8 oz)     Constitutional: Well-developed, appearing stated age; cooperative  Eyes: Normal EOM, PERRLA, vision, conjunctiva, sclera  ENT: Normal external ears, nose, hearing, lips, teeth, gums, throat. No mucous membrane lesions, normal saliva pool  Neck: No mass or thyroid enlargement  Resp: Lungs clear to auscultation, nl to palpation  CV:  RRR, no murmurs, rubs or gallops, no edema  GI: No ABD mass or tenderness, no HSM  : Not tested  Lymph: No cervical, supraclavicular, inguinal or epitrochlear nodes  MS: The TMJ, neck, shoulder, elbow, wrist, MCP/PIP/DIP, spine, hip, knee, ankle, and foot MTP/IP joints were examined and found normal. No active synovitis or altered joint anatomy. Full joint ROM. Normal  strength. No dactylitis,  tenosynovitis, enthesopathy.  - wrists, fingers, and knuckles are without swelling with normal range of motion.   - patchy hyperpigmented skin in the upper outer arms both sides.   - Tenderness at the acromial process and the collar bone.  No shoulder effusion. Passive abduction is 90 degrees in bilateral shoulders. Internal and external rotation is normal in both shoulders. Impingement presents in both shoulders.   Skin: No nail pitting, alopecia, rash, nodules or lesions  Neuro: Normal cranial nerves, strength, sensation, DTRs.   Psych: Normal judgement, orientation, memory, affect.     Imaging:  Chest X-Ray- 2 views (09/26/2019):  Findings:   Mild bibasilar scarring and/or atelectasis. No areas of consolidation, pneumothorax or pleural effusion. The cardiac silhouette and pulmonary vasculature are unchanged and within normal limits. No acute bony abnormalities about the thorax.  Impression:  No acute cardiopulmonary abnormality.  Telly Yates MD at [facility]    CT Angio Chest w/ IV contrast PE study (04/09/2015):  Findings:   No filling defect in the pulmonary arterial tree to indicate embolism. Lungs well aerated and essentially clear. There is bilateral gynecomastia.   Limited evaluation of the upper abdomen shows a somewhat nodular hepatic contour suggestive of intrinsic liver disease. There is a small a moderate size hiatal hernia. There are numerous probable varices near the GE junction.  Impression:  1. No evidence of pulmonary embolism.  2. Other findings as above   Per radiology at [facility].    Laboratory:   RHEUM RESULTS Latest Ref Rng & Units 1/28/2019 4/18/2019 4/29/2019   COMPLEMENT C3 76 - 169 mg/dL - - -   COMPLEMENT C4 15 - 50 mg/dL - - -   SED RATE 0 - 20 mm/h - - -   CRP, INFLAMMATION 0.0 - 8.0 mg/L - - -   CK TOTAL 30 - 300 U/L - - -   RHEUMATOID FACTOR 0 - 14 IU/mL - - -   AST 0 - 45 U/L 44 - 44   ALT 0 - 70 U/L 37 - 33   ALBUMIN 3.4 - 5.0 g/dL 3.0(L) 2.7(L) 2.8(L)   WBC 4.0 - 11.0 10e9/L 5.2 5.0 4.6   RBC 4.4 - 5.9 10e12/L 4.68 4.13(L) 4.24(L)   HGB 13.3 - 17.7 g/dL 14.6 12.6(L) 13.1(L)   HCT 40.0 - 53.0 % 43.0 38.6(L) 39.4(L)   MCV 78 - 100 fl 92 94 93   MCHC 31.5 - 36.5 g/dL 34.0 32.6 33.2   RDW 10.0 - 15.0 % 15.4(H) 13.3 13.2    - 450 10e9/L 124(L) 137(L) 119(L)   CREATININE 0.66 - 1.25 mg/dL 1.49(H) 1.09 1.20   GFR ESTIMATE, IF BLACK >60 mL/min/[1.73:m2] 53(L) 78 69   GFR ESTIMATE >60 mL/min/[1.73:m2] 46(L) 67 60(L)    - 1,620 mg/dL - - -   IGA 70 - 380 mg/dL - - -   IGM 60 - 265 mg/dL - - -     Rheumatoid Factor   Date Value Ref Range Status   09/24/2013 25 (H) 0 - 14 IU/mL Final     Hepatitis B Core Grisel   Date Value Ref Range Status   10/12/2018 Reactive (AA) NR^Nonreactive Final     Comment:     A reactive result indicates acute, chronic or past/resolved hepatitis B   infection.       Hep B Surface Agn   Date Value Ref Range Status   10/12/2018 Nonreactive NR^Nonreactive Final     Albumin Fraction   Date Value Ref Range Status   04/30/2018 3.5 (L) 3.7 - 5.1 g/dL Final     Alpha 2 Fraction   Date Value Ref Range Status   04/30/2018 1.0 (H) 0.5 - 0.9 g/dL Final     Beta Fraction   Date Value Ref Range Status   04/30/2018 0.9 0.6 - 1.0 g/dL Final     Gamma Fraction   Date Value Ref Range Status   04/30/2018 1.9 (H) 0.7 - 1.6 g/dL Final     Monoclonal Peak   Date Value Ref Range Status   04/30/2018 0.0 0.0 g/dL Final     ELP Interpretation:   Date Value Ref Range Status   04/30/2018   Final    Hypoalbuminemia with high normal alpha 1 globulins, slightly increased  alpha 2 globulins   and increased gamma globulins, suggestive of acute phase reaction with chronic   inflammation.  No monoclonal protein seen.  Pathologic significance requires clinical   correlation.  Naima Green M.D., Ph.D.        IGG   Date Value Ref Range Status   10/31/2018 1,410 695 - 1,620 mg/dL Final     IGA   Date Value Ref Range Status   10/31/2018 235 70 - 380 mg/dL Final     IGM   Date Value Ref Range Status   10/31/2018 286 (H) 60 - 265 mg/dL Final     Scribe Disclosure:  I, Jeannette Hector, am serving as a scribe to document services personally performed by Louis Jain MD at this visit, based upon the provider's statements to me. All documentation has been reviewed by the aforementioned provider prior to being entered into the official medical record.

## 2019-08-06 NOTE — PATIENT INSTRUCTIONS
"1. Headache, likely muscular in origin  2. Shoulder pain, partially due to rotator cuff tendonitis, potentially due to bodywide inflammation (\"polymyalgia rheumatica\").     Plan:  Check inflammation markers  Physical therapy for shoulders and rotator cuff  Consider course of prednisone (20 mg daily), if Gastroenterology approves, to address inflammation    "

## 2019-08-06 NOTE — NURSING NOTE
Chief Complaint   Patient presents with     Consult     Elevated ESR     /71 (BP Location: Left arm, Patient Position: Sitting, Cuff Size: Adult Regular)   Pulse 82   Temp 98.2  F (36.8  C) (Oral)   Wt 75.4 kg (166 lb 3.2 oz)   SpO2 98%   BMI 25.27 kg/m      Alyson Bess CMA    8/6/2019 11:01 AM

## 2019-08-06 NOTE — LETTER
2019      RE: Dnaielle Cook  [address]  [facility] [address]       [facility]  Rheumatology Clinic  Louis Jain MD  2019     Name: Danielle Cook  MRN: [id number]  Age: 74 year old  : 1945  Referring provider: Andrey Flores     Assessment and Plan:  Impingement syndrome of shoulder region, unspecified laterality  Chronic occipital headache and shoulder girdle arthralgia in a gentlemen with history of chronic liver disease. Exam reveals impingement signs at both shoulders, but preserved shoulder range of motion. Laboratory testing completed in 2018 showed CRP was normal 3.2. Sedimentation rate was 21 in 2018 and 115 in 2018. CBC in 2018 showed hematocrit at 39.4, otherwise normal. Urinalysis in 2018 was normal. Creatinine in 2019 was 1.2 which was stable since 2018.  Rheumatoid factor was 25 in . Chest X-Ray completed in  showed no pneumonia or pleural effusion. CT of the head with IV contrast from 2015 showed normal brain density and unremarkable bony structures of the skull.    Differential diagnosis of chronic headache in the setting of elevated inflammatory markers includes polymyalgia rheumatica  with giant cell arteritis. I have low index of suspicion for GCA because of the patient's ethnic background conferring low risk; the absence of occular or jaw symptoms; and the features of headache that suggest muscular strain rather than intrinsic cerebrovascular disease. We had a good discussion about polymyalgia rheumatica as a condition of unknown cause and no cure, and which is frequently dramatically improved with use of steroid therapy. I recommend a course of elham dose prednisone 20 mg daily for seven to 10 days as a diagnostic measure. I note that the patient developed tarry stool and Gi bleed leading to hospitalization in 2018 in close temporal association with use of prednisone. I  will ask for approval from patient's GI providers before activating a prescription for prednisone. If he remains at high risk for repeat bleeding, an alternative agent such as Actemra may be considered. Patient is not a candidate for methotrexate with his chronic liver disease.     I also recommend a course of physical therapy for suspected rotator cuff tendonitis, bilateral. I recommend instruction in range of motion exercises and a daily home exercise program for strengthening and range of motion for the shoulders. Subacromial injection may be considered prn incomplete improvement four to six weeks physical therapy. I will be in contact with the patient after interacting with GI and blood work results have returned.     Orders:  - CRP inflammation  - Erythrocyte sedimentation rate auto  - CBC with platelets  - PHYSICAL THERAPY REFERRAL    Follow-up: Return in about 2 months (around 10/6/2019).     HPI:   Danielle Cook is a 74 year old male with a history of gastroesophageal reflux disease who presents for evaluation of head, neck and shoulder pain.    In an October 2018 visit in Neurology, Dr. De León noted high sedimentation rate and intractable headache. He also recommended at that time for a short burst and taper of prednisone. One week later he was to got to the hospital for blood in the stool.     Patient saw Dr. Leventhal in April 2018 for chronic liver disease, history of GI bleed and esophageal virosis was noted. Patient has a history of Hepatitis C which was treated.      Patient saw Dr. Darnell in October 2018 for bleeding and gastroesophageal varices.  recommended management of varices and ascites as needed.     Today, he reports neck and transient head pain sometimes associated with swelling of the neck. His headaches are occasionally accompanied with numbness present in both arms. He reports he pain has been present for seven to eight years but has progressively worsened in the past two  years. He takes over the counter pain medication with little alleviation of pain.    He has shoulder pain worse with raising his shoulders above his head. The pain improves with movement of the shoulder joints. He denies pain in his hip or back. He has had chest pain. He has a dry cough. He has abdominal pain.     Of note, He was on prednisone in October 2018 for a short period.      Review of Systems:   Pertinent items are noted in HPI or as below, remainder of complete ROS is negative.      No recent problems with hearing or vision. No swallowing problems.   No breathing difficulty, shortness of breath, or wheezing  No chest pain or palpitations  No heart burn, indigestion, abdominal pain, nausea, vomiting, diarrhea  No urination problems, no bloody, cloudy urine, no dysuria  No numbing, tingling, weakness  No confusion  No rashes. No easy bleeding or bruising.   +Headaches  +Cough    Active Medications:   Current Outpatient Medications:      acetaminophen (TYLENOL) 325 MG tablet, Take 1 tablet (325 mg) by mouth every 6 hours as needed for mild pain, Disp: 100 tablet, Rfl: 3     amLODIPine (NORVASC) 10 MG tablet, , Disp: , Rfl:      ASPIRIN LOW DOSE 81 MG EC tablet, , Disp: , Rfl:      emollient (VANICREAM) external cream, Apply topically 2 times daily, Disp: 453 g, Rfl: 11     ferrous fumarate (FERRETTS) 324 (106 Fe) MG TABS tablet, Take by mouth daily, Disp: , Rfl:      furosemide (LASIX) 20 MG tablet, Take 1 tablet (20 mg) by mouth daily, Disp: 30 tablet, Rfl: 11     gabapentin (NEURONTIN) 300 MG tablet, Take 600 mg by mouth 3 times daily , Disp: , Rfl:      levothyroxine (SYNTHROID/LEVOTHROID) 50 MCG tablet, , Disp: , Rfl:      lidocaine, viscous, (XYLOCAINE) 2 % solution, , Disp: , Rfl:      mometasone (ELOCON) 0.1 % external ointment, Apply topically daily, Disp: 45 g, Rfl: 3     nortriptyline (PAMELOR) 25 MG capsule, , Disp: , Rfl:      omeprazole (PRILOSEC) 40 MG capsule, Take 1 capsule (40 mg) by mouth 2  times daily, Disp: 60 capsule, Rfl: 2     potassium chloride (KLOR-CON) 20 MEQ packet, Take 20 mEq by mouth daily, Disp: 30 tablet, Rfl: 1     tamsulosin (FLOMAX) 0.4 MG capsule, Take 1 capsule (0.4 mg) by mouth daily, Disp: 60 capsule, Rfl: 5     vitamin D3 (CHOLECALCIFEROL) 1000 units (25 mcg) tablet, Take 2 tablets (2,000 Units) by mouth daily, Disp: 60 tablet, Rfl: 11  No current facility-administered medications for this visit.     Facility-Administered Medications Ordered in Other Visits:      gadobutrol (GADAVIST) injection 7.5 mL, 7.5 mL, Intravenous, Once, Leventhal, Thomas Michael, MD      Allergies:   Review of patient's allergies indicates no known allergies.        Past Medical History:  Chronic Kidney Disease, stage 3, GFR 30-59 ml/min   Hepatitis C without coma, chronic  Hypertension  Cirrhosis of liver without ascites, unspecified hepatic cirrhosis type  Polymyalgia rheumatica   Iron deficiency anemia  Chronic coronary artery disease  Hyperlipidemia  Language difficulty  Paresthesia  Renal insuffiencey syndrome  Weakness   Syphilis  Gastroesophageal reflux disease     Past Surgical History:  Colonoscopy (2018)  EGD combined (multiple, last 04/17/2019)  Cataract surgery, bilateral     Family History:   Hypertension- Father      Social History:   Denies tobacco use. Denies alcohol use.       Physical Exam:   /71 (BP Location: Left arm, Patient Position: Sitting, Cuff Size: Adult Regular)   Pulse 82   Temp 98.2  F (36.8  C) (Oral)   Wt 75.4 kg (166 lb 3.2 oz)   SpO2 98%   BMI 25.27 kg/m      Wt Readings from Last 4 Encounters:   08/06/19 75.4 kg (166 lb 3.2 oz)   04/29/19 75 kg (165 lb 6.4 oz)   04/18/19 77.4 kg (170 lb 9.6 oz)   01/28/19 71.1 kg (156 lb 12.8 oz)     Constitutional: Well-developed, appearing stated age; cooperative  Eyes: Normal EOM, PERRLA, vision, conjunctiva, sclera  ENT: Normal external ears, nose, hearing, lips, teeth, gums, throat. No mucous membrane lesions, normal  saliva pool  Neck: No mass or thyroid enlargement  Resp: Lungs clear to auscultation, nl to palpation  CV: RRR, no murmurs, rubs or gallops, no edema  GI: No ABD mass or tenderness, no HSM  : Not tested  Lymph: No cervical, supraclavicular, inguinal or epitrochlear nodes  MS: The TMJ, neck, shoulder, elbow, wrist, MCP/PIP/DIP, spine, hip, knee, ankle, and foot MTP/IP joints were examined and found normal. No active synovitis or altered joint anatomy. Full joint ROM. Normal  strength. No dactylitis,  tenosynovitis, enthesopathy.  - wrists, fingers, and knuckles are without swelling with normal range of motion.   - patchy hyperpigmented skin in the upper outer arms both sides.   - Tenderness at the acromial process and the collar bone.  No shoulder effusion. Passive abduction is 90 degrees in bilateral shoulders. Internal and external rotation is normal in both shoulders. Impingement presents in both shoulders.   Skin: No nail pitting, alopecia, rash, nodules or lesions  Neuro: Normal cranial nerves, strength, sensation, DTRs.   Psych: Normal judgement, orientation, memory, affect.     Imaging:  Chest X-Ray- 2 views (09/26/2019):  Findings:   Mild bibasilar scarring and/or atelectasis. No areas of consolidation, pneumothorax or pleural effusion. The cardiac silhouette and pulmonary vasculature are unchanged and within normal limits. No acute bony abnormalities about the thorax.  Impression:  No acute cardiopulmonary abnormality.  Telly Yates MD at [facility] [place]    CT Angio Chest w/ IV contrast PE study (04/09/2015):  Findings:   No filling defect in the pulmonary arterial tree to indicate embolism. Lungs well aerated and essentially clear. There is bilateral gynecomastia.   Limited evaluation of the upper abdomen shows a somewhat nodular hepatic contour suggestive of intrinsic liver disease. There is a small a moderate size hiatal hernia. There are numerous probable varices near the GE  junction.  Impression:  1. No evidence of pulmonary embolism.  2. Other findings as above   Per radiology at [facility].    Laboratory:   RHEUM RESULTS Latest Ref Rng & Units 1/28/2019 4/18/2019 4/29/2019   COMPLEMENT C3 76 - 169 mg/dL - - -   COMPLEMENT C4 15 - 50 mg/dL - - -   SED RATE 0 - 20 mm/h - - -   CRP, INFLAMMATION 0.0 - 8.0 mg/L - - -   CK TOTAL 30 - 300 U/L - - -   RHEUMATOID FACTOR 0 - 14 IU/mL - - -   AST 0 - 45 U/L 44 - 44   ALT 0 - 70 U/L 37 - 33   ALBUMIN 3.4 - 5.0 g/dL 3.0(L) 2.7(L) 2.8(L)   WBC 4.0 - 11.0 10e9/L 5.2 5.0 4.6   RBC 4.4 - 5.9 10e12/L 4.68 4.13(L) 4.24(L)   HGB 13.3 - 17.7 g/dL 14.6 12.6(L) 13.1(L)   HCT 40.0 - 53.0 % 43.0 38.6(L) 39.4(L)   MCV 78 - 100 fl 92 94 93   MCHC 31.5 - 36.5 g/dL 34.0 32.6 33.2   RDW 10.0 - 15.0 % 15.4(H) 13.3 13.2    - 450 10e9/L 124(L) 137(L) 119(L)   CREATININE 0.66 - 1.25 mg/dL 1.49(H) 1.09 1.20   GFR ESTIMATE, IF BLACK >60 mL/min/[1.73:m2] 53(L) 78 69   GFR ESTIMATE >60 mL/min/[1.73:m2] 46(L) 67 60(L)    - 1,620 mg/dL - - -   IGA 70 - 380 mg/dL - - -   IGM 60 - 265 mg/dL - - -     Rheumatoid Factor   Date Value Ref Range Status   09/24/2013 25 (H) 0 - 14 IU/mL Final     Hepatitis B Core Grisel   Date Value Ref Range Status   10/12/2018 Reactive (AA) NR^Nonreactive Final     Comment:     A reactive result indicates acute, chronic or past/resolved hepatitis B   infection.       Hep B Surface Agn   Date Value Ref Range Status   10/12/2018 Nonreactive NR^Nonreactive Final     Albumin Fraction   Date Value Ref Range Status   04/30/2018 3.5 (L) 3.7 - 5.1 g/dL Final     Alpha 2 Fraction   Date Value Ref Range Status   04/30/2018 1.0 (H) 0.5 - 0.9 g/dL Final     Beta Fraction   Date Value Ref Range Status   04/30/2018 0.9 0.6 - 1.0 g/dL Final     Gamma Fraction   Date Value Ref Range Status   04/30/2018 1.9 (H) 0.7 - 1.6 g/dL Final     Monoclonal Peak   Date Value Ref Range Status   04/30/2018 0.0 0.0 g/dL Final     ELP Interpretation:   Date  Value Ref Range Status   04/30/2018   Final    Hypoalbuminemia with high normal alpha 1 globulins, slightly increased alpha 2 globulins   and increased gamma globulins, suggestive of acute phase reaction with chronic   inflammation.  No monoclonal protein seen.  Pathologic significance requires clinical   correlation.  Naima Green M.D., Ph.D.        IGG   Date Value Ref Range Status   10/31/2018 1,410 695 - 1,620 mg/dL Final     IGA   Date Value Ref Range Status   10/31/2018 235 70 - 380 mg/dL Final     IGM   Date Value Ref Range Status   10/31/2018 286 (H) 60 - 265 mg/dL Final     Scribe Disclosure:  IJeannette, am serving as a scribe to document services personally performed by Louis Jain MD at this visit, based upon the provider's statements to me. All documentation has been reviewed by the aforementioned provider prior to being entered into the official medical record.     Louis Jain MD

## 2019-08-07 RX ORDER — PREDNISONE 5 MG/1
TABLET ORAL
Qty: 50 TABLET | Refills: 2 | COMMUNITY
Start: 2019-08-07 | End: 2019-08-14

## 2019-08-14 DIAGNOSIS — M35.3 PMR (POLYMYALGIA RHEUMATICA) (H): Primary | ICD-10-CM

## 2019-08-14 RX ORDER — PREDNISONE 5 MG/1
TABLET ORAL
Qty: 50 TABLET | Refills: 2 | Status: SHIPPED | OUTPATIENT
Start: 2019-08-14 | End: 2019-10-31

## 2019-08-14 NOTE — TELEPHONE ENCOUNTER
Danielle's wife calling to state that the pharmacy does not have prescription of the Prednisone.    Will send to Dr. Jain to review and sign.    Milly Johnston MSN, RN  Rheumatology RN Care Coordinator  [facility]

## 2019-08-20 ENCOUNTER — THERAPY VISIT (OUTPATIENT)
Dept: PHYSICAL THERAPY | Facility: CLINIC | Age: 74
End: 2019-08-20
Attending: INTERNAL MEDICINE
Payer: COMMERCIAL

## 2019-08-20 DIAGNOSIS — M25.512 CHRONIC PAIN OF BOTH SHOULDERS: ICD-10-CM

## 2019-08-20 DIAGNOSIS — M25.511 CHRONIC PAIN OF BOTH SHOULDERS: ICD-10-CM

## 2019-08-20 DIAGNOSIS — G89.29 CHRONIC PAIN OF BOTH SHOULDERS: ICD-10-CM

## 2019-08-20 DIAGNOSIS — M75.40 IMPINGEMENT SYNDROME OF SHOULDER REGION, UNSPECIFIED LATERALITY: ICD-10-CM

## 2019-08-20 PROCEDURE — 97110 THERAPEUTIC EXERCISES: CPT | Mod: GP | Performed by: PHYSICAL THERAPIST

## 2019-08-20 PROCEDURE — 97161 PT EVAL LOW COMPLEX 20 MIN: CPT | Mod: GP | Performed by: PHYSICAL THERAPIST

## 2019-08-20 NOTE — PROGRESS NOTES
Physical Therapy Initial Evaluation  August 20, 2019     MD Instructions/Precautions/Restrictions: PT eval and treat.     Therapist Impression:   Danielle Cook presents with findings consistent with B shoulder impingement, with related impairments limiting his ability to reach overhead, out to side, behind back, lift/carry objects. Skilled PT services are necessary in order to reduce impairments and improve independent function.     Subjective:   Date of Onset: 8/6/19 (MD orders, ongoing since 2014)  C/C: Neck and bilateral shoulder pain, chronic headache. He believes most of his symptoms stem from his neck. Reports greatest pain with lifting/carrying objects, reaching arms out to side, overhead, behind back.   Referred from his rheumatologist stating he had preserved ROM but impingement signs bilaterally, this in the setting of chronic liver disease.   Quality of pain is dull and aching. Pains are described as intermittent in nature. Pain is worse: not dependent on time of day. Pain is rated 8/10.   History of symptoms: Pains began gradually as the result of insidious onset. Since onset, symptoms are same.  General health as reported by patient: fair  Pertinent medical/surgical history: Refer to health history in EMR. Imaging: none. Current occupational status: Retired. Patient's goals are: decrease pain. Return to MD:  PRN.     Objective:  SHOULDER EXAMINATION    STATIC POSTURE  Forward head on neck and Rounded shoulders  Cervical spine motions all unremarkable for worsening of baseline pain for single rep at endrange or with PT overpressure.   Gets tightness in anterior-lateral neck with sidebending, but not his symptoms.        SHOULDER RANGE OF MOTION & STRENGTH  (*Indicates patient s pain)   PROM L PROM R AROM L AROM R MMT L MMT R   Flex 170* 170* 160 (PA 90) 160 (PA 90)     * 170* 160 (PA 90) 160 4-* 4-*   ER 85 85 75 75 4* 4*   IR     5- 5-   Ext/IR   T8* T10*       JOINT MOBILITY  NA    SPECIAL  TESTS   (+) L: Irina Murillo, Empty can and Painful arc   (-) L: Anterior apprehension  (+) R: Neer, Shireen-Haseeb, Empty can and Painful arc   (-) R: Anterior apprehension    Assessment/Plan:    The patient is a 74 year old male with chief complaint of B shoulder pain.    The patient has the following significant findings with corresponding treatment plan.  Diagnosis 1:  B shoulder pain, impingement    Pain -  hot/cold therapy, manual therapy, splint/taping/bracing/orthotics and self management  Decreased ROM/flexibility - manual therapy, therapeutic exercise and home program  Decreased joint mobility - manual therapy, therapeutic exercise and home program  Decreased strength - therapeutic exercise, therapeutic activities and home program  Impaired muscle performance - neuro re-education and home program  Decreased function - therapeutic activities and home program  Impaired posture - neuro re-education, therapeutic activities and home program  Instability -  Therapeutic Activity, Therapeutic Exercise, Neuromuscular Re-education, Splinting/Taping/Bracing/Orthotic, home program    Therapy Evaluation Codes:   1) History comprised of:   Personal factors that impact the plan of care:      Please refer to health history in EMR.    Comorbidity factors that impact the plan of care are:      Please refer to health history in EMR.     Medications impacting care: None.  2) Examination of Body Systems comprised of:   Body structures and functions that impact the plan of care:      Shoulder.   Activity limitations that impact the plan of care are:      Reaching.   Clinical presentation characteristics are:    Stable/Uncomplicated.  3) Presentation comprised of:   Presentation scored as Low complexity with uncomplicated characteristics..  4) Decision-Making    Low complexity using standardized patient assessment instrument and/or measureable assessment of functional outcome.  Cumulative Therapy Evaluation is: Low  complexity.    Previous and current functional limitations:  (See Goal Flow Sheet for this information)    Short term and Long term goals: (See Goal Flow Sheet for this information)     Communication ability:  Patient has an  for communication clarity.  Treatment Explanation - The following has been discussed with the patient: RX ordered/plan of care, anticipated outcomes, and possible risks and side effects.  This patient would benefit from PT intervention to resume normal activities.   Rehab potential is good.    Frequency:  1 X week, once daily  Duration:  for 3 weeks tapering to 1x/month for 2 months  Discharge Plan: Achieve all LTGs, be independent in home treatment program, and reach maximal therapeutic benefit.    Please refer to the daily flowsheet for treatment today, total treatment time and time spent performing 1:1 timed codes.

## 2019-08-27 ENCOUNTER — THERAPY VISIT (OUTPATIENT)
Dept: PHYSICAL THERAPY | Facility: CLINIC | Age: 74
End: 2019-08-27
Payer: COMMERCIAL

## 2019-08-27 DIAGNOSIS — G89.29 CHRONIC PAIN OF BOTH SHOULDERS: ICD-10-CM

## 2019-08-27 DIAGNOSIS — M25.512 CHRONIC PAIN OF BOTH SHOULDERS: ICD-10-CM

## 2019-08-27 DIAGNOSIS — M25.511 CHRONIC PAIN OF BOTH SHOULDERS: ICD-10-CM

## 2019-08-27 PROCEDURE — 97110 THERAPEUTIC EXERCISES: CPT | Mod: GP | Performed by: PHYSICAL THERAPY ASSISTANT

## 2019-09-05 ENCOUNTER — THERAPY VISIT (OUTPATIENT)
Dept: PHYSICAL THERAPY | Facility: CLINIC | Age: 74
End: 2019-09-05
Payer: COMMERCIAL

## 2019-09-05 DIAGNOSIS — M25.512 CHRONIC PAIN OF BOTH SHOULDERS: ICD-10-CM

## 2019-09-05 DIAGNOSIS — G89.29 CHRONIC PAIN OF BOTH SHOULDERS: ICD-10-CM

## 2019-09-05 DIAGNOSIS — M25.511 CHRONIC PAIN OF BOTH SHOULDERS: ICD-10-CM

## 2019-09-05 PROCEDURE — 97530 THERAPEUTIC ACTIVITIES: CPT | Mod: GP | Performed by: PHYSICAL THERAPIST

## 2019-09-05 PROCEDURE — 97110 THERAPEUTIC EXERCISES: CPT | Mod: GP | Performed by: PHYSICAL THERAPIST

## 2019-09-05 PROCEDURE — 97112 NEUROMUSCULAR REEDUCATION: CPT | Mod: GP | Performed by: PHYSICAL THERAPIST

## 2019-09-16 DIAGNOSIS — L28.0 LICHEN SIMPLEX CHRONICUS: ICD-10-CM

## 2019-09-18 RX ORDER — MOMETASONE FUROATE 1 MG/G
OINTMENT TOPICAL DAILY
Qty: 45 G | Refills: 3 | Status: SHIPPED | OUTPATIENT
Start: 2019-09-18

## 2019-09-30 ENCOUNTER — DOCUMENTATION ONLY (OUTPATIENT)
Dept: CARE COORDINATION | Facility: CLINIC | Age: 74
End: 2019-09-30

## 2019-10-01 ENCOUNTER — ANCILLARY PROCEDURE (OUTPATIENT)
Dept: GENERAL RADIOLOGY | Facility: CLINIC | Age: 74
End: 2019-10-01
Attending: INTERNAL MEDICINE
Payer: COMMERCIAL

## 2019-10-01 ENCOUNTER — OFFICE VISIT (OUTPATIENT)
Dept: RHEUMATOLOGY | Facility: CLINIC | Age: 74
End: 2019-10-01
Attending: INTERNAL MEDICINE
Payer: COMMERCIAL

## 2019-10-01 VITALS
BODY MASS INDEX: 25.38 KG/M2 | WEIGHT: 166.89 LBS | TEMPERATURE: 97.5 F | HEART RATE: 94 BPM | DIASTOLIC BLOOD PRESSURE: 64 MMHG | SYSTOLIC BLOOD PRESSURE: 118 MMHG | OXYGEN SATURATION: 100 %

## 2019-10-01 DIAGNOSIS — M75.40 IMPINGEMENT SYNDROME OF SHOULDER REGION, UNSPECIFIED LATERALITY: Primary | ICD-10-CM

## 2019-10-01 DIAGNOSIS — M75.40 IMPINGEMENT SYNDROME OF SHOULDER REGION, UNSPECIFIED LATERALITY: ICD-10-CM

## 2019-10-01 DIAGNOSIS — S16.1XXD STRAIN OF NECK MUSCLE, SUBSEQUENT ENCOUNTER: ICD-10-CM

## 2019-10-01 PROCEDURE — G0463 HOSPITAL OUTPT CLINIC VISIT: HCPCS | Mod: ZF

## 2019-10-01 RX ORDER — CYCLOBENZAPRINE HCL 10 MG
10 TABLET ORAL 3 TIMES DAILY PRN
Qty: 90 TABLET | Refills: 3 | Status: SHIPPED | OUTPATIENT
Start: 2019-10-01

## 2019-10-01 ASSESSMENT — PAIN SCALES - GENERAL: PAINLEVEL: MILD PAIN (2)

## 2019-10-01 NOTE — PATIENT INSTRUCTIONS
DX:  1. Neck, chest, and shoulder pain, likely muscular in origin. Not prednisone sensitive.    Plan:  1. Begin cyclobenzaprine for muscle pain and stiffness. Take at night. If this is well tolerated, you may take during the day as often as 3 times daily.  2. Stop prednisone  3. Shoulder xray, left, to assess possible bony abnormality  4. Call office with response to cyclobenzaprine in 2-3 weeks.  5. Pain Clinic referral may be in order if no improvement.

## 2019-10-01 NOTE — NURSING NOTE
Chief Complaint   Patient presents with     RECHECK     2 mos follow up         /64 (BP Location: Right arm, Patient Position: Sitting, Cuff Size: Adult Regular)   Pulse 94   Temp 97.5  F (36.4  C)   Wt 75.7 kg (166 lb 14.2 oz)   SpO2 100%   BMI 25.38 kg/m        Erick Perez, EMT

## 2019-10-01 NOTE — LETTER
10/1/2019      RE: Danielle Cook  [address]  [facility] [address]       [facility]  Rheumatology Clinic  Louis Jain MD  10/01/2019     Name: Danielle Cook  MRN: [id number]  Age: 74 year old  : 1945  Referring provider: Andrey Flores     Assessment and Plan:  # Chronic headache, bilateral neck and shoulder pain:  Patient relates unchanged bilateral neck, shoulder and back discomfort, unchanged with trial of low dose prednisone. Exam reveals diffuse tenderness over the thoracic paraspinal musculature, painful bilateral shoulder flexion, and tender scalp apex. Blood work from  showed a stable creatinine at 1.2, normal transaminases, CRP of 4.1. CBC showed mild anemia. Platelets were stable at 135,000. Sed rate was elevated at 48, up from 21 in 2018.     Despite the mildly elevated sedimentation rate, I have low index of suspicion about a systemic inflammatory disorder as the cause of shoulder girdle/neck pain. There are no cranial symptoms suggestive of GCA. Discomfort is completely insensitive to prednisone, trialed for 2 months in 2019. Physical therapy was not tolerated due to increased pain. These observations argue somewhat against impingement.  It is possible that bony abnormality such as degenerative arthritis could be contributing to shoulder pain. I am concerned that a major component relates to muscular strain and spasm. I recommend plain films of the shoulders to rule out bony abnormalities. I recommend a trial of muscle relaxant therapy with cyclobenzaprine 10mg 3 times daily. I recommend a 2-3 week trial, after which I requested that the patient give us telephone follow-up. If no improvement with cyclobenzaprine, I will recommend a trail of serotonin reuptake inhibitor (duloxetine) for chronic pain, prior to referring the patient to a pain management clinic. No further immunomodulatory therapy is warranted; prednisone can be discontinued.     I  "offered flu immunization today; patient deferred because \"prior injections made me worse.\"    Orders:  - X-ray bl Shoulder 3 Vws  - cyclobenzaprine (FLEXERIL) 10 MG tablet  Dispense: 90 tablet; Refill: 3        Follow-up: Return in about 3 months (around 1/1/2020).     HPI:   Danielle Cook is a 74 year old male with a history of gastroesophageal reflux disease who presents for follow-up. I last evaluated the patient on 8/6/19 at which time he reported impingement syndrome of shoulder region, unspecified laterality, chronic occipital headache and shoulder girdle arthralgia. Differential diagnosis included polymyalgia rheumatica vs GCA, although I had low suspicion for GCA. I recommended a course of low dose prednisone 20mg daily for 7 to 10 days. I also recommended a course of physical therapy.     Today, he reports no improvement in right > left shoulder pain and spine pain. He did not tolerate prednisone well, noting leg and back stiffness and no improvement in joint pain. He also discontinued physical therapy, noting that it was not tolerated due to pain. He is typically an active person, but has been much less active while in pain.     He continues to experience a headache which is unchanged. Denies changes in vision, but endorses pain in his eye. Has an appointment with Ophthalmology tomorrow. Has no other concerns.      Review of Systems:   Pertinent items are noted in HPI or as below, remainder of complete ROS is negative.      No recent problems with hearing or vision. No swallowing problems.   No breathing difficulty, shortness of breath, coughing, or wheezing  No chest pain or palpitations  No heart burn, indigestion, abdominal pain, nausea, vomiting, diarrhea  No urination problems, no bloody, cloudy urine, no dysuria  No numbing, tingling, weakness  No headaches or confusion  No rashes. No easy bleeding or bruising.     Active Medications:   Current Outpatient Medications:      acetaminophen " (TYLENOL) 325 MG tablet, Take 1 tablet (325 mg) by mouth every 6 hours as needed for mild pain, Disp: 100 tablet, Rfl: 3     amLODIPine (NORVASC) 10 MG tablet, , Disp: , Rfl:      cyclobenzaprine (FLEXERIL) 10 MG tablet, Take 1 tablet (10 mg) by mouth 3 times daily as needed for muscle spasms, Disp: 90 tablet, Rfl: 3     emollient (VANICREAM) external cream, Apply topically 2 times daily, Disp: 453 g, Rfl: 11     furosemide (LASIX) 20 MG tablet, Take 1 tablet (20 mg) by mouth daily, Disp: 30 tablet, Rfl: 11     gabapentin (NEURONTIN) 300 MG tablet, Take 600 mg by mouth 3 times daily , Disp: , Rfl:      lidocaine, viscous, (XYLOCAINE) 2 % solution, , Disp: , Rfl:      mometasone (ELOCON) 0.1 % external ointment, APPLY TOPICALLY DAILY, Disp: 45 g, Rfl: 3     omeprazole (PRILOSEC) 40 MG capsule, Take 1 capsule (40 mg) by mouth 2 times daily, Disp: 60 capsule, Rfl: 2     predniSONE (DELTASONE) 5 MG tablet, Take 4 tabs daily for 1 week, then 3 tabs daily for 1 week, Disp: 50 tablet, Rfl: 2     tamsulosin (FLOMAX) 0.4 MG capsule, Take 1 capsule (0.4 mg) by mouth daily, Disp: 60 capsule, Rfl: 5     vitamin D3 (CHOLECALCIFEROL) 1000 units (25 mcg) tablet, Take 2 tablets (2,000 Units) by mouth daily, Disp: 60 tablet, Rfl: 11     ASPIRIN LOW DOSE 81 MG EC tablet, , Disp: , Rfl:      ferrous fumarate (FERRETTS) 324 (106 Fe) MG TABS tablet, Take by mouth daily, Disp: , Rfl:      levothyroxine (SYNTHROID/LEVOTHROID) 50 MCG tablet, , Disp: , Rfl:      levothyroxine (SYNTHROID/LEVOTHROID) 75 MCG tablet, , Disp: , Rfl: 3     nortriptyline (PAMELOR) 25 MG capsule, , Disp: , Rfl:      potassium chloride (KLOR-CON) 20 MEQ packet, Take 20 mEq by mouth daily (Patient not taking: Reported on 10/1/2019), Disp: 30 tablet, Rfl: 1  No current facility-administered medications for this visit.     Facility-Administered Medications Ordered in Other Visits:      gadobutrol (GADAVIST) injection 7.5 mL, 7.5 mL, Intravenous, Once, Leventhal, Thomas  MD Dragan      Allergies:   The patient reports no known allergies.      Past Medical History:  Chronic Kidney Disease, stage 3, GFR 30-59 ml/min   Hepatitis C without coma, chronic  Hypertension  Cirrhosis of liver without ascites, unspecified hepatic cirrhosis type  Polymyalgia rheumatica   Iron deficiency anemia  Chronic coronary artery disease  Hyperlipidemia  Language difficulty  Paresthesia  Renal insuffiencey syndrome  Weakness   Syphilis  Gastroesophageal reflux disease     Past Surgical History:  Colonoscopy (2018)  EGD combined (multiple, last 04/17/2019)  Cataract surgery, bilateral     Family History:   Hypertension- Father        Social History:   Denies tobacco use. Denies alcohol use.       Physical Exam:   /64 (BP Location: Right arm, Patient Position: Sitting, Cuff Size: Adult Regular)   Pulse 94   Temp 97.5  F (36.4  C)   Wt 75.7 kg (166 lb 14.2 oz)   SpO2 100%   BMI 25.38 kg/m      Wt Readings from Last 4 Encounters:   10/01/19 75.7 kg (166 lb 14.2 oz)   08/06/19 75.4 kg (166 lb 3.2 oz)   04/29/19 75 kg (165 lb 6.4 oz)   04/18/19 77.4 kg (170 lb 9.6 oz)     Constitutional: Well-developed, appearing stated age; cooperative  Eyes: Normal EOM, PERRLA, vision, conjunctiva, sclera  ENT: Normal external ears, nose, hearing, lips, teeth, gums, throat. No mucous membrane lesions, normal saliva pool  Neck: No mass or thyroid enlargement  Resp: Lungs clear to auscultation, nl to palpation  CV: RRR, no murmurs, rubs or gallops, no edema  GI: No ABD mass or tenderness, no HSM  : Not tested  Lymph: No cervical, supraclavicular, inguinal or epitrochlear nodes  MS: The TMJ, shoulder, elbow, wrist, MCP/PIP/DIP, hip, knee, ankle, and foot MTP/IP joints were examined and found normal. No active synovitis or altered joint anatomy. Full joint ROM. Normal  strength. No dactylitis,  tenosynovitis, enthesopathy. Tender at bilateral shoulders. Passive external and internal rotation are normal on the  right. Pain with extremes of abduction on the left and with internal rotation. Painful neck extension. Painful rotation of the head. Tenderness with palpation over the paraspinous muscles over the thorax. Tenderness at the midline lumbar spine.   Skin: No nail pitting, alopecia, rash, nodules or lesions  Neuro: Normal cranial nerves, strength, sensation, DTRs.   Psych: Normal judgement, orientation, memory, affect.     Laboratory:   RHEUM RESULTS Latest Ref Rng & Units 4/18/2019 4/29/2019 8/6/2019   COMPLEMENT C3 76 - 169 mg/dL - - -   COMPLEMENT C4 15 - 50 mg/dL - - -   SED RATE 0 - 20 mm/h - - 48(H)   CRP, INFLAMMATION 0.0 - 8.0 mg/L - - 4.1   CK TOTAL 30 - 300 U/L - - -   RHEUMATOID FACTOR 0 - 14 IU/mL - - -   AST 0 - 45 U/L - 44 41   ALT 0 - 70 U/L - 33 32   ALBUMIN 3.4 - 5.0 g/dL 2.7(L) 2.8(L) 3.0(L)   WBC 4.0 - 11.0 10e9/L 5.0 4.6 5.6   RBC 4.4 - 5.9 10e12/L 4.13(L) 4.24(L) 4.24(L)   HGB 13.3 - 17.7 g/dL 12.6(L) 13.1(L) 12.6(L)   HCT 40.0 - 53.0 % 38.6(L) 39.4(L) 38.3(L)   MCV 78 - 100 fl 94 93 90   MCHC 31.5 - 36.5 g/dL 32.6 33.2 32.9   RDW 10.0 - 15.0 % 13.3 13.2 14.4    - 450 10e9/L 137(L) 119(L) 135(L)   CREATININE 0.66 - 1.25 mg/dL 1.09 1.20 1.20   GFR ESTIMATE, IF BLACK >60 mL/min/[1.73:m2] 78 69 69   GFR ESTIMATE >60 mL/min/[1.73:m2] 67 60(L) 59(L)    - 1,620 mg/dL - - -   IGA 70 - 380 mg/dL - - -   IGM 60 - 265 mg/dL - - -       Rheumatoid Factor   Date Value Ref Range Status   09/24/2013 25 (H) 0 - 14 IU/mL Final     Hepatitis B Core Grisel   Date Value Ref Range Status   10/12/2018 Reactive (AA) NR^Nonreactive Final     Comment:     A reactive result indicates acute, chronic or past/resolved hepatitis B   infection.         Hep B Surface Agn   Date Value Ref Range Status   10/12/2018 Nonreactive NR^Nonreactive Final     Albumin Fraction   Date Value Ref Range Status   04/30/2018 3.5 (L) 3.7 - 5.1 g/dL Final     Alpha 2 Fraction   Date Value Ref Range Status   04/30/2018 1.0 (H) 0.5 - 0.9  g/dL Final     Beta Fraction   Date Value Ref Range Status   04/30/2018 0.9 0.6 - 1.0 g/dL Final     Gamma Fraction   Date Value Ref Range Status   04/30/2018 1.9 (H) 0.7 - 1.6 g/dL Final     Monoclonal Peak   Date Value Ref Range Status   04/30/2018 0.0 0.0 g/dL Final     ELP Interpretation:   Date Value Ref Range Status   04/30/2018   Final    Hypoalbuminemia with high normal alpha 1 globulins, slightly increased alpha 2 globulins   and increased gamma globulins, suggestive of acute phase reaction with chronic   inflammation.  No monoclonal protein seen.  Pathologic significance requires clinical   correlation.  Naima Green M.D., Ph.D.           IGG   Date Value Ref Range Status   10/31/2018 1,410 695 - 1,620 mg/dL Final     IGA   Date Value Ref Range Status   10/31/2018 235 70 - 380 mg/dL Final     IGM   Date Value Ref Range Status   10/31/2018 286 (H) 60 - 265 mg/dL Final              Scribe Disclosure:  IParveen, am serving as a scribe to document services personally performed by Louis Jain MD at this visit, based upon the provider's statements to me. All documentation has been reviewed by the aforementioned provider prior to being entered into the official medical record.    Louis Jain MD

## 2019-10-01 NOTE — LETTER
10/1/2019       RE: Danielle Cook  [address]  [facility] [address]     Dear Colleague,    Thank you for referring your patient, Danielle Cook, to the [facility] RHEUMATOLOGY at [facility]. Please see a copy of my visit note below.    [facility]  Rheumatology Clinic  Louis Jain MD  10/01/2019     Name: Danielle Cook  MRN: [id number]  Age: 74 year old  : 1945  Referring provider: Andrey Flores     Assessment and Plan:  # Chronic headache, bilateral neck and shoulder pain:  Patient relates unchanged bilateral neck, shoulder and back discomfort, unchanged with trial of low dose prednisone. Exam reveals diffuse tenderness over the thoracic paraspinal musculature, painful bilateral shoulder flexion, and tender scalp apex. Blood work from  showed a stable creatinine at 1.2, normal transaminases, CRP of 4.1. CBC showed mild anemia. Platelets were stable at 135,000. Sed rate was elevated at 48, up from 21 in 2018.     Despite the mildly elevated sedimentation rate, I have low index of suspicion about a systemic inflammatory disorder as the cause of shoulder girdle/neck pain. There are no cranial symptoms suggestive of GCA. Discomfort is completely insensitive to prednisone, trialed for 2 months in 2019. Physical therapy was not tolerated due to increased pain. These observations argue somewhat against impingement.  It is possible that bony abnormality such as degenerative arthritis could be contributing to shoulder pain. I am concerned that a major component relates to muscular strain and spasm. I recommend plain films of the shoulders to rule out bony abnormalities. I recommend a trial of muscle relaxant therapy with cyclobenzaprine 10mg 3 times daily. I recommend a 2-3 week trial, after which I requested that the patient give us telephone follow-up. If no improvement with cyclobenzaprine, I will recommend a trail of  "serotonin reuptake inhibitor (duloxetine) for chronic pain, prior to referring the patient to a pain management clinic. No further immunomodulatory therapy is warranted; prednisone can be discontinued.     I offered flu immunization today; patient deferred because \"prior injections made me worse.\"    Orders:  - X-ray bl Shoulder 3 Vws  - cyclobenzaprine (FLEXERIL) 10 MG tablet  Dispense: 90 tablet; Refill: 3        Follow-up: Return in about 3 months (around 1/1/2020).     HPI:   Danielle Cook is a 74 year old male with a history of gastroesophageal reflux disease who presents for follow-up. I last evaluated the patient on 8/6/19 at which time he reported impingement syndrome of shoulder region, unspecified laterality, chronic occipital headache and shoulder girdle arthralgia. Differential diagnosis included polymyalgia rheumatica vs GCA, although I had low suspicion for GCA. I recommended a course of low dose prednisone 20mg daily for 7 to 10 days. I also recommended a course of physical therapy.     Today, he reports no improvement in right > left shoulder pain and spine pain. He did not tolerate prednisone well, noting leg and back stiffness and no improvement in joint pain. He also discontinued physical therapy, noting that it was not tolerated due to pain. He is typically an active person, but has been much less active while in pain.     He continues to experience a headache which is unchanged. Denies changes in vision, but endorses pain in his eye. Has an appointment with Ophthalmology tomorrow. Has no other concerns.      Review of Systems:   Pertinent items are noted in HPI or as below, remainder of complete ROS is negative.      No recent problems with hearing or vision. No swallowing problems.   No breathing difficulty, shortness of breath, coughing, or wheezing  No chest pain or palpitations  No heart burn, indigestion, abdominal pain, nausea, vomiting, diarrhea  No urination problems, no bloody, " cloudy urine, no dysuria  No numbing, tingling, weakness  No headaches or confusion  No rashes. No easy bleeding or bruising.     Active Medications:   Current Outpatient Medications:      acetaminophen (TYLENOL) 325 MG tablet, Take 1 tablet (325 mg) by mouth every 6 hours as needed for mild pain, Disp: 100 tablet, Rfl: 3     amLODIPine (NORVASC) 10 MG tablet, , Disp: , Rfl:      cyclobenzaprine (FLEXERIL) 10 MG tablet, Take 1 tablet (10 mg) by mouth 3 times daily as needed for muscle spasms, Disp: 90 tablet, Rfl: 3     emollient (VANICREAM) external cream, Apply topically 2 times daily, Disp: 453 g, Rfl: 11     furosemide (LASIX) 20 MG tablet, Take 1 tablet (20 mg) by mouth daily, Disp: 30 tablet, Rfl: 11     gabapentin (NEURONTIN) 300 MG tablet, Take 600 mg by mouth 3 times daily , Disp: , Rfl:      lidocaine, viscous, (XYLOCAINE) 2 % solution, , Disp: , Rfl:      mometasone (ELOCON) 0.1 % external ointment, APPLY TOPICALLY DAILY, Disp: 45 g, Rfl: 3     omeprazole (PRILOSEC) 40 MG capsule, Take 1 capsule (40 mg) by mouth 2 times daily, Disp: 60 capsule, Rfl: 2     predniSONE (DELTASONE) 5 MG tablet, Take 4 tabs daily for 1 week, then 3 tabs daily for 1 week, Disp: 50 tablet, Rfl: 2     tamsulosin (FLOMAX) 0.4 MG capsule, Take 1 capsule (0.4 mg) by mouth daily, Disp: 60 capsule, Rfl: 5     vitamin D3 (CHOLECALCIFEROL) 1000 units (25 mcg) tablet, Take 2 tablets (2,000 Units) by mouth daily, Disp: 60 tablet, Rfl: 11     ASPIRIN LOW DOSE 81 MG EC tablet, , Disp: , Rfl:      ferrous fumarate (FERRETTS) 324 (106 Fe) MG TABS tablet, Take by mouth daily, Disp: , Rfl:      levothyroxine (SYNTHROID/LEVOTHROID) 50 MCG tablet, , Disp: , Rfl:      levothyroxine (SYNTHROID/LEVOTHROID) 75 MCG tablet, , Disp: , Rfl: 3     nortriptyline (PAMELOR) 25 MG capsule, , Disp: , Rfl:      potassium chloride (KLOR-CON) 20 MEQ packet, Take 20 mEq by mouth daily (Patient not taking: Reported on 10/1/2019), Disp: 30 tablet, Rfl: 1  No current  facility-administered medications for this visit.     Facility-Administered Medications Ordered in Other Visits:      gadobutrol (GADAVIST) injection 7.5 mL, 7.5 mL, Intravenous, Once, Leventhal, Thomas Michael, MD      Allergies:   The patient reports no known allergies.      Past Medical History:  Chronic Kidney Disease, stage 3, GFR 30-59 ml/min   Hepatitis C without coma, chronic  Hypertension  Cirrhosis of liver without ascites, unspecified hepatic cirrhosis type  Polymyalgia rheumatica   Iron deficiency anemia  Chronic coronary artery disease  Hyperlipidemia  Language difficulty  Paresthesia  Renal insuffiencey syndrome  Weakness   Syphilis  Gastroesophageal reflux disease     Past Surgical History:  Colonoscopy (2018)  EGD combined (multiple, last 04/17/2019)  Cataract surgery, bilateral     Family History:   Hypertension- Father        Social History:   Denies tobacco use. Denies alcohol use.       Physical Exam:   /64 (BP Location: Right arm, Patient Position: Sitting, Cuff Size: Adult Regular)   Pulse 94   Temp 97.5  F (36.4  C)   Wt 75.7 kg (166 lb 14.2 oz)   SpO2 100%   BMI 25.38 kg/m      Wt Readings from Last 4 Encounters:   10/01/19 75.7 kg (166 lb 14.2 oz)   08/06/19 75.4 kg (166 lb 3.2 oz)   04/29/19 75 kg (165 lb 6.4 oz)   04/18/19 77.4 kg (170 lb 9.6 oz)     Constitutional: Well-developed, appearing stated age; cooperative  Eyes: Normal EOM, PERRLA, vision, conjunctiva, sclera  ENT: Normal external ears, nose, hearing, lips, teeth, gums, throat. No mucous membrane lesions, normal saliva pool  Neck: No mass or thyroid enlargement  Resp: Lungs clear to auscultation, nl to palpation  CV: RRR, no murmurs, rubs or gallops, no edema  GI: No ABD mass or tenderness, no HSM  : Not tested  Lymph: No cervical, supraclavicular, inguinal or epitrochlear nodes  MS: The TMJ, shoulder, elbow, wrist, MCP/PIP/DIP, hip, knee, ankle, and foot MTP/IP joints were examined and found normal. No active  synovitis or altered joint anatomy. Full joint ROM. Normal  strength. No dactylitis,  tenosynovitis, enthesopathy. Tender at bilateral shoulders. Passive external and internal rotation are normal on the right. Pain with extremes of abduction on the left and with internal rotation. Painful neck extension. Painful rotation of the head. Tenderness with palpation over the paraspinous muscles over the thorax. Tenderness at the midline lumbar spine.   Skin: No nail pitting, alopecia, rash, nodules or lesions  Neuro: Normal cranial nerves, strength, sensation, DTRs.   Psych: Normal judgement, orientation, memory, affect.     Laboratory:   RHEUM RESULTS Latest Ref Rng & Units 4/18/2019 4/29/2019 8/6/2019   COMPLEMENT C3 76 - 169 mg/dL - - -   COMPLEMENT C4 15 - 50 mg/dL - - -   SED RATE 0 - 20 mm/h - - 48(H)   CRP, INFLAMMATION 0.0 - 8.0 mg/L - - 4.1   CK TOTAL 30 - 300 U/L - - -   RHEUMATOID FACTOR 0 - 14 IU/mL - - -   AST 0 - 45 U/L - 44 41   ALT 0 - 70 U/L - 33 32   ALBUMIN 3.4 - 5.0 g/dL 2.7(L) 2.8(L) 3.0(L)   WBC 4.0 - 11.0 10e9/L 5.0 4.6 5.6   RBC 4.4 - 5.9 10e12/L 4.13(L) 4.24(L) 4.24(L)   HGB 13.3 - 17.7 g/dL 12.6(L) 13.1(L) 12.6(L)   HCT 40.0 - 53.0 % 38.6(L) 39.4(L) 38.3(L)   MCV 78 - 100 fl 94 93 90   MCHC 31.5 - 36.5 g/dL 32.6 33.2 32.9   RDW 10.0 - 15.0 % 13.3 13.2 14.4    - 450 10e9/L 137(L) 119(L) 135(L)   CREATININE 0.66 - 1.25 mg/dL 1.09 1.20 1.20   GFR ESTIMATE, IF BLACK >60 mL/min/[1.73:m2] 78 69 69   GFR ESTIMATE >60 mL/min/[1.73:m2] 67 60(L) 59(L)    - 1,620 mg/dL - - -   IGA 70 - 380 mg/dL - - -   IGM 60 - 265 mg/dL - - -       Rheumatoid Factor   Date Value Ref Range Status   09/24/2013 25 (H) 0 - 14 IU/mL Final     Hepatitis B Core Grisel   Date Value Ref Range Status   10/12/2018 Reactive (AA) NR^Nonreactive Final     Comment:     A reactive result indicates acute, chronic or past/resolved hepatitis B   infection.         Hep B Surface Agn   Date Value Ref Range Status   10/12/2018  Nonreactive NR^Nonreactive Final     Albumin Fraction   Date Value Ref Range Status   04/30/2018 3.5 (L) 3.7 - 5.1 g/dL Final     Alpha 2 Fraction   Date Value Ref Range Status   04/30/2018 1.0 (H) 0.5 - 0.9 g/dL Final     Beta Fraction   Date Value Ref Range Status   04/30/2018 0.9 0.6 - 1.0 g/dL Final     Gamma Fraction   Date Value Ref Range Status   04/30/2018 1.9 (H) 0.7 - 1.6 g/dL Final     Monoclonal Peak   Date Value Ref Range Status   04/30/2018 0.0 0.0 g/dL Final     ELP Interpretation:   Date Value Ref Range Status   04/30/2018   Final    Hypoalbuminemia with high normal alpha 1 globulins, slightly increased alpha 2 globulins   and increased gamma globulins, suggestive of acute phase reaction with chronic   inflammation.  No monoclonal protein seen.  Pathologic significance requires clinical   correlation.  Naima Green M.D., Ph.D.           IGG   Date Value Ref Range Status   10/31/2018 1,410 695 - 1,620 mg/dL Final     IGA   Date Value Ref Range Status   10/31/2018 235 70 - 380 mg/dL Final     IGM   Date Value Ref Range Status   10/31/2018 286 (H) 60 - 265 mg/dL Final              Scribe Disclosure:  I, Parveen Horta, am serving as a scribe to document services personally performed by Louis Jain MD at this visit, based upon the provider's statements to me. All documentation has been reviewed by the aforementioned provider prior to being entered into the official medical record.    Again, thank you for allowing me to participate in the care of your patient.      Sincerely,    Louis Jain MD

## 2019-10-01 NOTE — PROGRESS NOTES
"[facility]  Rheumatology Clinic  Louis Jain MD  10/01/2019     Name: Danielle Cook  MRN: [id number]  Age: 74 year old  : 1945  Referring provider: Andrey Flores     Assessment and Plan:  # Chronic headache, bilateral neck and shoulder pain:  Patient relates unchanged bilateral neck, shoulder and back discomfort, unchanged with trial of low dose prednisone. Exam reveals diffuse tenderness over the thoracic paraspinal musculature, painful bilateral shoulder flexion, and tender scalp apex. Blood work from  showed a stable creatinine at 1.2, normal transaminases, CRP of 4.1. CBC showed mild anemia. Platelets were stable at 135,000. Sed rate was elevated at 48, up from 21 in 2018.     Despite the mildly elevated sedimentation rate, I have low index of suspicion about a systemic inflammatory disorder as the cause of shoulder girdle/neck pain. There are no cranial symptoms suggestive of GCA. Discomfort is completely insensitive to prednisone, trialed for 2 months in 2019. Physical therapy was not tolerated due to increased pain. These observations argue somewhat against impingement.  It is possible that bony abnormality such as degenerative arthritis could be contributing to shoulder pain. I am concerned that a major component relates to muscular strain and spasm. I recommend plain films of the shoulders to rule out bony abnormalities. I recommend a trial of muscle relaxant therapy with cyclobenzaprine 10mg 3 times daily. I recommend a 2-3 week trial, after which I requested that the patient give us telephone follow-up. If no improvement with cyclobenzaprine, I will recommend a trail of serotonin reuptake inhibitor (duloxetine) for chronic pain, prior to referring the patient to a pain management clinic. No further immunomodulatory therapy is warranted; prednisone can be discontinued.     I offered flu immunization today; patient deferred because \"prior injections made me worse.\"    Orders:  - " X-ray bl Shoulder 3 Vws  - cyclobenzaprine (FLEXERIL) 10 MG tablet  Dispense: 90 tablet; Refill: 3        Follow-up: Return in about 3 months (around 1/1/2020).     HPI:   Danielle Cook is a 74 year old male with a history of gastroesophageal reflux disease who presents for follow-up. I last evaluated the patient on 8/6/19 at which time he reported impingement syndrome of shoulder region, unspecified laterality, chronic occipital headache and shoulder girdle arthralgia. Differential diagnosis included polymyalgia rheumatica vs GCA, although I had low suspicion for GCA. I recommended a course of low dose prednisone 20mg daily for 7 to 10 days. I also recommended a course of physical therapy.     Today, he reports no improvement in right > left shoulder pain and spine pain. He did not tolerate prednisone well, noting leg and back stiffness and no improvement in joint pain. He also discontinued physical therapy, noting that it was not tolerated due to pain. He is typically an active person, but has been much less active while in pain.     He continues to experience a headache which is unchanged. Denies changes in vision, but endorses pain in his eye. Has an appointment with Ophthalmology tomorrow. Has no other concerns.      Review of Systems:   Pertinent items are noted in HPI or as below, remainder of complete ROS is negative.      No recent problems with hearing or vision. No swallowing problems.   No breathing difficulty, shortness of breath, coughing, or wheezing  No chest pain or palpitations  No heart burn, indigestion, abdominal pain, nausea, vomiting, diarrhea  No urination problems, no bloody, cloudy urine, no dysuria  No numbing, tingling, weakness  No headaches or confusion  No rashes. No easy bleeding or bruising.     Active Medications:   Current Outpatient Medications:      acetaminophen (TYLENOL) 325 MG tablet, Take 1 tablet (325 mg) by mouth every 6 hours as needed for mild pain, Disp: 100  tablet, Rfl: 3     amLODIPine (NORVASC) 10 MG tablet, , Disp: , Rfl:      cyclobenzaprine (FLEXERIL) 10 MG tablet, Take 1 tablet (10 mg) by mouth 3 times daily as needed for muscle spasms, Disp: 90 tablet, Rfl: 3     emollient (VANICREAM) external cream, Apply topically 2 times daily, Disp: 453 g, Rfl: 11     furosemide (LASIX) 20 MG tablet, Take 1 tablet (20 mg) by mouth daily, Disp: 30 tablet, Rfl: 11     gabapentin (NEURONTIN) 300 MG tablet, Take 600 mg by mouth 3 times daily , Disp: , Rfl:      lidocaine, viscous, (XYLOCAINE) 2 % solution, , Disp: , Rfl:      mometasone (ELOCON) 0.1 % external ointment, APPLY TOPICALLY DAILY, Disp: 45 g, Rfl: 3     omeprazole (PRILOSEC) 40 MG capsule, Take 1 capsule (40 mg) by mouth 2 times daily, Disp: 60 capsule, Rfl: 2     predniSONE (DELTASONE) 5 MG tablet, Take 4 tabs daily for 1 week, then 3 tabs daily for 1 week, Disp: 50 tablet, Rfl: 2     tamsulosin (FLOMAX) 0.4 MG capsule, Take 1 capsule (0.4 mg) by mouth daily, Disp: 60 capsule, Rfl: 5     vitamin D3 (CHOLECALCIFEROL) 1000 units (25 mcg) tablet, Take 2 tablets (2,000 Units) by mouth daily, Disp: 60 tablet, Rfl: 11     ASPIRIN LOW DOSE 81 MG EC tablet, , Disp: , Rfl:      ferrous fumarate (FERRETTS) 324 (106 Fe) MG TABS tablet, Take by mouth daily, Disp: , Rfl:      levothyroxine (SYNTHROID/LEVOTHROID) 50 MCG tablet, , Disp: , Rfl:      levothyroxine (SYNTHROID/LEVOTHROID) 75 MCG tablet, , Disp: , Rfl: 3     nortriptyline (PAMELOR) 25 MG capsule, , Disp: , Rfl:      potassium chloride (KLOR-CON) 20 MEQ packet, Take 20 mEq by mouth daily (Patient not taking: Reported on 10/1/2019), Disp: 30 tablet, Rfl: 1  No current facility-administered medications for this visit.     Facility-Administered Medications Ordered in Other Visits:      gadobutrol (GADAVIST) injection 7.5 mL, 7.5 mL, Intravenous, Once, Leventhal, Thomas Michael, MD      Allergies:   The patient reports no known allergies.      Past Medical History:  Chronic  Kidney Disease, stage 3, GFR 30-59 ml/min   Hepatitis C without coma, chronic  Hypertension  Cirrhosis of liver without ascites, unspecified hepatic cirrhosis type  Polymyalgia rheumatica   Iron deficiency anemia  Chronic coronary artery disease  Hyperlipidemia  Language difficulty  Paresthesia  Renal insuffiencey syndrome  Weakness   Syphilis  Gastroesophageal reflux disease     Past Surgical History:  Colonoscopy (2018)  EGD combined (multiple, last 04/17/2019)  Cataract surgery, bilateral     Family History:   Hypertension- Father       Social History:   Denies tobacco use. Denies alcohol use.       Physical Exam:   /64 (BP Location: Right arm, Patient Position: Sitting, Cuff Size: Adult Regular)   Pulse 94   Temp 97.5  F (36.4  C)   Wt 75.7 kg (166 lb 14.2 oz)   SpO2 100%   BMI 25.38 kg/m     Wt Readings from Last 4 Encounters:   10/01/19 75.7 kg (166 lb 14.2 oz)   08/06/19 75.4 kg (166 lb 3.2 oz)   04/29/19 75 kg (165 lb 6.4 oz)   04/18/19 77.4 kg (170 lb 9.6 oz)     Constitutional: Well-developed, appearing stated age; cooperative  Eyes: Normal EOM, PERRLA, vision, conjunctiva, sclera  ENT: Normal external ears, nose, hearing, lips, teeth, gums, throat. No mucous membrane lesions, normal saliva pool  Neck: No mass or thyroid enlargement  Resp: Lungs clear to auscultation, nl to palpation  CV: RRR, no murmurs, rubs or gallops, no edema  GI: No ABD mass or tenderness, no HSM  : Not tested  Lymph: No cervical, supraclavicular, inguinal or epitrochlear nodes  MS: The TMJ, shoulder, elbow, wrist, MCP/PIP/DIP, hip, knee, ankle, and foot MTP/IP joints were examined and found normal. No active synovitis or altered joint anatomy. Full joint ROM. Normal  strength. No dactylitis,  tenosynovitis, enthesopathy. Tender at bilateral shoulders. Passive external and internal rotation are normal on the right. Pain with extremes of abduction on the left and with internal rotation. Painful neck extension. Painful  rotation of the head. Tenderness with palpation over the paraspinous muscles over the thorax. Tenderness at the midline lumbar spine.   Skin: No nail pitting, alopecia, rash, nodules or lesions  Neuro: Normal cranial nerves, strength, sensation, DTRs.   Psych: Normal judgement, orientation, memory, affect.     Laboratory:   RHEUM RESULTS Latest Ref Rng & Units 4/18/2019 4/29/2019 8/6/2019   COMPLEMENT C3 76 - 169 mg/dL - - -   COMPLEMENT C4 15 - 50 mg/dL - - -   SED RATE 0 - 20 mm/h - - 48(H)   CRP, INFLAMMATION 0.0 - 8.0 mg/L - - 4.1   CK TOTAL 30 - 300 U/L - - -   RHEUMATOID FACTOR 0 - 14 IU/mL - - -   AST 0 - 45 U/L - 44 41   ALT 0 - 70 U/L - 33 32   ALBUMIN 3.4 - 5.0 g/dL 2.7(L) 2.8(L) 3.0(L)   WBC 4.0 - 11.0 10e9/L 5.0 4.6 5.6   RBC 4.4 - 5.9 10e12/L 4.13(L) 4.24(L) 4.24(L)   HGB 13.3 - 17.7 g/dL 12.6(L) 13.1(L) 12.6(L)   HCT 40.0 - 53.0 % 38.6(L) 39.4(L) 38.3(L)   MCV 78 - 100 fl 94 93 90   MCHC 31.5 - 36.5 g/dL 32.6 33.2 32.9   RDW 10.0 - 15.0 % 13.3 13.2 14.4    - 450 10e9/L 137(L) 119(L) 135(L)   CREATININE 0.66 - 1.25 mg/dL 1.09 1.20 1.20   GFR ESTIMATE, IF BLACK >60 mL/min/[1.73:m2] 78 69 69   GFR ESTIMATE >60 mL/min/[1.73:m2] 67 60(L) 59(L)    - 1,620 mg/dL - - -   IGA 70 - 380 mg/dL - - -   IGM 60 - 265 mg/dL - - -       Rheumatoid Factor   Date Value Ref Range Status   09/24/2013 25 (H) 0 - 14 IU/mL Final     Hepatitis B Core Grisel   Date Value Ref Range Status   10/12/2018 Reactive (AA) NR^Nonreactive Final     Comment:     A reactive result indicates acute, chronic or past/resolved hepatitis B   infection.         Hep B Surface Agn   Date Value Ref Range Status   10/12/2018 Nonreactive NR^Nonreactive Final     Albumin Fraction   Date Value Ref Range Status   04/30/2018 3.5 (L) 3.7 - 5.1 g/dL Final     Alpha 2 Fraction   Date Value Ref Range Status   04/30/2018 1.0 (H) 0.5 - 0.9 g/dL Final     Beta Fraction   Date Value Ref Range Status   04/30/2018 0.9 0.6 - 1.0 g/dL Final     Gamma  Fraction   Date Value Ref Range Status   04/30/2018 1.9 (H) 0.7 - 1.6 g/dL Final     Monoclonal Peak   Date Value Ref Range Status   04/30/2018 0.0 0.0 g/dL Final     ELP Interpretation:   Date Value Ref Range Status   04/30/2018   Final    Hypoalbuminemia with high normal alpha 1 globulins, slightly increased alpha 2 globulins   and increased gamma globulins, suggestive of acute phase reaction with chronic   inflammation.  No monoclonal protein seen.  Pathologic significance requires clinical   correlation.  Naima Green M.D., Ph.D.           IGG   Date Value Ref Range Status   10/31/2018 1,410 695 - 1,620 mg/dL Final     IGA   Date Value Ref Range Status   10/31/2018 235 70 - 380 mg/dL Final     IGM   Date Value Ref Range Status   10/31/2018 286 (H) 60 - 265 mg/dL Final              Scribe Disclosure:  Parveen JAMISON, am serving as a scribe to document services personally performed by Louis Jain MD at this visit, based upon the provider's statements to me. All documentation has been reviewed by the aforementioned provider prior to being entered into the official medical record.

## 2019-10-29 PROBLEM — M25.512 CHRONIC PAIN OF BOTH SHOULDERS: Status: RESOLVED | Noted: 2019-08-20 | Resolved: 2019-10-29

## 2019-10-29 PROBLEM — G89.29 CHRONIC PAIN OF BOTH SHOULDERS: Status: RESOLVED | Noted: 2019-08-20 | Resolved: 2019-10-29

## 2019-10-29 PROBLEM — M25.511 CHRONIC PAIN OF BOTH SHOULDERS: Status: RESOLVED | Noted: 2019-08-20 | Resolved: 2019-10-29

## 2019-10-29 NOTE — PROGRESS NOTES
Discharge Note    Progress reporting period is from initial eval to Sep 5, 2019.     Danielle failed to return for next follow up visit and current status is unknown.  Please see information below for last relevant information on current status.  Patient seen for Rxs Used: 3 visits.    SUBJECTIVE  Subjective changes noted by patient:  Subjective: No change with neck/upper back pain yet. Feels his neck pain and headaches seem to make him .  Current pain level is Current Pain level: No change.     Previous pain level was  Initial Pain level: 8/10.   Changes in function:  Yes (See Goal flowsheet attached for changes in current functional level)  Adverse reaction to treatment or activity: None    OBJECTIVE  Changes noted in objective findings: Objective: Significant pain with prior HEP despite cueing, trialed regressing to isometrics - still too painful. Tolerates sidelying ABD and flex 0-90 each. .    ASSESSMENT/PLAN  Diagnosis: B shoulder pain, impingement   DIAGP:  The encounter diagnosis was Chronic pain of both shoulders.  Updated problem list and treatment plan:     Pain - HEP  Decreased ROM/flexibility - HEP  Decreased function - HEP  Decreased strength - HEP    STG/LTGs have been met or progress has been made towards goals:  Yes, please see goal flowsheet for most current information.    Assessment of Progress: current status is unknown.  Last current status: Assessment of progress: Pt has not made progress.  Self Management Plans:  HEP    I have re-evaluated this patient and find that the nature, scope, duration and intensity of the therapy is appropriate for the medical condition of the patient.  Danielle continues to require the following intervention to meet STG and LTG's:  HEP.    Recommendations:  Discharge with current home program.  Patient to follow up with MD as needed. Episode to be closed at this time and patient formally discharged from therapy.    Reji Sánchez, PT, DPT, OCS      Please refer to the  daily flowsheet for treatment today, total treatment time and time spent performing 1:1 timed codes.

## 2019-10-30 ENCOUNTER — TELEPHONE (OUTPATIENT)
Dept: GASTROENTEROLOGY | Facility: CLINIC | Age: 74
End: 2019-10-30

## 2019-10-30 NOTE — TELEPHONE ENCOUNTER
Patient contacted and reminded of upcoming appointment.  Patient confirmed they will be attending.  Patient instructed to bring updated medications list to appointment.    Lolita Cordova on 10/30/2019 at 2:49 PM

## 2019-10-31 ENCOUNTER — OFFICE VISIT (OUTPATIENT)
Dept: GASTROENTEROLOGY | Facility: CLINIC | Age: 74
End: 2019-10-31
Attending: INTERNAL MEDICINE
Payer: COMMERCIAL

## 2019-10-31 ENCOUNTER — ANCILLARY PROCEDURE (OUTPATIENT)
Dept: ULTRASOUND IMAGING | Facility: CLINIC | Age: 74
End: 2019-10-31
Attending: INTERNAL MEDICINE
Payer: COMMERCIAL

## 2019-10-31 ENCOUNTER — ANCILLARY PROCEDURE (OUTPATIENT)
Dept: GENERAL RADIOLOGY | Facility: CLINIC | Age: 74
End: 2019-10-31
Attending: INTERNAL MEDICINE
Payer: COMMERCIAL

## 2019-10-31 VITALS
HEIGHT: 68 IN | SYSTOLIC BLOOD PRESSURE: 121 MMHG | HEART RATE: 112 BPM | WEIGHT: 169.5 LBS | DIASTOLIC BLOOD PRESSURE: 67 MMHG | TEMPERATURE: 98.5 F | BODY MASS INDEX: 25.69 KG/M2

## 2019-10-31 DIAGNOSIS — Z12.9 SCREENING FOR CANCER: ICD-10-CM

## 2019-10-31 DIAGNOSIS — K74.60 CIRRHOSIS OF LIVER WITHOUT ASCITES, UNSPECIFIED HEPATIC CIRRHOSIS TYPE (H): ICD-10-CM

## 2019-10-31 DIAGNOSIS — I85.10 SECONDARY ESOPHAGEAL VARICES WITHOUT BLEEDING (H): ICD-10-CM

## 2019-10-31 DIAGNOSIS — R05.3 CHRONIC COUGH: Primary | ICD-10-CM

## 2019-10-31 DIAGNOSIS — R05.3 CHRONIC COUGH: ICD-10-CM

## 2019-10-31 DIAGNOSIS — E44.0 MODERATE PROTEIN-CALORIE MALNUTRITION (H): ICD-10-CM

## 2019-10-31 LAB
AFP SERPL-MCNC: 2 UG/L (ref 0–8)
ALBUMIN SERPL-MCNC: 3.1 G/DL (ref 3.4–5)
ALP SERPL-CCNC: 282 U/L (ref 40–150)
ALT SERPL W P-5'-P-CCNC: 31 U/L (ref 0–70)
ANION GAP SERPL CALCULATED.3IONS-SCNC: 8 MMOL/L (ref 3–14)
AST SERPL W P-5'-P-CCNC: 41 U/L (ref 0–45)
BILIRUB DIRECT SERPL-MCNC: 0.8 MG/DL (ref 0–0.2)
BILIRUB SERPL-MCNC: 1.4 MG/DL (ref 0.2–1.3)
BUN SERPL-MCNC: 10 MG/DL (ref 7–30)
CALCIUM SERPL-MCNC: 8.5 MG/DL (ref 8.5–10.1)
CHLORIDE SERPL-SCNC: 109 MMOL/L (ref 94–109)
CO2 SERPL-SCNC: 24 MMOL/L (ref 20–32)
CREAT SERPL-MCNC: 1.22 MG/DL (ref 0.66–1.25)
ERYTHROCYTE [DISTWIDTH] IN BLOOD BY AUTOMATED COUNT: 17.1 % (ref 10–15)
GFR SERPL CREATININE-BSD FRML MDRD: 58 ML/MIN/{1.73_M2}
GLUCOSE SERPL-MCNC: 100 MG/DL (ref 70–99)
HCT VFR BLD AUTO: 38.2 % (ref 40–53)
HGB BLD-MCNC: 12.1 G/DL (ref 13.3–17.7)
INR PPP: 1.05 (ref 0.86–1.14)
MCH RBC QN AUTO: 28.1 PG (ref 26.5–33)
MCHC RBC AUTO-ENTMCNC: 31.7 G/DL (ref 31.5–36.5)
MCV RBC AUTO: 89 FL (ref 78–100)
PLATELET # BLD AUTO: 180 10E9/L (ref 150–450)
POTASSIUM SERPL-SCNC: 3.6 MMOL/L (ref 3.4–5.3)
PROT SERPL-MCNC: 7.6 G/DL (ref 6.8–8.8)
RBC # BLD AUTO: 4.3 10E12/L (ref 4.4–5.9)
SODIUM SERPL-SCNC: 140 MMOL/L (ref 133–144)
WBC # BLD AUTO: 7.1 10E9/L (ref 4–11)

## 2019-10-31 PROCEDURE — 85610 PROTHROMBIN TIME: CPT | Performed by: INTERNAL MEDICINE

## 2019-10-31 PROCEDURE — 82105 ALPHA-FETOPROTEIN SERUM: CPT | Performed by: INTERNAL MEDICINE

## 2019-10-31 PROCEDURE — G0463 HOSPITAL OUTPT CLINIC VISIT: HCPCS | Mod: ZF

## 2019-10-31 PROCEDURE — 36415 COLL VENOUS BLD VENIPUNCTURE: CPT | Performed by: INTERNAL MEDICINE

## 2019-10-31 PROCEDURE — 80048 BASIC METABOLIC PNL TOTAL CA: CPT | Performed by: INTERNAL MEDICINE

## 2019-10-31 PROCEDURE — 80076 HEPATIC FUNCTION PANEL: CPT | Performed by: INTERNAL MEDICINE

## 2019-10-31 PROCEDURE — 85027 COMPLETE CBC AUTOMATED: CPT | Performed by: INTERNAL MEDICINE

## 2019-10-31 ASSESSMENT — PAIN SCALES - GENERAL: PAINLEVEL: EXTREME PAIN (8)

## 2019-10-31 ASSESSMENT — MIFFLIN-ST. JEOR: SCORE: 1488.35

## 2019-10-31 NOTE — PATIENT INSTRUCTIONS
- we'll get a chest xray today    - See if you can get the ultrasound today, otherwise will get with next clinic visit    - Return to see me in 6 months    - I will send a message to Dr. Jain about your reactions to the medications

## 2019-10-31 NOTE — LETTER
10/31/2019      RE: Danielle Cook  [address]  [facility] [id number]       Date of Service: 1/28/2019     Subjective:            Danielle Cook is a 74 year old male presenting for evaluation of liver disease    Due to language barrier, an  was present during the history-taking and subsequent discussion (and for part of the physical exam) with this patient.      History of Present Illness   Danielle Cook is a 74 year old male with past medical history of chronic kidney disease, Chronic hepatitis C (s/p SVR) with cirrhosis complicated by esophageal varices and mild protein calorie malnutrition who presents in routine follow up.    Since last seen he notes that he has had many issues with headaches, facial pain and swelling, chest aches, difficulties with swallowing, shoulder pains.  He has establish care with Dr. Jain in rheumatology in regard to a likely diagnosis of PMR.  He attempted a trial of corticosteroids, but had worsening of headaches and facial swelling, and did not feel that he could tolerate the medication further.  In an attempt to treat with steroid sparing regimen, he was given a prescription for Flexeril.  Patient admits that he took 1 dose, and was unable to get out of bed for greater than 24 hours and has not taken a dose since.  He continues to have significant pain issues which seem to make him quite uncomfortable today  He has not had any issues with hematemesis or melena, notes that his bowel habits are consistent    He underwent ultrasound screening for hepatocellular carcinoma today, and this was negative for any new masses or lesions.      Past Medical History:  Past Medical History:   Diagnosis Date     Bad headache      CKD (chronic kidney disease)      CKD (chronic kidney disease) stage 3, GFR 30-59 ml/min (H)      Hep C w/o coma, chronic (H)      Hepatitis C virus infection      Hypertension        Surgical History:  Past Surgical  History:   Procedure Laterality Date     COLONOSCOPY N/A 1/19/2018    Procedure: COLONOSCOPY;  COMBINED ESOPHAGOSCOPY, GASTROSCOPY, DUODENOSCOPY (EGD) REMOVE TUMOR/POYP/LESION BY SNARE, CONTROL BLEED,BANDING/LIGATION OF VARICES Colonoscopy;  Surgeon: Xavier Sutton MD;  Location: [facility]     ESOPHAGOSCOPY, GASTROSCOPY, DUODENOSCOPY (EGD), COMBINED N/A 11/4/2015    Procedure: COMBINED ESOPHAGOSCOPY, GASTROSCOPY, DUODENOSCOPY (EGD), BIOPSY SINGLE OR MULTIPLE;  Surgeon: Inocente Do MD;  [facility]     ESOPHAGOSCOPY, GASTROSCOPY, DUODENOSCOPY (EGD), COMBINED N/A 3/29/2018    Procedure: COMBINED ESOPHAGOSCOPY, GASTROSCOPY, DUODENOSCOPY (EGD);  egd;  Surgeon: Danielle Mendenhall MD;  [facility]     ESOPHAGOSCOPY, GASTROSCOPY, DUODENOSCOPY (EGD), COMBINED N/A 9/10/2018    Procedure: COMBINED ESOPHAGOSCOPY, GASTROSCOPY, DUODENOSCOPY (EGD);  egd;  Surgeon: Xavier Sutton MD;  [facility]     ESOPHAGOSCOPY, GASTROSCOPY, DUODENOSCOPY (EGD), COMBINED N/A 4/17/2019    Procedure: COMBINED ESOPHAGOSCOPY, GASTROSCOPY, DUODENOSCOPY (EGD);  Surgeon: Leventhal, Thomas Michael, MD;  Location:  GI     EYE SURGERY Bilateral     cataracts       Social History:  Social History     Tobacco Use     Smoking status: Never Smoker     Smokeless tobacco: Never Used   Substance Use Topics     Alcohol use: No     Alcohol/week: 0.0 standard drinks     Drug use: No         Family History:  Family History   Problem Relation Age of Onset     Hypertension Father    - Denies a family history of liver disease         Medications:  Current Outpatient Medications   Medication     acetaminophen (TYLENOL) 325 MG tablet     amLODIPine (NORVASC) 10 MG tablet     emollient (VANICREAM) external cream     furosemide (LASIX) 20 MG tablet     gabapentin (NEURONTIN) 300 MG tablet     omeprazole (PRILOSEC) 40 MG capsule     vitamin D3 (CHOLECALCIFEROL) 1000 units (25 mcg) tablet     ASPIRIN LOW DOSE 81 MG EC tablet     cyclobenzaprine  "(FLEXERIL) 10 MG tablet     ferrous fumarate (FERRETTS) 324 (106 Fe) MG TABS tablet     levothyroxine (SYNTHROID/LEVOTHROID) 50 MCG tablet     levothyroxine (SYNTHROID/LEVOTHROID) 75 MCG tablet     lidocaine, viscous, (XYLOCAINE) 2 % solution     mometasone (ELOCON) 0.1 % external ointment     nortriptyline (PAMELOR) 25 MG capsule     potassium chloride (KLOR-CON) 20 MEQ packet     tamsulosin (FLOMAX) 0.4 MG capsule     No current facility-administered medications for this visit.      Facility-Administered Medications Ordered in Other Visits   Medication     gadobutrol (GADAVIST) injection 7.5 mL       Review of Systems  A complete 10 point review of systems was asked and answered in the negative unless specifically commented upon in the HPI    Objective:           Vitals:    10/31/19 1354   BP: 121/67   Pulse: 112   Temp: 98.5  F (36.9  C)   TempSrc: Oral   Weight: 76.9 kg (169 lb 8 oz)   Height: 1.727 m (5' 8\")     Body mass index is 25.77 kg/m .     Physical Exam    Vitals reviewed.   Constitutional: Well-developed, well-nourished, in no apparent distress.    HEENT: Normocephalic. No scleral icterus. Moist oral mucosa. Dentition normal  Neck/Lymph: Normal ROM, supple.  Cardiac:  Regular rate and rhythm.   Respiratory: Normal respiratory excursion.  GI:  Abdomen soft, non-distended, non-tender. BS present.   Skin:  Skin is warm and dry. No rash noted.  No jaundice.   Peripheral Vascular: No lower extremity edema. 2+ pulses in all extremities  Musculoskeletal:  ROM intact, normal muscle bulk    Psychiatric: Normal mood and affect. Behavior is normal.  Neuro:  No asterixis, No tremor    Labs and Diagnostic tests:    MELD-Na score: 11 at 10/31/2019  1:35 PM  MELD score: 11 at 10/31/2019  1:35 PM  Calculated from:  Serum Creatinine: 1.22 mg/dL at 10/31/2019  1:35 PM  Serum Sodium: 140 mmol/L (Rounded to 137 mmol/L) at 10/31/2019  1:35 PM  Total Bilirubin: 1.4 mg/dL at 10/31/2019  1:35 PM  INR(ratio): 1.05 at " 10/31/2019  1:35 PM  Age: 74 years    Imaging:  MRI 10/31/2019  Liver: The liver demonstrates coarse echotexture with nodular contour.  No focal hepatic mass. Liver measures 9.9 cm in craniocaudal dimension.  Extrahepatic portal vein flow is antegrade, measuring 14 cm/sec.  Right portal vein flow is antegrade, measuring 7 cm/sec.  Left portal vein flow is antegrade, measuring 14 cm/sec.  Hepatic artery flow is anterdgrdrrdarddrderd:rd rd3rd3rd cm/sec peak systolic  0.75 resistive index.   The splenic vein at the hilum is patent with flow towards the liver.   The left, middle, and right hepatic veins are patent with flow towards  the IVC. The IVC is patent with flow towards the heart.   The aorta is  of normal caliber.Proximal aorta is obscured; mid abdominal aorta  measures 1.9 cm and distal abdominal aorta measures 1.8 cm.     Gallbladder: The gallbladder is well distended and of normal  morphology. No cholelithiasis. Mild prominence of the gallbladder  wall, likely related to the presence of ascites and underlying liver  disease.  Bile Ducts: Common bile duct is not visualized.  Pancreas: Visualized portions of the pancreas are unremarkable.   Kidneys: Both kidneys are of normal echotexture, without mass,  shadowing stone, or hydronephrosis. The craniocaudal dimensions are:  right- 8.3 cm, left- 8.6 cm.  Spleen: The spleen measures 10.2 cm in sagittal dimension.  Fluid: Ascites.     Procedures:  EGD: 4/17/2019  Findings:        Non-bleeding small (< 5 mm) varices were found in the lower third of the        esophagus,. No stigmata of recent bleeding were evident and no red jesus        signs were present. Stigmata of prior treatment were evident. Scarring        from prior treatment was visible.        A small hiatal hernia was present.        The lower third of the esophagus was mildly tortuous.        An endoclip was found on the greater curvature of the stomach.        Mild portal hypertensive gastropathy was found in the entire  examined        stomach.        The examined duodenum was normal.                                           Colonoscopy: 1/19/2018  Findings:        The perianal and digital rectal examinations were normal. Pertinent        negatives include no palpable rectal lesions.        The entire examined colon appeared normal.        The retroflexed view of the distal rectum and anal verge was normal and        showed no anal or rectal abnormalities.     Assessment and Plan:    Cirrhosis:   - Secondary to chronic hepatitis C, now s/p treatment with SVR of Hepatitis C  - His main signs of decompensation are esophageal varices.    - MELD on labs today 11  - Had discussion with patient that I am hopeful that his liver is re-compensating after clearance of HCV and he may be able to sustain his current quality of life for several years  - MELD labs Q 6 months    Liver Transplant Candidacy:  - At this time he does not warrant transplant evaluation  - Barrier to liver transplantation in the future will likely be CKD and age    Hepatocellular Cancer Screening:   - Abd US from 10/2018 without mass lesions  - Will repeat an MRI with contrast today  - Recommend screening for HCC every 6 months with either abdominal ultrasound or by alternating abdominal ultrasound with EITHER a triple/quad phase CT Liver with IV contrast OR a Quad phase MRI Liver with IV contrast.  AFP levels should be checked every 6 months at time of imaging screen.    Ascites: No acute issues  - Continue to follow a sodium restricted (<2g sodium diet)     Hepatic Encephalopathy: no acute issues    Esophageal Varices:  - Exam on 4/17/19 with small varices  - Recommend repeating an upper GI endoscopy 1-2 year.  If evidence of medium/large esophageal varices, would recommend esophageal varix band ligation and if band ligation is performed, please continue to repeat until eraditation of varices.    Kidney Health: Has CKD stage 3 (eGFR < 60, >30)  - Encourage more free  water intake based on current Cr    Immobility/Frailty: no acute issues    Nutrition:  As with most patients with chronic liver disease, there is a significant degree of protein malnutrition.  Dicussed need to change dietary habits to improve overall protein balance.  Discussed the importance of eating between 1.2-1.5g/kg/day lean protein like eggs, fish, chicken, nuts, and legumes, in addition to a diet rich in fresh fruits and vegetables.  Continue to follow a sodium restricted (<2g sodium diet) and discussed the need to minimize the intake of carbohydrates and sugars, to avoid obesity.   - Strongly encourage protein supplements 2-3 times daily (Boost, Ensure, [place] Instant Milk, etc.) to meet protein and caloric intake.  - Recommend a bedtime snack with protein and complex carbohydrate to minimize risk of muscle catabolism overnight    Routine Health Care in Patient with Chronic Liver Disease:  - All patients with liver disease, particularly those with cholestatic liver disease, are at an increased risk for osteoporosis.  We strongly recommend screening for Vitamin D deficiency at least twice yearly with aggressive supplementation/replacement as indicated.    - We also recommend a screening DEXA scan to evaluate for osteoporosis.  If present, should treat with calcium, Vitamin D supplementation, and recommend consideration of bisphosphonate therapy.  Also recommend follow up DEXA scans to evaluate for improvement of bone density on therapy.  - All patients with liver disease should avoid the use of Non-steroidal Anti-Inflammatory (NSAID) medications as they can cause significant injury to the kidneys in this population    Follow Up:  6 months     Thank you very much for the opportunity to participate in the care of this patient.  If you have any further questions, please don't hesitate to contact our office.    Thomas M. Leventhal, M.D.   of Medicine  Advanced & Transplant Hepatology  [facility]  [facility]

## 2019-10-31 NOTE — NURSING NOTE
"/67   Pulse 112   Temp 98.5  F (36.9  C) (Oral)   Ht 1.727 m (5' 8\")   Wt 76.9 kg (169 lb 8 oz)   BMI 25.77 kg/m    Chief Complaint   Patient presents with     RECHECK     follow up with Hep C, kyleeimrula cma       "

## 2019-11-01 NOTE — PROGRESS NOTES
Date of Service: 1/28/2019     Subjective:            Danielle Cook is a 74 year old male presenting for evaluation of liver disease    Due to language barrier, an  was present during the history-taking and subsequent discussion (and for part of the physical exam) with this patient.      History of Present Illness   Danielle Cook is a 74 year old male with past medical history of chronic kidney disease, Chronic hepatitis C (s/p SVR) with cirrhosis complicated by esophageal varices and mild protein calorie malnutrition who presents in routine follow up.    Since last seen he notes that he has had many issues with headaches, facial pain and swelling, chest aches, difficulties with swallowing, shoulder pains.  He has establish care with Dr. Jain in rheumatology in regard to a likely diagnosis of PMR.  He attempted a trial of corticosteroids, but had worsening of headaches and facial swelling, and did not feel that he could tolerate the medication further.  In an attempt to treat with steroid sparing regimen, he was given a prescription for Flexeril.  Patient admits that he took 1 dose, and was unable to get out of bed for greater than 24 hours and has not taken a dose since.  He continues to have significant pain issues which seem to make him quite uncomfortable today  He has not had any issues with hematemesis or melena, notes that his bowel habits are consistent    He underwent ultrasound screening for hepatocellular carcinoma today, and this was negative for any new masses or lesions.      Past Medical History:  Past Medical History:   Diagnosis Date     Bad headache      CKD (chronic kidney disease)      CKD (chronic kidney disease) stage 3, GFR 30-59 ml/min (H)      Hep C w/o coma, chronic (H)      Hepatitis C virus infection      Hypertension        Surgical History:  Past Surgical History:   Procedure Laterality Date     COLONOSCOPY N/A 1/19/2018    Procedure: COLONOSCOPY;  COMBINED  ESOPHAGOSCOPY, GASTROSCOPY, DUODENOSCOPY (EGD) REMOVE TUMOR/POYP/LESION BY SNARE, CONTROL BLEED,BANDING/LIGATION OF VARICES Colonoscopy;  Surgeon: Xavier Sutton MD;  Location:  GI     ESOPHAGOSCOPY, GASTROSCOPY, DUODENOSCOPY (EGD), COMBINED N/A 11/4/2015    Procedure: COMBINED ESOPHAGOSCOPY, GASTROSCOPY, DUODENOSCOPY (EGD), BIOPSY SINGLE OR MULTIPLE;  Surgeon: Inocente Do MD;  Location:  GI     ESOPHAGOSCOPY, GASTROSCOPY, DUODENOSCOPY (EGD), COMBINED N/A 3/29/2018    Procedure: COMBINED ESOPHAGOSCOPY, GASTROSCOPY, DUODENOSCOPY (EGD);  egd;  Surgeon: Danielle Mendenhall MD;  Location:  GI     ESOPHAGOSCOPY, GASTROSCOPY, DUODENOSCOPY (EGD), COMBINED N/A 9/10/2018    Procedure: COMBINED ESOPHAGOSCOPY, GASTROSCOPY, DUODENOSCOPY (EGD);  egd;  Surgeon: Xavier Sutton MD;  [facility]     ESOPHAGOSCOPY, GASTROSCOPY, DUODENOSCOPY (EGD), COMBINED N/A 4/17/2019    Procedure: COMBINED ESOPHAGOSCOPY, GASTROSCOPY, DUODENOSCOPY (EGD);  Surgeon: Leventhal, Thomas Michael, MD;  Location:  GI     EYE SURGERY Bilateral     cataracts       Social History:  Social History     Tobacco Use     Smoking status: Never Smoker     Smokeless tobacco: Never Used   Substance Use Topics     Alcohol use: No     Alcohol/week: 0.0 standard drinks     Drug use: No         Family History:  Family History   Problem Relation Age of Onset     Hypertension Father    - Denies a family history of liver disease         Medications:  Current Outpatient Medications   Medication     acetaminophen (TYLENOL) 325 MG tablet     amLODIPine (NORVASC) 10 MG tablet     emollient (VANICREAM) external cream     furosemide (LASIX) 20 MG tablet     gabapentin (NEURONTIN) 300 MG tablet     omeprazole (PRILOSEC) 40 MG capsule     vitamin D3 (CHOLECALCIFEROL) 1000 units (25 mcg) tablet     ASPIRIN LOW DOSE 81 MG EC tablet     cyclobenzaprine (FLEXERIL) 10 MG tablet     ferrous fumarate (FERRETTS) 324 (106 Fe) MG TABS tablet     levothyroxine  "(SYNTHROID/LEVOTHROID) 50 MCG tablet     levothyroxine (SYNTHROID/LEVOTHROID) 75 MCG tablet     lidocaine, viscous, (XYLOCAINE) 2 % solution     mometasone (ELOCON) 0.1 % external ointment     nortriptyline (PAMELOR) 25 MG capsule     potassium chloride (KLOR-CON) 20 MEQ packet     tamsulosin (FLOMAX) 0.4 MG capsule     No current facility-administered medications for this visit.      Facility-Administered Medications Ordered in Other Visits   Medication     gadobutrol (GADAVIST) injection 7.5 mL       Review of Systems  A complete 10 point review of systems was asked and answered in the negative unless specifically commented upon in the HPI    Objective:           Vitals:    10/31/19 1354   BP: 121/67   Pulse: 112   Temp: 98.5  F (36.9  C)   TempSrc: Oral   Weight: 76.9 kg (169 lb 8 oz)   Height: 1.727 m (5' 8\")     Body mass index is 25.77 kg/m .     Physical Exam    Vitals reviewed.   Constitutional: Well-developed, well-nourished, in no apparent distress.    HEENT: Normocephalic. No scleral icterus. Moist oral mucosa. Dentition normal  Neck/Lymph: Normal ROM, supple.  Cardiac:  Regular rate and rhythm.   Respiratory: Normal respiratory excursion.  GI:  Abdomen soft, non-distended, non-tender. BS present.   Skin:  Skin is warm and dry. No rash noted.  No jaundice.   Peripheral Vascular: No lower extremity edema. 2+ pulses in all extremities  Musculoskeletal:  ROM intact, normal muscle bulk    Psychiatric: Normal mood and affect. Behavior is normal.  Neuro:  No asterixis, No tremor    Labs and Diagnostic tests:    MELD-Na score: 11 at 10/31/2019  1:35 PM  MELD score: 11 at 10/31/2019  1:35 PM  Calculated from:  Serum Creatinine: 1.22 mg/dL at 10/31/2019  1:35 PM  Serum Sodium: 140 mmol/L (Rounded to 137 mmol/L) at 10/31/2019  1:35 PM  Total Bilirubin: 1.4 mg/dL at 10/31/2019  1:35 PM  INR(ratio): 1.05 at 10/31/2019  1:35 PM  Age: 74 years    Imaging:  MRI 10/31/2019  Liver: The liver demonstrates coarse " echotexture with nodular contour.  No focal hepatic mass. Liver measures 9.9 cm in craniocaudal dimension.  Extrahepatic portal vein flow is antegrade, measuring 14 cm/sec.  Right portal vein flow is antegrade, measuring 7 cm/sec.  Left portal vein flow is antegrade, measuring 14 cm/sec.  Hepatic artery flow is anterdgrdrrdarddrderd:rd rd3rd3rd cm/sec peak systolic  0.75 resistive index.   The splenic vein at the hilum is patent with flow towards the liver.   The left, middle, and right hepatic veins are patent with flow towards  the IVC. The IVC is patent with flow towards the heart.   The aorta is  of normal caliber.Proximal aorta is obscured; mid abdominal aorta  measures 1.9 cm and distal abdominal aorta measures 1.8 cm.     Gallbladder: The gallbladder is well distended and of normal  morphology. No cholelithiasis. Mild prominence of the gallbladder  wall, likely related to the presence of ascites and underlying liver  disease.  Bile Ducts: Common bile duct is not visualized.  Pancreas: Visualized portions of the pancreas are unremarkable.   Kidneys: Both kidneys are of normal echotexture, without mass,  shadowing stone, or hydronephrosis. The craniocaudal dimensions are:  right- 8.3 cm, left- 8.6 cm.  Spleen: The spleen measures 10.2 cm in sagittal dimension.  Fluid: Ascites.     Procedures:  EGD: 4/17/2019  Findings:        Non-bleeding small (< 5 mm) varices were found in the lower third of the        esophagus,. No stigmata of recent bleeding were evident and no red jesus        signs were present. Stigmata of prior treatment were evident. Scarring        from prior treatment was visible.        A small hiatal hernia was present.        The lower third of the esophagus was mildly tortuous.        An endoclip was found on the greater curvature of the stomach.        Mild portal hypertensive gastropathy was found in the entire examined        stomach.        The examined duodenum was normal.                                            Colonoscopy: 1/19/2018  Findings:        The perianal and digital rectal examinations were normal. Pertinent        negatives include no palpable rectal lesions.        The entire examined colon appeared normal.        The retroflexed view of the distal rectum and anal verge was normal and        showed no anal or rectal abnormalities.     Assessment and Plan:    Cirrhosis:   - Secondary to chronic hepatitis C, now s/p treatment with SVR of Hepatitis C  - His main signs of decompensation are esophageal varices.    - MELD on labs today 11  - Had discussion with patient that I am hopeful that his liver is re-compensating after clearance of HCV and he may be able to sustain his current quality of life for several years  - MELD labs Q 6 months    Liver Transplant Candidacy:  - At this time he does not warrant transplant evaluation  - Barrier to liver transplantation in the future will likely be CKD and age    Hepatocellular Cancer Screening:   - Abd US from 10/2018 without mass lesions  - Will repeat an MRI with contrast today  - Recommend screening for HCC every 6 months with either abdominal ultrasound or by alternating abdominal ultrasound with EITHER a triple/quad phase CT Liver with IV contrast OR a Quad phase MRI Liver with IV contrast.  AFP levels should be checked every 6 months at time of imaging screen.    Ascites: No acute issues  - Continue to follow a sodium restricted (<2g sodium diet)     Hepatic Encephalopathy: no acute issues    Esophageal Varices:  - Exam on 4/17/19 with small varices  - Recommend repeating an upper GI endoscopy 1-2 year.  If evidence of medium/large esophageal varices, would recommend esophageal varix band ligation and if band ligation is performed, please continue to repeat until eraditation of varices.    Kidney Health: Has CKD stage 3 (eGFR < 60, >30)  - Encourage more free water intake based on current Cr    Immobility/Frailty: no acute issues    Nutrition:  As with most  patients with chronic liver disease, there is a significant degree of protein malnutrition.  Dicussed need to change dietary habits to improve overall protein balance.  Discussed the importance of eating between 1.2-1.5g/kg/day lean protein like eggs, fish, chicken, nuts, and legumes, in addition to a diet rich in fresh fruits and vegetables.  Continue to follow a sodium restricted (<2g sodium diet) and discussed the need to minimize the intake of carbohydrates and sugars, to avoid obesity.   - Strongly encourage protein supplements 2-3 times daily (Boost, Ensure, [place] Instant Milk, etc.) to meet protein and caloric intake.  - Recommend a bedtime snack with protein and complex carbohydrate to minimize risk of muscle catabolism overnight    Routine Health Care in Patient with Chronic Liver Disease:  - All patients with liver disease, particularly those with cholestatic liver disease, are at an increased risk for osteoporosis.  We strongly recommend screening for Vitamin D deficiency at least twice yearly with aggressive supplementation/replacement as indicated.    - We also recommend a screening DEXA scan to evaluate for osteoporosis.  If present, should treat with calcium, Vitamin D supplementation, and recommend consideration of bisphosphonate therapy.  Also recommend follow up DEXA scans to evaluate for improvement of bone density on therapy.  - All patients with liver disease should avoid the use of Non-steroidal Anti-Inflammatory (NSAID) medications as they can cause significant injury to the kidneys in this population    Follow Up:  6 months     Thank you very much for the opportunity to participate in the care of this patient.  If you have any further questions, please don't hesitate to contact our office.    Thomas M. Leventhal, M.D.   of Medicine  Advanced & Transplant Hepatology  [facility]

## 2020-04-20 ENCOUNTER — TELEPHONE (OUTPATIENT)
Dept: GASTROENTEROLOGY | Facility: CLINIC | Age: 75
End: 2020-04-20

## 2020-04-20 ENCOUNTER — HOSPITAL ENCOUNTER (EMERGENCY)
Facility: CLINIC | Age: 75
Discharge: HOME | End: 2020-04-21
Attending: EMERGENCY MEDICINE | Admitting: EMERGENCY MEDICINE
Payer: COMMERCIAL

## 2020-04-20 ENCOUNTER — APPOINTMENT (OUTPATIENT)
Dept: CT IMAGING | Facility: CLINIC | Age: 75
End: 2020-04-20
Attending: EMERGENCY MEDICINE
Payer: COMMERCIAL

## 2020-04-20 DIAGNOSIS — K74.60 CIRRHOSIS OF LIVER WITH ASCITES, UNSPECIFIED HEPATIC CIRRHOSIS TYPE (H): ICD-10-CM

## 2020-04-20 DIAGNOSIS — R30.0 DYSURIA: ICD-10-CM

## 2020-04-20 DIAGNOSIS — R18.8 CIRRHOSIS OF LIVER WITH ASCITES, UNSPECIFIED HEPATIC CIRRHOSIS TYPE (H): ICD-10-CM

## 2020-04-20 LAB
ALBUMIN SERPL-MCNC: 2.6 G/DL (ref 3.4–5)
ALBUMIN UR-MCNC: NEGATIVE MG/DL
ALP SERPL-CCNC: 343 U/L (ref 40–150)
ALT SERPL W P-5'-P-CCNC: 38 U/L (ref 0–70)
ANION GAP SERPL CALCULATED.3IONS-SCNC: 6 MMOL/L (ref 3–14)
APPEARANCE UR: CLEAR
AST SERPL W P-5'-P-CCNC: 49 U/L (ref 0–45)
BASOPHILS # BLD AUTO: 0.1 10E9/L (ref 0–0.2)
BASOPHILS NFR BLD AUTO: 0.8 %
BILIRUB SERPL-MCNC: 1.1 MG/DL (ref 0.2–1.3)
BILIRUB UR QL STRIP: NEGATIVE
BUN SERPL-MCNC: 10 MG/DL (ref 7–30)
CALCIUM SERPL-MCNC: 8.3 MG/DL (ref 8.5–10.1)
CHLORIDE SERPL-SCNC: 112 MMOL/L (ref 94–109)
CO2 SERPL-SCNC: 22 MMOL/L (ref 20–32)
COLOR UR AUTO: YELLOW
CREAT SERPL-MCNC: 1.2 MG/DL (ref 0.66–1.25)
DIFFERENTIAL METHOD BLD: ABNORMAL
EOSINOPHIL # BLD AUTO: 0.2 10E9/L (ref 0–0.7)
EOSINOPHIL NFR BLD AUTO: 2.6 %
ERYTHROCYTE [DISTWIDTH] IN BLOOD BY AUTOMATED COUNT: 15.8 % (ref 10–15)
GFR SERPL CREATININE-BSD FRML MDRD: 59 ML/MIN/{1.73_M2}
GLUCOSE SERPL-MCNC: 124 MG/DL (ref 70–99)
GLUCOSE UR STRIP-MCNC: NEGATIVE MG/DL
HCT VFR BLD AUTO: 35 % (ref 40–53)
HGB BLD-MCNC: 10.8 G/DL (ref 13.3–17.7)
HGB UR QL STRIP: NEGATIVE
IMM GRANULOCYTES # BLD: 0 10E9/L (ref 0–0.4)
IMM GRANULOCYTES NFR BLD: 0.5 %
KETONES UR STRIP-MCNC: NEGATIVE MG/DL
LEUKOCYTE ESTERASE UR QL STRIP: NEGATIVE
LIPASE SERPL-CCNC: 78 U/L (ref 73–393)
LYMPHOCYTES # BLD AUTO: 0.8 10E9/L (ref 0.8–5.3)
LYMPHOCYTES NFR BLD AUTO: 13.3 %
MCH RBC QN AUTO: 27.1 PG (ref 26.5–33)
MCHC RBC AUTO-ENTMCNC: 30.9 G/DL (ref 31.5–36.5)
MCV RBC AUTO: 88 FL (ref 78–100)
MONOCYTES # BLD AUTO: 0.9 10E9/L (ref 0–1.3)
MONOCYTES NFR BLD AUTO: 15.2 %
NEUTROPHILS # BLD AUTO: 4.2 10E9/L (ref 1.6–8.3)
NEUTROPHILS NFR BLD AUTO: 67.6 %
NITRATE UR QL: NEGATIVE
NRBC # BLD AUTO: 0 10*3/UL
NRBC BLD AUTO-RTO: 0 /100
PH UR STRIP: 6.5 PH (ref 5–7)
PLATELET # BLD AUTO: 148 10E9/L (ref 150–450)
POTASSIUM SERPL-SCNC: 3.3 MMOL/L (ref 3.4–5.3)
PROT SERPL-MCNC: 7.1 G/DL (ref 6.8–8.8)
RBC # BLD AUTO: 3.98 10E12/L (ref 4.4–5.9)
SODIUM SERPL-SCNC: 140 MMOL/L (ref 133–144)
SOURCE: NORMAL
SP GR UR STRIP: 1 (ref 1–1.03)
UROBILINOGEN UR STRIP-MCNC: 2 MG/DL (ref 0–2)
WBC # BLD AUTO: 6.2 10E9/L (ref 4–11)

## 2020-04-20 PROCEDURE — 25000128 H RX IP 250 OP 636: Performed by: EMERGENCY MEDICINE

## 2020-04-20 PROCEDURE — 99284 EMERGENCY DEPT VISIT MOD MDM: CPT | Mod: 25 | Performed by: EMERGENCY MEDICINE

## 2020-04-20 PROCEDURE — 99284 EMERGENCY DEPT VISIT MOD MDM: CPT | Mod: Z6 | Performed by: EMERGENCY MEDICINE

## 2020-04-20 PROCEDURE — 83690 ASSAY OF LIPASE: CPT | Performed by: EMERGENCY MEDICINE

## 2020-04-20 PROCEDURE — 40000556 ZZH STATISTIC PERIPHERAL IV START W US GUIDANCE

## 2020-04-20 PROCEDURE — 80053 COMPREHEN METABOLIC PANEL: CPT | Performed by: EMERGENCY MEDICINE

## 2020-04-20 PROCEDURE — 85025 COMPLETE CBC W/AUTO DIFF WBC: CPT | Performed by: EMERGENCY MEDICINE

## 2020-04-20 PROCEDURE — 74177 CT ABD & PELVIS W/CONTRAST: CPT

## 2020-04-20 PROCEDURE — 81001 URINALYSIS AUTO W/SCOPE: CPT | Performed by: EMERGENCY MEDICINE

## 2020-04-20 RX ORDER — IOPAMIDOL 755 MG/ML
111 INJECTION, SOLUTION INTRAVASCULAR ONCE
Status: COMPLETED | OUTPATIENT
Start: 2020-04-20 | End: 2020-04-20

## 2020-04-20 RX ADMIN — IOPAMIDOL 111 ML: 755 INJECTION, SOLUTION INTRAVENOUS at 20:15

## 2020-04-20 ASSESSMENT — ENCOUNTER SYMPTOMS
FATIGUE: 0
ABDOMINAL PAIN: 1
SHORTNESS OF BREATH: 0
DIZZINESS: 0
ARTHRALGIAS: 0
EYE PAIN: 0
DYSURIA: 1
NAUSEA: 0
CHEST TIGHTNESS: 0
WEAKNESS: 0
FREQUENCY: 0
BACK PAIN: 0
CHILLS: 0
NECK PAIN: 0
SORE THROAT: 0
HEADACHES: 0
COLOR CHANGE: 0
DIFFICULTY URINATING: 1
MYALGIAS: 0
DIARRHEA: 0
ABDOMINAL DISTENTION: 1
PALPITATIONS: 0
CONSTIPATION: 0
FEVER: 0
CONFUSION: 0
COUGH: 0
VOMITING: 0

## 2020-04-20 NOTE — TELEPHONE ENCOUNTER
Call back to patient's wife, Priscilla, utilizing a [employer] . Went over input per Dr. Leventhal as stated below. Wife agreeable to plan.    Karen Dumas LPN  Hepatology Clinic      -----------  Leventhal, MD Piter Juan Shannon, LPN     If he has urgent/emergent issues he should go through his primary or to urgent care.   We can schedule him 4/27 as he was and either phone with  or in person with      TL     ------------  [facility] Call Center    Phone Message    May a detailed message be left on voicemail: yes     Reason for Call: Other: Pt's wife Priscilla states they were contacted and told that the imaging for 4/27 appt had been cancelled. Priscilla is concerned for pt and wanted him to be seen sooner than his current appt for 4/27. Writer offered telephone visit for 4/23; she declined and asked to be seen today or tomorrow. She is also requesting guidance as how to schedule the labs and US. Please contact Priscilla with [employer]  to discuss care plan.     Action Taken: Message routed to:  [facility]atology    Travel Screening: Not Applicable

## 2020-04-20 NOTE — ED AVS SNAPSHOT
[facility], [place], Emergency Department  [address] [place] [id number]  Phone:  [phone]                                    Danielle Cook   MRN: [id number]    Department:  [facility], Emergency Department   Date of Visit:  4/20/2020           After Visit Summary Signature Page    I have received my discharge instructions, and my questions have been answered. I have discussed any challenges I see with this plan with the nurse or doctor.    ..........................................................................................................................................  Patient/Patient Representative Signature      ..........................................................................................................................................  Patient Representative Print Name and Relationship to Patient    ..................................................               ................................................  Date                                   Time    ..........................................................................................................................................  Reviewed by Signature/Title    ...................................................              ..............................................  Date                                               Time          22EPIC Rev 08/18

## 2020-04-20 NOTE — ED TRIAGE NOTES
Presents with abdominal pain/distention, hx of cirrhosis. Patient reports this has been going on for a month, unsure if he's ever received a paracentesis before. Patient also c/o urinary retention and constipation. Patient reports he did have a small bowel movement today, states that he has a lot of pressure in his abdomen

## 2020-04-21 ENCOUNTER — TELEPHONE (OUTPATIENT)
Dept: GASTROENTEROLOGY | Facility: CLINIC | Age: 75
End: 2020-04-21

## 2020-04-21 VITALS
WEIGHT: 180.6 LBS | DIASTOLIC BLOOD PRESSURE: 72 MMHG | SYSTOLIC BLOOD PRESSURE: 143 MMHG | RESPIRATION RATE: 18 BRPM | TEMPERATURE: 97.8 F | OXYGEN SATURATION: 100 % | HEART RATE: 96 BPM | BODY MASS INDEX: 27.46 KG/M2

## 2020-04-21 DIAGNOSIS — K74.60 CIRRHOSIS OF LIVER WITHOUT ASCITES, UNSPECIFIED HEPATIC CIRRHOSIS TYPE (H): Primary | ICD-10-CM

## 2020-04-21 RX ORDER — FUROSEMIDE 20 MG
20 TABLET ORAL DAILY
Qty: 90 TABLET | Refills: 1 | Status: SHIPPED | OUTPATIENT
Start: 2020-04-21 | End: 2020-08-27

## 2020-04-21 RX ORDER — SPIRONOLACTONE 50 MG/1
50 TABLET, FILM COATED ORAL DAILY
Qty: 90 TABLET | Refills: 1 | Status: SHIPPED | OUTPATIENT
Start: 2020-04-21 | End: 2020-08-27

## 2020-04-21 RX ORDER — TAMSULOSIN HYDROCHLORIDE 0.4 MG/1
0.4 CAPSULE ORAL DAILY
Qty: 10 CAPSULE | Refills: 0 | Status: SHIPPED | OUTPATIENT
Start: 2020-04-21 | End: 2020-05-01

## 2020-04-21 RX ORDER — PHENAZOPYRIDINE HYDROCHLORIDE 200 MG/1
200 TABLET, FILM COATED ORAL 3 TIMES DAILY
Qty: 9 TABLET | Refills: 0 | Status: SHIPPED | OUTPATIENT
Start: 2020-04-21 | End: 2020-04-24

## 2020-04-21 NOTE — ED PROVIDER NOTES
[facility] EMERGENCY DEPARTMENT ([facility])  4/20/20    History     Chief Complaint   Patient presents with     Abdominal Pain     The history is provided by the patient and medical records. A  was used (Official i-Pad Swiss interprter).     Danielle Cook is a 75 year old male with a past medical history significant for liver cirrhosis c/b esophageal varices, hepatitis C, CKD stage 3, HTN, GERD, CAD, and HLD who presents here to the Emergency Department due to abdominal pain and distension.    Per chart review patient recently saw his PCP on 2/17/2020 and had been noting abdominal pain. His exam was not thought to be concerning and his mentation was normal. His symptoms were thought to be mostly likely due to gastritis and constipation. His CMP showed a high alk phosphate, however, this was improved from prior. Was prescribe colace 100 mg po BID.    Here in the ED patient reports that he has been having abdominal pain for the last month. States that his pain has been worsening and is associated with abdominal distension. Notes his pain is mostly in his lower abdomen. He has also noted troubles urinating, painful BMs and pain while urinating. Notes that he is supposed to see a GI doctor on 4/27. Denies fevers, diarrhea or cold/flu symptoms. Denies recent travel.    I have reviewed the Medications, Allergies, Past Medical and Surgical History, and Social History in the [employer] system.  PAST MEDICAL HISTORY:   Past Medical History:   Diagnosis Date     Bad headache      CKD (chronic kidney disease)      CKD (chronic kidney disease) stage 3, GFR 30-59 ml/min (H)      Hep C w/o coma, chronic (H)      Hepatitis C virus infection      Hypertension        PAST SURGICAL HISTORY:   Past Surgical History:   Procedure Laterality Date     COLONOSCOPY N/A 1/19/2018    Procedure: COLONOSCOPY;  COMBINED ESOPHAGOSCOPY, GASTROSCOPY, DUODENOSCOPY (EGD) REMOVE TUMOR/POYP/LESION BY SNARE, CONTROL  BLEED,BANDING/LIGATION OF VARICES Colonoscopy;  Surgeon: Xavier Sutton MD;  Location: [facility] GI     ESOPHAGOSCOPY, GASTROSCOPY, DUODENOSCOPY (EGD), COMBINED N/A 11/4/2015    Procedure: COMBINED ESOPHAGOSCOPY, GASTROSCOPY, DUODENOSCOPY (EGD), BIOPSY SINGLE OR MULTIPLE;  Surgeon: Inocente Do MD;  Location:  GI     ESOPHAGOSCOPY, GASTROSCOPY, DUODENOSCOPY (EGD), COMBINED N/A 3/29/2018    Procedure: COMBINED ESOPHAGOSCOPY, GASTROSCOPY, DUODENOSCOPY (EGD);  egd;  Surgeon: Danielle Mendenhall MD;  Location: [facility] GI     ESOPHAGOSCOPY, GASTROSCOPY, DUODENOSCOPY (EGD), COMBINED N/A 9/10/2018    Procedure: COMBINED ESOPHAGOSCOPY, GASTROSCOPY, DUODENOSCOPY (EGD);  egd;  Surgeon: Xavier Sutton MD;  Location: [facility] OR     ESOPHAGOSCOPY, GASTROSCOPY, DUODENOSCOPY (EGD), COMBINED N/A 4/17/2019    Procedure: COMBINED ESOPHAGOSCOPY, GASTROSCOPY, DUODENOSCOPY (EGD);  Surgeon: Leventhal, Thomas Michael, MD;  Location:  GI     EYE SURGERY Bilateral     cataracts       Past medical history, past surgical history, medications, and allergies were reviewed with the patient. Additional pertinent items: None    FAMILY HISTORY:   Family History   Problem Relation Age of Onset     Hypertension Father        SOCIAL HISTORY:   Social History     Tobacco Use     Smoking status: Never Smoker     Smokeless tobacco: Never Used   Substance Use Topics     Alcohol use: No     Alcohol/week: 0.0 standard drinks     Social history was reviewed with the patient. Additional pertinent items: None      Patient's Medications   New Prescriptions    PHENAZOPYRIDINE (PYRIDIUM) 200 MG TABLET    Take 1 tablet (200 mg) by mouth 3 times daily for 3 days    TAMSULOSIN (FLOMAX) 0.4 MG CAPSULE    Take 1 capsule (0.4 mg) by mouth daily for 10 doses   Previous Medications    ACETAMINOPHEN (TYLENOL) 325 MG TABLET    Take 1 tablet (325 mg) by mouth every 6 hours as needed for mild pain    AMLODIPINE (NORVASC) 10 MG TABLET        ASPIRIN LOW DOSE 81 MG EC TABLET         CYCLOBENZAPRINE (FLEXERIL) 10 MG TABLET    Take 1 tablet (10 mg) by mouth 3 times daily as needed for muscle spasms    EMOLLIENT (VANICREAM) EXTERNAL CREAM    Apply topically 2 times daily    FERROUS FUMARATE (FERRETTS) 324 (106 FE) MG TABS TABLET    Take by mouth daily    FUROSEMIDE (LASIX) 20 MG TABLET    Take 1 tablet (20 mg) by mouth daily    GABAPENTIN (NEURONTIN) 300 MG TABLET    Take 600 mg by mouth 3 times daily     LEVOTHYROXINE (SYNTHROID/LEVOTHROID) 50 MCG TABLET        LEVOTHYROXINE (SYNTHROID/LEVOTHROID) 75 MCG TABLET        LIDOCAINE, VISCOUS, (XYLOCAINE) 2 % SOLUTION        MOMETASONE (ELOCON) 0.1 % EXTERNAL OINTMENT    APPLY TOPICALLY DAILY    NORTRIPTYLINE (PAMELOR) 25 MG CAPSULE        OMEPRAZOLE (PRILOSEC) 40 MG CAPSULE    Take 1 capsule (40 mg) by mouth 2 times daily    POTASSIUM CHLORIDE (KLOR-CON) 20 MEQ PACKET    Take 20 mEq by mouth daily    VITAMIN D3 (CHOLECALCIFEROL) 1000 UNITS (25 MCG) TABLET    Take 2 tablets (2,000 Units) by mouth daily   Modified Medications    No medications on file   Discontinued Medications    TAMSULOSIN (FLOMAX) 0.4 MG CAPSULE    Take 1 capsule (0.4 mg) by mouth daily        No Known Allergies     Review of Systems   Constitutional: Negative for chills, fatigue and fever.   HENT: Negative for congestion and sore throat.    Eyes: Negative for pain and visual disturbance.   Respiratory: Negative for cough, chest tightness and shortness of breath.    Cardiovascular: Negative for chest pain and palpitations.   Gastrointestinal: Positive for abdominal distention and abdominal pain. Negative for constipation, diarrhea, nausea and vomiting.   Genitourinary: Positive for difficulty urinating and dysuria. Negative for frequency and urgency.   Musculoskeletal: Negative for arthralgias, back pain, myalgias and neck pain.   Skin: Negative for color change and rash.   Neurological: Negative for dizziness, weakness and headaches.   Psychiatric/Behavioral: Negative for  confusion.       Physical Exam   BP: 128/64  Pulse: 78  Heart Rate: 104  Temp: 97.8  F (36.6  C)  Resp: 20  Weight: 81.9 kg (180 lb 9.6 oz)  SpO2: 99 %      Physical Exam  Vitals signs and nursing note reviewed.   Constitutional:       General: He is not in acute distress.     Appearance: Normal appearance. He is not ill-appearing or toxic-appearing.   HENT:      Head: Normocephalic and atraumatic.      Nose: Nose normal.      Mouth/Throat:      Mouth: Mucous membranes are moist.   Eyes:      Pupils: Pupils are equal, round, and reactive to light.   Neck:      Musculoskeletal: Normal range of motion. No neck rigidity.   Cardiovascular:      Rate and Rhythm: Normal rate.      Pulses: Normal pulses.      Heart sounds: Normal heart sounds.   Pulmonary:      Effort: Pulmonary effort is normal. No respiratory distress.      Breath sounds: Normal breath sounds.   Abdominal:      General: Abdomen is flat. There is distension.      Tenderness: There is no abdominal tenderness. There is no right CVA tenderness, left CVA tenderness, guarding or rebound.      Comments: Abdomen is mildly distended with a positive fluid wave.  Normal bowel sounds x4.  Otherwise soft and no specific tenderness.   Musculoskeletal: Normal range of motion.         General: No swelling or deformity.   Skin:     General: Skin is warm.      Capillary Refill: Capillary refill takes less than 2 seconds.   Neurological:      Mental Status: He is alert and oriented to person, place, and time.   Psychiatric:         Mood and Affect: Mood normal.         ED Course   7:13 PM  The patient was seen and examined by Raleigh Hoskins DO in Room ED17.         Results for orders placed or performed during the hospital encounter of 04/20/20 (from the past 24 hour(s))   CBC with platelets differential   Result Value Ref Range    WBC 6.2 4.0 - 11.0 10e9/L    RBC Count 3.98 (L) 4.4 - 5.9 10e12/L    Hemoglobin 10.8 (L) 13.3 - 17.7 g/dL    Hematocrit 35.0 (L) 40.0 - 53.0 %     MCV 88 78 - 100 fl    MCH 27.1 26.5 - 33.0 pg    MCHC 30.9 (L) 31.5 - 36.5 g/dL    RDW 15.8 (H) 10.0 - 15.0 %    Platelet Count 148 (L) 150 - 450 10e9/L    Diff Method Automated Method     % Neutrophils 67.6 %    % Lymphocytes 13.3 %    % Monocytes 15.2 %    % Eosinophils 2.6 %    % Basophils 0.8 %    % Immature Granulocytes 0.5 %    Nucleated RBCs 0 0 /100    Absolute Neutrophil 4.2 1.6 - 8.3 10e9/L    Absolute Lymphocytes 0.8 0.8 - 5.3 10e9/L    Absolute Monocytes 0.9 0.0 - 1.3 10e9/L    Absolute Eosinophils 0.2 0.0 - 0.7 10e9/L    Absolute Basophils 0.1 0.0 - 0.2 10e9/L    Abs Immature Granulocytes 0.0 0 - 0.4 10e9/L    Absolute Nucleated RBC 0.0    Comprehensive metabolic panel   Result Value Ref Range    Sodium 140 133 - 144 mmol/L    Potassium 3.3 (L) 3.4 - 5.3 mmol/L    Chloride 112 (H) 94 - 109 mmol/L    Carbon Dioxide 22 20 - 32 mmol/L    Anion Gap 6 3 - 14 mmol/L    Glucose 124 (H) 70 - 99 mg/dL    Urea Nitrogen 10 7 - 30 mg/dL    Creatinine 1.20 0.66 - 1.25 mg/dL    GFR Estimate 59 (L) >60 mL/min/[1.73_m2]    GFR Estimate If Black 69 >60 mL/min/[1.73_m2]    Calcium 8.3 (L) 8.5 - 10.1 mg/dL    Bilirubin Total 1.1 0.2 - 1.3 mg/dL    Albumin 2.6 (L) 3.4 - 5.0 g/dL    Protein Total 7.1 6.8 - 8.8 g/dL    Alkaline Phosphatase 343 (H) 40 - 150 U/L    ALT 38 0 - 70 U/L    AST 49 (H) 0 - 45 U/L   Lipase   Result Value Ref Range    Lipase 78 73 - 393 U/L   CT Abdomen Pelvis w Contrast    Narrative    EXAMINATION: CT of the Chest, Abdomen and Pelvis on 4/20/2020.    INDICATION: hx cirrhosis, ascites, diffuse abd pain, constipation,  urinary hesitancy, r/o mass, sbo     COMPARISON: MR dated 4/29/2019. CT dated 10/9/2018.     TECHNIQUE: Axial images of the abdomen and pelvis were obtained with  100 mL IV Isovue 370 contrast. Coronal reconstructions were provided.  Images were reviewed in bone, lung, and soft tissue windows.    Dose: 1071 mGy*cm    FINDINGS:    Lines and tubes:    Lower thorax:  The heart size is  mildly enlarged. No pericardial effusion. Bibasilar  atelectasis. No pleural effusion.    Abdomen and Pelvis:   Cirrhotic liver. No focal hepatic lesions. Fatty infiltration of the  pancreas. The gallbladder and spleen are unremarkable. Subcentimeter  cyst in the right kidney. No hydronephrosis. The adrenal glands are  normal.  Moderate hiatal hernia. No bowel dilation. No intra-abdominal  free air. Moderate ascites.    Vessels and lymph nodes:  Atherosclerotic calcifications of the aorta. Nonenlarged  retroperitoneal and mesenteric lymph nodes.    Bones and soft tissues:  Mild anasarca. No acute osseous abnormalities.      Impression    IMPRESSION:   1. Hepatic cirrhosis, moderate ascites and anasarca.  2. Moderate hiatal hernia.    I have personally reviewed the examination and initial interpretation  and I agree with the findings.    ILA ALMEIDA MD   UA reflex to Microscopic and Culture    Specimen: Urine clean catch   Result Value Ref Range    Color Urine Yellow     Appearance Urine Clear     Glucose Urine Negative NEG^Negative mg/dL    Bilirubin Urine Negative NEG^Negative    Ketones Urine Negative NEG^Negative mg/dL    Specific Gravity Urine 1.005 1.003 - 1.035    Blood Urine Negative NEG^Negative    pH Urine 6.5 5.0 - 7.0 pH    Protein Albumin Urine Negative NEG^Negative mg/dL    Urobilinogen mg/dL 2.0 0.0 - 2.0 mg/dL    Nitrite Urine Negative NEG^Negative    Leukocyte Esterase Urine Negative NEG^Negative    Source Urine        Medications   iopamidol (ISOVUE-370) solution 111 mL (111 mLs Intravenous Given 4/20/20 2015)   sodium chloride (PF) 0.9% PF flush 78 mL (78 mLs Intravenous Given 4/20/20 2016)       Procedures                  Assessments & Plan (with Medical Decision Making)   Patient presents for evaluation of chronic abdominal pain.  Has had this burning sensation throughout his abdomen for the past month.  Has appointment to see gastroenterology in 7 days.  States his pain is worse with  urination and defecation.    On arrival, patient appears nondistressed and nontoxic.  He has normal vital signs.  On physical exam his abdomen is mildly distended with a positive fluid wave, consistent with mild ascites.  His abdomen is otherwise soft and not particularly tender.    Differential diagnosis includes abdominal pain from ascitic fluid buildup, liver cirrhosis, liver mass, cholecystitis, pancreatitis, colitis, gastroenteritis.  Plan for CBC, CMP, lipase, urinalysis, CT abdomen pelvis with contrast.    Laboratory work-up is completely unremarkable.  Urinalysis is normal.  CT scan shows moderate ascites, liver cirrhosis, but no acute pathology.  Patient does not have any dyspnea, hypoxia, or any other indications for acute paracentesis.  His chronic abdominal pain is likely secondary to the ascites.  Not suspect SBP or any other serious intra-abdominal pathology.  Initial bladder scan showed 600 cc with a postvoid of 200 cc, however, I believe these values to be spurious given amount of ascites on CT exam.  Regardless, we will start the patient on Flomax as well as start him on Pyridium for bladder spasms.  These recommendations were given by urology service.  Patient has appointment on 4/27 to see Dr. Fuentes, gastroenterology.  I have educated him to follow-up this appointment, may need nonemergent paracentesis.    I have reviewed the nursing notes.    I have reviewed the findings, diagnosis, plan and need for follow up with the patient.    New Prescriptions    PHENAZOPYRIDINE (PYRIDIUM) 200 MG TABLET    Take 1 tablet (200 mg) by mouth 3 times daily for 3 days    TAMSULOSIN (FLOMAX) 0.4 MG CAPSULE    Take 1 capsule (0.4 mg) by mouth daily for 10 doses       Final diagnoses:   Cirrhosis of liver with ascites, unspecified hepatic cirrhosis type (H)   Dysuria     IColin, am serving as a trained medical scribe to document services personally performed by Raleigh Hoskins DO, based on the provider's  statements to me.   I, Raleigh Hoskins DO, was physically present and have reviewed and verified the accuracy of this note documented by Colin Hernandez.    4/20/2020   [facility], [place], EMERGENCY DEPARTMENT     Raleigh Hoskins DO  04/21/20 0042

## 2020-04-21 NOTE — TELEPHONE ENCOUNTER
Call back to patient's wife, Priscilla, utilizing a [employer]  went over new orders per Dr. Leventhal as stated below. All questions answered. Medications sent to requested pharmacy.    Karen Dumas LPN  Hepatology Clinic    ---------  Leventhal, Thomas Michael, MD Beckenbach, Shannon, LPN     Lets get him on lasix 20mg daily and spironolactone 50mg daily     TL   ---------  Beckenbach,Shannon,LPN Leventhal, Thomas Michael, MD     Pt was seen in ED yesterday, can you review and let me know what the plan is? Pt scheduled to see you on 4/27   -----------  [facility] Call Center    Phone Message    May a detailed message be left on voicemail: yes     Reason for Call: Other: Patient's wife called in and is asking for Liza to call her back regarding the patient. Please call the patient asap to discuss. Thank you.     Action Taken: Message routed to:  [facility] hep    Travel Screening: Not Applicable

## 2020-04-27 ENCOUNTER — OFFICE VISIT (OUTPATIENT)
Dept: GASTROENTEROLOGY | Facility: CLINIC | Age: 75
End: 2020-04-27
Attending: INTERNAL MEDICINE
Payer: COMMERCIAL

## 2020-04-27 VITALS
SYSTOLIC BLOOD PRESSURE: 117 MMHG | DIASTOLIC BLOOD PRESSURE: 72 MMHG | BODY MASS INDEX: 25.61 KG/M2 | WEIGHT: 168.4 LBS | HEART RATE: 97 BPM | TEMPERATURE: 98.5 F

## 2020-04-27 DIAGNOSIS — B19.20 DECOMPENSATED CIRRHOSIS RELATED TO HEPATITIS C VIRUS (HCV) (H): ICD-10-CM

## 2020-04-27 DIAGNOSIS — D50.9 IRON DEFICIENCY ANEMIA, UNSPECIFIED IRON DEFICIENCY ANEMIA TYPE: Primary | ICD-10-CM

## 2020-04-27 DIAGNOSIS — I85.10 SECONDARY ESOPHAGEAL VARICES WITHOUT BLEEDING (H): ICD-10-CM

## 2020-04-27 DIAGNOSIS — E03.9 HYPOTHYROIDISM, UNSPECIFIED TYPE: ICD-10-CM

## 2020-04-27 DIAGNOSIS — K74.60 CIRRHOSIS OF LIVER WITHOUT ASCITES, UNSPECIFIED HEPATIC CIRRHOSIS TYPE (H): ICD-10-CM

## 2020-04-27 DIAGNOSIS — N18.30 CKD (CHRONIC KIDNEY DISEASE) STAGE 3, GFR 30-59 ML/MIN (H): ICD-10-CM

## 2020-04-27 DIAGNOSIS — Z12.9 SCREENING FOR CANCER: ICD-10-CM

## 2020-04-27 DIAGNOSIS — K74.69 DECOMPENSATED CIRRHOSIS RELATED TO HEPATITIS C VIRUS (HCV) (H): ICD-10-CM

## 2020-04-27 DIAGNOSIS — R18.8 OTHER ASCITES: ICD-10-CM

## 2020-04-27 LAB
AFP SERPL-MCNC: 2.7 UG/L (ref 0–8)
ALBUMIN SERPL-MCNC: 3 G/DL (ref 3.4–5)
ALP SERPL-CCNC: 412 U/L (ref 40–150)
ALT SERPL W P-5'-P-CCNC: 47 U/L (ref 0–70)
ANION GAP SERPL CALCULATED.3IONS-SCNC: 6 MMOL/L (ref 3–14)
AST SERPL W P-5'-P-CCNC: 76 U/L (ref 0–45)
BILIRUB DIRECT SERPL-MCNC: 0.6 MG/DL (ref 0–0.2)
BILIRUB SERPL-MCNC: 0.9 MG/DL (ref 0.2–1.3)
BUN SERPL-MCNC: 12 MG/DL (ref 7–30)
CALCIUM SERPL-MCNC: 8.6 MG/DL (ref 8.5–10.1)
CHLORIDE SERPL-SCNC: 107 MMOL/L (ref 94–109)
CO2 SERPL-SCNC: 25 MMOL/L (ref 20–32)
CREAT SERPL-MCNC: 1.38 MG/DL (ref 0.66–1.25)
ERYTHROCYTE [DISTWIDTH] IN BLOOD BY AUTOMATED COUNT: 15.6 % (ref 10–15)
FERRITIN SERPL-MCNC: 11 NG/ML (ref 26–388)
GFR SERPL CREATININE-BSD FRML MDRD: 50 ML/MIN/{1.73_M2}
GLUCOSE SERPL-MCNC: 104 MG/DL (ref 70–99)
HCT VFR BLD AUTO: 35.4 % (ref 40–53)
HGB BLD-MCNC: 11.5 G/DL (ref 13.3–17.7)
INR PPP: 1.11 (ref 0.86–1.14)
IRON SATN MFR SERPL: 6 % (ref 15–46)
IRON SERPL-MCNC: 20 UG/DL (ref 35–180)
MCH RBC QN AUTO: 27.6 PG (ref 26.5–33)
MCHC RBC AUTO-ENTMCNC: 32.5 G/DL (ref 31.5–36.5)
MCV RBC AUTO: 85 FL (ref 78–100)
PLATELET # BLD AUTO: 153 10E9/L (ref 150–450)
POTASSIUM SERPL-SCNC: 4.1 MMOL/L (ref 3.4–5.3)
PROT SERPL-MCNC: 7.7 G/DL (ref 6.8–8.8)
RBC # BLD AUTO: 4.17 10E12/L (ref 4.4–5.9)
SODIUM SERPL-SCNC: 138 MMOL/L (ref 133–144)
T4 FREE SERPL-MCNC: 1.92 NG/DL (ref 0.76–1.46)
TIBC SERPL-MCNC: 312 UG/DL (ref 240–430)
TSH SERPL DL<=0.005 MIU/L-ACNC: 11.51 MU/L (ref 0.4–4)
WBC # BLD AUTO: 6 10E9/L (ref 4–11)

## 2020-04-27 PROCEDURE — 83540 ASSAY OF IRON: CPT | Performed by: INTERNAL MEDICINE

## 2020-04-27 PROCEDURE — 82728 ASSAY OF FERRITIN: CPT | Performed by: INTERNAL MEDICINE

## 2020-04-27 PROCEDURE — 85027 COMPLETE CBC AUTOMATED: CPT | Performed by: INTERNAL MEDICINE

## 2020-04-27 PROCEDURE — 84439 ASSAY OF FREE THYROXINE: CPT | Performed by: INTERNAL MEDICINE

## 2020-04-27 PROCEDURE — 84443 ASSAY THYROID STIM HORMONE: CPT | Performed by: INTERNAL MEDICINE

## 2020-04-27 PROCEDURE — 80048 BASIC METABOLIC PNL TOTAL CA: CPT | Performed by: INTERNAL MEDICINE

## 2020-04-27 PROCEDURE — 82105 ALPHA-FETOPROTEIN SERUM: CPT | Performed by: INTERNAL MEDICINE

## 2020-04-27 PROCEDURE — 36415 COLL VENOUS BLD VENIPUNCTURE: CPT | Performed by: INTERNAL MEDICINE

## 2020-04-27 PROCEDURE — 83550 IRON BINDING TEST: CPT | Performed by: INTERNAL MEDICINE

## 2020-04-27 PROCEDURE — 85610 PROTHROMBIN TIME: CPT | Performed by: INTERNAL MEDICINE

## 2020-04-27 PROCEDURE — G0463 HOSPITAL OUTPT CLINIC VISIT: HCPCS | Mod: ZF

## 2020-04-27 PROCEDURE — 80076 HEPATIC FUNCTION PANEL: CPT | Performed by: INTERNAL MEDICINE

## 2020-04-27 RX ORDER — FERROUS FUMARATE 324(106)MG
324 TABLET ORAL DAILY
Qty: 30 TABLET | Refills: 11 | Status: SHIPPED | OUTPATIENT
Start: 2020-04-27

## 2020-04-27 ASSESSMENT — PAIN SCALES - GENERAL: PAINLEVEL: EXTREME PAIN (8)

## 2020-04-27 NOTE — NURSING NOTE
/72   Pulse 97   Temp 98.5  F (36.9  C) (Oral)   Wt 76.4 kg (168 lb 6.4 oz)   BMI 25.61 kg/m    Chief Complaint   Patient presents with     RECHECK     follow up with Hep C

## 2020-04-27 NOTE — LETTER
4/27/2020      RE: Danielle Cook  [address]  [facility] [address]       Date of Service:  April 27, 2020     Subjective:            Danielle Cook is a 75 year old male presenting for evaluation of liver disease    Due to language barrier, an  was present during the history-taking and subsequent discussion with this patient.    History of Present Illness   Danielle Cook is a 75 year old male with past medical history of chronic kidney disease, Chronic hepatitis C (s/p SVR) with cirrhosis complicated by esophageal varices and mild protein calorie malnutrition who presents in routine follow up.    Since last seen, he said the progression of abdominal pain and discomfort with cramping pains and bloating.  He ultimately presented to his emergency department's on April 20 and was noted to have significant ascites.  CT of the abdomen demonstrated moderate ascites and anasarca of the tissue with a moderate sized hiatal hernia.  He was started on Lasix 20 mg daily and spironolactone 50 mg daily and encouraged to follow a low-sodium diet.  Over the past week he has had a dramatic reduction in the overall size of his abdomen, reduced bloating and pain symptoms.  He does note that he is urinating more.  He notes he is been dealing with significant issues with constipation: Notes that he takes prune juice every 3 to 4 days if he does not have a bowel movement.  When asked why he does not take his prune juice daily, he notes concerns of developing diarrhea.    Believes that he is thinking clearly and does not have problems with memory or concentration.  Has not noticed any blood or dark black stool      Past Medical History:  Past Medical History:   Diagnosis Date     Bad headache      CKD (chronic kidney disease)      CKD (chronic kidney disease) stage 3, GFR 30-59 ml/min (H)      Hep C w/o coma, chronic (H)      Hepatitis C virus infection      Hypertension    Hepatitis C  related cirrhosis    Surgical History:  Past Surgical History:   Procedure Laterality Date     COLONOSCOPY N/A 1/19/2018    Procedure: COLONOSCOPY;  COMBINED ESOPHAGOSCOPY, GASTROSCOPY, DUODENOSCOPY (EGD) REMOVE TUMOR/POYP/LESION BY SNARE, CONTROL BLEED,BANDING/LIGATION OF VARICES Colonoscopy;  Surgeon: Xavier Sutton MD;  Location:  GI     ESOPHAGOSCOPY, GASTROSCOPY, DUODENOSCOPY (EGD), COMBINED N/A 11/4/2015    Procedure: COMBINED ESOPHAGOSCOPY, GASTROSCOPY, DUODENOSCOPY (EGD), BIOPSY SINGLE OR MULTIPLE;  Surgeon: Inocente Do MD;  Location:  GI     ESOPHAGOSCOPY, GASTROSCOPY, DUODENOSCOPY (EGD), COMBINED N/A 3/29/2018    Procedure: COMBINED ESOPHAGOSCOPY, GASTROSCOPY, DUODENOSCOPY (EGD);  egd;  Surgeon: Danielle Mendenhall MD;  Location:  GI     ESOPHAGOSCOPY, GASTROSCOPY, DUODENOSCOPY (EGD), COMBINED N/A 9/10/2018    Procedure: COMBINED ESOPHAGOSCOPY, GASTROSCOPY, DUODENOSCOPY (EGD);  egd;  Surgeon: Xavier Sutton MD;  Location: [facility]     ESOPHAGOSCOPY, GASTROSCOPY, DUODENOSCOPY (EGD), COMBINED N/A 4/17/2019    Procedure: COMBINED ESOPHAGOSCOPY, GASTROSCOPY, DUODENOSCOPY (EGD);  Surgeon: Leventhal, Thomas Michael, MD;  Location:  GI     EYE SURGERY Bilateral     cataracts       Social History:  Social History     Tobacco Use     Smoking status: Never Smoker     Smokeless tobacco: Never Used   Substance Use Topics     Alcohol use: No     Alcohol/week: 0.0 standard drinks     Drug use: No         Family History:  Family History   Problem Relation Age of Onset     Hypertension Father    - Denies a family history of liver disease         Medications:  Current Outpatient Medications   Medication     acetaminophen (TYLENOL) 325 MG tablet     amLODIPine (NORVASC) 10 MG tablet     emollient (VANICREAM) external cream     ferrous fumarate (FERRETTS) 324 (106 Fe) MG TABS tablet     furosemide (LASIX) 20 MG tablet     gabapentin (NEURONTIN) 300 MG tablet     lidocaine, viscous, (XYLOCAINE) 2 % solution      mometasone (ELOCON) 0.1 % external ointment     omeprazole (PRILOSEC) 40 MG capsule     spironolactone (ALDACTONE) 50 MG tablet     tamsulosin (FLOMAX) 0.4 MG capsule     vitamin D3 (CHOLECALCIFEROL) 1000 units (25 mcg) tablet     ASPIRIN LOW DOSE 81 MG EC tablet     cyclobenzaprine (FLEXERIL) 10 MG tablet     levothyroxine (SYNTHROID/LEVOTHROID) 50 MCG tablet     levothyroxine (SYNTHROID/LEVOTHROID) 75 MCG tablet     nortriptyline (PAMELOR) 25 MG capsule     potassium chloride (KLOR-CON) 20 MEQ packet     No current facility-administered medications for this visit.      Facility-Administered Medications Ordered in Other Visits   Medication     gadobutrol (GADAVIST) injection 7.5 mL       Review of Systems  A complete 10 point review of systems was asked and answered in the negative unless specifically commented upon in the HPI    Objective:           Vitals:    04/27/20 0932   BP: 117/72   Pulse: 97   Temp: 98.5  F (36.9  C)   TempSrc: Oral   Weight: 76.4 kg (168 lb 6.4 oz)     Body mass index is 25.61 kg/m .     Physical Exam    Vitals reviewed.   Constitutional: Well-developed, well-nourished, in no apparent distress.    HEENT: Normocephalic. No scleral icterus. Moist oral mucosa. Dentition normal  Neck/Lymph: Normal ROM, supple.  Respiratory: Normal respiratory excursion.  GI:  Abdomen soft,+distended  Skin: No jaundice.   Peripheral Vascular: No lower extremity edema. 2+ pulses in all extremities  Musculoskeletal:  ROM intact, normal muscle bulk    Psychiatric: Normal mood and affect. Behavior is normal.  Neuro:  No asterixis, No tremor    Labs and Diagnostic tests:    MELD-Na score: 11 at 4/27/2020  8:36 AM  MELD score: 11 at 4/27/2020  8:36 AM  Calculated from:  Serum Creatinine: 1.38 mg/dL at 4/27/2020  8:36 AM  Serum Sodium: 138 mmol/L (Rounded to 137 mmol/L) at 4/27/2020  8:36 AM  Total Bilirubin: 0.9 mg/dL (Rounded to 1 mg/dL) at 4/27/2020  8:36 AM  INR(ratio): 1.11 at 4/27/2020  8:36 AM  Age: 75  years 1 month    Imaging:  CT Abdomen 4/20/2020  FINDINGS:  Lower thorax:  The heart size is mildly enlarged. No pericardial effusion. Bibasilar  atelectasis. No pleural effusion.     Abdomen and Pelvis:   Cirrhotic liver. No focal hepatic lesions. Fatty infiltration of the  pancreas. The gallbladder and spleen are unremarkable. Subcentimeter  cyst in the right kidney. No hydronephrosis. The adrenal glands are  normal.  Moderate hiatal hernia. No bowel dilation. No intra-abdominal  free air. Moderate ascites.     Vessels and lymph nodes:  Atherosclerotic calcifications of the aorta. Nonenlarged  retroperitoneal and mesenteric lymph nodes.     Bones and soft tissues:  Mild anasarca. No acute osseous abnormalities.    Procedures:  EGD: 4/17/2019  Findings:        Non-bleeding small (< 5 mm) varices were found in the lower third of the        esophagus,. No stigmata of recent bleeding were evident and no red jesus        signs were present. Stigmata of prior treatment were evident. Scarring        from prior treatment was visible.        A small hiatal hernia was present.        The lower third of the esophagus was mildly tortuous.        An endoclip was found on the greater curvature of the stomach.        Mild portal hypertensive gastropathy was found in the entire examined        stomach.        The examined duodenum was normal.                                           Colonoscopy: 1/19/2018  Findings:        The perianal and digital rectal examinations were normal. Pertinent        negatives include no palpable rectal lesions.        The entire examined colon appeared normal.        The retroflexed view of the distal rectum and anal verge was normal and        showed no anal or rectal abnormalities.     Assessment and Plan:    Cirrhosis:   - Secondary to chronic hepatitis C, now s/p treatment with SVR of Hepatitis C  - His main signs of decompensation are esophageal varices and ascites.    - MELD on labs today  11  - MELD labs Q 6 months    Hepatocellular Cancer Screening:   - Abd US from 10/2019 and CT Abd w/ contrast 4/2020 without hepatic masses  - Will repeat imaging in 6 months  - Recommend screening for HCC every 6 months with either abdominal ultrasound or by alternating abdominal ultrasound with EITHER a triple/quad phase CT Liver with IV contrast OR a Quad phase MRI Liver with IV contrast.  AFP levels should be checked every 6 months at time of imaging screen.    Ascites:   - Has new onset ascites  - Discussed need to follow a sodium restricted (<2g sodium) diet indefinitely  - Currently tolerating lasix 20mg daily and spironolactone 50mg daily    - Will follow up BMP in 2-4 weeks given mild DIA on CKD    Hepatic Encephalopathy: no acute issues    Esophageal Varices:  - Exam on 4/17/19 with small varices  - Recommend repeating an upper GI endoscopy 1-2 year.  If evidence of medium/large esophageal varices, would recommend esophageal varix band ligation and if band ligation is performed, please continue to repeat until eraditation of varices.    Kidney Health: Has CKD stage 3 (eGFR < 60, >30)  - Encourage more free water intake based on current Cr  - He appears to have DIA on CKD with initiation of diuretics    Immobility/Frailty: no acute issues    Nutrition:  As with most patients with chronic liver disease, there is a significant degree of protein malnutrition.  Dicussed need to change dietary habits to improve overall protein balance.  Discussed the importance of eating between 1.2-1.5g/kg/day lean protein like eggs, fish, chicken, nuts, and legumes, in addition to a diet rich in fresh fruits and vegetables.  Continue to follow a sodium restricted (<2g sodium diet) and discussed the need to minimize the intake of carbohydrates and sugars, to avoid obesity.   - Strongly encourage protein supplements 2-3 times daily (Boost, Ensure, [place] Instant Milk, etc.) to meet protein and caloric intake.  - Recommend a  bedtime snack with protein and complex carbohydrate to minimize risk of muscle catabolism overnight    Follow Up:  4 months     Thank you very much for the opportunity to participate in the care of this patient.  If you have any further questions, please don't hesitate to contact our office.    Thomas M. Leventhal, M.D.   of Medicine  Advanced & Transplant Hepatology  [facility]      Addendum:  - Noted that patient has significant iron deficient anemia    - Will reorder daily iron supplement  - Noted that TSH is quasi normal    TL 10:32 AM       Thomas M. Leventhal, MD

## 2020-04-27 NOTE — LETTER
4/27/2020       RE: Danielle Cook  [address]  [facility] [address]     Dear Colleague,    Thank you for referring your patient, Danielle Cook, to the [facility] HEPATOLOGY at [facility]. Please see a copy of my visit note below.    Date of Service:  April 27, 2020     Subjective:            Danielle Cook is a 75 year old male presenting for evaluation of liver disease    Due to language barrier, an  was present during the history-taking and subsequent discussion with this patient.      History of Present Illness   Danielle Cook is a 75 year old male with past medical history of chronic kidney disease, Chronic hepatitis C (s/p SVR) with cirrhosis complicated by esophageal varices and mild protein calorie malnutrition who presents in routine follow up.    Since last seen, he said the progression of abdominal pain and discomfort with cramping pains and bloating.  He ultimately presented to his emergency department's on April 20 and was noted to have significant ascites.  CT of the abdomen demonstrated moderate ascites and anasarca of the tissue with a moderate sized hiatal hernia.  He was started on Lasix 20 mg daily and spironolactone 50 mg daily and encouraged to follow a low-sodium diet.  Over the past week he has had a dramatic reduction in the overall size of his abdomen, reduced bloating and pain symptoms.  He does note that he is urinating more.  He notes he is been dealing with significant issues with constipation: Notes that he takes prune juice every 3 to 4 days if he does not have a bowel movement.  When asked why he does not take his prune juice daily, he notes concerns of developing diarrhea.    Believes that he is thinking clearly and does not have problems with memory or concentration.  Has not noticed any blood or dark black stool      Past Medical History:  Past Medical History:   Diagnosis Date     Bad  headache      CKD (chronic kidney disease)      CKD (chronic kidney disease) stage 3, GFR 30-59 ml/min (H)      Hep C w/o coma, chronic (H)      Hepatitis C virus infection      Hypertension    Hepatitis C related cirrhosis    Surgical History:  Past Surgical History:   Procedure Laterality Date     COLONOSCOPY N/A 1/19/2018    Procedure: COLONOSCOPY;  COMBINED ESOPHAGOSCOPY, GASTROSCOPY, DUODENOSCOPY (EGD) REMOVE TUMOR/POYP/LESION BY SNARE, CONTROL BLEED,BANDING/LIGATION OF VARICES Colonoscopy;  Surgeon: Xavier Sutton MD;  Location:  GI     ESOPHAGOSCOPY, GASTROSCOPY, DUODENOSCOPY (EGD), COMBINED N/A 11/4/2015    Procedure: COMBINED ESOPHAGOSCOPY, GASTROSCOPY, DUODENOSCOPY (EGD), BIOPSY SINGLE OR MULTIPLE;  Surgeon: Inocente Do MD;  Location:  GI     ESOPHAGOSCOPY, GASTROSCOPY, DUODENOSCOPY (EGD), COMBINED N/A 3/29/2018    Procedure: COMBINED ESOPHAGOSCOPY, GASTROSCOPY, DUODENOSCOPY (EGD);  egd;  Surgeon: Danielle Mendenhall MD;  Location:  GI     ESOPHAGOSCOPY, GASTROSCOPY, DUODENOSCOPY (EGD), COMBINED N/A 9/10/2018    Procedure: COMBINED ESOPHAGOSCOPY, GASTROSCOPY, DUODENOSCOPY (EGD);  egd;  Surgeon: Xavier Sutton MD;  Location:  OR     ESOPHAGOSCOPY, GASTROSCOPY, DUODENOSCOPY (EGD), COMBINED N/A 4/17/2019    Procedure: COMBINED ESOPHAGOSCOPY, GASTROSCOPY, DUODENOSCOPY (EGD);  Surgeon: Leventhal, Thomas Michael, MD;  Location:  GI     EYE SURGERY Bilateral     cataracts       Social History:  Social History     Tobacco Use     Smoking status: Never Smoker     Smokeless tobacco: Never Used   Substance Use Topics     Alcohol use: No     Alcohol/week: 0.0 standard drinks     Drug use: No         Family History:  Family History   Problem Relation Age of Onset     Hypertension Father    - Denies a family history of liver disease         Medications:  Current Outpatient Medications   Medication     acetaminophen (TYLENOL) 325 MG tablet     amLODIPine (NORVASC) 10 MG tablet     emollient  (VANICREAM) external cream     ferrous fumarate (FERRETTS) 324 (106 Fe) MG TABS tablet     furosemide (LASIX) 20 MG tablet     gabapentin (NEURONTIN) 300 MG tablet     lidocaine, viscous, (XYLOCAINE) 2 % solution     mometasone (ELOCON) 0.1 % external ointment     omeprazole (PRILOSEC) 40 MG capsule     spironolactone (ALDACTONE) 50 MG tablet     tamsulosin (FLOMAX) 0.4 MG capsule     vitamin D3 (CHOLECALCIFEROL) 1000 units (25 mcg) tablet     ASPIRIN LOW DOSE 81 MG EC tablet     cyclobenzaprine (FLEXERIL) 10 MG tablet     levothyroxine (SYNTHROID/LEVOTHROID) 50 MCG tablet     levothyroxine (SYNTHROID/LEVOTHROID) 75 MCG tablet     nortriptyline (PAMELOR) 25 MG capsule     potassium chloride (KLOR-CON) 20 MEQ packet     No current facility-administered medications for this visit.      Facility-Administered Medications Ordered in Other Visits   Medication     gadobutrol (GADAVIST) injection 7.5 mL       Review of Systems  A complete 10 point review of systems was asked and answered in the negative unless specifically commented upon in the HPI    Objective:           Vitals:    04/27/20 0932   BP: 117/72   Pulse: 97   Temp: 98.5  F (36.9  C)   TempSrc: Oral   Weight: 76.4 kg (168 lb 6.4 oz)     Body mass index is 25.61 kg/m .     Physical Exam    Vitals reviewed.   Constitutional: Well-developed, well-nourished, in no apparent distress.    HEENT: Normocephalic. No scleral icterus. Moist oral mucosa. Dentition normal  Neck/Lymph: Normal ROM, supple.  Respiratory: Normal respiratory excursion.  GI:  Abdomen soft,+distended  Skin: No jaundice.   Peripheral Vascular: No lower extremity edema. 2+ pulses in all extremities  Musculoskeletal:  ROM intact, normal muscle bulk    Psychiatric: Normal mood and affect. Behavior is normal.  Neuro:  No asterixis, No tremor    Labs and Diagnostic tests:    MELD-Na score: 11 at 4/27/2020  8:36 AM  MELD score: 11 at 4/27/2020  8:36 AM  Calculated from:  Serum Creatinine: 1.38 mg/dL at  4/27/2020  8:36 AM  Serum Sodium: 138 mmol/L (Rounded to 137 mmol/L) at 4/27/2020  8:36 AM  Total Bilirubin: 0.9 mg/dL (Rounded to 1 mg/dL) at 4/27/2020  8:36 AM  INR(ratio): 1.11 at 4/27/2020  8:36 AM  Age: 75 years 1 month    Imaging:  CT Abdomen 4/20/2020  FINDINGS:  Lower thorax:  The heart size is mildly enlarged. No pericardial effusion. Bibasilar  atelectasis. No pleural effusion.     Abdomen and Pelvis:   Cirrhotic liver. No focal hepatic lesions. Fatty infiltration of the  pancreas. The gallbladder and spleen are unremarkable. Subcentimeter  cyst in the right kidney. No hydronephrosis. The adrenal glands are  normal.  Moderate hiatal hernia. No bowel dilation. No intra-abdominal  free air. Moderate ascites.     Vessels and lymph nodes:  Atherosclerotic calcifications of the aorta. Nonenlarged  retroperitoneal and mesenteric lymph nodes.     Bones and soft tissues:  Mild anasarca. No acute osseous abnormalities.    Procedures:  EGD: 4/17/2019  Findings:        Non-bleeding small (< 5 mm) varices were found in the lower third of the        esophagus,. No stigmata of recent bleeding were evident and no red jesus        signs were present. Stigmata of prior treatment were evident. Scarring        from prior treatment was visible.        A small hiatal hernia was present.        The lower third of the esophagus was mildly tortuous.        An endoclip was found on the greater curvature of the stomach.        Mild portal hypertensive gastropathy was found in the entire examined        stomach.        The examined duodenum was normal.                                           Colonoscopy: 1/19/2018  Findings:        The perianal and digital rectal examinations were normal. Pertinent        negatives include no palpable rectal lesions.        The entire examined colon appeared normal.        The retroflexed view of the distal rectum and anal verge was normal and        showed no anal or rectal abnormalities.      Assessment and Plan:    Cirrhosis:   - Secondary to chronic hepatitis C, now s/p treatment with SVR of Hepatitis C  - His main signs of decompensation are esophageal varices and ascites.    - MELD on labs today 11  - MELD labs Q 6 months    Hepatocellular Cancer Screening:   - Abd US from 10/2019 and CT Abd w/ contrast 4/2020 without hepatic masses  - Will repeat imaging in 6 months  - Recommend screening for HCC every 6 months with either abdominal ultrasound or by alternating abdominal ultrasound with EITHER a triple/quad phase CT Liver with IV contrast OR a Quad phase MRI Liver with IV contrast.  AFP levels should be checked every 6 months at time of imaging screen.    Ascites:   - Has new onset ascites  - Discussed need to follow a sodium restricted (<2g sodium) diet indefinitely  - Currently tolerating lasix 20mg daily and spironolactone 50mg daily    - Will follow up BMP in 2-4 weeks given mild DIA on CKD    Hepatic Encephalopathy: no acute issues    Esophageal Varices:  - Exam on 4/17/19 with small varices  - Recommend repeating an upper GI endoscopy 1-2 year.  If evidence of medium/large esophageal varices, would recommend esophageal varix band ligation and if band ligation is performed, please continue to repeat until eraditation of varices.    Kidney Health: Has CKD stage 3 (eGFR < 60, >30)  - Encourage more free water intake based on current Cr  - He appears to have DIA on CKD with initiation of diuretics    Immobility/Frailty: no acute issues    Nutrition:  As with most patients with chronic liver disease, there is a significant degree of protein malnutrition.  Dicussed need to change dietary habits to improve overall protein balance.  Discussed the importance of eating between 1.2-1.5g/kg/day lean protein like eggs, fish, chicken, nuts, and legumes, in addition to a diet rich in fresh fruits and vegetables.  Continue to follow a sodium restricted (<2g sodium diet) and discussed the need to minimize  the intake of carbohydrates and sugars, to avoid obesity.   - Strongly encourage protein supplements 2-3 times daily (Boost, Ensure, [place] Instant Milk, etc.) to meet protein and caloric intake.  - Recommend a bedtime snack with protein and complex carbohydrate to minimize risk of muscle catabolism overnight    Follow Up:  4 months     Thank you very much for the opportunity to participate in the care of this patient.  If you have any further questions, please don't hesitate to contact our office.    Thomas M. Leventhal, M.D.   of Medicine  Advanced & Transplant Hepatology  [facility]      Again, thank you for allowing me to participate in the care of your patient.      Sincerely,    Thomas M. Leventhal, MD

## 2020-04-27 NOTE — PATIENT INSTRUCTIONS
- Continue to take both the furosemide and the spironolactone daily    - Lets repeat labs in 4 weeks    - Continue to take the prune juice every 1-2 days; goal is 1 BM daily    - If still constipated, please consider trying a medicine called Miralax (polyethylene glycol) which can help you have bowel movements that aren't heard    - Make sure to drink plenty of water throughout the day    You can take up to 2,000 mg of acetaminophen/Tylenol in a 24 hour period.  It is important to avoid medications such as ibuprofen, Advil, naproxen, Aleve, high-dose aspirin or other NSAIDs in people with advanced liver disease as these can cause kidney injury.

## 2020-04-27 NOTE — PROGRESS NOTES
Date of Service:  April 27, 2020     Subjective:            Danielle Cook is a 75 year old male presenting for evaluation of liver disease    Due to language barrier, an  was present during the history-taking and subsequent discussion with this patient.    History of Present Illness   Danielle Cook is a 75 year old male with past medical history of chronic kidney disease, Chronic hepatitis C (s/p SVR) with cirrhosis complicated by esophageal varices and mild protein calorie malnutrition who presents in routine follow up.    Since last seen, he said the progression of abdominal pain and discomfort with cramping pains and bloating.  He ultimately presented to his emergency department's on April 20 and was noted to have significant ascites.  CT of the abdomen demonstrated moderate ascites and anasarca of the tissue with a moderate sized hiatal hernia.  He was started on Lasix 20 mg daily and spironolactone 50 mg daily and encouraged to follow a low-sodium diet.  Over the past week he has had a dramatic reduction in the overall size of his abdomen, reduced bloating and pain symptoms.  He does note that he is urinating more.  He notes he is been dealing with significant issues with constipation: Notes that he takes prune juice every 3 to 4 days if he does not have a bowel movement.  When asked why he does not take his prune juice daily, he notes concerns of developing diarrhea.    Believes that he is thinking clearly and does not have problems with memory or concentration.  Has not noticed any blood or dark black stool      Past Medical History:  Past Medical History:   Diagnosis Date     Bad headache      CKD (chronic kidney disease)      CKD (chronic kidney disease) stage 3, GFR 30-59 ml/min (H)      Hep C w/o coma, chronic (H)      Hepatitis C virus infection      Hypertension    Hepatitis C related cirrhosis    Surgical History:  Past Surgical History:   Procedure Laterality Date      COLONOSCOPY N/A 1/19/2018    Procedure: COLONOSCOPY;  COMBINED ESOPHAGOSCOPY, GASTROSCOPY, DUODENOSCOPY (EGD) REMOVE TUMOR/POYP/LESION BY SNARE, CONTROL BLEED,BANDING/LIGATION OF VARICES Colonoscopy;  Surgeon: Xavier Sutton MD;  Location: [facility]     ESOPHAGOSCOPY, GASTROSCOPY, DUODENOSCOPY (EGD), COMBINED N/A 11/4/2015    Procedure: COMBINED ESOPHAGOSCOPY, GASTROSCOPY, DUODENOSCOPY (EGD), BIOPSY SINGLE OR MULTIPLE;  Surgeon: Inocente Do MD;  [facility]     ESOPHAGOSCOPY, GASTROSCOPY, DUODENOSCOPY (EGD), COMBINED N/A 3/29/2018    Procedure: COMBINED ESOPHAGOSCOPY, GASTROSCOPY, DUODENOSCOPY (EGD);  egd;  Surgeon: Danielle Mendenhall MD;  [facility]     ESOPHAGOSCOPY, GASTROSCOPY, DUODENOSCOPY (EGD), COMBINED N/A 9/10/2018    Procedure: COMBINED ESOPHAGOSCOPY, GASTROSCOPY, DUODENOSCOPY (EGD);  egd;  Surgeon: Xavier Sutton MD;  [facility]     ESOPHAGOSCOPY, GASTROSCOPY, DUODENOSCOPY (EGD), COMBINED N/A 4/17/2019    Procedure: COMBINED ESOPHAGOSCOPY, GASTROSCOPY, DUODENOSCOPY (EGD);  Surgeon: Leventhal, Thomas Michael, MD;  Location:  GI     EYE SURGERY Bilateral     cataracts       Social History:  Social History     Tobacco Use     Smoking status: Never Smoker     Smokeless tobacco: Never Used   Substance Use Topics     Alcohol use: No     Alcohol/week: 0.0 standard drinks     Drug use: No         Family History:  Family History   Problem Relation Age of Onset     Hypertension Father    - Denies a family history of liver disease         Medications:  Current Outpatient Medications   Medication     acetaminophen (TYLENOL) 325 MG tablet     amLODIPine (NORVASC) 10 MG tablet     emollient (VANICREAM) external cream     ferrous fumarate (FERRETTS) 324 (106 Fe) MG TABS tablet     furosemide (LASIX) 20 MG tablet     gabapentin (NEURONTIN) 300 MG tablet     lidocaine, viscous, (XYLOCAINE) 2 % solution     mometasone (ELOCON) 0.1 % external ointment     omeprazole (PRILOSEC) 40 MG capsule      spironolactone (ALDACTONE) 50 MG tablet     tamsulosin (FLOMAX) 0.4 MG capsule     vitamin D3 (CHOLECALCIFEROL) 1000 units (25 mcg) tablet     ASPIRIN LOW DOSE 81 MG EC tablet     cyclobenzaprine (FLEXERIL) 10 MG tablet     levothyroxine (SYNTHROID/LEVOTHROID) 50 MCG tablet     levothyroxine (SYNTHROID/LEVOTHROID) 75 MCG tablet     nortriptyline (PAMELOR) 25 MG capsule     potassium chloride (KLOR-CON) 20 MEQ packet     No current facility-administered medications for this visit.      Facility-Administered Medications Ordered in Other Visits   Medication     gadobutrol (GADAVIST) injection 7.5 mL       Review of Systems  A complete 10 point review of systems was asked and answered in the negative unless specifically commented upon in the HPI    Objective:           Vitals:    04/27/20 0932   BP: 117/72   Pulse: 97   Temp: 98.5  F (36.9  C)   TempSrc: Oral   Weight: 76.4 kg (168 lb 6.4 oz)     Body mass index is 25.61 kg/m .     Physical Exam    Vitals reviewed.   Constitutional: Well-developed, well-nourished, in no apparent distress.    HEENT: Normocephalic. No scleral icterus. Moist oral mucosa. Dentition normal  Neck/Lymph: Normal ROM, supple.  Respiratory: Normal respiratory excursion.  GI:  Abdomen soft,+distended  Skin: No jaundice.   Peripheral Vascular: No lower extremity edema. 2+ pulses in all extremities  Musculoskeletal:  ROM intact, normal muscle bulk    Psychiatric: Normal mood and affect. Behavior is normal.  Neuro:  No asterixis, No tremor    Labs and Diagnostic tests:    MELD-Na score: 11 at 4/27/2020  8:36 AM  MELD score: 11 at 4/27/2020  8:36 AM  Calculated from:  Serum Creatinine: 1.38 mg/dL at 4/27/2020  8:36 AM  Serum Sodium: 138 mmol/L (Rounded to 137 mmol/L) at 4/27/2020  8:36 AM  Total Bilirubin: 0.9 mg/dL (Rounded to 1 mg/dL) at 4/27/2020  8:36 AM  INR(ratio): 1.11 at 4/27/2020  8:36 AM  Age: 75 years 1 month    Imaging:  CT Abdomen 4/20/2020  FINDINGS:  Lower thorax:  The heart size is  mildly enlarged. No pericardial effusion. Bibasilar  atelectasis. No pleural effusion.     Abdomen and Pelvis:   Cirrhotic liver. No focal hepatic lesions. Fatty infiltration of the  pancreas. The gallbladder and spleen are unremarkable. Subcentimeter  cyst in the right kidney. No hydronephrosis. The adrenal glands are  normal.  Moderate hiatal hernia. No bowel dilation. No intra-abdominal  free air. Moderate ascites.     Vessels and lymph nodes:  Atherosclerotic calcifications of the aorta. Nonenlarged  retroperitoneal and mesenteric lymph nodes.     Bones and soft tissues:  Mild anasarca. No acute osseous abnormalities.    Procedures:  EGD: 4/17/2019  Findings:        Non-bleeding small (< 5 mm) varices were found in the lower third of the        esophagus,. No stigmata of recent bleeding were evident and no red jesus        signs were present. Stigmata of prior treatment were evident. Scarring        from prior treatment was visible.        A small hiatal hernia was present.        The lower third of the esophagus was mildly tortuous.        An endoclip was found on the greater curvature of the stomach.        Mild portal hypertensive gastropathy was found in the entire examined        stomach.        The examined duodenum was normal.                                           Colonoscopy: 1/19/2018  Findings:        The perianal and digital rectal examinations were normal. Pertinent        negatives include no palpable rectal lesions.        The entire examined colon appeared normal.        The retroflexed view of the distal rectum and anal verge was normal and        showed no anal or rectal abnormalities.     Assessment and Plan:    Cirrhosis:   - Secondary to chronic hepatitis C, now s/p treatment with SVR of Hepatitis C  - His main signs of decompensation are esophageal varices and ascites.    - MELD on labs today 11  - MELD labs Q 6 months    Hepatocellular Cancer Screening:   - Abd US from 10/2019 and CT Abd  w/ contrast 4/2020 without hepatic masses  - Will repeat imaging in 6 months  - Recommend screening for HCC every 6 months with either abdominal ultrasound or by alternating abdominal ultrasound with EITHER a triple/quad phase CT Liver with IV contrast OR a Quad phase MRI Liver with IV contrast.  AFP levels should be checked every 6 months at time of imaging screen.    Ascites:   - Has new onset ascites  - Discussed need to follow a sodium restricted (<2g sodium) diet indefinitely  - Currently tolerating lasix 20mg daily and spironolactone 50mg daily    - Will follow up BMP in 2-4 weeks given mild DIA on CKD    Hepatic Encephalopathy: no acute issues    Esophageal Varices:  - Exam on 4/17/19 with small varices  - Recommend repeating an upper GI endoscopy 1-2 year.  If evidence of medium/large esophageal varices, would recommend esophageal varix band ligation and if band ligation is performed, please continue to repeat until eraditation of varices.    Kidney Health: Has CKD stage 3 (eGFR < 60, >30)  - Encourage more free water intake based on current Cr  - He appears to have DIA on CKD with initiation of diuretics    Immobility/Frailty: no acute issues    Nutrition:  As with most patients with chronic liver disease, there is a significant degree of protein malnutrition.  Dicussed need to change dietary habits to improve overall protein balance.  Discussed the importance of eating between 1.2-1.5g/kg/day lean protein like eggs, fish, chicken, nuts, and legumes, in addition to a diet rich in fresh fruits and vegetables.  Continue to follow a sodium restricted (<2g sodium diet) and discussed the need to minimize the intake of carbohydrates and sugars, to avoid obesity.   - Strongly encourage protein supplements 2-3 times daily (Boost, Ensure, [place] Instant Milk, etc.) to meet protein and caloric intake.  - Recommend a bedtime snack with protein and complex carbohydrate to minimize risk of muscle catabolism  overnight    Follow Up:  4 months     Thank you very much for the opportunity to participate in the care of this patient.  If you have any further questions, please don't hesitate to contact our office.    Thomas M. Leventhal, M.D.   of Medicine  Advanced & Transplant Hepatology  [facility]      Addendum:  - Noted that patient has significant iron deficient anemia    - Will reorder daily iron supplement  - Noted that TSH is quasi normal    TL 10:32 AM

## 2020-05-26 ENCOUNTER — TELEPHONE (OUTPATIENT)
Dept: GASTROENTEROLOGY | Facility: CLINIC | Age: 75
End: 2020-05-26

## 2020-05-26 DIAGNOSIS — K74.60 CIRRHOSIS OF LIVER WITHOUT ASCITES, UNSPECIFIED HEPATIC CIRRHOSIS TYPE (H): Primary | ICD-10-CM

## 2020-05-26 NOTE — TELEPHONE ENCOUNTER
[facility] Call Center    Phone Message    May a detailed message be left on voicemail: yes     Reason for Call: pt's wife, Priscilla, wants to know IF it is OK to cancel the 5/28/20 lab date and wait until 8/27/20 appts in order to do all labs at that time?  If so, please, cancel the 5/28/20 appt for lab. Please call Priscilla back to have this conversation. Thank you.      Action Taken: Message routed to:  [facility]  Hepatology    Travel Screening: Not Applicable

## 2020-05-27 NOTE — TELEPHONE ENCOUNTER
Per Dr. Leventhal:    As we changed his meds, it is our recommendaiton that we check his labs now     TL     Notified pt's wife, Lyric, of Dr. Leventhal's recommendations for pt to keep lab appointment for 5/28. Lyric verbalized understanding and is agreeable to plan.

## 2020-05-28 DIAGNOSIS — N18.30 CKD (CHRONIC KIDNEY DISEASE) STAGE 3, GFR 30-59 ML/MIN (H): ICD-10-CM

## 2020-05-28 DIAGNOSIS — B19.20 DECOMPENSATED CIRRHOSIS RELATED TO HEPATITIS C VIRUS (HCV) (H): ICD-10-CM

## 2020-05-28 DIAGNOSIS — K74.69 DECOMPENSATED CIRRHOSIS RELATED TO HEPATITIS C VIRUS (HCV) (H): ICD-10-CM

## 2020-05-28 LAB
ALBUMIN SERPL-MCNC: 3.1 G/DL (ref 3.4–5)
ALP SERPL-CCNC: 337 U/L (ref 40–150)
ALT SERPL W P-5'-P-CCNC: 71 U/L (ref 0–70)
ANION GAP SERPL CALCULATED.3IONS-SCNC: 7 MMOL/L (ref 3–14)
AST SERPL W P-5'-P-CCNC: 98 U/L (ref 0–45)
BILIRUB SERPL-MCNC: 1 MG/DL (ref 0.2–1.3)
BUN SERPL-MCNC: 28 MG/DL (ref 7–30)
CALCIUM SERPL-MCNC: 9.1 MG/DL (ref 8.5–10.1)
CHLORIDE SERPL-SCNC: 105 MMOL/L (ref 94–109)
CO2 SERPL-SCNC: 23 MMOL/L (ref 20–32)
CREAT SERPL-MCNC: 1.92 MG/DL (ref 0.66–1.25)
GFR SERPL CREATININE-BSD FRML MDRD: 33 ML/MIN/{1.73_M2}
GLUCOSE SERPL-MCNC: 117 MG/DL (ref 70–99)
POTASSIUM SERPL-SCNC: 4.8 MMOL/L (ref 3.4–5.3)
PROT SERPL-MCNC: 8.1 G/DL (ref 6.8–8.8)
SODIUM SERPL-SCNC: 135 MMOL/L (ref 133–144)

## 2020-05-29 ENCOUNTER — APPOINTMENT (OUTPATIENT)
Dept: INTERPRETER SERVICES | Facility: CLINIC | Age: 75
End: 2020-05-29
Payer: COMMERCIAL

## 2020-05-29 NOTE — TELEPHONE ENCOUNTER
[facility] Call Center    Phone Message    May a detailed message be left on voicemail: yes     Reason for Call: Other: Patient spouse returning clinic's call. Please contact spouse at: [phone]     Action Taken: Message routed to:  [facility] HEPATOLOGY    Travel Screening: Negative

## 2020-05-29 NOTE — TELEPHONE ENCOUNTER
Pt had labs drawn on 5/28. Received the following message from Dr. Leventhal regarding pt's results:    Please call them and let him know to stop his lasix.  Continue the spironolactone.  Repeat BMP in 2 weeks     TL     Spoke with pt and pt's spouse, Lyric, and reviewed instructions to stop lasox and repeat labs in 2 weeks. Pt verbalized understanding and requested writer assist pt with scheduling an appointment on 6/12 at 8 am. Appointment scheduled and an appointment letter sent to pt.

## 2020-06-12 DIAGNOSIS — K74.60 CIRRHOSIS OF LIVER WITHOUT ASCITES, UNSPECIFIED HEPATIC CIRRHOSIS TYPE (H): ICD-10-CM

## 2020-06-12 LAB
ANION GAP SERPL CALCULATED.3IONS-SCNC: 6 MMOL/L (ref 3–14)
BUN SERPL-MCNC: 17 MG/DL (ref 7–30)
CALCIUM SERPL-MCNC: 8.5 MG/DL (ref 8.5–10.1)
CHLORIDE SERPL-SCNC: 100 MMOL/L (ref 94–109)
CO2 SERPL-SCNC: 23 MMOL/L (ref 20–32)
CREAT SERPL-MCNC: 1.44 MG/DL (ref 0.66–1.25)
GFR SERPL CREATININE-BSD FRML MDRD: 47 ML/MIN/{1.73_M2}
GLUCOSE SERPL-MCNC: 102 MG/DL (ref 70–99)
POTASSIUM SERPL-SCNC: 4.7 MMOL/L (ref 3.4–5.3)
SODIUM SERPL-SCNC: 128 MMOL/L (ref 133–144)

## 2020-06-18 ENCOUNTER — TELEPHONE (OUTPATIENT)
Dept: GASTROENTEROLOGY | Facility: CLINIC | Age: 75
End: 2020-06-18

## 2020-06-18 NOTE — TELEPHONE ENCOUNTER
Call back to patient utilizing a [employer] . Pt reports volume is under control. Pt will keep taken medications as ordered. Pt verbalized understanding of current plan of care. Pt had no questions for MD at this time.     Karen Dumas LPN  Hepatology Clinic     ---------  Leventhal, Thomas Michael, MD Beckenbach, Shannon, LPN    Caller: Unspecified (5 days ago,  1:52 PM)               Kidney looks better     If his volume is under control, keep going with current course     TL            ----------  [facility] Call Center    Phone Message    May a detailed message be left on voicemail: yes     Reason for Call: Requesting Results   Name/type of test: Lab test  Date of test: 6/12/2020  Was test done at a location other than [facility] (Please fill in the location if not [facility])?: No      Action Taken: Message routed to:  [facility] Hepatology    Travel Screening: Not Applicable

## 2020-06-23 ENCOUNTER — APPOINTMENT (OUTPATIENT)
Dept: INTERPRETER SERVICES | Facility: CLINIC | Age: 75
End: 2020-06-23
Payer: COMMERCIAL

## 2020-08-11 DIAGNOSIS — K74.60 CIRRHOSIS OF LIVER WITHOUT ASCITES, UNSPECIFIED HEPATIC CIRRHOSIS TYPE (H): Primary | ICD-10-CM

## 2020-08-27 ENCOUNTER — VIRTUAL VISIT (OUTPATIENT)
Dept: GASTROENTEROLOGY | Facility: CLINIC | Age: 75
End: 2020-08-27
Attending: INTERNAL MEDICINE
Payer: COMMERCIAL

## 2020-08-27 DIAGNOSIS — Z86.19 HISTORY OF HEPATITIS C: Primary | ICD-10-CM

## 2020-08-27 DIAGNOSIS — K74.60 CIRRHOSIS OF LIVER WITHOUT ASCITES, UNSPECIFIED HEPATIC CIRRHOSIS TYPE (H): ICD-10-CM

## 2020-08-27 DIAGNOSIS — N18.30 CKD (CHRONIC KIDNEY DISEASE) STAGE 3, GFR 30-59 ML/MIN (H): ICD-10-CM

## 2020-08-27 RX ORDER — FUROSEMIDE 20 MG
10 TABLET ORAL DAILY
Qty: 90 TABLET | Refills: 2 | Status: SHIPPED | OUTPATIENT
Start: 2020-08-27

## 2020-08-27 RX ORDER — AMILORIDE HYDROCHLORIDE 5 MG/1
5 TABLET ORAL DAILY
Qty: 30 TABLET | Refills: 11 | Status: SHIPPED | OUTPATIENT
Start: 2020-08-27

## 2020-08-27 NOTE — LETTER
"    8/27/2020         RE: Danielle Coko  [address]  [place] [address]        Dear Colleague,    Thank you for referring your patient, Danielle Cook, to the [facility] HEPATOLOGY. Please see a copy of my visit note below.    Patient stated he has muscle/ joint aches all the time and states this is from his spironolactone. He states he is still taking it though. He is NOT taking lasix any longer - stating that the provider told him not to take this any longer.    Danielle Cook is a 75 year old male who is being evaluated via a billable telephone visit.      The patient has been notified of following:     \"This telephone visit will be conducted via a call between you and your physician/provider. We have found that certain health care needs can be provided without the need for a physical exam.  This service lets us provide the care you need with a short phone conversation.  If a prescription is necessary we can send it directly to your pharmacy.  If lab work is needed we can place an order for that and you can then stop by our lab to have the test done at a later time.    Telephone visits are billed at different rates depending on your insurance coverage. During this emergency period, for some insurers they may be billed the same as an in-person visit.  Please reach out to your insurance provider with any questions.    If during the course of the call the physician/provider feels a telephone visit is not appropriate, you will not be charged for this service.\"    Patient has given verbal consent for Telephone visit?  Yes    What phone number would you like to be contacted at? Emily  [phone] / patient ph#[phone]    How would you like to obtain your AVS? Mail a copy    Subjective:            Danielle Cook is a 75 year old male presenting for evaluation of liver disease    Due to language barrier, an  was present during the history-taking and " subsequent discussion with this patient.    History of Present Illness   Danielle Cook is a 75 year old male with past medical history of chronic kidney disease, Chronic hepatitis C (s/p SVR) with cirrhosis complicated by esophageal varices and mild protein calorie malnutrition who presents in routine follow up.    Since last visit when he was initiated on diuretic therapy, he has had some side effects.  Feels that he is getting some muscle cramps which he previously relates to his Lasix use and does note the onset of breast pain and tenderness.  He is currently taking spironolactone 50 mg daily as a sole diuretic.  He notes on this regimen he has had significant improvement in the fluid buildup in his belly and in his lower extremities.  He otherwise notes that his thinking is clear, believes he is got normal concentration and memory.  He continues to eat well and is staying physically active.      Past Medical History:  Past Medical History:   Diagnosis Date     Bad headache      CKD (chronic kidney disease)      CKD (chronic kidney disease) stage 3, GFR 30-59 ml/min (H)      Hep C w/o coma, chronic (H)      Hepatitis C virus infection      Hypertension    Hepatitis C related cirrhosis    Surgical History:  Past Surgical History:   Procedure Laterality Date     COLONOSCOPY N/A 1/19/2018    Procedure: COLONOSCOPY;  COMBINED ESOPHAGOSCOPY, GASTROSCOPY, DUODENOSCOPY (EGD) REMOVE TUMOR/POYP/LESION BY SNARE, CONTROL BLEED,BANDING/LIGATION OF VARICES Colonoscopy;  Surgeon: Xavier Sutton MD;  Location:  GI     ESOPHAGOSCOPY, GASTROSCOPY, DUODENOSCOPY (EGD), COMBINED N/A 11/4/2015    Procedure: COMBINED ESOPHAGOSCOPY, GASTROSCOPY, DUODENOSCOPY (EGD), BIOPSY SINGLE OR MULTIPLE;  Surgeon: Inocente Do MD;  [facility]     ESOPHAGOSCOPY, GASTROSCOPY, DUODENOSCOPY (EGD), COMBINED N/A 3/29/2018    Procedure: COMBINED ESOPHAGOSCOPY, GASTROSCOPY, DUODENOSCOPY (EGD);  egd;  Surgeon: Danielle Mendenhall,  MD;  Location:  GI     ESOPHAGOSCOPY, GASTROSCOPY, DUODENOSCOPY (EGD), COMBINED N/A 9/10/2018    Procedure: COMBINED ESOPHAGOSCOPY, GASTROSCOPY, DUODENOSCOPY (EGD);  egd;  Surgeon: Xavier Sutton MD;  Location:  OR     ESOPHAGOSCOPY, GASTROSCOPY, DUODENOSCOPY (EGD), COMBINED N/A 4/17/2019    Procedure: COMBINED ESOPHAGOSCOPY, GASTROSCOPY, DUODENOSCOPY (EGD);  Surgeon: Leventhal, Thomas Michael, MD;  Location:  GI     EYE SURGERY Bilateral     cataracts       Social History:  Social History     Tobacco Use     Smoking status: Never Smoker     Smokeless tobacco: Never Used   Substance Use Topics     Alcohol use: No     Alcohol/week: 0.0 standard drinks     Drug use: No         Family History:  Family History   Problem Relation Age of Onset     Hypertension Father    - Denies a family history of liver disease         Medications:  Current Outpatient Medications   Medication     acetaminophen (TYLENOL) 325 MG tablet     aMILoride (MIDAMOR) 5 MG tablet     amLODIPine (NORVASC) 10 MG tablet     furosemide (LASIX) 20 MG tablet     gabapentin (NEURONTIN) 300 MG tablet     omeprazole (PRILOSEC) 40 MG capsule     ASPIRIN LOW DOSE 81 MG EC tablet     cyclobenzaprine (FLEXERIL) 10 MG tablet     emollient (VANICREAM) external cream     ferrous fumarate (FERRETTS) 324 (106 Fe) MG TABS tablet     levothyroxine (SYNTHROID/LEVOTHROID) 50 MCG tablet     levothyroxine (SYNTHROID/LEVOTHROID) 75 MCG tablet     lidocaine, viscous, (XYLOCAINE) 2 % solution     mometasone (ELOCON) 0.1 % external ointment     nortriptyline (PAMELOR) 25 MG capsule     vitamin D3 (CHOLECALCIFEROL) 1000 units (25 mcg) tablet     No current facility-administered medications for this visit.      Facility-Administered Medications Ordered in Other Visits   Medication     gadobutrol (GADAVIST) injection 7.5 mL       Review of Systems  A complete 10 point review of systems was asked and answered in the negative unless specifically commented upon in the  HPI    Objective:           There were no vitals filed for this visit.  There is no height or weight on file to calculate BMI.       Labs and Diagnostic tests:    MELD-Na score: 11 at 4/27/2020  8:36 AM  MELD score: 11 at 4/27/2020  8:36 AM  Calculated from:  Serum Creatinine: 1.38 mg/dL at 4/27/2020  8:36 AM  Serum Sodium: 138 mmol/L (Rounded to 137 mmol/L) at 4/27/2020  8:36 AM  Total Bilirubin: 0.9 mg/dL (Rounded to 1 mg/dL) at 4/27/2020  8:36 AM  INR(ratio): 1.11 at 4/27/2020  8:36 AM  Age: 75 years 1 month    Imaging:  CT Abdomen 4/20/2020  FINDINGS:  Lower thorax:  The heart size is mildly enlarged. No pericardial effusion. Bibasilar  atelectasis. No pleural effusion.     Abdomen and Pelvis:   Cirrhotic liver. No focal hepatic lesions. Fatty infiltration of the  pancreas. The gallbladder and spleen are unremarkable. Subcentimeter  cyst in the right kidney. No hydronephrosis. The adrenal glands are  normal.  Moderate hiatal hernia. No bowel dilation. No intra-abdominal  free air. Moderate ascites.     Vessels and lymph nodes:  Atherosclerotic calcifications of the aorta. Nonenlarged  retroperitoneal and mesenteric lymph nodes.     Bones and soft tissues:  Mild anasarca. No acute osseous abnormalities.    Procedures:  EGD: 4/17/2019  Findings:        Non-bleeding small (< 5 mm) varices were found in the lower third of the        esophagus,. No stigmata of recent bleeding were evident and no red jesus        signs were present. Stigmata of prior treatment were evident. Scarring        from prior treatment was visible.        A small hiatal hernia was present.        The lower third of the esophagus was mildly tortuous.        An endoclip was found on the greater curvature of the stomach.        Mild portal hypertensive gastropathy was found in the entire examined        stomach.        The examined duodenum was normal.                                           Colonoscopy: 1/19/2018  Findings:        The perianal  and digital rectal examinations were normal. Pertinent        negatives include no palpable rectal lesions.        The entire examined colon appeared normal.        The retroflexed view of the distal rectum and anal verge was normal and        showed no anal or rectal abnormalities.     Assessment and Plan:    Cirrhosis:   - Secondary to chronic hepatitis C, now s/p treatment with SVR of Hepatitis C  - His main signs of decompensation are esophageal varices and ascites.    - MELD labs Q 6 months    Hepatocellular Cancer Screening:   - Abd US from 10/2019 and CT Abd w/ contrast 4/2020 without hepatic masses  - Abd US now  - Recommend screening for HCC every 6 months with either abdominal ultrasound or by alternating abdominal ultrasound with EITHER a triple/quad phase CT Liver with IV contrast OR a Quad phase MRI Liver with IV contrast.  AFP levels should be checked every 6 months at time of imaging screen.    Ascites:   - Developed gynecomastia with spironolactone  - Had DIA with last time taking furosemide  - Will trial lasix 10mg daily (1/2 tab) and will trial amiloride 5mg daily  - Discussed need to follow a sodium restricted (<2g sodium) diet indefinitely    - Will follow up BMP in 2-4 weeks given mild DIA on CKD    Hepatic Encephalopathy: no acute issues    Esophageal Varices:  - Exam on 4/17/19 with small varices  - Recommend repeating an upper GI endoscopy 1-2 year.  If evidence of medium/large esophageal varices, would recommend esophageal varix band ligation and if band ligation is performed, please continue to repeat until eraditation of varices.    Kidney Health: Has CKD stage 3 (eGFR < 60, >30)  - Encourage more free water intake based on current Cr    Immobility/Frailty: no acute issues    Nutrition:  As with most patients with chronic liver disease, there is a significant degree of protein malnutrition.  Dicussed need to change dietary habits to improve overall protein balance.  Discussed the  importance of eating between 1.2-1.5g/kg/day lean protein like eggs, fish, chicken, nuts, and legumes, in addition to a diet rich in fresh fruits and vegetables.  Continue to follow a sodium restricted (<2g sodium diet) and discussed the need to minimize the intake of carbohydrates and sugars, to avoid obesity.   - Strongly encourage protein supplements 2-3 times daily (Boost, Ensure, [place] Instant Milk, etc.) to meet protein and caloric intake.  - Recommend a bedtime snack with protein and complex carbohydrate to minimize risk of muscle catabolism overnight    Follow Up:  4 months     Thank you very much for the opportunity to participate in the care of this patient.  If you have any further questions, please don't hesitate to contact our office.    Thomas M. Leventhal, M.D.   of Medicine  Advanced & Transplant Hepatology  [facility]        Phone call duration: 15 minutes          Again, thank you for allowing me to participate in the care of your patient.        Sincerely,        Thomas M. Leventhal, MD

## 2020-08-27 NOTE — PROGRESS NOTES
"Patient stated he has muscle/ joint aches all the time and states this is from his spironolactone. He states he is still taking it though. He is NOT taking lasix any longer - stating that the provider told him not to take this any longer.    Danielle Cook is a 75 year old male who is being evaluated via a billable telephone visit.      The patient has been notified of following:     \"This telephone visit will be conducted via a call between you and your physician/provider. We have found that certain health care needs can be provided without the need for a physical exam.  This service lets us provide the care you need with a short phone conversation.  If a prescription is necessary we can send it directly to your pharmacy.  If lab work is needed we can place an order for that and you can then stop by our lab to have the test done at a later time.    Telephone visits are billed at different rates depending on your insurance coverage. During this emergency period, for some insurers they may be billed the same as an in-person visit.  Please reach out to your insurance provider with any questions.    If during the course of the call the physician/provider feels a telephone visit is not appropriate, you will not be charged for this service.\"    Patient has given verbal consent for Telephone visit?  Yes    What phone number would you like to be contacted at? Emily  [phone] / patient ph#[phone]    How would you like to obtain your AVS? Mail a copy    Subjective:            Danielle Cook is a 75 year old male presenting for evaluation of liver disease    Due to language barrier, an  was present during the history-taking and subsequent discussion with this patient.    History of Present Illness   Danielle Cook is a 75 year old male with past medical history of chronic kidney disease, Chronic hepatitis C (s/p SVR) with cirrhosis complicated by esophageal varices and mild " protein calorie malnutrition who presents in routine follow up.    Since last visit when he was initiated on diuretic therapy, he has had some side effects.  Feels that he is getting some muscle cramps which he previously relates to his Lasix use and does note the onset of breast pain and tenderness.  He is currently taking spironolactone 50 mg daily as a sole diuretic.  He notes on this regimen he has had significant improvement in the fluid buildup in his belly and in his lower extremities.  He otherwise notes that his thinking is clear, believes he is got normal concentration and memory.  He continues to eat well and is staying physically active.      Past Medical History:  Past Medical History:   Diagnosis Date     Bad headache      CKD (chronic kidney disease)      CKD (chronic kidney disease) stage 3, GFR 30-59 ml/min (H)      Hep C w/o coma, chronic (H)      Hepatitis C virus infection      Hypertension    Hepatitis C related cirrhosis    Surgical History:  Past Surgical History:   Procedure Laterality Date     COLONOSCOPY N/A 1/19/2018    Procedure: COLONOSCOPY;  COMBINED ESOPHAGOSCOPY, GASTROSCOPY, DUODENOSCOPY (EGD) REMOVE TUMOR/POYP/LESION BY SNARE, CONTROL BLEED,BANDING/LIGATION OF VARICES Colonoscopy;  Surgeon: Xavier Sutton MD;  Location:  GI     ESOPHAGOSCOPY, GASTROSCOPY, DUODENOSCOPY (EGD), COMBINED N/A 11/4/2015    Procedure: COMBINED ESOPHAGOSCOPY, GASTROSCOPY, DUODENOSCOPY (EGD), BIOPSY SINGLE OR MULTIPLE;  Surgeon: Inocente Do MD;  Location:  GI     ESOPHAGOSCOPY, GASTROSCOPY, DUODENOSCOPY (EGD), COMBINED N/A 3/29/2018    Procedure: COMBINED ESOPHAGOSCOPY, GASTROSCOPY, DUODENOSCOPY (EGD);  egd;  Surgeon: Danielle Mendenhall MD;  Location:  GI     ESOPHAGOSCOPY, GASTROSCOPY, DUODENOSCOPY (EGD), COMBINED N/A 9/10/2018    Procedure: COMBINED ESOPHAGOSCOPY, GASTROSCOPY, DUODENOSCOPY (EGD);  egd;  Surgeon: Xavier Sutton MD;  Location:  OR     ESOPHAGOSCOPY, GASTROSCOPY,  DUODENOSCOPY (EGD), COMBINED N/A 4/17/2019    Procedure: COMBINED ESOPHAGOSCOPY, GASTROSCOPY, DUODENOSCOPY (EGD);  Surgeon: Leventhal, Thomas Michael, MD;  Location:  GI     EYE SURGERY Bilateral     cataracts       Social History:  Social History     Tobacco Use     Smoking status: Never Smoker     Smokeless tobacco: Never Used   Substance Use Topics     Alcohol use: No     Alcohol/week: 0.0 standard drinks     Drug use: No         Family History:  Family History   Problem Relation Age of Onset     Hypertension Father    - Denies a family history of liver disease         Medications:  Current Outpatient Medications   Medication     acetaminophen (TYLENOL) 325 MG tablet     aMILoride (MIDAMOR) 5 MG tablet     amLODIPine (NORVASC) 10 MG tablet     furosemide (LASIX) 20 MG tablet     gabapentin (NEURONTIN) 300 MG tablet     omeprazole (PRILOSEC) 40 MG capsule     ASPIRIN LOW DOSE 81 MG EC tablet     cyclobenzaprine (FLEXERIL) 10 MG tablet     emollient (VANICREAM) external cream     ferrous fumarate (FERRETTS) 324 (106 Fe) MG TABS tablet     levothyroxine (SYNTHROID/LEVOTHROID) 50 MCG tablet     levothyroxine (SYNTHROID/LEVOTHROID) 75 MCG tablet     lidocaine, viscous, (XYLOCAINE) 2 % solution     mometasone (ELOCON) 0.1 % external ointment     nortriptyline (PAMELOR) 25 MG capsule     vitamin D3 (CHOLECALCIFEROL) 1000 units (25 mcg) tablet     No current facility-administered medications for this visit.      Facility-Administered Medications Ordered in Other Visits   Medication     gadobutrol (GADAVIST) injection 7.5 mL       Review of Systems  A complete 10 point review of systems was asked and answered in the negative unless specifically commented upon in the HPI    Objective:           There were no vitals filed for this visit.  There is no height or weight on file to calculate BMI.       Labs and Diagnostic tests:    MELD-Na score: 11 at 4/27/2020  8:36 AM  MELD score: 11 at 4/27/2020  8:36 AM  Calculated  from:  Serum Creatinine: 1.38 mg/dL at 4/27/2020  8:36 AM  Serum Sodium: 138 mmol/L (Rounded to 137 mmol/L) at 4/27/2020  8:36 AM  Total Bilirubin: 0.9 mg/dL (Rounded to 1 mg/dL) at 4/27/2020  8:36 AM  INR(ratio): 1.11 at 4/27/2020  8:36 AM  Age: 75 years 1 month    Imaging:  CT Abdomen 4/20/2020  FINDINGS:  Lower thorax:  The heart size is mildly enlarged. No pericardial effusion. Bibasilar  atelectasis. No pleural effusion.     Abdomen and Pelvis:   Cirrhotic liver. No focal hepatic lesions. Fatty infiltration of the  pancreas. The gallbladder and spleen are unremarkable. Subcentimeter  cyst in the right kidney. No hydronephrosis. The adrenal glands are  normal.  Moderate hiatal hernia. No bowel dilation. No intra-abdominal  free air. Moderate ascites.     Vessels and lymph nodes:  Atherosclerotic calcifications of the aorta. Nonenlarged  retroperitoneal and mesenteric lymph nodes.     Bones and soft tissues:  Mild anasarca. No acute osseous abnormalities.    Procedures:  EGD: 4/17/2019  Findings:        Non-bleeding small (< 5 mm) varices were found in the lower third of the        esophagus,. No stigmata of recent bleeding were evident and no red jesus        signs were present. Stigmata of prior treatment were evident. Scarring        from prior treatment was visible.        A small hiatal hernia was present.        The lower third of the esophagus was mildly tortuous.        An endoclip was found on the greater curvature of the stomach.        Mild portal hypertensive gastropathy was found in the entire examined        stomach.        The examined duodenum was normal.                                           Colonoscopy: 1/19/2018  Findings:        The perianal and digital rectal examinations were normal. Pertinent        negatives include no palpable rectal lesions.        The entire examined colon appeared normal.        The retroflexed view of the distal rectum and anal verge was normal and        showed no  anal or rectal abnormalities.     Assessment and Plan:    Cirrhosis:   - Secondary to chronic hepatitis C, now s/p treatment with SVR of Hepatitis C  - His main signs of decompensation are esophageal varices and ascites.    - MELD labs Q 6 months    Hepatocellular Cancer Screening:   - Abd US from 10/2019 and CT Abd w/ contrast 4/2020 without hepatic masses  - Abd US now  - Recommend screening for HCC every 6 months with either abdominal ultrasound or by alternating abdominal ultrasound with EITHER a triple/quad phase CT Liver with IV contrast OR a Quad phase MRI Liver with IV contrast.  AFP levels should be checked every 6 months at time of imaging screen.    Ascites:   - Developed gynecomastia with spironolactone  - Had DIA with last time taking furosemide  - Will trial lasix 10mg daily (1/2 tab) and will trial amiloride 5mg daily  - Discussed need to follow a sodium restricted (<2g sodium) diet indefinitely    - Will follow up BMP in 2-4 weeks given mild DIA on CKD    Hepatic Encephalopathy: no acute issues    Esophageal Varices:  - Exam on 4/17/19 with small varices  - Recommend repeating an upper GI endoscopy 1-2 year.  If evidence of medium/large esophageal varices, would recommend esophageal varix band ligation and if band ligation is performed, please continue to repeat until eraditation of varices.    Kidney Health: Has CKD stage 3 (eGFR < 60, >30)  - Encourage more free water intake based on current Cr    Immobility/Frailty: no acute issues    Nutrition:  As with most patients with chronic liver disease, there is a significant degree of protein malnutrition.  Dicussed need to change dietary habits to improve overall protein balance.  Discussed the importance of eating between 1.2-1.5g/kg/day lean protein like eggs, fish, chicken, nuts, and legumes, in addition to a diet rich in fresh fruits and vegetables.  Continue to follow a sodium restricted (<2g sodium diet) and discussed the need to minimize the  intake of carbohydrates and sugars, to avoid obesity.   - Strongly encourage protein supplements 2-3 times daily (Boost, Ensure, [place] Instant Milk, etc.) to meet protein and caloric intake.  - Recommend a bedtime snack with protein and complex carbohydrate to minimize risk of muscle catabolism overnight    Follow Up:  4 months     Thank you very much for the opportunity to participate in the care of this patient.  If you have any further questions, please don't hesitate to contact our office.    Thomas M. Leventhal, M.D.   of Medicine  Advanced & Transplant Hepatology  [facility]        Phone call duration: 15 minutes

## 2020-08-27 NOTE — PATIENT INSTRUCTIONS
- we will start a very low dose of the lasix (10mg which is 1/2 of the 20mg tab)    - we will start a diuretic called Amiloride    - Please stop the spironolactone    - Please get an ultrasound and labs in 2 weeks

## 2020-09-02 ENCOUNTER — TELEPHONE (OUTPATIENT)
Dept: GASTROENTEROLOGY | Facility: CLINIC | Age: 75
End: 2020-09-02

## 2020-09-02 NOTE — TELEPHONE ENCOUNTER
Spoke with pharmacist at [facility] pharmacy, per pharmacist they received the prescriptions and they mailed them out to the patient. Call back to pt's wife, Priscilla,letting her know prescriptions were mailed to patients home and [facility] if [facility] have not receive them. Wife voiced understanding of plan.    ---------  [facility] Call Center    Phone Message    May a detailed message be left on voicemail: yes     Reason for Call: Medication Question or concern regarding medication   Prescription Clarification  Name of Medication: Patient spouse is calling stating the pharmacy did not get the Rx and would like to get this processed.     aMILoride (MIDAMOR) 5 MG tablet [1048] (Order [id number])     furosemide (LASIX) 20 MG tablet [8864] (Order [id number])     Prescribing Provider: Dr. Leventhal   Pharmacy: [facility]   What on the order needs clarification? Spouse states pharmacy doesn't have Rx as they attempted to pick them up yesterday at the pharmacy. Please advise.          Action Taken: Other:  HEPATOLOGY    Travel Screening: Not Applicable

## 2020-09-10 ENCOUNTER — TELEPHONE (OUTPATIENT)
Dept: GASTROENTEROLOGY | Facility: CLINIC | Age: 75
End: 2020-09-10

## 2020-09-10 NOTE — TELEPHONE ENCOUNTER
"Lee Miller Shannon, LPN     All scheduled.     -------  Karen Dumas LPN Branch, Tony     Orders are in. Please call wife back to arrange appointments. Thank you.   ----------  [facility] Call Center    Phone Message    May a detailed message be left on voicemail: yes     Reason for Call: Other: pt wife, Priscilla, reporting that Dr. Leventhal told pt that his nurse would call the pt to get the imaging and labs scheduled within 15 days of speaking with this pt. Priscilla reporting that no one has contacted the pt to do labs and imaging. Priscilla wants nurse to call back to let her know \"how soon should pt have labs (which labs) and imaging? Thank you.     Action Taken: Message routed to:  [facility]  Hepatology    Travel Screening: Not Applicable                                                                      "

## 2020-09-14 ENCOUNTER — TELEPHONE (OUTPATIENT)
Dept: GASTROENTEROLOGY | Facility: CLINIC | Age: 75
End: 2020-09-14

## 2020-09-17 ENCOUNTER — ANCILLARY PROCEDURE (OUTPATIENT)
Dept: ULTRASOUND IMAGING | Facility: CLINIC | Age: 75
End: 2020-09-17
Attending: INTERNAL MEDICINE
Payer: COMMERCIAL

## 2020-09-17 DIAGNOSIS — K74.60 CIRRHOSIS OF LIVER WITHOUT ASCITES, UNSPECIFIED HEPATIC CIRRHOSIS TYPE (H): ICD-10-CM

## 2020-09-17 LAB
AFP SERPL-MCNC: 2.4 UG/L (ref 0–8)
ALBUMIN SERPL-MCNC: 3 G/DL (ref 3.4–5)
ALP SERPL-CCNC: 254 U/L (ref 40–150)
ALT SERPL W P-5'-P-CCNC: 26 U/L (ref 0–70)
ANION GAP SERPL CALCULATED.3IONS-SCNC: 6 MMOL/L (ref 3–14)
AST SERPL W P-5'-P-CCNC: 31 U/L (ref 0–45)
BILIRUB DIRECT SERPL-MCNC: 0.7 MG/DL (ref 0–0.2)
BILIRUB SERPL-MCNC: 1.2 MG/DL (ref 0.2–1.3)
BUN SERPL-MCNC: 7 MG/DL (ref 7–30)
CALCIUM SERPL-MCNC: 8.7 MG/DL (ref 8.5–10.1)
CHLORIDE SERPL-SCNC: 109 MMOL/L (ref 94–109)
CO2 SERPL-SCNC: 26 MMOL/L (ref 20–32)
CREAT SERPL-MCNC: 1.16 MG/DL (ref 0.66–1.25)
ERYTHROCYTE [DISTWIDTH] IN BLOOD BY AUTOMATED COUNT: 14.2 % (ref 10–15)
GFR SERPL CREATININE-BSD FRML MDRD: 61 ML/MIN/{1.73_M2}
GLUCOSE SERPL-MCNC: 104 MG/DL (ref 70–99)
HCT VFR BLD AUTO: 37.1 % (ref 40–53)
HGB BLD-MCNC: 11.7 G/DL (ref 13.3–17.7)
INR PPP: 1.22 (ref 0.86–1.14)
MCH RBC QN AUTO: 29 PG (ref 26.5–33)
MCHC RBC AUTO-ENTMCNC: 31.5 G/DL (ref 31.5–36.5)
MCV RBC AUTO: 92 FL (ref 78–100)
PLATELET # BLD AUTO: 131 10E9/L (ref 150–450)
POTASSIUM SERPL-SCNC: 3.6 MMOL/L (ref 3.4–5.3)
PROT SERPL-MCNC: 7.8 G/DL (ref 6.8–8.8)
RBC # BLD AUTO: 4.04 10E12/L (ref 4.4–5.9)
SODIUM SERPL-SCNC: 141 MMOL/L (ref 133–144)
WBC # BLD AUTO: 5.9 10E9/L (ref 4–11)

## 2020-10-01 ENCOUNTER — TELEPHONE (OUTPATIENT)
Dept: GASTROENTEROLOGY | Facility: CLINIC | Age: 75
End: 2020-10-01

## 2021-01-12 ENCOUNTER — TELEPHONE (OUTPATIENT)
Dept: GASTROENTEROLOGY | Facility: CLINIC | Age: 76
End: 2021-01-12

## 2021-01-12 NOTE — TELEPHONE ENCOUNTER
[facility] Call Center    Phone Message    May a detailed message be left on voicemail: yes     Reason for Call: Other: Pt wishes to be seen in-person. Please call pt back to schedule. Thanks!     Action Taken: Message routed to:  [facility] hep    Travel Screening: Not Applicable

## 2021-01-13 ENCOUNTER — TELEPHONE (OUTPATIENT)
Dept: GASTROENTEROLOGY | Facility: CLINIC | Age: 76
End: 2021-01-13

## 2021-01-13 NOTE — TELEPHONE ENCOUNTER
Lab orders faxed to [facility].  Pt's wife notified.    Corrina GOMEZ LPN  Hepatology Clinic      [facility]    Phone Message    May a detailed message be left on voicemail: yes     Reason for Call: Priscilla Aguayo, patients wife, wants to order labs for patient to be done at [facility], [address] [address].  Patient has an appointment with Dr. Young on 1/28/21.  Please call [phone] to schedule order.      Action Taken: Message routed to:  [facility] Hepatology    Travel Screening: Not Applicable

## 2021-01-14 NOTE — TELEPHONE ENCOUNTER
Lee Miller Shannon, LPN    He agreed to do a tele visit and I rescheduled him. I also got him set up for labs the day before.     ------    Beckenbach,Karen,LPN  Clinic Coordinators-[facility]-; Branch, Tony Dr. Leventhal can see pt in person if in person  is available, if not virtual with over the phone .

## 2021-01-25 ENCOUNTER — TELEPHONE (OUTPATIENT)
Dept: GASTROENTEROLOGY | Facility: CLINIC | Age: 76
End: 2021-01-25

## 2021-01-25 NOTE — TELEPHONE ENCOUNTER
[facility] Call Center    Phone Message    May a detailed message be left on voicemail: yes     Reason for Call: Order(s): Other:   Reason for requested: Pt is requesting for their lab orders to be sent to the [facility], [address] [address]. Was told they were faxed but nothing has showed. Thanks!  Date needed: asap  Provider name: Dr. Leventhal      Action Taken: Message routed to:  [facility] hep    Travel Screening: Not Applicable

## 2021-01-25 NOTE — TELEPHONE ENCOUNTER
Dr. Leventhal agreed to see pt in person. Pt updated by clinic coordinator.    Karen GOMEZ LPN  Hepatology Clinic

## 2021-01-25 NOTE — TELEPHONE ENCOUNTER
[facility] Call Center    Phone Message    May a detailed message be left on voicemail: yes     Reason for Call: Order(s): Other:   Reason for requested: Pt wishes to be seen in-person for his appt on 1/28/2021. Thanks!  Date needed: asap  Provider name: Dr. Leventhal      Action Taken: Message routed to:  [facility] hep    Travel Screening: Not Applicable

## 2021-01-25 NOTE — TELEPHONE ENCOUNTER
Labs needed for upcoming appointment re faxed to [facility] clinic as requested. LVM For Ali nurse at [facility]  Clinic to call me back if orders were not received, direct number given.    Karen GOMEZ LPN  Hepatology Clinic

## 2021-01-28 ENCOUNTER — OFFICE VISIT (OUTPATIENT)
Dept: GASTROENTEROLOGY | Facility: CLINIC | Age: 76
End: 2021-01-28
Attending: INTERNAL MEDICINE
Payer: COMMERCIAL

## 2021-01-28 VITALS
BODY MASS INDEX: 23.49 KG/M2 | WEIGHT: 154.5 LBS | SYSTOLIC BLOOD PRESSURE: 125 MMHG | DIASTOLIC BLOOD PRESSURE: 71 MMHG | HEART RATE: 102 BPM | OXYGEN SATURATION: 100 %

## 2021-01-28 DIAGNOSIS — N18.30 STAGE 3 CHRONIC KIDNEY DISEASE, UNSPECIFIED WHETHER STAGE 3A OR 3B CKD (H): ICD-10-CM

## 2021-01-28 DIAGNOSIS — K74.60 CIRRHOSIS OF LIVER WITHOUT ASCITES, UNSPECIFIED HEPATIC CIRRHOSIS TYPE (H): ICD-10-CM

## 2021-01-28 DIAGNOSIS — I85.10 SECONDARY ESOPHAGEAL VARICES WITHOUT BLEEDING (H): ICD-10-CM

## 2021-01-28 DIAGNOSIS — E44.0 MODERATE PROTEIN-CALORIE MALNUTRITION (H): ICD-10-CM

## 2021-01-28 DIAGNOSIS — K74.60 CIRRHOSIS OF LIVER WITHOUT ASCITES, UNSPECIFIED HEPATIC CIRRHOSIS TYPE (H): Primary | ICD-10-CM

## 2021-01-28 DIAGNOSIS — Z12.9 SCREENING FOR CANCER: ICD-10-CM

## 2021-01-28 LAB
AFP SERPL-MCNC: 3.2 UG/L (ref 0–8)
ALBUMIN SERPL-MCNC: 3.2 G/DL (ref 3.4–5)
ALP SERPL-CCNC: 316 U/L (ref 40–150)
ALT SERPL W P-5'-P-CCNC: 31 U/L (ref 0–70)
ANION GAP SERPL CALCULATED.3IONS-SCNC: 6 MMOL/L (ref 3–14)
AST SERPL W P-5'-P-CCNC: 35 U/L (ref 0–45)
BILIRUB DIRECT SERPL-MCNC: 0.7 MG/DL (ref 0–0.2)
BILIRUB SERPL-MCNC: 1.3 MG/DL (ref 0.2–1.3)
BUN SERPL-MCNC: 12 MG/DL (ref 7–30)
CALCIUM SERPL-MCNC: 8.9 MG/DL (ref 8.5–10.1)
CHLORIDE SERPL-SCNC: 107 MMOL/L (ref 94–109)
CO2 SERPL-SCNC: 22 MMOL/L (ref 20–32)
CREAT SERPL-MCNC: 1.34 MG/DL (ref 0.66–1.25)
ERYTHROCYTE [DISTWIDTH] IN BLOOD BY AUTOMATED COUNT: 15.3 % (ref 10–15)
GFR SERPL CREATININE-BSD FRML MDRD: 51 ML/MIN/{1.73_M2}
GLUCOSE SERPL-MCNC: 98 MG/DL (ref 70–99)
HCT VFR BLD AUTO: 35 % (ref 40–53)
HGB BLD-MCNC: 11.1 G/DL (ref 13.3–17.7)
INR PPP: 1.16 (ref 0.86–1.14)
MCH RBC QN AUTO: 26.9 PG (ref 26.5–33)
MCHC RBC AUTO-ENTMCNC: 31.7 G/DL (ref 31.5–36.5)
MCV RBC AUTO: 85 FL (ref 78–100)
PLATELET # BLD AUTO: 169 10E9/L (ref 150–450)
POTASSIUM SERPL-SCNC: 4.5 MMOL/L (ref 3.4–5.3)
PROT SERPL-MCNC: 8.4 G/DL (ref 6.8–8.8)
RBC # BLD AUTO: 4.13 10E12/L (ref 4.4–5.9)
SODIUM SERPL-SCNC: 136 MMOL/L (ref 133–144)
WBC # BLD AUTO: 7.1 10E9/L (ref 4–11)

## 2021-01-28 PROCEDURE — 80076 HEPATIC FUNCTION PANEL: CPT | Performed by: PATHOLOGY

## 2021-01-28 PROCEDURE — 99000 SPECIMEN HANDLING OFFICE-LAB: CPT | Performed by: PATHOLOGY

## 2021-01-28 PROCEDURE — 99215 OFFICE O/P EST HI 40 MIN: CPT | Performed by: INTERNAL MEDICINE

## 2021-01-28 PROCEDURE — 36415 COLL VENOUS BLD VENIPUNCTURE: CPT | Performed by: PATHOLOGY

## 2021-01-28 PROCEDURE — 80048 BASIC METABOLIC PNL TOTAL CA: CPT | Performed by: PATHOLOGY

## 2021-01-28 PROCEDURE — 85610 PROTHROMBIN TIME: CPT | Performed by: PATHOLOGY

## 2021-01-28 PROCEDURE — 82105 ALPHA-FETOPROTEIN SERUM: CPT | Mod: 90 | Performed by: PATHOLOGY

## 2021-01-28 PROCEDURE — 85027 COMPLETE CBC AUTOMATED: CPT | Performed by: PATHOLOGY

## 2021-01-28 ASSESSMENT — PAIN SCALES - GENERAL: PAINLEVEL: NO PAIN (0)

## 2021-01-28 NOTE — NURSING NOTE
Chief Complaint   Patient presents with     RECHECK     4 month f/u     Blood pressure 125/71, pulse 102, weight 70.1 kg (154 lb 8 oz), SpO2 100 %.    Citlalli Villarreal, CMA

## 2021-01-28 NOTE — PROGRESS NOTES
Danielle Cook is a 75 year old male who is being evaluated via a billable telephone visit.        Subjective:            Danielle Cook is a 75 year old male presenting for evaluation of liver disease    Due to language barrier, an  was present during the history-taking and subsequent discussion with this patient.    History of Present Illness   Danielle Cook is a 75 year old male with past medical history of chronic kidney disease, Chronic hepatitis C (s/p SVR) with cirrhosis complicated by esophageal varices and mild protein calorie malnutrition who presents in routine follow up.     Since last seen he has overall been doing well.  He has not had issues with volume overload and his kidney and liver numbers continue to trend toward normal.  Unfortunately, he had an event in November where he woke up, had shooting pain in his eyes and a headache, was very weak in his extremities, and possible facial droop.  EMS was called, and CT head was normal.  No overt etiology was determined.  Since that time he will have flashes of light and painful burning in eyes lasting seconds, and frequent headaches.     He continues on lasix and amiloride    Past Medical History:  Past Medical History:   Diagnosis Date     Bad headache      CKD (chronic kidney disease)      CKD (chronic kidney disease) stage 3, GFR 30-59 ml/min      Hep C w/o coma, chronic (H)      Hepatitis C virus infection      Hypertension    Hepatitis C related cirrhosis    Surgical History:  Past Surgical History:   Procedure Laterality Date     COLONOSCOPY N/A 1/19/2018    Procedure: COLONOSCOPY;  COMBINED ESOPHAGOSCOPY, GASTROSCOPY, DUODENOSCOPY (EGD) REMOVE TUMOR/POYP/LESION BY SNARE, CONTROL BLEED,BANDING/LIGATION OF VARICES Colonoscopy;  Surgeon: Xavier Sutton MD;  Location:  GI     ESOPHAGOSCOPY, GASTROSCOPY, DUODENOSCOPY (EGD), COMBINED N/A 11/4/2015    Procedure: COMBINED ESOPHAGOSCOPY, GASTROSCOPY, DUODENOSCOPY (EGD), BIOPSY  SINGLE OR MULTIPLE;  Surgeon: Inocente Do MD;  Location:  GI     ESOPHAGOSCOPY, GASTROSCOPY, DUODENOSCOPY (EGD), COMBINED N/A 3/29/2018    Procedure: COMBINED ESOPHAGOSCOPY, GASTROSCOPY, DUODENOSCOPY (EGD);  egd;  Surgeon: Danielle Mendenhall MD;  Location:  GI     ESOPHAGOSCOPY, GASTROSCOPY, DUODENOSCOPY (EGD), COMBINED N/A 9/10/2018    Procedure: COMBINED ESOPHAGOSCOPY, GASTROSCOPY, DUODENOSCOPY (EGD);  egd;  Surgeon: Xavier Sutton MD;  [facility]     ESOPHAGOSCOPY, GASTROSCOPY, DUODENOSCOPY (EGD), COMBINED N/A 4/17/2019    Procedure: COMBINED ESOPHAGOSCOPY, GASTROSCOPY, DUODENOSCOPY (EGD);  Surgeon: Leventhal, Thomas Michael, MD;  Location:  GI     EYE SURGERY Bilateral     cataracts       Social History:  Social History     Tobacco Use     Smoking status: Never Smoker     Smokeless tobacco: Never Used   Substance Use Topics     Alcohol use: No     Alcohol/week: 0.0 standard drinks     Drug use: No         Family History:  Family History   Problem Relation Age of Onset     Hypertension Father    - Denies a family history of liver disease         Medications:  Current Outpatient Medications   Medication     acetaminophen (TYLENOL) 325 MG tablet     aMILoride (MIDAMOR) 5 MG tablet     amLODIPine (NORVASC) 10 MG tablet     ASPIRIN LOW DOSE 81 MG EC tablet     furosemide (LASIX) 20 MG tablet     gabapentin (NEURONTIN) 300 MG tablet     levothyroxine (SYNTHROID/LEVOTHROID) 50 MCG tablet     levothyroxine (SYNTHROID/LEVOTHROID) 75 MCG tablet     lidocaine, viscous, (XYLOCAINE) 2 % solution     nortriptyline (PAMELOR) 25 MG capsule     omeprazole (PRILOSEC) 40 MG capsule     cyclobenzaprine (FLEXERIL) 10 MG tablet     emollient (VANICREAM) external cream     ferrous fumarate (FERRETTS) 324 (106 Fe) MG TABS tablet     mometasone (ELOCON) 0.1 % external ointment     vitamin D3 (CHOLECALCIFEROL) 1000 units (25 mcg) tablet     No current facility-administered medications for this visit.       Facility-Administered Medications Ordered in Other Visits   Medication     gadobutrol (GADAVIST) injection 7.5 mL       Review of Systems  A complete 10 point review of systems was asked and answered in the negative unless specifically commented upon in the HPI    Objective:           Vitals:    01/28/21 0956   BP: 125/71   Pulse: 102   SpO2: 100%   Weight: 70.1 kg (154 lb 8 oz)     Body mass index is 23.49 kg/m .   Gen: NAD  CV: RRR  Abd: soft, non-tender, non-distended  Ext: no edema  Neuro: A&Ox3, no asterixis    Labs and Diagnostic tests:    MELD-Na score: 13 at 1/28/2021  9:34 AM  MELD score: 12 at 1/28/2021  9:34 AM  Calculated from:  Serum Creatinine: 1.34 mg/dL at 1/28/2021  9:34 AM  Serum Sodium: 136 mmol/L at 1/28/2021  9:34 AM  Total Bilirubin: 1.3 mg/dL at 1/28/2021  9:34 AM  INR(ratio): 1.16 at 1/28/2021  9:34 AM  Age: 75 years 10 months    Imaging:  Abd US 9/17/2020  Findings:     Liver:     The liver demonstrates coarsened echotexture and diffusely increased  parenchymal echogenicity. No focal lesions identified.     US visualization score: A - No or minimal limitations     Gallbladder: The gallbladder is well distended and of normal  morphology. There is no wall thickening, pericholecystic fluid,  sonographic Betancur's sign nor evidence for cholelithiasis.     Bile Ducts: Both the intra- and extrahepatic biliary system are of  normal caliber. The common bile duct measures 3.4 mm in diameter.     Pancreas: Visualized portions of the head and body of the pancreas are  unremarkable.      Kidneys: Both kidneys are of normal echotexture, without mass nor  hydronephrosis.  The craniocaudal dimensions are: right- 8.1 cm, left-  8.4 cm.     Spleen: The spleen is normal in size, measuring 10.4 cm in sagittal  dimension.     Aorta and IVC: The visualized portions of the aorta and IVC are  unremarkable. The aorta measures 1.9 cm in diameter and the IVC  measures 0.9 cm in diameter.     Fluid: Trace  ascites.    Procedures:  EGD: 4/17/2019  Findings:        Non-bleeding small (< 5 mm) varices were found in the lower third of the        esophagus,. No stigmata of recent bleeding were evident and no red jesus        signs were present. Stigmata of prior treatment were evident. Scarring        from prior treatment was visible.        A small hiatal hernia was present.        The lower third of the esophagus was mildly tortuous.        An endoclip was found on the greater curvature of the stomach.        Mild portal hypertensive gastropathy was found in the entire examined        stomach.        The examined duodenum was normal.                                           Colonoscopy: 1/19/2018  Findings:        The perianal and digital rectal examinations were normal. Pertinent        negatives include no palpable rectal lesions.        The entire examined colon appeared normal.        The retroflexed view of the distal rectum and anal verge was normal and        showed no anal or rectal abnormalities.     Assessment and Plan:    Cirrhosis:   - Secondary to chronic hepatitis C, now s/p treatment with SVR of Hepatitis C  - His main signs of decompensation are esophageal varices and ascites.    - MELD labs Q 6 months    Hepatocellular Cancer Screening:   - Abd US 9/2020 without masses; ordered repeat for 3/2021  - Recommend screening for HCC every 6 months with either abdominal ultrasound or by alternating abdominal ultrasound with EITHER a triple/quad phase CT Liver with IV contrast OR a Quad phase MRI Liver with IV contrast.  AFP levels should be checked every 6 months at time of imaging screen.    Ascites:   - Developed gynecomastia with spironolactone  - Had DIA historically with moderate dosefurosemide  - Tolerating lasix 10mg daily (1/2 tab) and amiloride 5mg daily  - Discussed need to follow a sodium restricted (<2g sodium) diet indefinitely    Hepatic Encephalopathy: no acute issues    Esophageal Varices:  -  Exam on 4/17/19 with small varices  - Recommend repeating an upper GI endoscopy now.  If evidence of medium/large esophageal varices, would recommend esophageal varix band ligation and if band ligation is performed, please continue to repeat until eraditation of varices.    Kidney Health: Has CKD stage 3 (eGFR < 60, >30)  - Encourage more free water intake based on current Cr    Immobility/Frailty: no acute issues    Nutrition:  As with most patients with chronic liver disease, there is a significant degree of protein malnutrition.  Dicussed need to change dietary habits to improve overall protein balance.  Discussed the importance of eating between 1.2-1.5g/kg/day lean protein like eggs, fish, chicken, nuts, and legumes, in addition to a diet rich in fresh fruits and vegetables.  Continue to follow a sodium restricted (<2g sodium diet) and discussed the need to minimize the intake of carbohydrates and sugars, to avoid obesity.   - Strongly encourage protein supplements 2-3 times daily (Boost, Ensure, [place] Instant Milk, etc.) to meet protein and caloric intake.  - Recommend a bedtime snack with protein and complex carbohydrate to minimize risk of muscle catabolism overnight    Follow Up:  6 months     Thank you very much for the opportunity to participate in the care of this patient.  If you have any further questions, please don't hesitate to contact our office.    Thomas M. Leventhal, M.D.   of Medicine  Advanced & Transplant Hepatology  [facility]

## 2021-01-28 NOTE — LETTER
1/28/2021         RE: Danielle Cook  [address]  [facility] [address]        Dear Colleague,    Thank you for referring your patient, Danielle Cook, to the [facility] [place]. Please see a copy of my visit note below.    Danielle Cook is a 75 year old male who is being evaluated via a billable telephone visit.        Subjective:            Danielle Cook is a 75 year old male presenting for evaluation of liver disease    Due to language barrier, an  was present during the history-taking and subsequent discussion with this patient.    History of Present Illness   Danielle Cook is a 75 year old male with past medical history of chronic kidney disease, Chronic hepatitis C (s/p SVR) with cirrhosis complicated by esophageal varices and mild protein calorie malnutrition who presents in routine follow up.     Since last seen he has overall been doing well.  He has not had issues with volume overload and his kidney and liver numbers continue to trend toward normal.  Unfortunately, he had an event in November where he woke up, had shooting pain in his eyes and a headache, was very weak in his extremities, and possible facial droop.  EMS was called, and CT head was normal.  No overt etiology was determined.  Since that time he will have flashes of light and painful burning in eyes lasting seconds, and frequent headaches.     He continues on lasix and amiloride    Past Medical History:  Past Medical History:   Diagnosis Date     Bad headache      CKD (chronic kidney disease)      CKD (chronic kidney disease) stage 3, GFR 30-59 ml/min      Hep C w/o coma, chronic (H)      Hepatitis C virus infection      Hypertension    Hepatitis C related cirrhosis    Surgical History:  Past Surgical History:   Procedure Laterality Date     COLONOSCOPY N/A 1/19/2018    Procedure: COLONOSCOPY;  COMBINED ESOPHAGOSCOPY, GASTROSCOPY, DUODENOSCOPY (EGD)  REMOVE TUMOR/POYP/LESION BY SNARE, CONTROL BLEED,BANDING/LIGATION OF VARICES Colonoscopy;  Surgeon: Xavier Sutton MD;  Location: [facility]     ESOPHAGOSCOPY, GASTROSCOPY, DUODENOSCOPY (EGD), COMBINED N/A 11/4/2015    Procedure: COMBINED ESOPHAGOSCOPY, GASTROSCOPY, DUODENOSCOPY (EGD), BIOPSY SINGLE OR MULTIPLE;  Surgeon: Inocente Do MD;  Location: [facility]     ESOPHAGOSCOPY, GASTROSCOPY, DUODENOSCOPY (EGD), COMBINED N/A 3/29/2018    Procedure: COMBINED ESOPHAGOSCOPY, GASTROSCOPY, DUODENOSCOPY (EGD);  egd;  Surgeon: Danielle Mendenhall MD;  Location: [facility]     ESOPHAGOSCOPY, GASTROSCOPY, DUODENOSCOPY (EGD), COMBINED N/A 9/10/2018    Procedure: COMBINED ESOPHAGOSCOPY, GASTROSCOPY, DUODENOSCOPY (EGD);  egd;  Surgeon: Xavier Sutton MD;  [facility]     ESOPHAGOSCOPY, GASTROSCOPY, DUODENOSCOPY (EGD), COMBINED N/A 4/17/2019    Procedure: COMBINED ESOPHAGOSCOPY, GASTROSCOPY, DUODENOSCOPY (EGD);  Surgeon: Leventhal, Thomas Michael, MD;  Location:  GI     EYE SURGERY Bilateral     cataracts       Social History:  Social History     Tobacco Use     Smoking status: Never Smoker     Smokeless tobacco: Never Used   Substance Use Topics     Alcohol use: No     Alcohol/week: 0.0 standard drinks     Drug use: No         Family History:  Family History   Problem Relation Age of Onset     Hypertension Father    - Denies a family history of liver disease         Medications:  Current Outpatient Medications   Medication     acetaminophen (TYLENOL) 325 MG tablet     aMILoride (MIDAMOR) 5 MG tablet     amLODIPine (NORVASC) 10 MG tablet     ASPIRIN LOW DOSE 81 MG EC tablet     furosemide (LASIX) 20 MG tablet     gabapentin (NEURONTIN) 300 MG tablet     levothyroxine (SYNTHROID/LEVOTHROID) 50 MCG tablet     levothyroxine (SYNTHROID/LEVOTHROID) 75 MCG tablet     lidocaine, viscous, (XYLOCAINE) 2 % solution     nortriptyline (PAMELOR) 25 MG capsule     omeprazole (PRILOSEC) 40 MG capsule     cyclobenzaprine (FLEXERIL) 10 MG  tablet     emollient (VANICREAM) external cream     ferrous fumarate (FERRETTS) 324 (106 Fe) MG TABS tablet     mometasone (ELOCON) 0.1 % external ointment     vitamin D3 (CHOLECALCIFEROL) 1000 units (25 mcg) tablet     No current facility-administered medications for this visit.      Facility-Administered Medications Ordered in Other Visits   Medication     gadobutrol (GADAVIST) injection 7.5 mL       Review of Systems  A complete 10 point review of systems was asked and answered in the negative unless specifically commented upon in the HPI    Objective:           Vitals:    01/28/21 0956   BP: 125/71   Pulse: 102   SpO2: 100%   Weight: 70.1 kg (154 lb 8 oz)     Body mass index is 23.49 kg/m .   Gen: NAD  CV: RRR  Abd: soft, non-tender, non-distended  Ext: no edema  Neuro: A&Ox3, no asterixis    Labs and Diagnostic tests:    MELD-Na score: 13 at 1/28/2021  9:34 AM  MELD score: 12 at 1/28/2021  9:34 AM  Calculated from:  Serum Creatinine: 1.34 mg/dL at 1/28/2021  9:34 AM  Serum Sodium: 136 mmol/L at 1/28/2021  9:34 AM  Total Bilirubin: 1.3 mg/dL at 1/28/2021  9:34 AM  INR(ratio): 1.16 at 1/28/2021  9:34 AM  Age: 75 years 10 months    Imaging:  Abd US 9/17/2020  Findings:     Liver:     The liver demonstrates coarsened echotexture and diffusely increased  parenchymal echogenicity. No focal lesions identified.     US visualization score: A - No or minimal limitations     Gallbladder: The gallbladder is well distended and of normal  morphology. There is no wall thickening, pericholecystic fluid,  sonographic Betancur's sign nor evidence for cholelithiasis.     Bile Ducts: Both the intra- and extrahepatic biliary system are of  normal caliber. The common bile duct measures 3.4 mm in diameter.     Pancreas: Visualized portions of the head and body of the pancreas are  unremarkable.      Kidneys: Both kidneys are of normal echotexture, without mass nor  hydronephrosis.  The craniocaudal dimensions are: right- 8.1 cm,  left-  8.4 cm.     Spleen: The spleen is normal in size, measuring 10.4 cm in sagittal  dimension.     Aorta and IVC: The visualized portions of the aorta and IVC are  unremarkable. The aorta measures 1.9 cm in diameter and the IVC  measures 0.9 cm in diameter.     Fluid: Trace ascites.    Procedures:  EGD: 4/17/2019  Findings:        Non-bleeding small (< 5 mm) varices were found in the lower third of the        esophagus,. No stigmata of recent bleeding were evident and no red jesus        signs were present. Stigmata of prior treatment were evident. Scarring        from prior treatment was visible.        A small hiatal hernia was present.        The lower third of the esophagus was mildly tortuous.        An endoclip was found on the greater curvature of the stomach.        Mild portal hypertensive gastropathy was found in the entire examined        stomach.        The examined duodenum was normal.                                           Colonoscopy: 1/19/2018  Findings:        The perianal and digital rectal examinations were normal. Pertinent        negatives include no palpable rectal lesions.        The entire examined colon appeared normal.        The retroflexed view of the distal rectum and anal verge was normal and        showed no anal or rectal abnormalities.     Assessment and Plan:    Cirrhosis:   - Secondary to chronic hepatitis C, now s/p treatment with SVR of Hepatitis C  - His main signs of decompensation are esophageal varices and ascites.    - MELD labs Q 6 months    Hepatocellular Cancer Screening:   - Abd US 9/2020 without masses; ordered repeat for 3/2021  - Recommend screening for HCC every 6 months with either abdominal ultrasound or by alternating abdominal ultrasound with EITHER a triple/quad phase CT Liver with IV contrast OR a Quad phase MRI Liver with IV contrast.  AFP levels should be checked every 6 months at time of imaging screen.    Ascites:   - Developed gynecomastia with  spironolactone  - Had DIA historically with moderate dosefurosemide  - Tolerating lasix 10mg daily (1/2 tab) and amiloride 5mg daily  - Discussed need to follow a sodium restricted (<2g sodium) diet indefinitely    Hepatic Encephalopathy: no acute issues    Esophageal Varices:  - Exam on 4/17/19 with small varices  - Recommend repeating an upper GI endoscopy now.  If evidence of medium/large esophageal varices, would recommend esophageal varix band ligation and if band ligation is performed, please continue to repeat until eraditation of varices.    Kidney Health: Has CKD stage 3 (eGFR < 60, >30)  - Encourage more free water intake based on current Cr    Immobility/Frailty: no acute issues    Nutrition:  As with most patients with chronic liver disease, there is a significant degree of protein malnutrition.  Dicussed need to change dietary habits to improve overall protein balance.  Discussed the importance of eating between 1.2-1.5g/kg/day lean protein like eggs, fish, chicken, nuts, and legumes, in addition to a diet rich in fresh fruits and vegetables.  Continue to follow a sodium restricted (<2g sodium diet) and discussed the need to minimize the intake of carbohydrates and sugars, to avoid obesity.   - Strongly encourage protein supplements 2-3 times daily (Boost, Ensure, [place] Instant Milk, etc.) to meet protein and caloric intake.  - Recommend a bedtime snack with protein and complex carbohydrate to minimize risk of muscle catabolism overnight    Follow Up:  6 months     Thank you very much for the opportunity to participate in the care of this patient.  If you have any further questions, please don't hesitate to contact our office.    Thomas M. Leventhal, M.D.   of Medicine  Advanced & Transplant Hepatology  [facility]

## 2021-01-28 NOTE — PATIENT INSTRUCTIONS
Cirrhosis Education  Nutrition  - For patients with cirrhosis, it is very important to eat the right types and amounts of foods.  We recommend a diet low in carbohydrates/sugars and high in fresh fruits/vegetables, with the right amount of protein.  We typically recommend 1.5gm/kg/day of protein, or  grams of protein every day.  - In regard to protein intake, you can focus on: All meats, cooked fish and seafood, vegetable-based protein meat products, tofu, eggs, legumes, dairy products (including milk and yogurt and cheese), unsalted nuts.  - You should eat at least three meals a day and three to four snacks between meals  - Bedtime snacks are especially important (preferrably something with some protein)    - Patients with malnutrition and/or loss of muscular mass can improve their nutrition and muscular mass by drinking two cans of dietary supplements daily, particularly at bedtime.  These would include: Ensure, Boost, [place] Instant Milk, Glucerna, (or similar supplements)  - Please avoid eating raw seafood, especially shellfish, because of risk of serious illness  - We recommend all patients with cirrhosis limit their daily sodium intake to less than 2,000mg      General Liver Health  - Continue to avoid the use of all non-steroid anti-inflammatory medicines (ibuprofen, Aleve, naproxen, aspirin, etc.) as this can cause serious injury to your kidneys in the setting of liver disease  - If you see a doctor and they prescribe you a new medication, please contact our clinic to let us know what changes are being made  - If you become acutely ill and present to an ER or are admitted to a hospital, please let us know as soon as you are able.  - We recommend that all patients with chronic liver disease be vaccinated against hepatitis A and B.  Please discuss with your primary provider the need for these vaccines  - As patients with liver disease are at an increased risk of developing osteoporosis, we recommend  that you have a DEXA scan with appropriate follow up and treatment.   - We recommend screening for Vitamin D deficiency (at least yearly) and aggressive replacement/supplementation as warranted.    Sleep Troubles:  - Sleep issues are very common in patients with chronic liver disease  - Recommend strict avoidance of medications such as narcotics, sedatives and sleep aids.    - Low dose benadryl or melatonin can be used for insomnia, if needed.

## 2021-01-29 ENCOUNTER — TELEPHONE (OUTPATIENT)
Dept: GASTROENTEROLOGY | Facility: OUTPATIENT CENTER | Age: 76
End: 2021-01-29

## 2021-01-29 NOTE — TELEPHONE ENCOUNTER
Patient is scheduled for EGD with Dr. GODWIN    Spoke with: PATIENT & -FAMILY    Date of Procedure: 2/18/2021    Location: [facility]    Sedation Type CS    Pre-op for [facility] MAC N/A    (if yes advise patient they will need a pre-op prior to procedure)      Is patient on blood thinners? -N (If yes- inform patient to follow up with PCP or provider for follow up instructions)     Informed patient they will need an adult  Y    Informed Patient of COVID Test Requirement Y-SCHED    Preferred Pharmacy for Pre Prescription N/A    Confirmed Nurse will call to complete assessment N    Additional comments: N/A

## 2021-02-05 DIAGNOSIS — Z11.59 ENCOUNTER FOR SCREENING FOR OTHER VIRAL DISEASES: ICD-10-CM

## 2021-02-15 DIAGNOSIS — Z11.59 ENCOUNTER FOR SCREENING FOR OTHER VIRAL DISEASES: ICD-10-CM

## 2021-02-15 LAB
SARS-COV-2 RNA RESP QL NAA+PROBE: NORMAL
SPECIMEN SOURCE: NORMAL

## 2021-02-15 PROCEDURE — U0003 INFECTIOUS AGENT DETECTION BY NUCLEIC ACID (DNA OR RNA); SEVERE ACUTE RESPIRATORY SYNDROME CORONAVIRUS 2 (SARS-COV-2) (CORONAVIRUS DISEASE [COVID-19]), AMPLIFIED PROBE TECHNIQUE, MAKING USE OF HIGH THROUGHPUT TECHNOLOGIES AS DESCRIBED BY CMS-2020-01-R: HCPCS | Mod: 90 | Performed by: PATHOLOGY

## 2021-02-15 PROCEDURE — U0005 INFEC AGEN DETEC AMPLI PROBE: HCPCS | Mod: 90 | Performed by: PATHOLOGY

## 2021-02-15 PROCEDURE — 99000 SPECIMEN HANDLING OFFICE-LAB: CPT | Performed by: PATHOLOGY

## 2021-02-16 LAB
LABORATORY COMMENT REPORT: NORMAL
SARS-COV-2 RNA RESP QL NAA+PROBE: NEGATIVE
SPECIMEN SOURCE: NORMAL

## 2021-02-18 ENCOUNTER — HOSPITAL ENCOUNTER (OUTPATIENT)
Facility: CLINIC | Age: 76
Discharge: HOME | End: 2021-02-18
Attending: INTERNAL MEDICINE | Admitting: INTERNAL MEDICINE
Payer: COMMERCIAL

## 2021-02-18 VITALS
SYSTOLIC BLOOD PRESSURE: 125 MMHG | WEIGHT: 153.44 LBS | OXYGEN SATURATION: 99 % | BODY MASS INDEX: 23.26 KG/M2 | DIASTOLIC BLOOD PRESSURE: 68 MMHG | RESPIRATION RATE: 19 BRPM | HEART RATE: 56 BPM | TEMPERATURE: 98.1 F | HEIGHT: 68 IN

## 2021-02-18 LAB — UPPER GI ENDOSCOPY: NORMAL

## 2021-02-18 PROCEDURE — 250N000013 HC RX MED GY IP 250 OP 250 PS 637: Performed by: INTERNAL MEDICINE

## 2021-02-18 PROCEDURE — 43235 EGD DIAGNOSTIC BRUSH WASH: CPT | Performed by: INTERNAL MEDICINE

## 2021-02-18 PROCEDURE — 250N000009 HC RX 250: Performed by: INTERNAL MEDICINE

## 2021-02-18 PROCEDURE — 250N000011 HC RX IP 250 OP 636: Performed by: INTERNAL MEDICINE

## 2021-02-18 PROCEDURE — G0500 MOD SEDAT ENDO SERVICE >5YRS: HCPCS | Performed by: INTERNAL MEDICINE

## 2021-02-18 PROCEDURE — 40000104 ZZH STATISTIC MODERATE SEDATION < 10 MIN: Performed by: INTERNAL MEDICINE

## 2021-02-18 RX ORDER — ONDANSETRON 2 MG/ML
4 INJECTION INTRAMUSCULAR; INTRAVENOUS
Status: DISCONTINUED | OUTPATIENT
Start: 2021-02-18 | End: 2021-02-18 | Stop reason: HOSPADM

## 2021-02-18 RX ORDER — FLUMAZENIL 0.1 MG/ML
0.2 INJECTION, SOLUTION INTRAVENOUS
Status: DISCONTINUED | OUTPATIENT
Start: 2021-02-18 | End: 2021-02-19 | Stop reason: HOSPADM

## 2021-02-18 RX ORDER — NALOXONE HYDROCHLORIDE 0.4 MG/ML
0.2 INJECTION, SOLUTION INTRAMUSCULAR; INTRAVENOUS; SUBCUTANEOUS
Status: DISCONTINUED | OUTPATIENT
Start: 2021-02-18 | End: 2021-02-19 | Stop reason: HOSPADM

## 2021-02-18 RX ORDER — NALOXONE HYDROCHLORIDE 0.4 MG/ML
0.4 INJECTION, SOLUTION INTRAMUSCULAR; INTRAVENOUS; SUBCUTANEOUS
Status: DISCONTINUED | OUTPATIENT
Start: 2021-02-18 | End: 2021-02-19 | Stop reason: HOSPADM

## 2021-02-18 RX ORDER — PROCHLORPERAZINE MALEATE 5 MG
5 TABLET ORAL EVERY 6 HOURS PRN
Status: DISCONTINUED | OUTPATIENT
Start: 2021-02-18 | End: 2021-02-22 | Stop reason: HOSPADM

## 2021-02-18 RX ORDER — ONDANSETRON 2 MG/ML
4 INJECTION INTRAMUSCULAR; INTRAVENOUS EVERY 6 HOURS PRN
Status: DISCONTINUED | OUTPATIENT
Start: 2021-02-18 | End: 2021-02-22 | Stop reason: HOSPADM

## 2021-02-18 RX ORDER — SIMETHICONE
LIQUID (ML) MISCELLANEOUS PRN
Status: DISCONTINUED | OUTPATIENT
Start: 2021-02-18 | End: 2021-02-22 | Stop reason: HOSPADM

## 2021-02-18 RX ORDER — LIDOCAINE 40 MG/G
CREAM TOPICAL
Status: DISCONTINUED | OUTPATIENT
Start: 2021-02-18 | End: 2021-02-18 | Stop reason: HOSPADM

## 2021-02-18 RX ORDER — FENTANYL CITRATE 50 UG/ML
INJECTION, SOLUTION INTRAMUSCULAR; INTRAVENOUS PRN
Status: DISCONTINUED | OUTPATIENT
Start: 2021-02-18 | End: 2021-02-22 | Stop reason: HOSPADM

## 2021-02-18 RX ORDER — ONDANSETRON 4 MG/1
4 TABLET, ORALLY DISINTEGRATING ORAL EVERY 6 HOURS PRN
Status: DISCONTINUED | OUTPATIENT
Start: 2021-02-18 | End: 2021-02-22 | Stop reason: HOSPADM

## 2021-02-18 ASSESSMENT — MIFFLIN-ST. JEOR: SCORE: 1410.5

## (undated) RX ORDER — FENTANYL CITRATE 50 UG/ML
INJECTION, SOLUTION INTRAMUSCULAR; INTRAVENOUS
Status: DISPENSED
Start: 2018-01-19

## (undated) RX ORDER — FENTANYL CITRATE 50 UG/ML
INJECTION, SOLUTION INTRAMUSCULAR; INTRAVENOUS
Status: DISPENSED
Start: 2018-03-29

## (undated) RX ORDER — DIPHENHYDRAMINE HYDROCHLORIDE 50 MG/ML
INJECTION INTRAMUSCULAR; INTRAVENOUS
Status: DISPENSED
Start: 2019-04-17

## (undated) RX ORDER — FENTANYL CITRATE 50 UG/ML
INJECTION, SOLUTION INTRAMUSCULAR; INTRAVENOUS
Status: DISPENSED
Start: 2018-10-10

## (undated) RX ORDER — FENTANYL CITRATE 50 UG/ML
INJECTION, SOLUTION INTRAMUSCULAR; INTRAVENOUS
Status: DISPENSED
Start: 2019-04-17

## (undated) RX ORDER — SIMETHICONE 20 MG/.3ML
EMULSION ORAL
Status: DISPENSED
Start: 2018-10-10

## (undated) RX ORDER — DIPHENHYDRAMINE HYDROCHLORIDE 50 MG/ML
INJECTION INTRAMUSCULAR; INTRAVENOUS
Status: DISPENSED
Start: 2018-01-19

## (undated) RX ORDER — SIMETHICONE 20 MG/.3ML
EMULSION ORAL
Status: DISPENSED
Start: 2018-01-19

## (undated) RX ORDER — FENTANYL CITRATE 50 UG/ML
INJECTION, SOLUTION INTRAMUSCULAR; INTRAVENOUS
Status: DISPENSED
Start: 2018-09-10